# Patient Record
Sex: FEMALE | Race: WHITE | NOT HISPANIC OR LATINO | Employment: PART TIME | URBAN - METROPOLITAN AREA
[De-identification: names, ages, dates, MRNs, and addresses within clinical notes are randomized per-mention and may not be internally consistent; named-entity substitution may affect disease eponyms.]

---

## 2021-08-11 ENCOUNTER — TRANSFERRED RECORDS (OUTPATIENT)
Dept: HEALTH INFORMATION MANAGEMENT | Facility: CLINIC | Age: 66
End: 2021-08-11

## 2021-08-11 DIAGNOSIS — Z11.59 ENCOUNTER FOR SCREENING FOR OTHER VIRAL DISEASES: ICD-10-CM

## 2021-08-13 ENCOUNTER — LAB (OUTPATIENT)
Dept: LAB | Facility: CLINIC | Age: 66
End: 2021-08-13
Attending: INTERNAL MEDICINE
Payer: COMMERCIAL

## 2021-08-13 ENCOUNTER — TELEPHONE (OUTPATIENT)
Dept: SURGERY | Facility: CLINIC | Age: 66
End: 2021-08-13

## 2021-08-13 DIAGNOSIS — Z11.59 ENCOUNTER FOR SCREENING FOR OTHER VIRAL DISEASES: ICD-10-CM

## 2021-08-13 PROCEDURE — U0003 INFECTIOUS AGENT DETECTION BY NUCLEIC ACID (DNA OR RNA); SEVERE ACUTE RESPIRATORY SYNDROME CORONAVIRUS 2 (SARS-COV-2) (CORONAVIRUS DISEASE [COVID-19]), AMPLIFIED PROBE TECHNIQUE, MAKING USE OF HIGH THROUGHPUT TECHNOLOGIES AS DESCRIBED BY CMS-2020-01-R: HCPCS

## 2021-08-13 PROCEDURE — U0005 INFEC AGEN DETEC AMPLI PROBE: HCPCS

## 2021-08-13 NOTE — TELEPHONE ENCOUNTER
Per Dr. Wolf's faxed request, called Khloe to make an appointment for her to see Dr. Viera for surgical consult.  Khloe has been recently diagnosed with Her 2 + breast cancer.  She had her work up at [facility] in [place].  She did meet with Dr. Frank at [facility] for surgical consult but prefers to keep her care closer to home.  She is having her port placed by IR on Tuesday, 8-17-21.  Made her an appointment to come see Dr. Viera for surgical consult on Thursday, 8-19-21 at 0940.  Told her she may bring someone with to her consult with Dr. Viera, masks required.

## 2021-08-14 LAB — SARS-COV-2 RNA RESP QL NAA+PROBE: NEGATIVE

## 2021-08-16 NOTE — H&P
Interventional Radiology - History and Physical  8/16/2021    Procedure Requested: Port placement  Requesting Provider: Brandon Wolf MD    HPI: Khloe Becker is a 65 year old female with a recent diagnosis of LEFT sided ductal breast carcinoma.    Presents today for a port placement.    Imaging:   N/A    NPO Status: MN  Anticoagulation/Antiplatelets/Bleeding tendencies: MN  Antibiotics: Ancef 2 g IV x1 ordered for procedure    Review of Systems: A comprehensive 10-point review of systems was performed. All systems were reviewed and negative with exception to those reported in the HPI.    PMH:  Past Medical History:   Diagnosis Date     Breast cancer (H)     left     Hypertension      Obese        PSH:  Past Surgical History:   Procedure Laterality Date     CHOLECYSTECTOMY       HAND SURGERY Left      hysteectomy       TONSILLECTOMY         ALLERGIES:  Allergies   Allergen Reactions     Shellfish-Derived Products Hives     Latex Rash       MEDICATIONS:  Current Outpatient Medications   Medication     busPIRone (BUSPAR) 10 MG tablet     clonazePAM (KLONOPIN) 0.5 MG tablet     fluticasone (VERAMYST) 27.5 MCG/SPRAY nasal spray     furosemide (LASIX) 20 MG tablet     hydrochlorothiazide 10 mg/mL SUSP     multivitamin w/minerals (THERA-VIT-M) tablet     Current Facility-Administered Medications   Medication     lidocaine (LMX4) cream     lidocaine 1 % 0.1-1 mL     sodium chloride (PF) 0.9% PF flush 3 mL     sodium chloride (PF) 0.9% PF flush 3 mL     sodium chloride 0.9% 1000 mL TABLE SOLN     sodium chloride 0.9% infusion         LABS:  No results found for: INR   No results found for: HGB]  No results found for: PLT  No results found for: CR  No results found for: POTASSIUM      EXAM:  BP (!) 172/87 (BP Location: Right arm)   Pulse 74   Temp 98.2  F (36.8  C) (Oral)   Resp 18   SpO2 96%      General:  Stable.  In no acute distress.    Neuro:  A&O x 3. Answers questions appropriately  Resp:  Lungs clear to  auscultation bilaterally. RA.  Unlabored  Cardio:  S1S2 and reg, without murmur, clicks or rubs  Abdomen:  Soft, non-distended, non-tender, positive bowel sounds.  Skin:  Without excoriations, ecchymosis, erythema, lesions or open sores on bilateral chest and neck.    Pre-Sedation Assessment:  Mallampati Airway Classification:  II - Faucial pillars and soft palate may be seen, but uvula is masked by the base of the tongue  Previous reaction to anesthesia/sedation:  No  Sedation plan based on assessment: Moderate (conscious) sedation  ASA Classification: Class 3 - SEVERE SYSTEMIC DISEASE, DEFINITE FUNCTIONAL LIMITATIONS.   Code Status: Full Code intra procedure, per discussion with patient.     ASSESSMENT:  66 yo F presents today for a port placement with sedation 2/2 newly diagnosed LEFT sided breast cancer requiring chemotherapy    PLAN:    Proceed with port placement, with sedation    - No apparent contraindications for right sided port placement based upon chart review, physical exam, and discussion with patient today- prefer RIGHT sided placement given underlying dx  - Ancef per MAR    Procedure, risks/benefits, details, alternatives, and sedation reviewed with patient and she verbalized understanding. All questions answered. OK to proceed with above radiology procedure.       MIR IRVING NP  Interventional Radiology  [phone]

## 2021-08-17 ENCOUNTER — HOSPITAL ENCOUNTER (OUTPATIENT)
Dept: INTERVENTIONAL RADIOLOGY/VASCULAR | Facility: HOSPITAL | Age: 66
Discharge: HOME | End: 2021-08-17
Attending: INTERNAL MEDICINE | Admitting: RADIOLOGY
Payer: COMMERCIAL

## 2021-08-17 VITALS
DIASTOLIC BLOOD PRESSURE: 76 MMHG | SYSTOLIC BLOOD PRESSURE: 144 MMHG | HEART RATE: 75 BPM | OXYGEN SATURATION: 98 % | RESPIRATION RATE: 16 BRPM | TEMPERATURE: 98.3 F

## 2021-08-17 DIAGNOSIS — C50.919 BREAST CANCER, FEMALE (H): ICD-10-CM

## 2021-08-17 PROCEDURE — 99153 MOD SED SAME PHYS/QHP EA: CPT

## 2021-08-17 PROCEDURE — 272N000500 HC NEEDLE CR2

## 2021-08-17 PROCEDURE — 76937 US GUIDE VASCULAR ACCESS: CPT

## 2021-08-17 PROCEDURE — C1788 PORT, INDWELLING, IMP: HCPCS

## 2021-08-17 PROCEDURE — 99152 MOD SED SAME PHYS/QHP 5/>YRS: CPT

## 2021-08-17 PROCEDURE — 36561 INSERT TUNNELED CV CATH: CPT

## 2021-08-17 PROCEDURE — 250N000011 HC RX IP 250 OP 636: Performed by: NURSE PRACTITIONER

## 2021-08-17 PROCEDURE — C1769 GUIDE WIRE: HCPCS

## 2021-08-17 PROCEDURE — 250N000011 HC RX IP 250 OP 636: Performed by: RADIOLOGY

## 2021-08-17 PROCEDURE — 250N000009 HC RX 250: Performed by: NURSE PRACTITIONER

## 2021-08-17 RX ORDER — SODIUM CHLORIDE 9 MG/ML
INJECTION, SOLUTION INTRAVENOUS CONTINUOUS
Status: DISCONTINUED | OUTPATIENT
Start: 2021-08-17 | End: 2021-08-18 | Stop reason: HOSPADM

## 2021-08-17 RX ORDER — CEFAZOLIN SODIUM 2 G/100ML
2 INJECTION, SOLUTION INTRAVENOUS ONCE
Status: COMPLETED | OUTPATIENT
Start: 2021-08-17 | End: 2021-08-17

## 2021-08-17 RX ORDER — NALOXONE HYDROCHLORIDE 0.4 MG/ML
0.2 INJECTION, SOLUTION INTRAMUSCULAR; INTRAVENOUS; SUBCUTANEOUS
Status: DISCONTINUED | OUTPATIENT
Start: 2021-08-17 | End: 2021-08-18 | Stop reason: HOSPADM

## 2021-08-17 RX ORDER — FUROSEMIDE 20 MG
20 TABLET ORAL PRN
COMMUNITY
End: 2022-12-28

## 2021-08-17 RX ORDER — NALOXONE HYDROCHLORIDE 0.4 MG/ML
0.4 INJECTION, SOLUTION INTRAMUSCULAR; INTRAVENOUS; SUBCUTANEOUS
Status: DISCONTINUED | OUTPATIENT
Start: 2021-08-17 | End: 2021-08-18 | Stop reason: HOSPADM

## 2021-08-17 RX ORDER — MULTIPLE VITAMINS W/ MINERALS TAB 9MG-400MCG
1 TAB ORAL DAILY
COMMUNITY

## 2021-08-17 RX ORDER — FLUMAZENIL 0.1 MG/ML
0.2 INJECTION, SOLUTION INTRAVENOUS
Status: DISCONTINUED | OUTPATIENT
Start: 2021-08-17 | End: 2021-08-18 | Stop reason: HOSPADM

## 2021-08-17 RX ORDER — BUSPIRONE HYDROCHLORIDE 10 MG/1
10 TABLET ORAL 2 TIMES DAILY PRN
COMMUNITY
End: 2022-10-19

## 2021-08-17 RX ORDER — CLONAZEPAM 0.5 MG/1
0.5 TABLET ORAL 2 TIMES DAILY PRN
COMMUNITY
End: 2022-04-15

## 2021-08-17 RX ORDER — FENTANYL CITRATE 50 UG/ML
25-50 INJECTION, SOLUTION INTRAMUSCULAR; INTRAVENOUS EVERY 5 MIN PRN
Status: DISCONTINUED | OUTPATIENT
Start: 2021-08-17 | End: 2021-08-18 | Stop reason: HOSPADM

## 2021-08-17 RX ORDER — HEPARIN SODIUM (PORCINE) LOCK FLUSH IV SOLN 100 UNIT/ML 100 UNIT/ML
500 SOLUTION INTRAVENOUS ONCE
Status: COMPLETED | OUTPATIENT
Start: 2021-08-17 | End: 2021-08-17

## 2021-08-17 RX ORDER — LIDOCAINE 40 MG/G
CREAM TOPICAL
Status: DISCONTINUED | OUTPATIENT
Start: 2021-08-17 | End: 2021-08-18 | Stop reason: HOSPADM

## 2021-08-17 RX ADMIN — FENTANYL CITRATE 50 MCG: 50 INJECTION, SOLUTION INTRAMUSCULAR; INTRAVENOUS at 08:24

## 2021-08-17 RX ADMIN — FENTANYL CITRATE 50 MCG: 50 INJECTION, SOLUTION INTRAMUSCULAR; INTRAVENOUS at 08:39

## 2021-08-17 RX ADMIN — MIDAZOLAM HYDROCHLORIDE 1 MG: 1 INJECTION, SOLUTION INTRAMUSCULAR; INTRAVENOUS at 08:22

## 2021-08-17 RX ADMIN — MIDAZOLAM HYDROCHLORIDE 1 MG: 1 INJECTION, SOLUTION INTRAMUSCULAR; INTRAVENOUS at 08:30

## 2021-08-17 RX ADMIN — LIDOCAINE HYDROCHLORIDE 30 ML: 10 INJECTION, SOLUTION INFILTRATION; PERINEURAL at 08:43

## 2021-08-17 RX ADMIN — FENTANYL CITRATE 50 MCG: 50 INJECTION, SOLUTION INTRAMUSCULAR; INTRAVENOUS at 08:34

## 2021-08-17 RX ADMIN — MIDAZOLAM HYDROCHLORIDE 2 MG: 1 INJECTION, SOLUTION INTRAMUSCULAR; INTRAVENOUS at 08:16

## 2021-08-17 RX ADMIN — CEFAZOLIN SODIUM 2 G: 2 INJECTION, SOLUTION INTRAVENOUS at 08:15

## 2021-08-17 RX ADMIN — FENTANYL CITRATE 50 MCG: 50 INJECTION, SOLUTION INTRAMUSCULAR; INTRAVENOUS at 08:18

## 2021-08-17 RX ADMIN — HEPARIN 500 UNITS: 100 SYRINGE at 08:44

## 2021-08-17 NOTE — IP AVS SNAPSHOT
After Visit Summary Template Not Found    This Print Group is only intended to be used in the After Visit Summary and can only be used in a report that uses a released After Visit Summary Template.                       MRN:[id number]                      After Visit Summary   8/17/2021    Khloe Becker    MRN: [id number]           Visit Information        Provider Department      8/17/2021  8:00 AM [facility]OLOG[facility] NURSE; [facility] Interventional Radiology           Review of your medicines      UNREVIEWED medicines. Ask your doctor about these medicines       Dose / Directions   busPIRone 10 MG tablet  Commonly known as: BUSPAR      Dose: 10 mg  Take 10 mg by mouth 2 times daily as needed  Refills: 0     clonazePAM 0.5 MG tablet  Commonly known as: klonoPIN      Dose: 0.5 mg  Take 0.5 mg by mouth 2 times daily as needed for anxiety  Refills: 0     fluticasone 27.5 MCG/SPRAY nasal spray  Commonly known as: VERAMYST      Dose: 2 spray  Spray 2 sprays into both nostrils daily  Refills: 0     furosemide 20 MG tablet  Commonly known as: LASIX      Dose: 20 mg  Take 20 mg by mouth daily  Refills: 0     hydrochlorothiazide 10 mg/mL Susp      Dose: 25 mg  Take 25 mg by mouth daily  Refills: 0     multivitamin w/minerals tablet      Dose: 1 tablet  Take 1 tablet by mouth daily  Refills: 0              Protect others around you: Learn how to safely use, store and throw away your medicines at www.disposemymeds.org.       Follow-ups after your visit       Your next 10 appointments already scheduled    Aug 19, 2021  9:40 AM  (Arrive by 9:25 AM)  New Visit with Ana Viera MD  [facility] ([facility] ) [address] [place] [id number]  [phone]      Aug 25, 2021 10:00 AM  ECHO COMPLETE with [facility] ECHO STAFF, [facility] OP 1  [facility] ([facility] ) [address]  [facility] [address] [phone]         Care Instructions       Further instructions from your care team       Port Placement Procedure Discharge Instructions:  You had a port placed. A port is a small medical device that is placed under the skin and is connected to a vein with a catheter (thin, flexible tube). Ports can be used to administer IV medications, fluids or blood products (including chemotherapy) or for blood lab draws. Please follow the below instructions:  Care Instructions:  - If you received sedation for your procedure, do not drive or operate heavy machinery for the rest of the day.  - You may shower beginning post procedure day #1.  Do not scrub site until well healed; pat dry.  - Avoid submerging the port site under water (tub baths, Jacuzzis, hot tubs and pools) for 10 days or until glue falls off.  - You may take over the counter pain medication for discomfort. Follow the package directions.  - Avoid heavy lifting (greater than 10 pounds) and strenuous activities for 3 days.   - If you experience significant bleeding at site, apply pressure with hands above the clavicle bone, sit upright and seek immediate medical assistance.    Call [facility] ([phone]) if you experience the following:   - Uncontrolled bleeding from port site  - Fever (greater than 101 F (38.3C))  - Purulent (yellow/green/foul smelling) drainage from port insertion site.  - Increasing pain at port site  - Increasing redness at port site    Additional Information About Your Visit       [employer] Information    [facility] gives you secure access to your electronic health record. If you see a primary care provider, you can also send messages to your care team and make appointments. If you have questions, please call your primary care clinic.  If you do not have a primary care provider, please call [phone] and they will assist you.       Care EveryWhere ID    This is your Care EveryWhere ID. This could  be used by other organizations to access your [facility] medical records  [id number]       Your Vitals Were  Most recent update: 8/17/2021  9:03 AM    Blood Pressure   146/68    Pulse   67    Temperature   98.3  F (36.8  C)    Respirations   16    Pulse Oximetry   98%          Primary Care Provider Office Phone # Fax #    Daniela Haile PA-C [phone]      Equal Access to Services    KAYLAH AGUILERA : Hadii aad ku hadasho Soomaali, waaxda luqadaha, qaybta kaalmada adeegyada, waxay idiin hayaan adeeg kharash la'aan . So [facility] [phone].    ATENCIÓN: Si habla español, tiene a cummins disposición servicios gratuitos de asistencia lingüística. Llame al [phone].    We comply with applicable federal and state civil rights laws, including the [place] Human Rights Act. We do not discriminate on the basis of race, color, creed, Latter-day, national origin, marital status, age, disability, sex, sexual orientation, or gender identity.    If you would like an itemization of your charges they will now be available in [facility] 30 days after discharge. To access the itemized statements in [facility] go to billing/billing summary. From there select view account. There will be multiple tabs showing an overview of your account, detail, payments, and communications. From the communications tab you can see your monthly statements, your itemized statements, and any billing letters generated for your account. If you do not have a [facility] account and need help getting access please contact [facility] support at [phone].  If you would prefer to have your itemized statements mailed please contact our automated itemized bill request line at [phone] option  2.       Thank you!    Thank you for choosing [facility] for your care. Our goal is always to provide you with excellent care. Hearing back from our patients is one way we can continue to improve our services. Please take a few minutes to complete the written survey that  you may receive in the mail after you visit with us. Thank you!            Medication List      ASK your doctor about these medications          Morning Afternoon Evening Bedtime As Needed    busPIRone 10 MG tablet  Also known as: BUSPAR  INSTRUCTIONS: Take 10 mg by mouth 2 times daily as needed                     clonazePAM 0.5 MG tablet  Also known as: klonoPIN  INSTRUCTIONS: Take 0.5 mg by mouth 2 times daily as needed for anxiety                     fluticasone 27.5 MCG/SPRAY nasal spray  Also known as: VERAMYST  INSTRUCTIONS: Spray 2 sprays into both nostrils daily                     furosemide 20 MG tablet  Also known as: LASIX  INSTRUCTIONS: Take 20 mg by mouth daily                     hydrochlorothiazide 10 mg/mL Susp  INSTRUCTIONS: Take 25 mg by mouth daily                     multivitamin w/minerals tablet  INSTRUCTIONS: Take 1 tablet by mouth daily

## 2021-08-17 NOTE — PRE-PROCEDURE
GENERAL PRE-PROCEDURE:   Date/Time:  8/17/2021 8:00 AM    Risks and benefits: Risks, benefits and alternatives were discussed    Consent given by:  Patient  Patient states understanding of procedure being performed: Yes    Patient's understanding of procedure matches consent: Yes    Procedure consent matches procedure scheduled: Yes    Expected level of sedation:  Moderate  Appropriately NPO:  Yes  Mallampati  :  Grade 2- soft palate, base of uvula, tonsillar pillars, and portion of posterior pharyngeal wall visible  Lungs:  Lungs clear with good breath sounds bilaterally  Heart:  Normal heart sounds and rate  History & Physical reviewed:  History and physical reviewed and no updates needed  Statement of review:  I have reviewed the lab findings, diagnostic data, medications, and the plan for sedation

## 2021-08-17 NOTE — DISCHARGE INSTRUCTIONS
Port Placement Procedure Discharge Instructions:  You had a port placed. A port is a small medical device that is placed under the skin and is connected to a vein with a catheter (thin, flexible tube). Ports can be used to administer IV medications, fluids or blood products (including chemotherapy) or for blood lab draws. Please follow the below instructions:  Care Instructions:  - If you received sedation for your procedure, do not drive or operate heavy machinery for the rest of the day.  - You may shower beginning post procedure day #1.  Do not scrub site until well healed; pat dry.  - Avoid submerging the port site under water (tub baths, Jacuzzis, hot tubs and pools) for 10 days or until glue falls off.  - You may take over the counter pain medication for discomfort. Follow the package directions.  - Avoid heavy lifting (greater than 10 pounds) and strenuous activities for 3 days.   - If you experience significant bleeding at site, apply pressure with hands above the clavicle bone, sit upright and seek immediate medical assistance.    Call [facility] ([phone]) if you experience the following:   - Uncontrolled bleeding from port site  - Fever (greater than 101 F (38.3C))  - Purulent (yellow/green/foul smelling) drainage from port insertion site.  - Increasing pain at port site  - Increasing redness at port site

## 2021-08-17 NOTE — IP AVS SNAPSHOT
[facility] Interventional Radiology  [address] [place] [id number]  Phone: [phone]  Fax: [phone]                                    After Visit Summary   8/17/2021    Khloe Becker    MRN: [id number]           After Visit Summary Signature Page    I have received my discharge instructions, and my questions have been answered. I have discussed any challenges I see with this plan with the nurse or doctor.    ..........................................................................................................................................  Patient/Patient Representative Signature      ..........................................................................................................................................  Patient Representative Print Name and Relationship to Patient    ..................................................               ................................................  Date                                   Time    ..........................................................................................................................................  Reviewed by Signature/Title    ...................................................              ..............................................  Date                                               Time          22EPIC Rev 08/18

## 2021-08-17 NOTE — PROGRESS NOTES
Pt given discharge instructions without questions or concerns and pt ambulates from ER without difficulty with family driving.

## 2021-08-17 NOTE — PRE-PROCEDURE
GENERAL PRE-PROCEDURE:   Procedure:  Port placement  Date/Time:  8/17/2021 7:54 AM    Written consent obtained?: Yes    Risks and benefits: Risks, benefits and alternatives were discussed    Consent given by:  Patient  Patient states understanding of procedure being performed: Yes    Patient's understanding of procedure matches consent: Yes    Procedure consent matches procedure scheduled: Yes    Expected level of sedation:  Moderate  Appropriately NPO:  Yes  Mallampati  :  Grade 2- soft palate, base of uvula, tonsillar pillars, and portion of posterior pharyngeal wall visible  Lungs:  Lungs clear with good breath sounds bilaterally  Heart:  Normal heart sounds and rate  History & Physical reviewed:  History and physical reviewed and no updates needed  Statement of review:  I have reviewed the lab findings, diagnostic data, medications, and the plan for sedation

## 2021-08-17 NOTE — PROCEDURES
[facility]    Procedure: IR Procedure Note    Date/Time: 8/17/2021 8:55 AM  Performed by: Claudio Carrillo MD  Authorized by: Claudio Carrillo MD     UNIVERSAL PROTOCOL   Site Marked: NA  Prior Images Obtained and Reviewed:  Yes  Required items: Required blood products, implants, devices and special equipment available    Patient identity confirmed:  Verbally with patient, arm band, provided demographic data and hospital-assigned identification number  Patient was reevaluated immediately before administering moderate or deep sedation or anesthesia  Confirmation Checklist:  Patient's identity using two indicators, relevant allergies, procedure was appropriate and matched the consent or emergent situation and correct equipment/implants were available  Time out: Immediately prior to the procedure a time out was called    Universal Protocol: the Joint Commission Universal Protocol was followed    Preparation: Patient was prepped and draped in usual sterile fashion           ANESTHESIA    Anesthesia: Local infiltration  Local Anesthetic:  Lidocaine 1% without epinephrine      SEDATION    Patient Sedated: Yes    Vital signs: Vital signs monitored during sedation    See dictated procedure note for full details.  Findings: Port placement    Specimens: none    Complications: None    Condition: Stable    Plan: Interventional Radiology Post-Procedure Note    Procedure: Chest port placement.    Attending: Claudio Carrillo,    Findings: Placement of a chest port.  Please see the final report for specific details.    Plan: The chest port is ready for use.    PROCEDURE   Patient Tolerance:  Patient tolerated the procedure well with no immediate complications    Length of time physician/provider present for 1:1 monitoring during sedation: 30

## 2021-08-19 ENCOUNTER — OFFICE VISIT (OUTPATIENT)
Dept: SURGERY | Facility: CLINIC | Age: 66
End: 2021-08-19
Payer: COMMERCIAL

## 2021-08-19 VITALS — HEIGHT: 64 IN | RESPIRATION RATE: 16 BRPM | WEIGHT: 243 LBS | BODY MASS INDEX: 41.48 KG/M2

## 2021-08-19 DIAGNOSIS — E66.01 MORBID OBESITY (H): ICD-10-CM

## 2021-08-19 DIAGNOSIS — C50.812 MALIGNANT NEOPLASM OF OVERLAPPING SITES OF LEFT BREAST IN FEMALE, ESTROGEN RECEPTOR POSITIVE (H): Primary | ICD-10-CM

## 2021-08-19 DIAGNOSIS — Z17.0 MALIGNANT NEOPLASM OF OVERLAPPING SITES OF LEFT BREAST IN FEMALE, ESTROGEN RECEPTOR POSITIVE (H): Primary | ICD-10-CM

## 2021-08-19 PROCEDURE — 99204 OFFICE O/P NEW MOD 45 MIN: CPT | Mod: ZF | Performed by: SPECIALIST

## 2021-08-19 PROCEDURE — G0463 HOSPITAL OUTPT CLINIC VISIT: HCPCS

## 2021-08-19 RX ORDER — DEXAMETHASONE 4 MG/1
TABLET ORAL
COMMUNITY
Start: 2021-08-18 | End: 2022-01-14

## 2021-08-19 RX ORDER — OMEGA-3/DHA/EPA/FISH OIL 300-1000MG
1 CAPSULE ORAL
COMMUNITY

## 2021-08-19 RX ORDER — IRBESARTAN 75 MG/1
75 TABLET ORAL PRN
COMMUNITY
Start: 2020-12-29

## 2021-08-19 RX ORDER — PROCHLORPERAZINE MALEATE 10 MG
10 TABLET ORAL EVERY 6 HOURS PRN
COMMUNITY
Start: 2021-08-18 | End: 2023-05-17 | Stop reason: ALTCHOICE

## 2021-08-19 ASSESSMENT — MIFFLIN-ST. JEOR: SCORE: 1627.24

## 2021-08-19 NOTE — PROGRESS NOTES
"This is a 65 year old  female who I'm asked to see by Daniela Haile for evaluation of a left breast cancer.  This was picked up  on screening mammogram.   The patient cannot feel a mass.  A needle biopsy was done of 2 different areas which shows an invasive ductal carcinoma in one area and then DCIS and another area.  It is estrogen receptor positive , progesterone receptor positive  and HER-2 positive.     She has no family history of breast cancer.      PAST MEDICAL HISTORY:  Past Medical History:   Diagnosis Date     Breast cancer (H)     left     Hypertension      Obese      Past Surgical History:   Procedure Laterality Date     CHOLECYSTECTOMY       HAND SURGERY Left      hysteectomy       IR CHEST PORT PLACEMENT > 5 YRS OF AGE  8/17/2021     TONSILLECTOMY         Medications:    Current Outpatient Medications:      busPIRone (BUSPAR) 10 MG tablet, Take 10 mg by mouth 2 times daily as needed, Disp: , Rfl:      clonazePAM (KLONOPIN) 0.5 MG tablet, Take 0.5 mg by mouth 2 times daily as needed for anxiety, Disp: , Rfl:      fluticasone (VERAMYST) 27.5 MCG/SPRAY nasal spray, Spray 2 sprays into both nostrils daily, Disp: , Rfl:      furosemide (LASIX) 20 MG tablet, Take 20 mg by mouth daily, Disp: , Rfl:      hydrochlorothiazide 10 mg/mL SUSP, Take 25 mg by mouth daily, Disp: , Rfl:      multivitamin w/minerals (THERA-VIT-M) tablet, Take 1 tablet by mouth daily, Disp: , Rfl:     Allergies:  Allergies   Allergen Reactions     Shellfish-Derived Products Hives     Latex Rash        Social History:  Social History     Tobacco Use     Smoking status: Never Smoker     Smokeless tobacco: Never Used   Vaping Use     Vaping Use: Never used   Substance Use Topics     Alcohol use: Not on file     Drug use: Never        ROS:  A comprehensive review of systems was negative.    Physical Exam  Resp 16   Ht 1.626 m (5' 4\")   Wt 110.2 kg (243 lb)   Breastfeeding No   BMI 41.71 kg/m    General:alert, appears stated age, " cooperative and morbidly obese  Lungs:clear to auscultation bilaterally  CV:regular rate and rhythm  Breasts: Very large pendulous breasts.  No palpable masses on the right.  Clearly palpable mass in the upper outer quadrant of the left breast.  No other palpable masses.  No skin changes.  Lymph Nodes:enlarged lymph nodes in the left axilla  Neuro:Grossly normal  Musculoskeletal: Normal range of motion of her upper extremities.  Skin: Normal skin turgor.    Reviewed her mammograms and ultrasound and pathology.     Impression: Left Breast Cancer. Clinically T2, N1.  Discussed the surgical options for treatment of breast cancer which generally are a lumpectomy (partial mastectomy) combined with radiation versus a mastectomy.  Explained that the survival benefit is the same for both.  The difference is in local recurrence risk.  The patient is a Poor candidate for a lumpectomy.  Because of the multifocal nature and in the very large area of calcifications on the left breast, she will need a mastectomy.  I am actually somewhat of a second opinion.  The patient has already visited with Dr. Frank at [facility] and was told exactly the same thing.  Because she is HER-2 positive it was recommended she do neoadjuvant chemotherapy which is exactly what I would have also recommended.  The patient already understands all of this.  And in fact is starting chemotherapy later this week.  We went over the different options for reconstruction after mastectomy.  She also had questions about doing the opposite side.  I explained that whether or not she does the right side is her choice.  It does not improve her survival.  From a symmetry standpoint, she may want to consider at least a breast reduction on the other side.  I suggested she start talking to a plastic surgeon so she can start getting educated about her options.    Plan: The patient will follow up with me in November when she still has 1 or 2 doses of chemotherapy left.  We will  then make further plans for her surgery.  Explained that most of her surgeries are done as an outpatient.  If she does end up having a tissue flap which she is interested in because she does not want an implant, then I told her that would need to be delayed because she will need radiation afterwards.

## 2021-08-19 NOTE — NURSING NOTE
"Khloe presents to [facility] today for a surgical consult with Dr. Viera  regarding her newly diagnosed left breast cancer.  She is accompanied by her friend for consult.  RN assessment and EMR update. Resp 16   Ht 1.626 m (5' 4\")   Wt 110.2 kg (243 lb)   Breastfeeding No   BMI 41.71 kg/m    Patient given a Breast Cancer Packet, contents reviewed.  She met with Dr. Viera  see dictation for details of visit. She will plan to continue with her NAC with Dr. Wolf and come see Dr. Viera when getting close to her last dose. Support provided, invited calls.  RN time 18 mins.  "

## 2021-08-25 ENCOUNTER — HOSPITAL ENCOUNTER (OUTPATIENT)
Dept: CARDIOLOGY | Facility: HOSPITAL | Age: 66
Discharge: HOME | End: 2021-08-25
Attending: INTERNAL MEDICINE | Admitting: INTERNAL MEDICINE
Payer: COMMERCIAL

## 2021-08-25 DIAGNOSIS — Z01.818 EXAMINATION PRIOR TO CHEMOTHERAPY: ICD-10-CM

## 2021-08-25 LAB — LVEF ECHO: NORMAL

## 2021-08-25 PROCEDURE — 93306 TTE W/DOPPLER COMPLETE: CPT | Mod: 26 | Performed by: INTERNAL MEDICINE

## 2021-08-25 PROCEDURE — 999N000208 ECHOCARDIOGRAM COMPLETE

## 2021-08-25 PROCEDURE — 255N000002 HC RX 255 OP 636: Performed by: INTERNAL MEDICINE

## 2021-08-25 RX ADMIN — PERFLUTREN 3 ML: 6.52 INJECTION, SUSPENSION INTRAVENOUS at 11:00

## 2021-08-29 ENCOUNTER — HEALTH MAINTENANCE LETTER (OUTPATIENT)
Age: 66
End: 2021-08-29

## 2021-09-02 ENCOUNTER — TRANSFERRED RECORDS (OUTPATIENT)
Dept: HEALTH INFORMATION MANAGEMENT | Facility: CLINIC | Age: 66
End: 2021-09-02

## 2021-10-24 ENCOUNTER — HEALTH MAINTENANCE LETTER (OUTPATIENT)
Age: 66
End: 2021-10-24

## 2021-11-04 ENCOUNTER — TRANSFERRED RECORDS (OUTPATIENT)
Dept: HEALTH INFORMATION MANAGEMENT | Facility: CLINIC | Age: 66
End: 2021-11-04
Payer: COMMERCIAL

## 2021-11-22 ENCOUNTER — TELEPHONE (OUTPATIENT)
Dept: SURGERY | Facility: CLINIC | Age: 66
End: 2021-11-22
Payer: COMMERCIAL

## 2021-11-22 NOTE — TELEPHONE ENCOUNTER
Patient called, needing help setting up a plastic surgeon consult and follow up NAC appointment with Dr. Viera.  She has two chemo appointments left.  She will see Dr. Alamo on 12-8-21 and then Dr. Viera on 12-10-21.  Support provided, invited calls.

## 2021-11-26 ENCOUNTER — TRANSFERRED RECORDS (OUTPATIENT)
Dept: HEALTH INFORMATION MANAGEMENT | Facility: CLINIC | Age: 66
End: 2021-11-26
Payer: COMMERCIAL

## 2021-12-08 ENCOUNTER — TELEPHONE (OUTPATIENT)
Dept: SURGERY | Facility: CLINIC | Age: 66
End: 2021-12-08
Payer: COMMERCIAL

## 2021-12-08 NOTE — TELEPHONE ENCOUNTER
Khloe called, she needed to reschedule her Dr. Alamo and Dr. Viera, scheduled for this week as she is having chemo side effects.  Appts cancelled, rescheduled her for next week for both appts.  Support provided.

## 2021-12-13 ENCOUNTER — HOSPITAL ENCOUNTER (OUTPATIENT)
Dept: CARDIOLOGY | Facility: HOSPITAL | Age: 66
Discharge: HOME | End: 2021-12-13
Attending: INTERNAL MEDICINE | Admitting: INTERNAL MEDICINE
Payer: COMMERCIAL

## 2021-12-13 DIAGNOSIS — Z01.818 EXAMINATION PRIOR TO CHEMOTHERAPY: ICD-10-CM

## 2021-12-13 PROCEDURE — 255N000002 HC RX 255 OP 636: Performed by: INTERNAL MEDICINE

## 2021-12-13 PROCEDURE — 999N000208 ECHOCARDIOGRAM COMPLETE

## 2021-12-13 PROCEDURE — 93306 TTE W/DOPPLER COMPLETE: CPT | Mod: 26 | Performed by: INTERNAL MEDICINE

## 2021-12-13 PROCEDURE — C8929 TTE W OR WO FOL WCON,DOPPLER: HCPCS

## 2021-12-13 RX ADMIN — PERFLUTREN 2 ML: 6.52 INJECTION, SUSPENSION INTRAVENOUS at 15:50

## 2021-12-15 ENCOUNTER — OFFICE VISIT (OUTPATIENT)
Dept: SURGERY | Facility: CLINIC | Age: 66
End: 2021-12-15
Attending: PHYSICIAN ASSISTANT
Payer: COMMERCIAL

## 2021-12-15 VITALS — HEIGHT: 64 IN | RESPIRATION RATE: 16 BRPM | WEIGHT: 225 LBS | BODY MASS INDEX: 38.41 KG/M2

## 2021-12-15 DIAGNOSIS — N64.81 PTOSIS OF BOTH BREASTS: ICD-10-CM

## 2021-12-15 DIAGNOSIS — N62 MACROMASTIA: ICD-10-CM

## 2021-12-15 DIAGNOSIS — Z17.0 MALIGNANT NEOPLASM OF UPPER-OUTER QUADRANT OF LEFT BREAST IN FEMALE, ESTROGEN RECEPTOR POSITIVE (H): Primary | ICD-10-CM

## 2021-12-15 DIAGNOSIS — C50.412 MALIGNANT NEOPLASM OF UPPER-OUTER QUADRANT OF LEFT BREAST IN FEMALE, ESTROGEN RECEPTOR POSITIVE (H): Primary | ICD-10-CM

## 2021-12-15 PROCEDURE — G0463 HOSPITAL OUTPT CLINIC VISIT: HCPCS

## 2021-12-15 PROCEDURE — 99204 OFFICE O/P NEW MOD 45 MIN: CPT | Performed by: PLASTIC SURGERY

## 2021-12-15 RX ORDER — LIDOCAINE/PRILOCAINE 2.5 %-2.5%
CREAM (GRAM) TOPICAL
COMMUNITY
Start: 2021-08-18 | End: 2023-02-28

## 2021-12-15 RX ORDER — OLANZAPINE 2.5 MG/1
TABLET, FILM COATED ORAL
COMMUNITY
Start: 2021-10-29 | End: 2022-01-14

## 2021-12-15 RX ORDER — CITALOPRAM HYDROBROMIDE 20 MG/1
TABLET ORAL
COMMUNITY
Start: 2021-11-19 | End: 2022-01-14

## 2021-12-15 RX ORDER — ONDANSETRON 8 MG/1
TABLET, ORALLY DISINTEGRATING ORAL
COMMUNITY
Start: 2021-10-19 | End: 2022-04-08

## 2021-12-15 RX ORDER — POTASSIUM CHLORIDE 1500 MG/1
TABLET, EXTENDED RELEASE ORAL
COMMUNITY
Start: 2021-12-02 | End: 2021-12-30

## 2021-12-15 ASSESSMENT — MIFFLIN-ST. JEOR: SCORE: 1540.59

## 2021-12-15 NOTE — PROGRESS NOTES
Chief complaint:  Left breast cancer    History of present illness:  This is a 66 year old lady who is a patient of Dr. Viera, with newly diagnosed left breast invasive ductal carcinoma.  Patient discovered the cancer by herself during self breast examination.  This was later confirmed with an ultrasound core needle biopsy that showed invasive ductal carcinoma, ductal carcinoma in situ, with ER positive receptors, RI positive receptors and HER-2 positive.  She was also found to have a left axillary metastatic lymph node.  Patient has been receiving chemotherapy.    Patient is referred to me to discuss breast reconstruction options.    Past medical history:  Past Medical History:   Diagnosis Date     Breast cancer (H)     left     Hypertension      Obese        Past surgical history:  Laparoscopic cholecystectomy, left hand ring finger and small finger metacarpal open reduction internal fixation, total abdominal hysterectomy, tonsillectomy, right chest Mediport placement.    Allergies:  Shrimp, latex, lisinopril.  Ibuprofen gave her peptic ulcer disease.    Medications:    Current Outpatient Medications:      busPIRone (BUSPAR) 10 MG tablet, Take 10 mg by mouth 2 times daily as needed, Disp: , Rfl:      cholecalciferol 25 MCG (1000 UT) TABS, Take 1,000 Units by mouth, Disp: , Rfl:      citalopram (CELEXA) 20 MG tablet, , Disp: , Rfl:      clonazePAM (KLONOPIN) 0.5 MG tablet, Take 0.5 mg by mouth 2 times daily as needed for anxiety, Disp: , Rfl:      dexamethasone (DECADRON) 4 MG tablet, , Disp: , Rfl:      fish oil-omega-3 fatty acids 1000 MG capsule, , Disp: , Rfl:      fluticasone (VERAMYST) 27.5 MCG/SPRAY nasal spray, Spray 2 sprays into both nostrils daily, Disp: , Rfl:      furosemide (LASIX) 20 MG tablet, Take 20 mg by mouth daily, Disp: , Rfl:      hydrochlorothiazide 10 mg/mL SUSP, Take 25 mg by mouth daily, Disp: , Rfl:      irbesartan (AVAPRO) 75 MG tablet, TAKE 1 TABLET BY MOUTH ONCE DAILY IN THE EVENING,  "Disp: , Rfl:      lidocaine-prilocaine (EMLA) 2.5-2.5 % external cream, , Disp: , Rfl:      multivitamin w/minerals (THERA-VIT-M) tablet, Take 1 tablet by mouth daily, Disp: , Rfl:      OLANZapine (ZYPREXA) 2.5 MG tablet, TAKE 1 TABLET BY MOUTH STARTING THE NIGHT BEFORE CHEMOTHERAPY AND CONTINUE FOR 6 DAYS AS NEEDED, Disp: , Rfl:      ondansetron (ZOFRAN-ODT) 8 MG ODT tab, DISSOLVE 1 TABLET ON THE TONGUE EVERY 8 HOURS AS NEEDED FOR NAUSEA OR VOMITING, Disp: , Rfl:      potassium chloride ER (K-TAB) 20 MEQ CR tablet, TAKE 2 TABLETS BY MOUTH DAILY FOR 1 WEEK, Disp: , Rfl:      prochlorperazine (COMPAZINE) 10 MG tablet, , Disp: , Rfl:     Family history:  1 brother with head and neck cancer.    GYN history:  G0, P0    Social History:  Denies tobacco denies alcohol    Review of systems:  General ROS: No complaints or constitutional symptoms  Skin: No complaints or symptoms   Hematologic/Lymphatic: No symptoms or complaints  Psychiatric: No symptoms or complaints  Endocrine: No excessive fatigue, no hypermetabolic symptoms reported  Respiratory ROS: No cough, shortness of breath, or wheezing  Cardiovascular ROS: No chest pain or dyspnea on exertion  Breast ROS: Denies mastalgia, denies nipple inversion, denies nipple discharge, denies peau d'orange  Gastrointestinal ROS: Patient is complaining of diarrhea secondary to her chemotherapy treatments  Musculoskeletal ROS: No recent injuries reported  Neurological ROS: No focal neurologic defects reported.      Physical exam:  Resp 16   Ht 1.626 m (5' 4\")   Wt 102.1 kg (225 lb)   BMI 38.62 kg/m    General: Alert, cooperative, appears stated age   Skin: Skin color, texture, turgor normal, no rashes or lesions   Lymphatic: No obvious adenopathy, no swelling   Eyes: No scleral icterus, pupils equal  HENT: No traumatic injury to the head or face, no gross abnormalities  Lungs: Normal respiratory effort, breath sounds equal bilaterally  Heart: Regular rate and rhythm  Breasts: " Patient presents with bilateral grade 3 ptosis and significant loss of volume on both breasts.  There are no nipple inversion or nipple discharge or peau d'orange.  On the right breast the sternal notch to nipple distance is 33 cm, nipple to inframammary fold is 13 cm, breast diameter 18 cm.  On the left breast the sternal notch to nipple 31 cm, nipple to IMF is 14 cm, diameter 17 cm.  There are no palpable breast masses, no axillary lymphadenopathies.  Abdomen: Soft, non-distended and non-tender to palpation  Neurologic: Grossly intact                      Pathology:  Left breast invasive ductal carcinoma, ductal carcinoma in situ, ER positive, SD positive, HER-2 positive.    Assessment:  This is a 66 years old lady with left breast invasive ductal carcinoma requiring neoadjuvant chemotherapy.    Patient is a candidate for left breast skin sparing mastectomy.  However I still need to receive confirmation from Dr. Viera as far of her definitive surgical treatment.    Patient with grade 3 ptosis bilaterally, and previous history of macromastia currently wearing a 40 double D bra.    PLAN:   I had a lengthy discussion with Ms. puga and we discussed about autologous versus implant-based reconstruction.  Patient is not interested in autologous breast reconstruction.    At this time she seems to be more inclined towards implant-based reconstruction.  However, she have not ruled out opting out from any reconstruction.  She mention that she have thought about being flat on that side and use an external prosthesis.  Patient told me that she is still thinking about different options.    We did discuss implant-based reconstruction, direct to implant in the prepectoral space while utilizing Wise pattern reduction mastectomy.  She seems to me interested on the left breast reconstruction and then wait until the summer for contralateral right breast reduction.  She told me that she is not very interested in having bilateral  mastectomies at this time.  She told me that she wants to recover from her left breast cancer and potentially from the left breast reconstruction before doing anything else with her right breast.    I discussed with her that we will place a permanent implant if she decides to have the breast reconstruction.  She wants to be a smaller and I offered her Allergan, full profile, cohesive implant, and sizers using 450 cc, 415 cc, and 385 cc.  Again we will utilize sizers as well of dermal flap from the inferior pedicle area of a Mathews pattern mastectomy if she decides to go with the reconstruction.  I also discussed with her that if the blood supply to the skin mastectomy flap is poor then we will have to place a tissue expander, fill it up in the office during multiple visits, followed by second stage breast reconstruction with a permanent implant around 3 months later.  I told her that she needs to be aware of this.    We also discussed risks of surgery and they include but are not limited to scarring, infection, loss of implant, asymmetry, permanent anesthesia or paresthesia on the left breast, loss of the nipple areolar complex, tattooing for the nipple areolar complex, need for further surgeries.    At this time patient told me that she to think about all of this and she is going to have a consultation again with Dr. Viera to go over her treatment.    I will wait for Dr. Viera's recommendations and for the patient's decision.  We will coordinate accordingly.    Time spent examining the patient, taking pictures and reviewing her chart 45 minutes.    Jorge Luis Alamo MD MD, FACS   Diplomate American Board of Plastic Surgery  Diplomate American Board of Surgery  [facility]  Division of Plastic & Reconstructive Surgery  Office: [phone]   12/15/2021 at 11:25 AM

## 2021-12-15 NOTE — NURSING NOTE
"Khloe presents to [facility] today for a reconstruction consult with Dr. Alamo.  She has been receiving NAC.  She follows with Dr. Wolf for medical oncology.  She has her last chemotherapy next week.  RN assessment and EMR update.  Resp 16   Ht 1.626 m (5' 4\")   Wt 102.1 kg (225 lb)   BMI 38.62 kg/m  .  Patient met with Dr. Alamo and his assistant, Mela.  See dictation for details of visit.  Patient rescheduled her follow up surgical consult with Dr. Viera to 12-21-21.  She is unsure of a surgery plan at this time and hopes to decide it after that appointment.  Support provided, invited calls. RN time 15 mins  "

## 2021-12-17 ENCOUNTER — TRANSFERRED RECORDS (OUTPATIENT)
Dept: HEALTH INFORMATION MANAGEMENT | Facility: CLINIC | Age: 66
End: 2021-12-17
Payer: COMMERCIAL

## 2021-12-30 ENCOUNTER — OFFICE VISIT (OUTPATIENT)
Dept: SURGERY | Facility: CLINIC | Age: 66
End: 2021-12-30
Attending: PHYSICIAN ASSISTANT
Payer: COMMERCIAL

## 2021-12-30 DIAGNOSIS — C50.412 MALIGNANT NEOPLASM OF UPPER-OUTER QUADRANT OF LEFT BREAST IN FEMALE, ESTROGEN RECEPTOR POSITIVE (H): Primary | ICD-10-CM

## 2021-12-30 DIAGNOSIS — Z17.0 MALIGNANT NEOPLASM OF UPPER-OUTER QUADRANT OF LEFT BREAST IN FEMALE, ESTROGEN RECEPTOR POSITIVE (H): Primary | ICD-10-CM

## 2021-12-30 PROCEDURE — G0463 HOSPITAL OUTPT CLINIC VISIT: HCPCS

## 2021-12-30 PROCEDURE — 99215 OFFICE O/P EST HI 40 MIN: CPT | Performed by: SPECIALIST

## 2021-12-30 NOTE — PROGRESS NOTES
History of Present Illness:  This is a 66 year old y/o female who comes in for follow-up of her left breast cancer for which she has been getting neoadjuvant chemotherapy.  She has had a tough time with chemo but is gone through it.  She feels like the mass has completely resolved.  She is already visited with the plastic surgeon and she would like to do a unilateral mastectomy for now with reconstruction and then wants to have a breast reduction on the other side down the line.  She had a friend with her today who had lots of questions about whether or not she should be doing bilateral related to her HER-2.  I tried to answer her questions as best as I could try to keep my focus on the patient.    PAST MEDICAL HISTORY:  Past Medical History:   Diagnosis Date     Breast cancer (H)     left     Hypertension      Obese        Past Surgical History:   Procedure Laterality Date     CHOLECYSTECTOMY       HAND SURGERY Left      hysteectomy       IR CHEST PORT PLACEMENT > 5 YRS OF AGE  8/17/2021     TONSILLECTOMY           Medications:    Current Outpatient Medications:      busPIRone (BUSPAR) 10 MG tablet, Take 10 mg by mouth 2 times daily as needed, Disp: , Rfl:      cholecalciferol 25 MCG (1000 UT) TABS, Take 1,000 Units by mouth, Disp: , Rfl:      citalopram (CELEXA) 20 MG tablet, , Disp: , Rfl:      clonazePAM (KLONOPIN) 0.5 MG tablet, Take 0.5 mg by mouth 2 times daily as needed for anxiety, Disp: , Rfl:      dexamethasone (DECADRON) 4 MG tablet, , Disp: , Rfl:      fish oil-omega-3 fatty acids 1000 MG capsule, , Disp: , Rfl:      fluticasone (VERAMYST) 27.5 MCG/SPRAY nasal spray, Spray 2 sprays into both nostrils daily, Disp: , Rfl:      furosemide (LASIX) 20 MG tablet, Take 20 mg by mouth daily, Disp: , Rfl:      hydrochlorothiazide 10 mg/mL SUSP, Take 25 mg by mouth daily, Disp: , Rfl:      irbesartan (AVAPRO) 75 MG tablet, TAKE 1 TABLET BY MOUTH ONCE DAILY IN THE EVENING, Disp: , Rfl:      lidocaine-prilocaine  (EMLA) 2.5-2.5 % external cream, , Disp: , Rfl:      multivitamin w/minerals (THERA-VIT-M) tablet, Take 1 tablet by mouth daily, Disp: , Rfl:      OLANZapine (ZYPREXA) 2.5 MG tablet, TAKE 1 TABLET BY MOUTH STARTING THE NIGHT BEFORE CHEMOTHERAPY AND CONTINUE FOR 6 DAYS AS NEEDED, Disp: , Rfl:      ondansetron (ZOFRAN-ODT) 8 MG ODT tab, DISSOLVE 1 TABLET ON THE TONGUE EVERY 8 HOURS AS NEEDED FOR NAUSEA OR VOMITING, Disp: , Rfl:      potassium chloride ER (K-TAB) 20 MEQ CR tablet, TAKE 2 TABLETS BY MOUTH DAILY FOR 1 WEEK, Disp: , Rfl:      prochlorperazine (COMPAZINE) 10 MG tablet, , Disp: , Rfl:       Allergies:     Allergies   Allergen Reactions     Ibuprofen Other (See Comments)     Hypertensive reaction     Shrimp Anaphylaxis     Shrimp allergy     Hydrochlorothiazide      Other reaction(s): Gastrointestinal  Diarrhea     Lisinopril Cough     Caused aspiration     Shellfish-Derived Products Hives     Latex Rash        Social History:  Social History     Tobacco Use     Smoking status: Never Smoker     Smokeless tobacco: Never Used   Vaping Use     Vaping Use: Never used   Substance Use Topics     Alcohol use: Not on file     Drug use: Never        ROS:  Pertinent items are noted in HPI.    PHYSICAL EXAM:  There were no vitals taken for this visit.  General: alert, appears stated age and cooperative, morbidly obese  Lungs:  clear to auscultation bilaterally  CV: Regular  Breast: No palpable masses in either breast.  Axilla: No palpable adenopathy.      Impression: Left breast cancer, HER-2 positive, status post neoadjuvant chemotherapy.  She has had an excellent clinical response.  We talked again about the options for surgery.  I made it very clear that she would not improve her survival by doing a mastectomy on the opposite breast and in fact she does not want to do that at this time.  I did talk about the fact that she needs radiation afterwards and she does not remember at all that I told her this when I first  saw her.  I have documented in my note that we did talk about it but with all the information you get right at the time, it is very possible she did not hear this.  She had node positive disease, so even with a complete pathologic response, we would tend to recommend postoperative radiation.  I told her that reconstruction is still an option it just can change it somewhat and sometimes the plastic surgeon will want to do just tissue expanders versus a permanent implant at the time.  She then wondered if she should even do it at all.  I told her that we can save her skin and it is a better reconstruction result if we try to save her skin and do an implant at the time.  She wants to take some time to think about this now before she makes a final decision.  I would still do a sentinel lymph node biopsy and if that node is positive then we need to do a full lymph node dissection but if the node is negative then we do not need to remove more nodes.  We really try to take at least 3 lymph nodes when doing a sentinel lymph node after neoadjuvant chemotherapy.  Also went over the fact that almost everything we do now as an outpatient surgery.  She understands that and asked appropriate questions.    Plan: She wants to talk this over with Dr. Wolf and I have also sent Dr. Wolf messages to make sure we are all on the same page as far as the need for postop radiation.  She will get back to me and let me know what she decided from a surgical standpoint.

## 2021-12-30 NOTE — NURSING NOTE
Khloe presents to [facility] today for a surgical consult with Dr. Viera  regarding her  breast cancer.  She has been undergoing NAC with Dr. Wolf.  She has completed her chemo.  She is accompanied by her friend for consult.  RN assessment and EMR update.  Patient given a Breast Cancer Packet, contents reviewed.  She met with Dr. Viera  see dictation for details of visit. She will plan to think over her surgery options now that she was reminded she will need radiation after her surgery.    Support provided, invited calls.  RN time 20 mins.

## 2022-01-04 ENCOUNTER — TELEPHONE (OUTPATIENT)
Dept: SURGERY | Facility: CLINIC | Age: 67
End: 2022-01-04
Payer: COMMERCIAL

## 2022-01-04 NOTE — TELEPHONE ENCOUNTER
Spoke with pt about follow up appointment. She scheduled for 1/7/22 to discuss change in breast recon options.

## 2022-01-07 ENCOUNTER — TELEPHONE (OUTPATIENT)
Dept: SURGERY | Facility: CLINIC | Age: 67
End: 2022-01-07

## 2022-01-07 ENCOUNTER — OFFICE VISIT (OUTPATIENT)
Dept: PLASTIC SURGERY | Facility: AMBULATORY SURGERY CENTER | Age: 67
End: 2022-01-07
Payer: COMMERCIAL

## 2022-01-07 DIAGNOSIS — C50.112 MALIGNANT NEOPLASM OF CENTRAL PORTION OF LEFT BREAST IN FEMALE, ESTROGEN RECEPTOR POSITIVE (H): Primary | ICD-10-CM

## 2022-01-07 DIAGNOSIS — Z17.0 MALIGNANT NEOPLASM OF CENTRAL PORTION OF LEFT BREAST IN FEMALE, ESTROGEN RECEPTOR POSITIVE (H): Primary | ICD-10-CM

## 2022-01-07 DIAGNOSIS — Z17.0 MALIGNANT NEOPLASM OF OVERLAPPING SITES OF LEFT BREAST IN FEMALE, ESTROGEN RECEPTOR POSITIVE (H): Primary | ICD-10-CM

## 2022-01-07 DIAGNOSIS — C50.812 MALIGNANT NEOPLASM OF OVERLAPPING SITES OF LEFT BREAST IN FEMALE, ESTROGEN RECEPTOR POSITIVE (H): Primary | ICD-10-CM

## 2022-01-07 PROCEDURE — 99213 OFFICE O/P EST LOW 20 MIN: CPT | Performed by: PLASTIC SURGERY

## 2022-01-07 NOTE — LETTER
1/7/2022         RE: Khloe Becker  [address] [place] [address]        Dear Colleague,    Thank you for referring your patient, Khloe Becker, to the [facility]. Please see a copy of my visit note below.    Mrs. Becker is a 66 years old lady with history of left breast invasive ductal carcinoma with ER positive receptors, MD positive receptors and HER2 positive.  Patient also with history of left axillary metastatic lymph node.  Patient has been receiving chemotherapy.    She returns today for reevaluation.  Mrs. becker is a patient of Dr. Viera and both have informed me that patient will also receive postoperative radiation.    I explained to Mrs. Becker that radiation increases the chances of complications in implant-based breast reconstruction.  I told her that I will no longer be able to offer immediate prepectoral direct to implant breast reconstruction.  What I will be able to offer her is immediate delay breast reconstruction with a tissue expander.  Then, she will need serial insufflation of the tissue expander with saline in the office until the tissue expander has achieved its nominal volume.    Then, she will receive radiation into the left breast, and approximately 3 months later the tissue expander will be removed and she will have the option of either transition to a permanent implant plus fat grafting or autologous breast reconstruction with a CHARLY flap.    Patient told me that she did not realize that she will be needing radiation and with this prospect she is no longer interested in pursuing left breast reconstruction.  She told me that she does not want to face potential complications with implant-based reconstruction or a more complex procedure with autologous reconstruction.    I told her that I completely understand her point of view.    Patient told me however, that once she is recovered from her mastectomy and she has finished radiation, she might  contact me in the summer for right breast reduction mammoplasty.    I told her that I will be happy to see her once she is feeling ready for this step.    Patient told me that she will contact me when she feels ready.    Time spent with the patient 20 minutes.    Jorge Luis Alamo MD , FACS   Diplomate American Board of Plastic Surgery  Diplomate American Board of Surgery  [facility]  Division of Plastic & Reconstructive Surgery   Office: [phone]   1/9/2022 at 5:34 PM         Again, thank you for allowing me to participate in the care of your patient.        Sincerely,        Jorge Luis Alamo MD

## 2022-01-07 NOTE — TELEPHONE ENCOUNTER
Khloe called, said she saw Dr. Alamo today and has decided she will be having a unilateral mastectomy without any reconstruction.  She may decided to have a breast reduction on her opposite breast next summer.  Dr. Viera notified, orders placed.  Told Khloe that Alba will call her to schedule. Gave her Alba's direct number as well.  Support provided, invited calls.

## 2022-01-09 NOTE — PROGRESS NOTES
Mrs. Becker is a 66 years old lady with history of left breast invasive ductal carcinoma with ER positive receptors, AR positive receptors and HER2 positive.  Patient also with history of left axillary metastatic lymph node.  Patient has been receiving chemotherapy.    She returns today for reevaluation.  Mrs. becker is a patient of Dr. Viera and both have informed me that patient will also receive postoperative radiation.    I explained to Mrs. Becker that radiation increases the chances of complications in implant-based breast reconstruction.  I told her that I will no longer be able to offer immediate prepectoral direct to implant breast reconstruction.  What I will be able to offer her is immediate delay breast reconstruction with a tissue expander.  Then, she will need serial insufflation of the tissue expander with saline in the office until the tissue expander has achieved its nominal volume.    Then, she will receive radiation into the left breast, and approximately 3 months later the tissue expander will be removed and she will have the option of either transition to a permanent implant plus fat grafting or autologous breast reconstruction with a CHARLY flap.    Patient told me that she did not realize that she will be needing radiation and with this prospect she is no longer interested in pursuing left breast reconstruction.  She told me that she does not want to face potential complications with implant-based reconstruction or a more complex procedure with autologous reconstruction.    I told her that I completely understand her point of view.    Patient told me however, that once she is recovered from her mastectomy and she has finished radiation, she might contact me in the summer for right breast reduction mammoplasty.    I told her that I will be happy to see her once she is feeling ready for this step.    Patient told me that she will contact me when she feels ready.    Time spent with the patient 20  minutes.    Jorge Luis Alamo MD , FACS   Diplomate American Board of Plastic Surgery  Diplomate American Board of Surgery  [facility]  Division of Plastic & Reconstructive Surgery   Office: [phone]   1/9/2022 at 5:34 PM

## 2022-01-10 ENCOUNTER — TELEPHONE (OUTPATIENT)
Dept: SURGERY | Facility: CLINIC | Age: 67
End: 2022-01-10
Payer: COMMERCIAL

## 2022-01-10 PROBLEM — Z17.0 MALIGNANT NEOPLASM OF OVERLAPPING SITES OF LEFT BREAST IN FEMALE, ESTROGEN RECEPTOR POSITIVE (H): Status: ACTIVE | Noted: 2022-01-10

## 2022-01-10 PROBLEM — C50.812 MALIGNANT NEOPLASM OF OVERLAPPING SITES OF LEFT BREAST IN FEMALE, ESTROGEN RECEPTOR POSITIVE (H): Status: ACTIVE | Noted: 2022-01-10

## 2022-01-10 NOTE — TELEPHONE ENCOUNTER
Spoke with Khloe over the phone today regarding surgery scheduling with Dr. Middleton MSC on  date: 1/19/2022.    Covid Test: Date: 1/15/2022 at 10:50 am, Location: [facility]  Post Op: Date: 2/3/2022 at 8:40 am, Location: [facility]    Letter sent via [employer]

## 2022-01-10 NOTE — LETTER
We've received instruction to get you scheduled for surgery with Dr Viera. We have that arranged as follows:     Surgery Date: 1/19/2022  Location: [address], [address] (3rd floor) [facility]  Arrival Time: 8:15 am (Unless instructed differently by the pre-op call nurse)     Post op Appointment: 2/3/2022 at 8:40 with Dr. Viera     Prep Instructions:     1. Please schedule a pre-op physical with your primary care doctor. This may be virtual or face-to-face depending on your doctors preference. Call them right away to schedule this.    2. PCR-Rated COVID19 testing is required within 4 days of surgery. We have this scheduled for you at [facility], [address] [place] [address], 1st Floor on 1/15/2022 at 10:50 am. Follow the specific instructions you receive by Franca. If your test is positive, your surgery will be canceled.     3. Nothing to eat or drink for 8 hours before surgery unless instructed differently by the pre-op nurse.    4. If the community sees a new COVID19 surge, your procedure may need to be postponed. We will contact you if this happens.     5. You will need an adult to drive you home and stay with you 24 hours after surgery.     6. You may have one family member wait in the lobby at the surgery center during your surgery. Visitor restrictions are subject to change, please verify with the pre-op nurse when they call.    7. When you arrive to the surgery center, you will be screened for COVID19 symptoms. If you screen positive, your surgery will need to be postponed.    8. We always encourage you to notify your insurance any time you have medical tests or procedures scheduled including surgery. The number is usually right on the back of your insurance card. Please call [facility] Cost of Care at [phone] for the surgeon fees, and [facility] Cost of Care [phone] for facility fees if you need pricing information.     9. You will receive a  call from a pre-op call nurse 1-3 days prior to surgery. She will go over more details with you.     Call our office if you have any questions! Thank you!

## 2022-01-11 DIAGNOSIS — Z11.59 ENCOUNTER FOR SCREENING FOR OTHER VIRAL DISEASES: Primary | ICD-10-CM

## 2022-01-14 RX ORDER — NITROFURANTOIN 25; 75 MG/1; MG/1
CAPSULE ORAL
COMMUNITY
End: 2022-01-14

## 2022-01-14 RX ORDER — POTASSIUM CHLORIDE 1500 MG/1
TABLET, EXTENDED RELEASE ORAL
COMMUNITY
End: 2022-01-14

## 2022-01-14 RX ORDER — DIPHENOXYLATE HCL/ATROPINE 2.5-.025MG
TABLET ORAL
COMMUNITY
End: 2022-04-08

## 2022-01-14 RX ORDER — HYDROCHLOROTHIAZIDE 25 MG/1
25 TABLET ORAL DAILY
COMMUNITY
End: 2022-09-28

## 2022-01-14 ASSESSMENT — MIFFLIN-ST. JEOR: SCORE: 1513.38

## 2022-01-15 ENCOUNTER — LAB (OUTPATIENT)
Dept: FAMILY MEDICINE | Facility: CLINIC | Age: 67
End: 2022-01-15
Payer: COMMERCIAL

## 2022-01-15 DIAGNOSIS — Z11.59 ENCOUNTER FOR SCREENING FOR OTHER VIRAL DISEASES: ICD-10-CM

## 2022-01-15 PROCEDURE — U0003 INFECTIOUS AGENT DETECTION BY NUCLEIC ACID (DNA OR RNA); SEVERE ACUTE RESPIRATORY SYNDROME CORONAVIRUS 2 (SARS-COV-2) (CORONAVIRUS DISEASE [COVID-19]), AMPLIFIED PROBE TECHNIQUE, MAKING USE OF HIGH THROUGHPUT TECHNOLOGIES AS DESCRIBED BY CMS-2020-01-R: HCPCS

## 2022-01-15 PROCEDURE — U0005 INFEC AGEN DETEC AMPLI PROBE: HCPCS

## 2022-01-16 LAB — SARS-COV-2 RNA RESP QL NAA+PROBE: NEGATIVE

## 2022-01-18 ENCOUNTER — ANESTHESIA EVENT (OUTPATIENT)
Dept: SURGERY | Facility: AMBULATORY SURGERY CENTER | Age: 67
End: 2022-01-18
Payer: COMMERCIAL

## 2022-01-19 ENCOUNTER — HOSPITAL ENCOUNTER (OUTPATIENT)
Facility: AMBULATORY SURGERY CENTER | Age: 67
End: 2022-01-19
Attending: SPECIALIST
Payer: COMMERCIAL

## 2022-01-19 ENCOUNTER — ANCILLARY PROCEDURE (OUTPATIENT)
Dept: MAMMOGRAPHY | Facility: CLINIC | Age: 67
End: 2022-01-19
Attending: SPECIALIST
Payer: COMMERCIAL

## 2022-01-19 ENCOUNTER — ANESTHESIA (OUTPATIENT)
Dept: SURGERY | Facility: AMBULATORY SURGERY CENTER | Age: 67
End: 2022-01-19
Payer: COMMERCIAL

## 2022-01-19 ENCOUNTER — HOSPITAL ENCOUNTER (OUTPATIENT)
Dept: NUCLEAR MEDICINE | Facility: HOSPITAL | Age: 67
End: 2022-01-19
Attending: SPECIALIST
Payer: COMMERCIAL

## 2022-01-19 VITALS
HEIGHT: 64 IN | RESPIRATION RATE: 16 BRPM | HEART RATE: 74 BPM | TEMPERATURE: 97 F | WEIGHT: 219 LBS | BODY MASS INDEX: 37.39 KG/M2 | DIASTOLIC BLOOD PRESSURE: 74 MMHG | OXYGEN SATURATION: 97 % | SYSTOLIC BLOOD PRESSURE: 159 MMHG

## 2022-01-19 DIAGNOSIS — Z17.0 MALIGNANT NEOPLASM OF OVERLAPPING SITES OF LEFT BREAST IN FEMALE, ESTROGEN RECEPTOR POSITIVE (H): ICD-10-CM

## 2022-01-19 DIAGNOSIS — C50.812 MALIGNANT NEOPLASM OF OVERLAPPING SITES OF LEFT BREAST IN FEMALE, ESTROGEN RECEPTOR POSITIVE (H): ICD-10-CM

## 2022-01-19 PROCEDURE — 250N000009 HC RX 250: Performed by: SPECIALIST

## 2022-01-19 PROCEDURE — 999N000065 MA POST PROCEDURE LEFT

## 2022-01-19 PROCEDURE — 343N000001 HC RX 343: Performed by: SPECIALIST

## 2022-01-19 PROCEDURE — A9520 TC99 TILMANOCEPT DIAG 0.5MCI: HCPCS | Performed by: SPECIALIST

## 2022-01-19 PROCEDURE — 272N000431 IMAGE GUIDED BREAST LOCALIZATION W SENT NODE INJ LEFT

## 2022-01-19 PROCEDURE — 19307 MAST MOD RAD: CPT | Mod: LT | Performed by: SPECIALIST

## 2022-01-19 RX ORDER — ONDANSETRON 2 MG/ML
INJECTION INTRAMUSCULAR; INTRAVENOUS PRN
Status: DISCONTINUED | OUTPATIENT
Start: 2022-01-19 | End: 2022-01-19

## 2022-01-19 RX ORDER — NEOSTIGMINE METHYLSULFATE 1 MG/ML
VIAL (ML) INJECTION PRN
Status: DISCONTINUED | OUTPATIENT
Start: 2022-01-19 | End: 2022-01-19

## 2022-01-19 RX ORDER — CEFAZOLIN SODIUM 2 G/100ML
2 INJECTION, SOLUTION INTRAVENOUS
Status: DISCONTINUED | OUTPATIENT
Start: 2022-01-19 | End: 2022-01-19

## 2022-01-19 RX ORDER — FENTANYL CITRATE 50 UG/ML
INJECTION, SOLUTION INTRAMUSCULAR; INTRAVENOUS PRN
Status: DISCONTINUED | OUTPATIENT
Start: 2022-01-19 | End: 2022-01-19

## 2022-01-19 RX ORDER — GLYCOPYRROLATE 0.2 MG/ML
INJECTION, SOLUTION INTRAMUSCULAR; INTRAVENOUS PRN
Status: DISCONTINUED | OUTPATIENT
Start: 2022-01-19 | End: 2022-01-19

## 2022-01-19 RX ORDER — LIDOCAINE 40 MG/G
CREAM TOPICAL
Status: DISCONTINUED | OUTPATIENT
Start: 2022-01-19 | End: 2022-01-20 | Stop reason: HOSPADM

## 2022-01-19 RX ORDER — HYDROCODONE BITARTRATE AND ACETAMINOPHEN 5; 325 MG/1; MG/1
1-2 TABLET ORAL
Status: COMPLETED | OUTPATIENT
Start: 2022-01-19 | End: 2022-01-19

## 2022-01-19 RX ORDER — MEPERIDINE HYDROCHLORIDE 25 MG/ML
12.5 INJECTION INTRAMUSCULAR; INTRAVENOUS; SUBCUTANEOUS
Status: DISCONTINUED | OUTPATIENT
Start: 2022-01-19 | End: 2022-01-20 | Stop reason: HOSPADM

## 2022-01-19 RX ORDER — MAGNESIUM SULFATE HEPTAHYDRATE 40 MG/ML
4 INJECTION, SOLUTION INTRAVENOUS ONCE
Status: COMPLETED | OUTPATIENT
Start: 2022-01-19 | End: 2022-01-19

## 2022-01-19 RX ORDER — PROPOFOL 10 MG/ML
INJECTION, EMULSION INTRAVENOUS CONTINUOUS PRN
Status: DISCONTINUED | OUTPATIENT
Start: 2022-01-19 | End: 2022-01-19

## 2022-01-19 RX ORDER — FENTANYL CITRATE 50 UG/ML
25 INJECTION, SOLUTION INTRAMUSCULAR; INTRAVENOUS EVERY 5 MIN PRN
Status: DISCONTINUED | OUTPATIENT
Start: 2022-01-19 | End: 2022-01-20 | Stop reason: HOSPADM

## 2022-01-19 RX ORDER — VANCOMYCIN HYDROCHLORIDE 1 G/200ML
1000 INJECTION, SOLUTION INTRAVENOUS
Status: DISCONTINUED | OUTPATIENT
Start: 2022-01-19 | End: 2022-01-19

## 2022-01-19 RX ORDER — ONDANSETRON 4 MG/1
4 TABLET, ORALLY DISINTEGRATING ORAL EVERY 30 MIN PRN
Status: DISCONTINUED | OUTPATIENT
Start: 2022-01-19 | End: 2022-01-20 | Stop reason: HOSPADM

## 2022-01-19 RX ORDER — LIDOCAINE HYDROCHLORIDE 20 MG/ML
INJECTION, SOLUTION INFILTRATION; PERINEURAL PRN
Status: DISCONTINUED | OUTPATIENT
Start: 2022-01-19 | End: 2022-01-19

## 2022-01-19 RX ORDER — DEXAMETHASONE SODIUM PHOSPHATE 10 MG/ML
INJECTION, SOLUTION INTRAMUSCULAR; INTRAVENOUS PRN
Status: DISCONTINUED | OUTPATIENT
Start: 2022-01-19 | End: 2022-01-19

## 2022-01-19 RX ORDER — SODIUM CHLORIDE, SODIUM LACTATE, POTASSIUM CHLORIDE, CALCIUM CHLORIDE 600; 310; 30; 20 MG/100ML; MG/100ML; MG/100ML; MG/100ML
INJECTION, SOLUTION INTRAVENOUS CONTINUOUS
Status: DISCONTINUED | OUTPATIENT
Start: 2022-01-19 | End: 2022-01-20 | Stop reason: HOSPADM

## 2022-01-19 RX ORDER — PROPOFOL 10 MG/ML
INJECTION, EMULSION INTRAVENOUS PRN
Status: DISCONTINUED | OUTPATIENT
Start: 2022-01-19 | End: 2022-01-19

## 2022-01-19 RX ORDER — CEFAZOLIN SODIUM 2 G/100ML
2 INJECTION, SOLUTION INTRAVENOUS SEE ADMIN INSTRUCTIONS
Status: DISCONTINUED | OUTPATIENT
Start: 2022-01-19 | End: 2022-01-19

## 2022-01-19 RX ORDER — HYDROCODONE BITARTRATE AND ACETAMINOPHEN 5; 325 MG/1; MG/1
1-2 TABLET ORAL EVERY 6 HOURS PRN
Qty: 20 TABLET | Refills: 0 | Status: SHIPPED | OUTPATIENT
Start: 2022-01-19 | End: 2022-02-03

## 2022-01-19 RX ORDER — ONDANSETRON 2 MG/ML
4 INJECTION INTRAMUSCULAR; INTRAVENOUS EVERY 30 MIN PRN
Status: DISCONTINUED | OUTPATIENT
Start: 2022-01-19 | End: 2022-01-20 | Stop reason: HOSPADM

## 2022-01-19 RX ORDER — FENTANYL CITRATE 50 UG/ML
25 INJECTION, SOLUTION INTRAMUSCULAR; INTRAVENOUS
Status: DISCONTINUED | OUTPATIENT
Start: 2022-01-19 | End: 2022-01-20 | Stop reason: HOSPADM

## 2022-01-19 RX ORDER — CLINDAMYCIN PHOSPHATE 900 MG/50ML
900 INJECTION, SOLUTION INTRAVENOUS ONCE
Status: COMPLETED | OUTPATIENT
Start: 2022-01-19 | End: 2022-01-19

## 2022-01-19 RX ORDER — ACETAMINOPHEN 325 MG/1
975 TABLET ORAL ONCE
Status: COMPLETED | OUTPATIENT
Start: 2022-01-19 | End: 2022-01-19

## 2022-01-19 RX ORDER — OXYCODONE HYDROCHLORIDE 5 MG/1
5 TABLET ORAL EVERY 4 HOURS PRN
Status: DISCONTINUED | OUTPATIENT
Start: 2022-01-19 | End: 2022-01-20 | Stop reason: HOSPADM

## 2022-01-19 RX ORDER — HYDROMORPHONE HCL IN WATER/PF 6 MG/30 ML
0.2 PATIENT CONTROLLED ANALGESIA SYRINGE INTRAVENOUS EVERY 5 MIN PRN
Status: DISCONTINUED | OUTPATIENT
Start: 2022-01-19 | End: 2022-01-20 | Stop reason: HOSPADM

## 2022-01-19 RX ORDER — KETAMINE HYDROCHLORIDE 50 MG/ML
INJECTION, SOLUTION INTRAMUSCULAR; INTRAVENOUS PRN
Status: DISCONTINUED | OUTPATIENT
Start: 2022-01-19 | End: 2022-01-19

## 2022-01-19 RX ORDER — BUPIVACAINE HYDROCHLORIDE 2.5 MG/ML
INJECTION, SOLUTION INFILTRATION; PERINEURAL PRN
Status: DISCONTINUED | OUTPATIENT
Start: 2022-01-19 | End: 2022-01-19 | Stop reason: HOSPADM

## 2022-01-19 RX ADMIN — ONDANSETRON 4 MG: 2 INJECTION INTRAMUSCULAR; INTRAVENOUS at 10:36

## 2022-01-19 RX ADMIN — CLINDAMYCIN PHOSPHATE 900 MG: 900 INJECTION, SOLUTION INTRAVENOUS at 10:31

## 2022-01-19 RX ADMIN — TILMANOCEPT 0.49 MILLICURIE: KIT at 08:32

## 2022-01-19 RX ADMIN — LIDOCAINE HYDROCHLORIDE 10 ML: 10 INJECTION, SOLUTION EPIDURAL; INFILTRATION; INTRACAUDAL; PERINEURAL at 09:16

## 2022-01-19 RX ADMIN — DEXAMETHASONE SODIUM PHOSPHATE 10 MG: 10 INJECTION, SOLUTION INTRAMUSCULAR; INTRAVENOUS at 10:36

## 2022-01-19 RX ADMIN — SODIUM CHLORIDE, SODIUM LACTATE, POTASSIUM CHLORIDE, CALCIUM CHLORIDE: 600; 310; 30; 20 INJECTION, SOLUTION INTRAVENOUS at 11:57

## 2022-01-19 RX ADMIN — FENTANYL CITRATE 50 MCG: 50 INJECTION, SOLUTION INTRAMUSCULAR; INTRAVENOUS at 10:45

## 2022-01-19 RX ADMIN — PROPOFOL 180 MCG/KG/MIN: 10 INJECTION, EMULSION INTRAVENOUS at 10:31

## 2022-01-19 RX ADMIN — GLYCOPYRROLATE 0.8 MG: 0.2 INJECTION, SOLUTION INTRAMUSCULAR; INTRAVENOUS at 11:24

## 2022-01-19 RX ADMIN — Medication 5 MG: at 11:24

## 2022-01-19 RX ADMIN — FENTANYL CITRATE 50 MCG: 50 INJECTION, SOLUTION INTRAMUSCULAR; INTRAVENOUS at 10:31

## 2022-01-19 RX ADMIN — FENTANYL CITRATE 25 MCG: 50 INJECTION, SOLUTION INTRAMUSCULAR; INTRAVENOUS at 12:57

## 2022-01-19 RX ADMIN — Medication 30 MG: at 10:31

## 2022-01-19 RX ADMIN — KETAMINE HYDROCHLORIDE 30 MG: 50 INJECTION, SOLUTION INTRAMUSCULAR; INTRAVENOUS at 11:02

## 2022-01-19 RX ADMIN — LIDOCAINE HYDROCHLORIDE 60 MG: 20 INJECTION, SOLUTION INFILTRATION; PERINEURAL at 10:31

## 2022-01-19 RX ADMIN — ACETAMINOPHEN 975 MG: 325 TABLET ORAL at 10:12

## 2022-01-19 RX ADMIN — PROPOFOL 180 MG: 10 INJECTION, EMULSION INTRAVENOUS at 10:31

## 2022-01-19 RX ADMIN — SODIUM CHLORIDE, SODIUM LACTATE, POTASSIUM CHLORIDE, CALCIUM CHLORIDE: 600; 310; 30; 20 INJECTION, SOLUTION INTRAVENOUS at 10:25

## 2022-01-19 RX ADMIN — MAGNESIUM SULFATE HEPTAHYDRATE 4 G: 40 INJECTION, SOLUTION INTRAVENOUS at 10:15

## 2022-01-19 RX ADMIN — HYDROCODONE BITARTRATE AND ACETAMINOPHEN 1 TABLET: 5; 325 TABLET ORAL at 14:10

## 2022-01-19 ASSESSMENT — MIFFLIN-ST. JEOR: SCORE: 1513.38

## 2022-01-19 NOTE — DISCHARGE INSTRUCTIONS
You have received 975 mg of Acetaminophen (Tylenol) at 10:12 AM. Please do not take an additional dose of Tylenol until after 4:12 PM     Do not exceed 4,000 mg of acetaminophen during a 24 hour period and keep in mind that acetaminophen can also be found in many over-the-counter cold medications as well as narcotics that may be given for pain.     If you have any questions or concerns regarding your procedure, please contact Dr. Viera, her office number is [phone].

## 2022-01-19 NOTE — ANESTHESIA CARE TRANSFER NOTE
Patient: Khloe Becker    Procedure: Procedure(s):  Left Mastectomy, [place] Lymph Node Biopsy, COMPLETION LYMPH NODE DISSECTION       Diagnosis: Malignant neoplasm of overlapping sites of left breast in female, estrogen receptor positive (H) [C50.812, Z17.0]  Diagnosis Additional Information: No value filed.    Anesthesia Type:   General     Note:    Oropharynx: oropharynx clear of all foreign objects  Level of Consciousness: drowsy  Oxygen Supplementation: face mask  Level of Supplemental Oxygen (L/min / FiO2): 10  Independent Airway: airway patency satisfactory and stable  Dentition: dentition unchanged  Vital Signs Stable: post-procedure vital signs reviewed and stable  Report to RN Given: handoff report given  Patient transferred to: PACU    Handoff Report: Identifed the Patient, Identified the Reponsible Provider, Reviewed the pertinent medical history, Discussed the surgical course, Reviewed Intra-OP anesthesia mangement and issues during anesthesia, Set expectations for post-procedure period and Allowed opportunity for questions and acknowledgement of understanding      Vitals:  Vitals Value Taken Time   /64 01/19/22 1200   Temp 97.1  F (36.2  C) 01/19/22 1200   Pulse 78 01/19/22 1200   Resp 16 01/19/22 1200   SpO2 99 % 01/19/22 1200       Electronically Signed By: ZOË Maria CRNA  January 19, 2022  12:02 PM

## 2022-01-19 NOTE — ANESTHESIA PREPROCEDURE EVALUATION
Anesthesia Pre-Procedure Evaluation    Patient: Khloe Becker   MRN: [id number] : 1955        Preoperative Diagnosis: Malignant neoplasm of overlapping sites of left breast in female, estrogen receptor positive (H) [C50.812, Z17.0]    Procedure : Procedure(s):  Left Mastectomy, [place] Lymph Node Biopsy          Past Medical History:   Diagnosis Date     Anemia      Breast cancer (H)     left     Gastroesophageal reflux disease      Hypertension      Motion sickness      Obese      PONV (postoperative nausea and vomiting)       Past Surgical History:   Procedure Laterality Date     CHOLECYSTECTOMY       HAND SURGERY Left      hysteectomy       IR CHEST PORT PLACEMENT > 5 YRS OF AGE  2021     TONSILLECTOMY        Allergies   Allergen Reactions     Ibuprofen Other (See Comments)     Hypertensive reaction     Shrimp Anaphylaxis     Shrimp allergy     Keflex [Cephalexin] Other (See Comments)     Blood in Urine, heavy     Hydrochlorothiazide      Other reaction(s): Gastrointestinal  Diarrhea     Lisinopril Cough     Caused aspiration     Shellfish-Derived Products Hives     Latex Rash      Social History     Tobacco Use     Smoking status: Never Smoker     Smokeless tobacco: Never Used   Substance Use Topics     Alcohol use: Not Currently      Wt Readings from Last 1 Encounters:   22 99.3 kg (219 lb)        Anesthesia Evaluation   Pt has had prior anesthetic.     History of anesthetic complications  - PONV.      ROS/MED HX  ENT/Pulmonary:  - neg pulmonary ROS     Neurologic:  - neg neurologic ROS     Cardiovascular:     (+) hypertension-----    METS/Exercise Tolerance:     Hematologic:  - neg hematologic  ROS     Musculoskeletal:  - neg musculoskeletal ROS     GI/Hepatic:     (+) GERD,     Renal/Genitourinary:  - neg Renal ROS     Endo:     (+) Obesity,     Psychiatric/Substance Use:  - neg psychiatric ROS     Infectious Disease:       Malignancy:   (+) Malignancy, History of Breast.    Other:             Physical Exam    Airway  airway exam normal      Mallampati: I   TM distance: > 3 FB   Neck ROM: full     Respiratory Devices and Support         Dental  no notable dental history         Cardiovascular   cardiovascular exam normal          Pulmonary   pulmonary exam normal                OUTSIDE LABS:  CBC: No results found for: WBC, HGB, HCT, PLT  BMP: No results found for: NA, POTASSIUM, CHLORIDE, CO2, BUN, CR, GLC  COAGS: No results found for: PTT, INR, FIBR  POC: No results found for: BGM, HCG, HCGS  HEPATIC: No results found for: ALBUMIN, PROTTOTAL, ALT, AST, GGT, ALKPHOS, BILITOTAL, BILIDIRECT, ABRAHAN  OTHER: No results found for: PH, LACT, A1C, VIJAY, PHOS, MAG, LIPASE, AMYLASE, TSH, T4, T3, CRP, SED    Anesthesia Plan    ASA Status:  3   NPO Status:  NPO Appropriate    Anesthesia Type: General.     - Airway: LMA   Induction: Intravenous.   Maintenance: TIVA.        Consents    Anesthesia Plan(s) and associated risks, benefits, and realistic alternatives discussed. Questions answered and patient/representative(s) expressed understanding.    - Discussed:     - Discussed with:  Patient         Postoperative Care    Pain management: Multi-modal analgesia.   PONV prophylaxis: Ondansetron (or other 5HT-3), Dexamethasone or Solumedrol     Comments:    Other Comments: GA - LMA with TIVA   Fentanyl prn. Patient does NOT want versed.  Decadron, zofran.             Trish Faith MD

## 2022-01-19 NOTE — ANESTHESIA POSTPROCEDURE EVALUATION
Patient: Khloe Becker    Procedure: Procedure(s):  Left Mastectomy, [place] Lymph Node Biopsy, COMPLETION LYMPH NODE DISSECTION       Diagnosis:Malignant neoplasm of overlapping sites of left breast in female, estrogen receptor positive (H) [C50.812, Z17.0]  Diagnosis Additional Information: No value filed.    Anesthesia Type:  General    Note:  Disposition: Outpatient   Postop Pain Control: Uneventful            Sign Out: Well controlled pain   PONV: No   Neuro/Psych: Uneventful            Sign Out: Acceptable/Baseline neuro status   Airway/Respiratory: Uneventful            Sign Out: Acceptable/Baseline resp. status   CV/Hemodynamics: Uneventful            Sign Out: Acceptable CV status   Other NRE: NONE   DID A NON-ROUTINE EVENT OCCUR? No           Last vitals:  Vitals Value Taken Time   /77 01/19/22 1316   Temp 97.1  F (36.2  C) 01/19/22 1200   Pulse 76 01/19/22 1319   Resp 16 01/19/22 1315   SpO2 94 % 01/19/22 1319   Vitals shown include unvalidated device data.    Electronically Signed By: Trish Faith MD  January 19, 2022  2:03 PM

## 2022-01-19 NOTE — ANESTHESIA PROCEDURE NOTES
Airway       Patient location during procedure: OR  Staff -        Performed By: CRNA  Consent for Airway        Urgency: elective  Indications and Patient Condition       Indications for airway management: kirsten-procedural         Mask difficulty assessment: 2 - vent by mask + OA or adjuvant +/- NMBA    Final Airway Details       Final airway type: supraglottic airway    Supraglottic Airway Details        Type: LMA       LMA size: 4    Post intubation assessment        Placement verified by: capnometry, equal breath sounds and chest rise        Number of attempts at approach: 1       Secured with: silk tape       Ease of procedure: easy       Dentition: Intact and Unchanged

## 2022-01-19 NOTE — OP NOTE
Operative Note    Name:  Khloe Becker  PCP:  Daniela Haile  Procedure Date:  1/19/2022       Procedure:  Procedure(s):  Left Mastectomy, [place] Lymph Node Biopsy, COMPLETION LYMPH NODE DISSECTION     Pre-Procedure Diagnosis:  Malignant neoplasm of overlapping sites of left breast in female, estrogen receptor positive (H) [C50.812, Z17.0]     Post-Procedure Diagnosis:    Same     Surgeon(s):  Ana Viera MD     Assistant: None        Anesthesia Type:  General       Findings:  Positive SLN.    Operative Report:    Patient was properly identified and brought to the operating suite where she is placed in the supine position.  She was prepped and draped in a sterile fashion.  An elliptical shaped incision encompassing the Left nipple was made and skin flaps raised superiorly to the pectoralis medially to the sternum, inferiorly to the rectus sheath and lateral to the lateral chest wall.  I then dissected into the axilla and followed the wire placed by radiology down to what was a clump of lymph nodes.  These were removed and then using the gamma probe I confirmed that at least 1 of these nodes was the true sentinel node.  There is no further activity in the axilla.  These were sent for frozen section.  The breast tissue was swept away from the chest wall using electrocautery. The wound was inspected for hemostasis.  A 7 mm flat Mathew-Lerner drain was brought out through a separate stab incision.  Pathology then called to report that the sentinel node was unfortunately positive for disease.  I then used a combination of sharp dissection and electrocautery to remove all of the level 1 and level 2 lymph nodes.  The axillary tissue was swept down from the axillary vein.  Branches of the axillary vein and artery were clipped with hemoclips.  The long thoracic nerve and the thoracodorsal bundle were left intact.  The wound was then closed with 2-0 Vicryl to the subcutaneous tissue and Insorb staples to the skin.  Sterile dressings were placed.  The patient told procedure well.      Estimated Blood Loss:   50cc        Specimens:    ID Type Source Tests Collected by Time Destination   1 :  Tissue Lymph Node(s) [place], Breast, Left SURGICAL PATHOLOGY EXAM Ana Viera MD 1/19/2022 10:58 AM    2 :  Mastectomy, Simple Breast, Left SURGICAL PATHOLOGY EXAM Ana Viera MD 1/19/2022 11:13 AM    3 : Additional Axillary Lymph Nodes Biopsy Lymph Node(s), Axillary, Left SURGICAL PATHOLOGY EXAM Ana Viera MD 1/19/2022 11:45 AM            Drains:        Complications:    None    Ana Viera MD     Date: 1/19/2022  Time: 11:53 AM

## 2022-01-21 ENCOUNTER — TELEPHONE (OUTPATIENT)
Dept: SURGERY | Facility: CLINIC | Age: 67
End: 2022-01-21
Payer: COMMERCIAL

## 2022-01-21 NOTE — TELEPHONE ENCOUNTER
Patient called, wanted to review some routine post op cares at home.  Reviewed with her, support and encouragement and reassurance provided.  Invited calls.

## 2022-01-26 DIAGNOSIS — C50.412 MALIGNANT NEOPLASM OF UPPER-OUTER QUADRANT OF LEFT BREAST IN FEMALE, ESTROGEN RECEPTOR POSITIVE (H): Primary | ICD-10-CM

## 2022-01-26 DIAGNOSIS — Z17.0 MALIGNANT NEOPLASM OF UPPER-OUTER QUADRANT OF LEFT BREAST IN FEMALE, ESTROGEN RECEPTOR POSITIVE (H): Primary | ICD-10-CM

## 2022-01-26 DIAGNOSIS — Z90.12 STATUS POST LEFT MASTECTOMY: ICD-10-CM

## 2022-02-03 ENCOUNTER — OFFICE VISIT (OUTPATIENT)
Dept: SURGERY | Facility: CLINIC | Age: 67
End: 2022-02-03
Attending: PHYSICIAN ASSISTANT
Payer: COMMERCIAL

## 2022-02-03 DIAGNOSIS — Z90.12 STATUS POST LEFT MASTECTOMY: ICD-10-CM

## 2022-02-03 DIAGNOSIS — Z48.89 POSTOPERATIVE VISIT: Primary | ICD-10-CM

## 2022-02-03 PROCEDURE — 99024 POSTOP FOLLOW-UP VISIT: CPT | Performed by: SPECIALIST

## 2022-02-03 PROCEDURE — G0463 HOSPITAL OUTPT CLINIC VISIT: HCPCS

## 2022-02-03 NOTE — NURSING NOTE
Khloe presents to [facility] for a post op appointment with Dr. Viera .  She isaccompanied by herself for appointment.  She states she is doing well, minimal pain.  She has good ROM, reviewed ROM exercises with her.  Drains are still putting out 40 ml per 24 hours.  Patient met with Dr. Viera .  See dictation for details of visit. Patient will call when her drain levels reach 20 ml or less in 24 hours.   Invited calls sooner if she has any questions.  RN time 12 mins.

## 2022-02-03 NOTE — PROGRESS NOTES
In for follow-up of her left mastectomy  with sentinel lymph node biopsy.  She is feeling well.  She is having very minimal pain.      Physical exam:  Appears well.  Does not appear in any discomfort.  Breasts: Incision is healing nicely with no signs of infection.  No swelling.  APURVA drain is putting out just a little too much to be pulled today.    Pathology:  There was a tiny area of residual carcinoma in the tumor bed.  The margins are negative.  Her sentinel lymph node was positive for just a tiny focus of tumor.  The remaining nodes were all negative.    Impression: Postop visit. Doing well.  Reviewed her pathology.  She had a nearly 100% pathologic response so a very good response.  I still think she needs radiation and I reviewed that with her.  She has already been set up to be fitted for a sleeve for her left arm.  We will also put in a prescription for mastectomy bra.    Plan: Follow-up with us when her drain is putting out less than 20 cc/day.  I told her that should happen within the next week or so.

## 2022-02-09 ENCOUNTER — TRANSFERRED RECORDS (OUTPATIENT)
Dept: HEALTH INFORMATION MANAGEMENT | Facility: CLINIC | Age: 67
End: 2022-02-09
Payer: COMMERCIAL

## 2022-02-15 ENCOUNTER — ALLIED HEALTH/NURSE VISIT (OUTPATIENT)
Dept: SURGERY | Facility: CLINIC | Age: 67
End: 2022-02-15
Payer: COMMERCIAL

## 2022-02-15 DIAGNOSIS — Z90.12 STATUS POST LEFT MASTECTOMY: Primary | ICD-10-CM

## 2022-02-15 DIAGNOSIS — T81.49XA CELLULITIS, WOUND, POST-OPERATIVE: Primary | ICD-10-CM

## 2022-02-15 PROCEDURE — G0463 HOSPITAL OUTPT CLINIC VISIT: HCPCS

## 2022-02-15 RX ORDER — CLINDAMYCIN HCL 300 MG
300 CAPSULE ORAL 3 TIMES DAILY
Qty: 30 CAPSULE | Refills: 0 | Status: SHIPPED | OUTPATIENT
Start: 2022-02-15 | End: 2022-02-25

## 2022-02-15 NOTE — NURSING NOTE
Khloe presents to [facility] [place] today for RN drain removal.  Patient states she has had 20 mlor less in a 24 hour period for at least 2 consecutive days. Patient has area of warmth and redness under her incision.  Patient states she was running a low grade temp yesterday.  Temp 97.7 today.  Dr. Viera consulted.  RX for abx sent to patient's pharmacy.  RN assessment and EMR update.  Drain removed without difficulty.  Patient tolerated well.  Applied 4x4 with bacitracin. Told her to change as needed if it becomeswet, otherwise leave it on for 24 hours then shower as usual and cover as needed.  Reviewed ROM exercises.  Reviewed follow up plan and signs and symptoms of infection and seroma. Made her a follow up appointment with Dr. Viera next Tuesday, 2-22-21.  Told her to call with any increase or change in symptoms.   Support provided, invited calls.  RN time22 mins

## 2022-02-24 ENCOUNTER — OFFICE VISIT (OUTPATIENT)
Dept: SURGERY | Facility: CLINIC | Age: 67
End: 2022-02-24
Attending: PHYSICIAN ASSISTANT
Payer: COMMERCIAL

## 2022-02-24 DIAGNOSIS — Z90.12 STATUS POST LEFT MASTECTOMY: Primary | ICD-10-CM

## 2022-02-24 PROCEDURE — G0463 HOSPITAL OUTPT CLINIC VISIT: HCPCS

## 2022-02-24 PROCEDURE — 99024 POSTOP FOLLOW-UP VISIT: CPT | Performed by: SPECIALIST

## 2022-02-24 NOTE — NURSING NOTE
Khloe presents to [facility] for a post op appointment with Dr. Viera .  She isaccompanied by herself for appointment.  She states she is doing well, minimal pain.  She has good ROM, reviewed ROM exercises with her.  States she is still having occasional drainage from her drain insertion site.  No more temp or areas of redness.  Still has two days left of [place].  Patient met with Dr. Viera .  See dictation for details of visit.  She will plan tofollow up with Dr. Viera  in 6 mos after her right mammogram.  RN time 12 mins.

## 2022-03-10 ENCOUNTER — HOSPITAL ENCOUNTER (OUTPATIENT)
Dept: CARDIOLOGY | Facility: HOSPITAL | Age: 67
Discharge: HOME | End: 2022-03-10
Attending: INTERNAL MEDICINE | Admitting: INTERNAL MEDICINE
Payer: COMMERCIAL

## 2022-03-10 DIAGNOSIS — C79.9: ICD-10-CM

## 2022-03-10 DIAGNOSIS — C50.919: ICD-10-CM

## 2022-03-10 LAB — BI-PLANE LVEF ECHO: NORMAL

## 2022-03-10 PROCEDURE — 93306 TTE W/DOPPLER COMPLETE: CPT

## 2022-03-10 PROCEDURE — 93306 TTE W/DOPPLER COMPLETE: CPT | Mod: 26 | Performed by: INTERNAL MEDICINE

## 2022-03-14 ENCOUNTER — PATIENT OUTREACH (OUTPATIENT)
Dept: ONCOLOGY | Facility: CLINIC | Age: 67
End: 2022-03-14
Payer: COMMERCIAL

## 2022-03-14 ENCOUNTER — TELEPHONE (OUTPATIENT)
Dept: SURGERY | Facility: CLINIC | Age: 67
End: 2022-03-14
Payer: COMMERCIAL

## 2022-03-14 DIAGNOSIS — Z17.0 MALIGNANT NEOPLASM OF UPPER-OUTER QUADRANT OF LEFT BREAST IN FEMALE, ESTROGEN RECEPTOR POSITIVE (H): Primary | ICD-10-CM

## 2022-03-14 DIAGNOSIS — C50.412 MALIGNANT NEOPLASM OF UPPER-OUTER QUADRANT OF LEFT BREAST IN FEMALE, ESTROGEN RECEPTOR POSITIVE (H): Primary | ICD-10-CM

## 2022-03-14 NOTE — PROGRESS NOTES
New Patient Oncology Nurse Navigator Note     Referring provider: Dr. Ana Viera     Referring Clinic/Organization: [facility]     Referred to (specialty:) Medical Oncology      Date Referral Received: March 14, 2022     Evaluation for:  Breast cancer     Clinical History (per Nurse review of records provided):      In summer of 2021 Khloe noticed a left upper outer breast lump present for one week and a bilateral diagnostic mammogram and bilateral ultrasound were performed.  On mammography an irregular, dense mass upper outer left breast with spiculated margins was noted with some associated calcification underlying the BB marking site of lump.  There were also suspicious pleomorphic microcalcs in the most of the left lower, inner quadrant extending to the centra/retroaerolar breast at middle depth.  These extended over an approximately 10 cm area.  In the righ breast a microlobular mass upper outer breast was deemed mostly likely an intramammary lymph node.     Targeted ultrasound of the left breast revealed an irregular, hypoechoic, solid mass with antiparallel orientation at the 2:00, 11 cm from the nipple measuring 2.1 X 2.1 X 2.0 cm.  Ultrasound of the left blateral, anterior asymmetry only demonstrated an incidental 8 mm cyst within dense tissue and no suspsicious findings.  Left axillary ultrasound revealed a suspicious rounded lymph node without visible fatty hilum measuring 1.1 X 1.1 X 1.0 cm.  Targeting ultrasound of the right breast mass revealed a normal-appearing intramammary lymph node with oval shape and fatty hilum measuring 9 x 8 x 5 mm.      7/13/21 -  A.  Breast, left, 2:00; 11 cm FN, needle core biopsy:    Invasive ductal carcinoma, Ellis grade 3     ER/CA+, HER2 POSITIVE   B.  Lymph node, left axilla, needle core biopsy:    Metastatic ductal carcinoma    7/13/21 -   A.  Breast, left, calcifications; 8:00 mid depth, needle core biopsy:    Ductal carcinoma in  situ, high nuclear grade, micropapillary and clinging types with necrosis    Results of estrogen receptor to follow in an addendum     B.  Breast, left, tissue; 8:00 mid depth, needle core biopsy:    Benign adipose tissue     7/16/21 - A bilateral breast MRI was performed.  Please see original report.    She met with Dr. Ana Viera and Dr. Brandon Wolf and proceeded with neoadjuvant TCHP.      7/27/21 - PET completed and findings suspicious for multifocal left breast invasive ductal carcinoma with left level I axillary lymph node metastasis.    9/2/21-12/17/21 - TCHP X 6 cycles with carboplatin held C4 and 6 due to poor tolerance.     Dr. Viera performed left mastectomy with sentinel lymph node biopsy, and complete lymph node dissection on 1/19/22.    A) SENTINEL LYMPH NODES, LEFT, BIOPSY:        - ONE LYMPH NODE POSITIVE FOR MICROMETASTATIC CARCINOMA (1/3)     B) BREAST, LEFT, MASTECTOMY:   1) RESIDUAL MICROSCOPIC FOCI OF INVASIVE DUCTAL CARCINOMA IN A 27 x 19 x   18 MM FIBROTIC TREATMENT BED             a) Grade: Ellis grade II (of III)             b) Size:  1.5 x 0.5 mm             c) Margins: Uninvolved by carcinoma        2) NEGATIVE FOR DUCTAL CARCINOMA IN SITU        3) ADDITIONAL FINDINGS:             Sclerosing adenosis, calcifications and unremarkable skin and nipple        4) Previous biopsy site present x2   5) ER/AL/HER2 IMMUNOHISTOCHEMICAL STAIN RESULTS, B4:   a) ER: Positive, strong average nuclear staining in greater than 95% of invasive carcinoma   b) AL: Positive, strong average nuclear staining in 30% of invasive carcinoma   c) HER2 by IHC: Equivocal (score 2+), see comment     C) LYMPH NODES, LEFT AXILLARY, ADDITIONAL, EXCISION:        - TEN LYMPH NODES NEGATIVE FOR METASTATIC CARCINOMA (0/10)     PATHOLOGIC STAGE: [id number]     In post-op follow up on 2/24/22 Dr. Viera suggests she is okay to proceed with radiation therapy. (WHERE IS REFERRAL FOR RAD ONC? Did she see someone already?  Pt lives  in [facility]      She did meet with Dr. Alamo for plastic surgery consult.  Since she needs radiation therapy and with this prospect she is no longer interested in pursuing left breast reconstruction.  She did not want to face potential complications with implant-based reconstruction or a more complex procedure with autologous reconstruction. Patient told Dr. Alamo that once she is recovered from her mastectomy and she has finished radiation, she might consider right breast reduction mammoplasty.    On post-op return for follow up with Dr. Wolf recommended adjuvant TDM-1 (Ado-trastuzumab, also called T-DM1) with low threshold to de-escalate to HP is there is poor tolerance. Plan was to start on 2/21/22 for 14 cycles.      Records Location: Care Everywhere, Media and See Bookmarked material

## 2022-03-14 NOTE — TELEPHONE ENCOUNTER
Per Dr. Viera's request, called patient to ask about her desire to change medical oncologists.  Patient states there has not been good communication in her current office and this has prompted her to seek out a second opinion.  It has been recommended she have radiation, she has not started this yet. States she is still deciding if she wants to have radiation.    Order placed, told her to expect a call to schedule.  Support provided, invited calls.

## 2022-03-17 NOTE — PROGRESS NOTES
RECORDS STATUS - BREAST    RECORDS REQUESTED FROM:EPIC   DATE REQUESTED: 3/25/2022   NOTES DETAILS STATUS   OFFICE NOTE from referring provider Complete Epic   Ana Viera MD   OFFICE NOTE from surgeon Complete See Breast Biopsy in EPIC   OFFICE NOTE from radiation oncologist     DISCHARGE SUMMARY from hospital Complete 7/14/2021   Ductal carcinoma in situ (DCIS) of left breast ([facility])    Invasive ductal carcinoma of breast, female, left ([facility])     DISCHARGE REPORT from the ER     OPERATIVE REPORT Complete Epic 1/19/2022 Breast Biopsy    MEDICATION LIST Complete [facility]   CLINICAL TRIAL TREATMENTS TO DATE     LABS     REQUEST BLOCKS FOR ALL BREAST CANCER PTS     PATHOLOGY REPORTS  (Tissue diagnosis, Stage, ER/ND percentage positive and intensity of staining, HER2 IHC, FISH, and all biopsies from breast and any distant metastasis)                 Complete 1/19/2022  A) SENTINEL LYMPH NODES, LEFT, BIOPSY:        - ONE LYMPH NODE POSITIVE FOR MICROMETASTATIC CARCINOMA    GENONOMIC TESTING     TYPE:   (Next Generation Sequencing, including Foundation One testing, and Oncotype score)     IMAGING (NEED IMAGES & REPORT)     CT SCANS     MRI Complete MRI Breast 7/16/2021 ([facility])    MAMMO Complete 1/19/2022, [facility] images are in PACS   ULTRASOUND     PET Complete-[facility]     BONE SCAN     BRAIN MRI       Action    Action Taken 3/17/2022 5:10pm ANNAMARIA     I called pt Khloe

## 2022-03-25 ENCOUNTER — ONCOLOGY VISIT (OUTPATIENT)
Dept: ONCOLOGY | Facility: HOSPITAL | Age: 67
End: 2022-03-25
Attending: SPECIALIST
Payer: COMMERCIAL

## 2022-03-25 ENCOUNTER — PRE VISIT (OUTPATIENT)
Dept: ONCOLOGY | Facility: HOSPITAL | Age: 67
End: 2022-03-25

## 2022-03-25 ENCOUNTER — PATIENT OUTREACH (OUTPATIENT)
Dept: ONCOLOGY | Facility: HOSPITAL | Age: 67
End: 2022-03-25
Payer: COMMERCIAL

## 2022-03-25 VITALS
SYSTOLIC BLOOD PRESSURE: 159 MMHG | HEART RATE: 77 BPM | TEMPERATURE: 97.9 F | BODY MASS INDEX: 36.7 KG/M2 | HEIGHT: 64 IN | RESPIRATION RATE: 20 BRPM | WEIGHT: 215 LBS | DIASTOLIC BLOOD PRESSURE: 71 MMHG | OXYGEN SATURATION: 100 %

## 2022-03-25 DIAGNOSIS — C50.412 MALIGNANT NEOPLASM OF UPPER-OUTER QUADRANT OF LEFT BREAST IN FEMALE, ESTROGEN RECEPTOR POSITIVE (H): ICD-10-CM

## 2022-03-25 DIAGNOSIS — Z17.0 MALIGNANT NEOPLASM OF UPPER-OUTER QUADRANT OF LEFT BREAST IN FEMALE, ESTROGEN RECEPTOR POSITIVE (H): ICD-10-CM

## 2022-03-25 PROCEDURE — 99205 OFFICE O/P NEW HI 60 MIN: CPT | Performed by: INTERNAL MEDICINE

## 2022-03-25 PROCEDURE — G0463 HOSPITAL OUTPT CLINIC VISIT: HCPCS

## 2022-03-25 RX ORDER — HYDROCORTISONE 2.5 %
CREAM (GRAM) TOPICAL
COMMUNITY
Start: 2022-03-09

## 2022-03-25 RX ORDER — FLUCONAZOLE 150 MG/1
150 TABLET ORAL WEEKLY
COMMUNITY
Start: 2022-03-09 | End: 2022-05-20

## 2022-03-25 RX ORDER — KETOCONAZOLE 20 MG/G
CREAM TOPICAL
COMMUNITY
Start: 2022-03-09 | End: 2022-09-07

## 2022-03-25 RX ORDER — ACETAMINOPHEN 500 MG
1000 TABLET ORAL PRN
COMMUNITY

## 2022-03-25 ASSESSMENT — PAIN SCALES - GENERAL: PAINLEVEL: NO PAIN (0)

## 2022-03-25 NOTE — PROGRESS NOTES
[facility] Hematology and Oncology Consult Note    Patient: Khloe Becker  MRN: [id number]  Date of Service: 03/25/2022      Reason for Visit    Chief Complaint   Patient presents with     Oncology Clinic Visit     New Patient - Malignant neoplasm of upper-outer quadrant of left breast in female, estrogen receptor positive          Assessment/Plan    Problem List Items Addressed This Visit     None      Visit Diagnoses     Malignant neoplasm of upper-outer quadrant of left breast in female, estrogen receptor positive (H)            Early-stage triple positive left-sided breast cancer status post 6 cycles of neoadjuvant TCHP chemotherapy and left mastectomy  Post neoadjuvant therapy surgical pathology shows residual tumor both in the breast and in the lymph nodes.  Pathological staging is [id number].  One of the 3 sentinel lymph nodes were positive.  She had some issues with chemotherapy and carboplatin dose was held for cycle 4 and 6 due to cytopenias and poor tolerance.    He is here to discuss adjuvant therapy.  I reviewed the post neoadjuvant therapy pathology report with her in detail today.  With residual disease following neoadjuvant chemotherapy containing anti-HER-2 therapy, the category 1 recommendation (NCCN guidelines) is to do adjuvant TDM 1 (Ado-trastuzumab emtansine) every 3 weeks for 14 cycles based on PACO trial which showed significant improvement in the disease-free survival for patients receiving TDM 1 when compared to trastuzumab (with prior either anthracycline or not anthracycline containing neoadjuvant chemotherapy) across all subgroups.  If there is any significant contraindications or intolerance to use TDM 1 then trastuzumab with Pertuzumab combination is an alternative although there are no studies comparing this combination with TDM 1.  She is particularly worried about peripheral neuropathy side effect from TDM 1 as she already has grade 1-2 neuropathy from neoadjuvant  therapy.  I explained to her that the incidence of peripheral neuropathy with TDM 1 is about 20 to 30% and mostly it is grade 1-2.  Grade 3 or 4 events were seen only in a small number of patients (less than 5%).  Since she already has some residual neuropathy I would expect some worsening of this if she were to do TDM 1, but unfortunately I do not have any tool to quantify or to predict the severity of it.  With Herceptin and Perjeta she was worried about worsening cardiac functions.  I explained to her that studies have shown no increase in cardiotoxicity when Perjeta is combined with Herceptin.  And most cardiotoxicity from Herceptin is reversible once we stop the drug.  She also has a similar risk of cardiotoxicity with the TDM 1 although it is slightly less compared with the Herceptin.     As far as the adjuvant radiation is concerned, she is already met with radiation oncologist at an outside clinic and it looks like based on their discussion, she is leaning towards not doing radiation.  I again reviewed the NCCN guidelines with her.  With her being clinically node positive disease to begin with and the fact that she still has node positive disease post neoadjuvant therapy, the recommendation is to do postmastectomy radiation based on retrospective studies which have shown decrease in the risk of local recurrence in patients undergoing PMRT.  She is worried about side effects from radiation especially long-term cardiotoxicity, which is real.  I offered her a second opinion with our radiation oncologist but she is happy with her decision as of now.  She understands that if she proceeds with adjuvant systemic therapy without radiation, there is an increased risk of local recurrence.    She is also hormone receptor positive and will need adjuvant endocrine therapy which can be done concurrently with the anti-HER-2 therapy.    She is leaning towards doing TDM 1 but will think about it a bit more and will contact  me with her decision in the next few days.    ECOG Performance    0 - Independent    Problem List    Patient Active Problem List   Diagnosis     Morbid obesity (H)     Malignant neoplasm of overlapping sites of left breast in female, estrogen receptor positive (H)     ______________________________________________________________________________    Staging History    Cancer Staging  No matching staging information was found for the patient.      History of presenting illness:  Khloe Becker is a 66-year-old female who has a recent history of stage IIb cT2 cN1 cM0, triple positive high-grade invasive ductal carcinoma of the left breast, status post 6 cycles of TCHP and left mastectomy with sentinel lymph node biopsy and ALND who is here to discuss adjuvant therapy in the setting of residual HER-2 positive disease.    In brief, she presented in July 21 to her PCP with left-sided breast mass.  Mammogram showed a 2.1 x 2.1 x 2 cm hypoechoic solid mass at 2 o'clock position about 11 cm of the nipple.  Ultrasound-guided biopsy of this mass came back positive for invasive ductal carcinoma grade 3, ER and OK positive ( 90%) and HER-2 positive by FISH.  Left axilla lymph node biopsy showed metastatic disease.  She also had some DCIS in the microcalcification sites.  Along this she had an MRI of bilateral breasts which showed 3 x 2.7 x 2.5 cm mass in the left breast and a 1.5 x 1.9 x 1.1 cm mass in the left axilla.  PET scan showed no evidence of metastatic disease.  She underwent neoadjuvant chemotherapy with 6 cycles of TCHP starting 9/2/21 to 12/17/21.  Carboplatin was held for cycle 4 and cycle 6 due to poor tolerance.  He underwent left breast mastectomy with sentinel node biopsy followed by axillary lymph node dissection on 1/19/2022.  Surgical path showed residual microscopic invasive ductal carcinoma and one of the 3 sentinel lymph nodes were positive for micrometastatic carcinoma.  It was hormone receptor positive  and HER-2 2+ by IHC.  I do not think a repeat FISH was sent again.  She was seen by an oncologist at [facility] last month who recommended adjuvant therapy with TDM 1 due to residual disease and she also met with radiation oncologist to discuss adjuvant postmastectomy radiation to the breast and axilla.     She is here today to seek a second opinion regarding adjuvant systemic therapy.  She has grade 1-2 peripheral neuropathy in the form of numbness and tingling.  Start affecting her daily activities of living.  She does say that she drops things sometimes and has some fine motor skills deficits.  She also had an echocardiogram recently which showed an EF of 56%.  There is a slight drop in the EF compared to pretreatment echo.    She is concerned about her quality of life going forward and is torn between choosing TDM 1 versus Herceptin and Perjeta for adjuvant therapy.  She is also not keen on doing adjuvant radiation due to similar concerns.  She has fully recovered from chemotherapy.  Apart from neuropathy no other residual side effects.  Her counts have recovered.    She is a never smoker.  Does not drink alcohol.    Past History    Past Medical History:   Diagnosis Date     Anemia      Breast cancer (H)     left     Gastroesophageal reflux disease      Hypertension      Motion sickness      Obese      PONV (postoperative nausea and vomiting)     No family history on file.   Past Surgical History:   Procedure Laterality Date     CHOLECYSTECTOMY       HAND SURGERY Left      hysteectomy       IR CHEST PORT PLACEMENT > 5 YRS OF AGE  8/17/2021     MASTECTOMY SIMPLE Left 1/19/2022    Procedure: Left Mastectomy, [place] Lymph Node Biopsy, COMPLETION LYMPH NODE DISSECTION;  Surgeon: Ana Viera MD;  Location: [facility] OR     TONSILLECTOMY      Social History     Socioeconomic History     Marital status:      Spouse name: Not on file     Number of children: Not on file     Years of education:  Not on file     Highest education level: Not on file   Occupational History     Not on file   Tobacco Use     Smoking status: Never Smoker     Smokeless tobacco: Never Used   Vaping Use     Vaping Use: Never used   Substance and Sexual Activity     Alcohol use: Not Currently     Drug use: Never     Sexual activity: Not on file   Other Topics Concern     Parent/sibling w/ CABG, MI or angioplasty before 65F 55M? Not Asked   Social History Narrative     Not on file     Social Determinants of Health     Financial Resource Strain: Not on file   Food Insecurity: Not on file   Transportation Needs: Not on file   Physical Activity: Not on file   Stress: Not on file   Social Connections: Not on file   Intimate Partner Violence: Not on file   Housing Stability: Not on file        Allergies    Allergies   Allergen Reactions     Ibuprofen Other (See Comments)     Hypertensive reaction     Seafood Anaphylaxis     Shrimp allergy     Shrimp Anaphylaxis     Shrimp allergy     Keflex [Cephalexin] Other (See Comments)     Blood in Urine, heavy     Hydrochlorothiazide      Other reaction(s): Gastrointestinal  Diarrhea     Lisinopril Cough     Caused aspiration     Shellfish-Derived Products Hives     Latex Rash       Review of Systems    Pertinent items are noted in HPI.      Physical Exam    Oncology Vitals 3/25/2022   Height 162 cm   Weight 97.523 kg   BSA (m2) 2.09 m2   /71   Pulse 77   Temp 97.9   Temp src 1   SpO2 100   Pain Score 0   Some recent data might be hidden       General: Alert, cooperative, in no distress  HEENT: No scleral icterus, EOMs normal  Lungs: Clear to auscultation bilaterally  CVS: S1-S2 normal, regular rhythm  Abdomen: Soft, nontender  Lymphatic system: No peripheral adenopathy  Skin: No rashes  Breast: Status post left mastectomy  Neuro: Alert, oriented x3    Lab Results    No results found for this or any previous visit (from the past 168 hour(s)).    Imaging Results    Echocardiogram  Complete    Result Date: 3/10/2022  [id number]^MAGALIS^[place] [address]  Name: JOHN VINSON MRN: [id number] : 1955 Study Date: 03/10/2022 12:41 PM Age: 66 yrs Gender: Female Patient Location: [facility] Reason For Study: Micrometastasis of breast with at least one neoplasm greater sendy Ordering Physician: BRYSON BLANCAS Referring Physician: BRYSON BLANCAS Performed By: MB  BSA: 2.0 m2 Height: 64 in Weight: 214 lb HR: 91 ______________________________________________________________________________ Procedure Complete Echo Adult. ______________________________________________________________________________ Interpretation Summary  Biplane LVEF is 56%. No regional wall motion abnormalities noted. The right ventricle is normal in size and function. There is trace to mild mitral regurgitation. There is trace tricuspid regurgitation. Compared to the prior study dated 2021, there are changes as noted. There is slight decline in LV systolic function, although still within normal limits. ______________________________________________________________________________ Left Ventricle The left ventricle is normal in size. There is normal left ventricular wall thickness. Biplane LVEF is 56%. No regional wall motion abnormalities noted.  Right Ventricle The right ventricle is normal in size and function.  Atria Normal left atrial size. Right atrial size is normal. There is no color Doppler evidence of an atrial shunt.  Mitral Valve Mitral valve leaflets appear normal. There is trace to mild mitral regurgitation.  Tricuspid Valve Tricuspid valve leaflets appear normal. There is trace tricuspid regurgitation.  Aortic Valve The aortic valve is trileaflet with aortic valve sclerosis. No aortic regurgitation is present.  Pulmonic Valve The pulmonic valve is not well visualized. There is no pulmonic valvular regurgitation.  Vessels The aorta root is normal. Normal size  ascending aorta. IVC diameter and respiratory changes fall into an intermediate range suggesting an RA pressure of 8 mmHg.  Pericardium There is no pericardial effusion.  ______________________________________________________________________________ MMode/2D Measurements & Calculations RVDd: 2.8 cm IVSd: 1.0 cm LVIDd: 4.5 cm LVIDs: 3.8 cm LVPWd: 0.79 cm FS: 15.2 %  LV mass(C)d: 134.8 grams LV mass(C)dI: 67.0 grams/m2 asc Aorta Diam: 3.5 cm LVOT diam: 2.1 cm LVOT area: 3.6 cm2 LA Volume Indexed (AL/bp): 22.5 ml/m2 RWT: 0.35  Time Measurements MM HR: 78.0 BPM  Doppler Measurements & Calculations MV E max spenser: 78.0 cm/sec MV A max spenser: 111.6 cm/sec MV E/A: 0.70 MV dec time: 0.15 sec Ao V2 max: 154.1 cm/sec Ao max P.0 mmHg Ao V2 mean: 109.3 cm/sec Ao mean P.3 mmHg Ao V2 VTI: 33.8 cm BOBBY(I,D): 2.3 cm2 BOBBY(V,D): 2.2 cm2 LV V1 max PG: 3.4 mmHg LV V1 max: 92.8 cm/sec LV V1 VTI: 21.4 cm SV(LVOT): 77.1 ml SI(LVOT): 38.3 ml/m2 PA acc time: 0.13 sec AV Spenser Ratio (DI): 0.60 BOBBY Index (cm2/m2): 1.1 E/E': 11.6 E/E' avg: 10.9 Lateral E/e': 10.1 Medial E/e': 11.8 Peak E' Spenser: 6.7 cm/sec  ______________________________________________________________________________ Report approved by: Gloria Mendez 03/10/2022 02:56 PM         A total of 70 minutes was spent today on this visit including face to face conversation with the patient, EMR review (labs, imaging studies, pathology reports and outside records), counseling and care co-ordination and documentation.    Signed by: Xavier Rubio MD

## 2022-03-25 NOTE — PROGRESS NOTES
"Oncology Rooming Note    March 25, 2022 10:40 AM   Khloe Becker is a 66 year old female who presents for:    Chief Complaint   Patient presents with     Oncology Clinic Visit     New Patient - Malignant neoplasm of upper-outer quadrant of left breast in female, estrogen receptor positive      Initial Vitals: BP (!) 159/71 (BP Location: Right arm, Patient Position: Sitting, Cuff Size: Adult Regular)   Pulse 77   Temp 97.9  F (36.6  C) (Oral)   Resp 20   Ht 1.619 m (5' 3.75\")   Wt 97.5 kg (215 lb)   SpO2 100%   BMI 37.19 kg/m   Estimated body mass index is 37.19 kg/m  as calculated from the following:    Height as of this encounter: 1.619 m (5' 3.75\").    Weight as of this encounter: 97.5 kg (215 lb). Body surface area is 2.09 meters squared.  No Pain (0) Comment: Data Unavailable   No LMP recorded. Patient is postmenopausal.  Allergies reviewed: Yes  Medications reviewed: Yes    Medications: Medication refills not needed today.  Pharmacy name entered into Ensyn: [id number] - [place] [id number]  [facility] [address]    Clinical concerns: New Patient - Malignant neoplasm of upper-outer quadrant of left breast in female, estrogen receptor positive.       Erica Eric CMA              "

## 2022-03-25 NOTE — PROGRESS NOTES
[facility]: Cancer Care Short Note                                    Discussion with Patient:                                                      Written info on Kadcyla given to patient. She was seen in clinic today for consultation.      Patient to follow up as scheduled at next appt.    Signature:    Sofi Sandoval RN Care Coordinator  [facility]  Cancer [facility]

## 2022-03-30 PROBLEM — Z17.0 MALIGNANT NEOPLASM OF UPPER-OUTER QUADRANT OF LEFT BREAST IN FEMALE, ESTROGEN RECEPTOR POSITIVE (H): Status: ACTIVE | Noted: 2022-03-30

## 2022-03-30 PROBLEM — C50.412 MALIGNANT NEOPLASM OF UPPER-OUTER QUADRANT OF LEFT BREAST IN FEMALE, ESTROGEN RECEPTOR POSITIVE (H): Status: ACTIVE | Noted: 2022-03-30

## 2022-04-04 ENCOUNTER — PATIENT OUTREACH (OUTPATIENT)
Dept: ONCOLOGY | Facility: HOSPITAL | Age: 67
End: 2022-04-04
Payer: COMMERCIAL

## 2022-04-04 NOTE — TELEPHONE ENCOUNTER
I call Khloe to check in on how she is doing and to see if she would like to proceed with chemotherapy.     She was seen in clinic on Fri, 3/25. Information was given to her about Kadcyla.     I was unable to get a hold of Khloe. I left her a detailed message indicating to intention of my call. I asked that she call me back and I would assist in coordinating her treatment.     Sofi Sandoval  RN Care Coordinator  [facility]

## 2022-04-06 DIAGNOSIS — C50.412 MALIGNANT NEOPLASM OF UPPER-OUTER QUADRANT OF LEFT BREAST IN FEMALE, ESTROGEN RECEPTOR POSITIVE (H): Primary | ICD-10-CM

## 2022-04-06 DIAGNOSIS — Z17.0 MALIGNANT NEOPLASM OF UPPER-OUTER QUADRANT OF LEFT BREAST IN FEMALE, ESTROGEN RECEPTOR POSITIVE (H): Primary | ICD-10-CM

## 2022-04-06 RX ORDER — DIPHENHYDRAMINE HYDROCHLORIDE 50 MG/ML
50 INJECTION INTRAMUSCULAR; INTRAVENOUS
Status: CANCELLED
Start: 2022-04-06

## 2022-04-06 RX ORDER — EPINEPHRINE 1 MG/ML
0.3 INJECTION, SOLUTION INTRAMUSCULAR; SUBCUTANEOUS EVERY 5 MIN PRN
Status: CANCELLED | OUTPATIENT
Start: 2022-04-06

## 2022-04-06 RX ORDER — HEPARIN SODIUM,PORCINE 10 UNIT/ML
5 VIAL (ML) INTRAVENOUS
Status: CANCELLED | OUTPATIENT
Start: 2022-04-06

## 2022-04-06 RX ORDER — NALOXONE HYDROCHLORIDE 0.4 MG/ML
0.2 INJECTION, SOLUTION INTRAMUSCULAR; INTRAVENOUS; SUBCUTANEOUS
Status: CANCELLED | OUTPATIENT
Start: 2022-04-06

## 2022-04-06 RX ORDER — HEPARIN SODIUM (PORCINE) LOCK FLUSH IV SOLN 100 UNIT/ML 100 UNIT/ML
5 SOLUTION INTRAVENOUS
Status: CANCELLED | OUTPATIENT
Start: 2022-04-06

## 2022-04-06 RX ORDER — MEPERIDINE HYDROCHLORIDE 25 MG/ML
25 INJECTION INTRAMUSCULAR; INTRAVENOUS; SUBCUTANEOUS EVERY 30 MIN PRN
Status: CANCELLED | OUTPATIENT
Start: 2022-04-06

## 2022-04-06 RX ORDER — ALBUTEROL SULFATE 0.83 MG/ML
2.5 SOLUTION RESPIRATORY (INHALATION)
Status: CANCELLED | OUTPATIENT
Start: 2022-04-06

## 2022-04-06 RX ORDER — LORAZEPAM 2 MG/ML
0.5 INJECTION INTRAMUSCULAR EVERY 4 HOURS PRN
Status: CANCELLED | OUTPATIENT
Start: 2022-04-06

## 2022-04-06 RX ORDER — ALBUTEROL SULFATE 90 UG/1
1-2 AEROSOL, METERED RESPIRATORY (INHALATION)
Status: CANCELLED
Start: 2022-04-06

## 2022-04-06 RX ORDER — METHYLPREDNISOLONE SODIUM SUCCINATE 125 MG/2ML
125 INJECTION, POWDER, LYOPHILIZED, FOR SOLUTION INTRAMUSCULAR; INTRAVENOUS
Status: CANCELLED
Start: 2022-04-06

## 2022-04-08 ENCOUNTER — INFUSION THERAPY VISIT (OUTPATIENT)
Dept: INFUSION THERAPY | Facility: HOSPITAL | Age: 67
End: 2022-04-08
Attending: INTERNAL MEDICINE
Payer: COMMERCIAL

## 2022-04-08 ENCOUNTER — ONCOLOGY VISIT (OUTPATIENT)
Dept: ONCOLOGY | Facility: HOSPITAL | Age: 67
End: 2022-04-08
Attending: INTERNAL MEDICINE
Payer: COMMERCIAL

## 2022-04-08 VITALS
OXYGEN SATURATION: 97 % | BODY MASS INDEX: 35 KG/M2 | RESPIRATION RATE: 20 BRPM | HEART RATE: 92 BPM | DIASTOLIC BLOOD PRESSURE: 77 MMHG | WEIGHT: 205 LBS | SYSTOLIC BLOOD PRESSURE: 143 MMHG | TEMPERATURE: 98.2 F | HEIGHT: 64 IN

## 2022-04-08 VITALS — HEART RATE: 77 BPM | DIASTOLIC BLOOD PRESSURE: 71 MMHG | SYSTOLIC BLOOD PRESSURE: 135 MMHG | RESPIRATION RATE: 16 BRPM

## 2022-04-08 DIAGNOSIS — C50.412 MALIGNANT NEOPLASM OF UPPER-OUTER QUADRANT OF LEFT BREAST IN FEMALE, ESTROGEN RECEPTOR POSITIVE (H): Primary | ICD-10-CM

## 2022-04-08 DIAGNOSIS — Z17.0 MALIGNANT NEOPLASM OF UPPER-OUTER QUADRANT OF LEFT BREAST IN FEMALE, ESTROGEN RECEPTOR POSITIVE (H): Primary | ICD-10-CM

## 2022-04-08 LAB
ALBUMIN SERPL-MCNC: 3.7 G/DL (ref 3.5–5)
ALP SERPL-CCNC: 98 U/L (ref 45–120)
ALT SERPL W P-5'-P-CCNC: 13 U/L (ref 0–45)
ANION GAP SERPL CALCULATED.3IONS-SCNC: 10 MMOL/L (ref 5–18)
AST SERPL W P-5'-P-CCNC: 14 U/L (ref 0–40)
BASOPHILS # BLD AUTO: 0.1 10E3/UL (ref 0–0.2)
BASOPHILS NFR BLD AUTO: 1 %
BILIRUB SERPL-MCNC: 0.4 MG/DL (ref 0–1)
BUN SERPL-MCNC: 23 MG/DL (ref 8–22)
CALCIUM SERPL-MCNC: 9.5 MG/DL (ref 8.5–10.5)
CHLORIDE BLD-SCNC: 102 MMOL/L (ref 98–107)
CO2 SERPL-SCNC: 29 MMOL/L (ref 22–31)
CREAT SERPL-MCNC: 0.78 MG/DL (ref 0.6–1.1)
EOSINOPHIL # BLD AUTO: 0.2 10E3/UL (ref 0–0.7)
EOSINOPHIL NFR BLD AUTO: 2 %
ERYTHROCYTE [DISTWIDTH] IN BLOOD BY AUTOMATED COUNT: 14.4 % (ref 10–15)
GFR SERPL CREATININE-BSD FRML MDRD: 83 ML/MIN/1.73M2
GLUCOSE BLD-MCNC: 87 MG/DL (ref 70–125)
HCT VFR BLD AUTO: 37.4 % (ref 35–47)
HGB BLD-MCNC: 11.9 G/DL (ref 11.7–15.7)
IMM GRANULOCYTES # BLD: 0 10E3/UL
IMM GRANULOCYTES NFR BLD: 0 %
LYMPHOCYTES # BLD AUTO: 1.9 10E3/UL (ref 0.8–5.3)
LYMPHOCYTES NFR BLD AUTO: 28 %
MCH RBC QN AUTO: 27.7 PG (ref 26.5–33)
MCHC RBC AUTO-ENTMCNC: 31.8 G/DL (ref 31.5–36.5)
MCV RBC AUTO: 87 FL (ref 78–100)
MONOCYTES # BLD AUTO: 0.4 10E3/UL (ref 0–1.3)
MONOCYTES NFR BLD AUTO: 6 %
NEUTROPHILS # BLD AUTO: 4.5 10E3/UL (ref 1.6–8.3)
NEUTROPHILS NFR BLD AUTO: 63 %
NRBC # BLD AUTO: 0 10E3/UL
NRBC BLD AUTO-RTO: 0 /100
PLATELET # BLD AUTO: 232 10E3/UL (ref 150–450)
POTASSIUM BLD-SCNC: 3.6 MMOL/L (ref 3.5–5)
PROT SERPL-MCNC: 7 G/DL (ref 6–8)
RBC # BLD AUTO: 4.29 10E6/UL (ref 3.8–5.2)
SODIUM SERPL-SCNC: 141 MMOL/L (ref 136–145)
WBC # BLD AUTO: 7 10E3/UL (ref 4–11)

## 2022-04-08 PROCEDURE — 258N000003 HC RX IP 258 OP 636: Performed by: INTERNAL MEDICINE

## 2022-04-08 PROCEDURE — 85014 HEMATOCRIT: CPT | Performed by: INTERNAL MEDICINE

## 2022-04-08 PROCEDURE — G0463 HOSPITAL OUTPT CLINIC VISIT: HCPCS | Mod: 25

## 2022-04-08 PROCEDURE — 80053 COMPREHEN METABOLIC PANEL: CPT | Performed by: INTERNAL MEDICINE

## 2022-04-08 PROCEDURE — 96413 CHEMO IV INFUSION 1 HR: CPT

## 2022-04-08 PROCEDURE — 250N000011 HC RX IP 250 OP 636: Performed by: INTERNAL MEDICINE

## 2022-04-08 PROCEDURE — 99214 OFFICE O/P EST MOD 30 MIN: CPT | Performed by: INTERNAL MEDICINE

## 2022-04-08 RX ORDER — METHYLPREDNISOLONE SODIUM SUCCINATE 125 MG/2ML
125 INJECTION, POWDER, LYOPHILIZED, FOR SOLUTION INTRAMUSCULAR; INTRAVENOUS
Status: DISCONTINUED | OUTPATIENT
Start: 2022-04-08 | End: 2022-04-08 | Stop reason: HOSPADM

## 2022-04-08 RX ORDER — DIPHENOXYLATE HCL/ATROPINE 2.5-.025MG
TABLET ORAL
Status: CANCELLED | OUTPATIENT
Start: 2022-04-08

## 2022-04-08 RX ORDER — ONDANSETRON 8 MG/1
TABLET, ORALLY DISINTEGRATING ORAL
Status: CANCELLED | OUTPATIENT
Start: 2022-04-08

## 2022-04-08 RX ORDER — ALBUTEROL SULFATE 90 UG/1
1-2 AEROSOL, METERED RESPIRATORY (INHALATION)
Status: DISCONTINUED | OUTPATIENT
Start: 2022-04-08 | End: 2022-04-08 | Stop reason: HOSPADM

## 2022-04-08 RX ORDER — EPINEPHRINE 1 MG/ML
0.3 INJECTION, SOLUTION INTRAMUSCULAR; SUBCUTANEOUS EVERY 5 MIN PRN
Status: DISCONTINUED | OUTPATIENT
Start: 2022-04-08 | End: 2022-04-08 | Stop reason: HOSPADM

## 2022-04-08 RX ORDER — DIPHENOXYLATE HCL/ATROPINE 2.5-.025MG
1 TABLET ORAL 4 TIMES DAILY PRN
Qty: 30 TABLET | Refills: 0 | Status: SHIPPED | OUTPATIENT
Start: 2022-04-08 | End: 2023-05-17

## 2022-04-08 RX ORDER — MEPERIDINE HYDROCHLORIDE 25 MG/ML
25 INJECTION INTRAMUSCULAR; INTRAVENOUS; SUBCUTANEOUS EVERY 30 MIN PRN
Status: DISCONTINUED | OUTPATIENT
Start: 2022-04-08 | End: 2022-04-08 | Stop reason: HOSPADM

## 2022-04-08 RX ORDER — DIPHENHYDRAMINE HYDROCHLORIDE 50 MG/ML
50 INJECTION INTRAMUSCULAR; INTRAVENOUS
Status: DISCONTINUED | OUTPATIENT
Start: 2022-04-08 | End: 2022-04-08 | Stop reason: HOSPADM

## 2022-04-08 RX ORDER — ONDANSETRON 8 MG/1
8 TABLET, ORALLY DISINTEGRATING ORAL EVERY 8 HOURS PRN
Qty: 30 TABLET | Refills: 1 | Status: SHIPPED | OUTPATIENT
Start: 2022-04-08

## 2022-04-08 RX ORDER — NALOXONE HYDROCHLORIDE 0.4 MG/ML
0.2 INJECTION, SOLUTION INTRAMUSCULAR; INTRAVENOUS; SUBCUTANEOUS
Status: DISCONTINUED | OUTPATIENT
Start: 2022-04-08 | End: 2022-04-08 | Stop reason: HOSPADM

## 2022-04-08 RX ORDER — ALBUTEROL SULFATE 0.83 MG/ML
2.5 SOLUTION RESPIRATORY (INHALATION)
Status: DISCONTINUED | OUTPATIENT
Start: 2022-04-08 | End: 2022-04-08 | Stop reason: HOSPADM

## 2022-04-08 RX ORDER — HEPARIN SODIUM (PORCINE) LOCK FLUSH IV SOLN 100 UNIT/ML 100 UNIT/ML
5 SOLUTION INTRAVENOUS
Status: DISCONTINUED | OUTPATIENT
Start: 2022-04-08 | End: 2022-04-08 | Stop reason: HOSPADM

## 2022-04-08 RX ADMIN — HEPARIN 5 ML: 100 SYRINGE at 14:45

## 2022-04-08 RX ADMIN — SODIUM CHLORIDE 250 ML: 9 INJECTION, SOLUTION INTRAVENOUS at 10:21

## 2022-04-08 RX ADMIN — ADO-TRASTUZUMAB EMTANSINE 352 MG: 20 INJECTION, POWDER, LYOPHILIZED, FOR SOLUTION INTRAVENOUS at 11:42

## 2022-04-08 ASSESSMENT — PAIN SCALES - GENERAL: PAINLEVEL: MODERATE PAIN (5)

## 2022-04-08 NOTE — LETTER
[address] [address]  [phone]  Dept: [phone]      4/8/2022    Re: Khloe Becker      TO WHOM IT MAY CONCERN:    Khloe Becker is under my care for management of breast cancer.  She will be starting treatment with Adotrastuzumab-emtasine on 4/8/2022.  The treatment is given every 3 weeks for total of 14 cycles.  She may experience significant side effects during this treatment and may need to be out of work during the period of treatment.  Please do not hesitate to call me if there are any questions.    Cordkylie Rubio MD  [facility]   ambulated to bathroom

## 2022-04-08 NOTE — PROGRESS NOTES
Infusion Nursing Note:  Khloe Becker presents today for cycle 1 day 1 treatment with Kadcyla.    Patient seen by provider today: Yes: Dr Rubio   present during visit today: Not Applicable.    Note: She was educated on her plan of care for today.  Kadcyla infused over 90 minutes and pt was observed for 90 following infusion.  There was no evidence of reaction.      Intravenous Access:  Implanted Port.    Treatment Conditions:  Results reviewed, labs MET treatment parameters, ok to proceed with treatment.      Post Infusion Assessment:  Patient tolerated infusion without incident.       Discharge Plan:   Patient discharged in stable condition accompanied by: self.      Crista Mi RN

## 2022-04-08 NOTE — PROGRESS NOTES
[facility] Hematology and Oncology Progress Note    Patient: Khloe Becker  MRN: [id number]  Date of Service: 04/08/2022        Reason for Visit    Chief Complaint   Patient presents with     Oncology Clinic Visit     New start Kaadcyla - Malignant neoplasm of upper-outer quadrant of left breast in female, estrogen receptor and HER-2 positive          Problem List Items Addressed This Visit        Other    Malignant neoplasm of upper-outer quadrant of left breast in female, estrogen receptor positive (H) - Primary    Relevant Medications    diphenoxylate-atropine (LOMOTIL) 2.5-0.025 MG tablet    ondansetron (ZOFRAN-ODT) 8 MG ODT tab    Other Relevant Orders    CBC with platelets (Completed)    Creatinine (Completed)    ALT (Completed)    AST (Completed)    Bilirubin,  total (Completed)            Assessment and Plan    Early-stage triple positive left-sided breast cancer status post 6 cycles of neoadjuvant TCHP chemotherapy and left mastectomy  Post neoadjuvant therapy surgical pathology shows residual tumor both in the breast and in the lymph nodes.  Pathological staging is [id number].  One of the 3 sentinel lymph nodes were positive.    Here to start Kadcyla.  She finally made a decision to proceed with R.  She was contemplating using either Herceptin or Herceptin with Perjeta.  We had extensive discussion last time.  Labs today reviewed.  Creatinine is 0.78.  Electrolytes within normal limits.  Liver function tests are normal.  CBC shows hemoglobin 11.9.  Platelet count is normal.  No contraindication to proceed with catheter today.  Dose will be 3.5 mg/kg.  We will see her back in 1 week for toxicity check.  Echocardiogram from 2/24/2022 showed an EF of 56%.  We will monitor her echo every 12 weeks or so.  I again reviewed the potential side effects and complications with I including worsening peripheral neuropathy and heart failure.  She already has grade 1 through 2 peripheral neuropathy.  I asked her  to keep an eye on it.  The chances of grade 3 or 4 neuropathy with calculator is low but with preexistent neuropathy she is at risk for worsening issues.    We again discussed about adjuvant radiation to her chest wall considering that she had node positive disease to begin with.  She has voluntarily made a decision not to pursue radiation therapy.  I explained to her that there is increased risk for local recurrence and radiation would help mitigate the problem.  She understands this.  She wants to hold off on it.    She is also ER/AR positive.  I will start with Kadcyla first and abdominal Arimidex after 1 or 2 cycles.  She wants to see how she does with Kadcyla first before starting endocrine therapy also.    Cycle 1 Kadcyla today.  We will see her back in 1 week for toxicity check with labs again.    Cancer Staging  No matching staging information was found for the patient.    ECOG Performance    0 - Independent         Problem List    Patient Active Problem List   Diagnosis     Morbid obesity (H)     Malignant neoplasm of overlapping sites of left breast in female, estrogen receptor positive (H)     Malignant neoplasm of upper-outer quadrant of left breast in female, estrogen receptor positive (H)        ______________________________________________________________________________    Interval History   Khloe Becker is a 66 year old with triple positive breast cancer status post neoadjuvant chemotherapy with TCHP followed by mastectomy who is here to start cycle 1 of adjuvant Kadcyla.    Previously I saw her for early-stage triple positive breast cancer.  She had residual disease after neoadjuvant chemotherapy with TCHP.  I recommended adjuvant Kadcyla.  She has no prevention in proceeding with it initially.  But finally she made decision not here to start treatment.  Denies any new issues since last visit.  Doing well.  Denies any residual chemotherapy side effects.  She is slowly recovering well.  Denies  any excessive fatigue.  She is physically active.  Appetite is good.  Weight has been stable.    Labs reviewed today    Review of Systems  A comprehensive review of systems was negative except for what is noted in the interval history    Current Outpatient Medications   Medication     acetaminophen (TYLENOL) 500 MG tablet     cholecalciferol 25 MCG (1000 UT) TABS     diphenoxylate-atropine (LOMOTIL) 2.5-0.025 MG tablet     fish oil-omega-3 fatty acids 1000 MG capsule     fluconazole (DIFLUCAN) 150 MG tablet     fluticasone (VERAMYST) 27.5 MCG/SPRAY nasal spray     hydrochlorothiazide (HYDRODIURIL) 25 MG tablet     hydrocortisone 2.5 % cream     ketoconazole (NIZORAL) 2 % external cream     lidocaine-prilocaine (EMLA) 2.5-2.5 % external cream     multivitamin w/minerals (THERA-VIT-M) tablet     ondansetron (ZOFRAN-ODT) 8 MG ODT tab     prochlorperazine (COMPAZINE) 10 MG tablet     busPIRone (BUSPAR) 10 MG tablet     clonazePAM (KLONOPIN) 0.5 MG tablet     furosemide (LASIX) 20 MG tablet     irbesartan (AVAPRO) 75 MG tablet     medical cannabis (Patient's own supply)     No current facility-administered medications for this visit.        Physical Exam    No flowsheet data found.    General: alert and cooperative  HEENT: Head: Normal, normocephalic, atraumatic.  Eye: Normal external eye, conjunctiva, lids cornea, BRIGETTE.  Chest: Clear to auscultation bilaterally  Cardiac: S1, S2 normal, regular rate and rhythm  Abdomen: abdomen is soft without significant tenderness, masses, organomegaly or guarding  Extremities: atraumatic, no peripheral edema  CNS: Alert and oriented x3, neurologic exam grossly normal.  Lymphatics: No bilateral cervical, axillary, supraclavicular adenopathy noted    Lab Results    No results found for this or any previous visit (from the past 168 hour(s)).    Imaging    No results found.  A total of 30 min were spent today on this visit which included face to face conversation with the patient, EMR  review, counseling and co-ordination of care and medical documentation.      Signed by: Xavier Rubio MD

## 2022-04-08 NOTE — LETTER
4/8/2022         RE: Khloe Becker  [address] [place] [address]        Dear Colleague,    Thank you for referring your patient, Khloe Becker, to the [facility]ER CENTER [place]. Please see a copy of my visit note below.    [facility] Hematology and Oncology Progress Note    Patient: Khloe Becker  MRN: [id number]  Date of Service: 04/08/2022        Reason for Visit    Chief Complaint   Patient presents with     Oncology Clinic Visit     New start Kaadcyla - Malignant neoplasm of upper-outer quadrant of left breast in female, estrogen receptor and HER-2 positive          Problem List Items Addressed This Visit        Other    Malignant neoplasm of upper-outer quadrant of left breast in female, estrogen receptor positive (H) - Primary    Relevant Medications    diphenoxylate-atropine (LOMOTIL) 2.5-0.025 MG tablet    ondansetron (ZOFRAN-ODT) 8 MG ODT tab    Other Relevant Orders    CBC with platelets (Completed)    Creatinine (Completed)    ALT (Completed)    AST (Completed)    Bilirubin,  total (Completed)            Assessment and Plan    Early-stage triple positive left-sided breast cancer status post 6 cycles of neoadjuvant TCHP chemotherapy and left mastectomy  Post neoadjuvant therapy surgical pathology shows residual tumor both in the breast and in the lymph nodes.  Pathological staging is [id number].  One of the 3 sentinel lymph nodes were positive.    Here to start Kadcyla.  She finally made a decision to proceed with R.  She was contemplating using either Herceptin or Herceptin with Perjeta.  We had extensive discussion last time.  Labs today reviewed.  Creatinine is 0.78.  Electrolytes within normal limits.  Liver function tests are normal.  CBC shows hemoglobin 11.9.  Platelet count is normal.  No contraindication to proceed with catheter today.  Dose will be 3.5 mg/kg.  We will see her back in 1 week for toxicity check.  Echocardiogram from 2/24/2022 showed an EF  of 56%.  We will monitor her echo every 12 weeks or so.  I again reviewed the potential side effects and complications with I including worsening peripheral neuropathy and heart failure.  She already has grade 1 through 2 peripheral neuropathy.  I asked her to keep an eye on it.  The chances of grade 3 or 4 neuropathy with calculator is low but with preexistent neuropathy she is at risk for worsening issues.    We again discussed about adjuvant radiation to her chest wall considering that she had node positive disease to begin with.  She has voluntarily made a decision not to pursue radiation therapy.  I explained to her that there is increased risk for local recurrence and radiation would help mitigate the problem.  She understands this.  She wants to hold off on it.    She is also ER/OK positive.  I will start with Kadcyla first and abdominal Arimidex after 1 or 2 cycles.  She wants to see how she does with Kadcyla first before starting endocrine therapy also.    Cycle 1 Kadcyla today.  We will see her back in 1 week for toxicity check with labs again.    Cancer Staging  No matching staging information was found for the patient.    ECOG Performance    0 - Independent         Problem List    Patient Active Problem List   Diagnosis     Morbid obesity (H)     Malignant neoplasm of overlapping sites of left breast in female, estrogen receptor positive (H)     Malignant neoplasm of upper-outer quadrant of left breast in female, estrogen receptor positive (H)        ______________________________________________________________________________    Interval History   Khloe Becker is a 66 year old with triple positive breast cancer status post neoadjuvant chemotherapy with TCHP followed by mastectomy who is here to start cycle 1 of adjuvant Kadcyla.    Previously I saw her for early-stage triple positive breast cancer.  She had residual disease after neoadjuvant chemotherapy with TCHP.  I recommended adjuvant Kadcyla.   She has no prevention in proceeding with it initially.  But finally she made decision not here to start treatment.  Denies any new issues since last visit.  Doing well.  Denies any residual chemotherapy side effects.  She is slowly recovering well.  Denies any excessive fatigue.  She is physically active.  Appetite is good.  Weight has been stable.    Labs reviewed today    Review of Systems  A comprehensive review of systems was negative except for what is noted in the interval history    Current Outpatient Medications   Medication     acetaminophen (TYLENOL) 500 MG tablet     cholecalciferol 25 MCG (1000 UT) TABS     diphenoxylate-atropine (LOMOTIL) 2.5-0.025 MG tablet     fish oil-omega-3 fatty acids 1000 MG capsule     fluconazole (DIFLUCAN) 150 MG tablet     fluticasone (VERAMYST) 27.5 MCG/SPRAY nasal spray     hydrochlorothiazide (HYDRODIURIL) 25 MG tablet     hydrocortisone 2.5 % cream     ketoconazole (NIZORAL) 2 % external cream     lidocaine-prilocaine (EMLA) 2.5-2.5 % external cream     multivitamin w/minerals (THERA-VIT-M) tablet     ondansetron (ZOFRAN-ODT) 8 MG ODT tab     prochlorperazine (COMPAZINE) 10 MG tablet     busPIRone (BUSPAR) 10 MG tablet     clonazePAM (KLONOPIN) 0.5 MG tablet     furosemide (LASIX) 20 MG tablet     irbesartan (AVAPRO) 75 MG tablet     medical cannabis (Patient's own supply)     No current facility-administered medications for this visit.        Physical Exam    No flowsheet data found.    General: alert and cooperative  HEENT: Head: Normal, normocephalic, atraumatic.  Eye: Normal external eye, conjunctiva, lids cornea, BRIGETTE.  Chest: Clear to auscultation bilaterally  Cardiac: S1, S2 normal, regular rate and rhythm  Abdomen: abdomen is soft without significant tenderness, masses, organomegaly or guarding  Extremities: atraumatic, no peripheral edema  CNS: Alert and oriented x3, neurologic exam grossly normal.  Lymphatics: No bilateral cervical, axillary, supraclavicular  "adenopathy noted    Lab Results    No results found for this or any previous visit (from the past 168 hour(s)).    Imaging    No results found.  A total of 30 min were spent today on this visit which included face to face conversation with the patient, EMR review, counseling and co-ordination of care and medical documentation.      Signed by: Xavier Rubio MD      Oncology Rooming Note    April 8, 2022 9:32 AM   Khloe Becker is a 66 year old female who presents for:    Chief Complaint   Patient presents with     Oncology Clinic Visit     New start Kiana - Malignant neoplasm of upper-outer quadrant of left breast in female, estrogen receptor and HER-2 positive      Initial Vitals: BP (!) 143/77 (BP Location: Right arm, Patient Position: Sitting, Cuff Size: Adult Large)   Pulse 92   Temp 98.2  F (36.8  C) (Oral)   Resp 20   Ht 1.626 m (5' 4\")   Wt 93 kg (205 lb)   SpO2 97%   BMI 35.19 kg/m   Estimated body mass index is 35.19 kg/m  as calculated from the following:    Height as of this encounter: 1.626 m (5' 4\").    Weight as of this encounter: 93 kg (205 lb). Body surface area is 2.05 meters squared.  Moderate Pain (5) Comment: Data Unavailable   No LMP recorded. Patient is postmenopausal.  Allergies reviewed: Yes  Medications reviewed: Yes    Medications: MEDICATION REFILLS NEEDED TODAY. Provider was notified.  Pharmacy name entered into [facility]: [facility] #[id number] - [address]  [facility]    Clinical concerns: New start Kiana - Malignant neoplasm of upper-outer quadrant of left breast in female, estrogen receptor and HER-2 positive.     Erica Eric [facility]                  Again, thank you for allowing me to participate in the care of your patient.        Sincerely,        Xavier Rubio MD    "

## 2022-04-08 NOTE — PROGRESS NOTES
"Oncology Rooming Note    April 8, 2022 9:32 AM   Khloe Becker is a 66 year old female who presents for:    Chief Complaint   Patient presents with     Oncology Clinic Visit     New start Kiana - Malignant neoplasm of upper-outer quadrant of left breast in female, estrogen receptor and HER-2 positive      Initial Vitals: BP (!) 143/77 (BP Location: Right arm, Patient Position: Sitting, Cuff Size: Adult Large)   Pulse 92   Temp 98.2  F (36.8  C) (Oral)   Resp 20   Ht 1.626 m (5' 4\")   Wt 93 kg (205 lb)   SpO2 97%   BMI 35.19 kg/m   Estimated body mass index is 35.19 kg/m  as calculated from the following:    Height as of this encounter: 1.626 m (5' 4\").    Weight as of this encounter: 93 kg (205 lb). Body surface area is 2.05 meters squared.  Moderate Pain (5) Comment: Data Unavailable   No LMP recorded. Patient is postmenopausal.  Allergies reviewed: Yes  Medications reviewed: Yes    Medications: MEDICATION REFILLS NEEDED TODAY. Provider was notified.  Pharmacy name entered into [facility]: [id number] - [place] [id number] [facility] AT [facility]    Clinical concerns: New start Sreecyla - Malignant neoplasm of upper-outer quadrant of left breast in female, estrogen receptor and HER-2 positive.     Erica Eric CMA              "

## 2022-04-11 ENCOUNTER — PATIENT OUTREACH (OUTPATIENT)
Dept: CARE COORDINATION | Facility: CLINIC | Age: 67
End: 2022-04-11

## 2022-04-11 NOTE — PROGRESS NOTES
"Oncology Distress Screening Follow-up  Clinical Social Work  [facility]    Identified Concern and Score From Distress Screenin. How concerned are you about feeling anxious or very scared?  6 Abnormal      Date of Distress Screenin22    Data: Khloe is a 66 year old woman diagnosed with breast cancer. Khloe is followed by Dr. Rubio at the [facility] in [place]. Khloe had a provider visit on  where she expressed concern re: feeling scared/anxious.     Intervention: SW reached out to Khloe today to check in and address her concerns further. Khloe shares that she is doing \"pretty good\" She reports feeling fatigued over the weekend after her treatment but overall is experiencing no other side effects. Khloe states that she is having some \"chemo brain'\" today and reports leaving her work laptop at home. She states \" I made it through all the other treatment without experiencing this (Chemo brain) and now here it is\".     Khloe reports that she was feeling anxious about starting a new treatment. She reports being very anxious at baseline but this increased those feelings. She does share that she was very grateful for the support from her nurses during her treatment on  as they slowly administered the chemo and checked in with her half way through to make sure she was doing/feeling ok. She states that she felt \"very safe\" and \"happy\" about being at [facility].   SW validate her feelings about being increasingly anxious starting a new treatment and  discussed various types of resources SW can provide if needed. At this time, Khloe denies any resource/support needs but will let SW know if any needs arise. SW will MyChart SW contact info.     Education Provided: Oncology Clinic SW role/contact info    Follow-up Required: SW will remain available for ongoing resource/support needs.       This encounter was discussed with Lynne Santos, LP, LICSW    Joyce Nelson, MSW, LGSW  [facility] " [place]  Oncology Social Worker   Phone: [phone]  Pager: [phone]

## 2022-04-13 ENCOUNTER — PATIENT OUTREACH (OUTPATIENT)
Dept: ONCOLOGY | Facility: HOSPITAL | Age: 67
End: 2022-04-13
Payer: COMMERCIAL

## 2022-04-13 NOTE — PROGRESS NOTES
[facility]: Cancer Care Short Note                                  [facility]: Cancer Care Initial Note                                    Discussion with Patient:                                                      Patient returned call.   Post treatment assessment:   Discussed how she is doing since treatment last Friday. She is very tired and fatigued. 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 Nothing helps the fatigue so far.     She has some leg pain - is not sure if this is from the chemo or from the weather changes.    Her question about appt on Friday is if her labs will be drawn via her port - in which case she can numb it up before she arrives. Writer encouraged her to do this.      Medication understanding/side effect: She reports that she researches any medications she takes and she does not take them unless she needs them.       Assessment:                                                      Initial  Current living arrangement:: I live alone, I live in a private home  Informal Support system:: Children, Neighbors, Friends  Mobility Status: Independent  Transportation means:: Regular car  Medication adherence problem (GOAL):: No  Knowledgeable about how to use meds:: Yes (Very medically knowledgeable.)  Medication side effects suspected:: No  Referrals Placed: None  Advanced Care Plans/Directives on file:: In process  Patient Reported Pain?: Yes, but is new or different pain (Not managed. Tylenol doesn't help much, and can't take advil.)  Pain Score: Moderate Pain (5)  Pain Loc: Lower Leg  (DVPRS) Pain Rating Score : Interrupts some activities  Pain Management Interventions: essential oils, guided imagery    RNCC Initial:   Initial  Current living arrangement:: I live alone, I live in a private home  Informal Support system:: Children, Neighbors, Friends  Mobility Status: Independent  Transportation means:: Regular car  Medication  adherence problem (GOAL):: No  Knowledgeable about how to use meds:: Yes (Very medically knowledgeable.  Her  )  Medication side effects suspected:: No  Referrals Placed: None  Advanced Care Plans/Directives on file:: In process  Patient Reported Pain?: Yes, but is new or different pain (Not managed. Tylenol doesn't help much, and can't take advil.)  Pain Score: Moderate Pain (5)  Pain Loc: Lower Leg  (DVPRS) Pain Rating Score : Interrupts some activities  Pain Management Interventions: essential oils, guided imagery          Intervention/Education provided during outreach:                                                       Inquired about non-pharmacological approaches to symptom management she uses. She uses guided meditation by Kenn Cain. Writer educated pt on essential oils - using massage oils via vendor such a [employer], such as Ache Ease. We discussed considering using Peppermint for fatigue.     Educated her on the Care Coordination program, and that I would be her care coordinator. Explained triage nursing, on call MD, going to ED for emergencies.       Patient to follow up as scheduled at next appt.    Signature:  .Isis Cobb RN   Cancer Care Nurse Coordinator  [facility]  [phone]      Discussion with Patient:                                                      Called patient to check in post treatment. No answer, left message. She will be in clinic on Friday, and let her know she can return my call or talk to us on Friday.        Assessment:                                                         Intervention/Education provided during outreach:                                                           Signature:  Isis Cobb RN   Cancer Care Nurse Coordinator  [facility]  [phone]

## 2022-04-15 ENCOUNTER — ONCOLOGY VISIT (OUTPATIENT)
Dept: ONCOLOGY | Facility: HOSPITAL | Age: 67
End: 2022-04-15
Attending: INTERNAL MEDICINE
Payer: COMMERCIAL

## 2022-04-15 ENCOUNTER — LAB (OUTPATIENT)
Dept: INFUSION THERAPY | Facility: HOSPITAL | Age: 67
End: 2022-04-15
Attending: INTERNAL MEDICINE
Payer: COMMERCIAL

## 2022-04-15 VITALS
WEIGHT: 201.2 LBS | DIASTOLIC BLOOD PRESSURE: 83 MMHG | HEIGHT: 64 IN | HEART RATE: 75 BPM | OXYGEN SATURATION: 97 % | RESPIRATION RATE: 14 BRPM | SYSTOLIC BLOOD PRESSURE: 135 MMHG | TEMPERATURE: 98.8 F | BODY MASS INDEX: 34.35 KG/M2

## 2022-04-15 DIAGNOSIS — C50.412 MALIGNANT NEOPLASM OF UPPER-OUTER QUADRANT OF LEFT BREAST IN FEMALE, ESTROGEN RECEPTOR POSITIVE (H): Primary | ICD-10-CM

## 2022-04-15 DIAGNOSIS — Z17.0 MALIGNANT NEOPLASM OF UPPER-OUTER QUADRANT OF LEFT BREAST IN FEMALE, ESTROGEN RECEPTOR POSITIVE (H): Primary | ICD-10-CM

## 2022-04-15 LAB
ALT SERPL W P-5'-P-CCNC: 15 U/L (ref 0–45)
AST SERPL W P-5'-P-CCNC: 25 U/L (ref 0–40)
BILIRUB SERPL-MCNC: 0.4 MG/DL (ref 0–1)
CREAT SERPL-MCNC: 0.75 MG/DL (ref 0.6–1.1)
ERYTHROCYTE [DISTWIDTH] IN BLOOD BY AUTOMATED COUNT: 14 % (ref 10–15)
GFR SERPL CREATININE-BSD FRML MDRD: 87 ML/MIN/1.73M2
HCT VFR BLD AUTO: 37.6 % (ref 35–47)
HGB BLD-MCNC: 12 G/DL (ref 11.7–15.7)
MCH RBC QN AUTO: 27.5 PG (ref 26.5–33)
MCHC RBC AUTO-ENTMCNC: 31.9 G/DL (ref 31.5–36.5)
MCV RBC AUTO: 86 FL (ref 78–100)
PLATELET # BLD AUTO: 131 10E3/UL (ref 150–450)
RBC # BLD AUTO: 4.36 10E6/UL (ref 3.8–5.2)
WBC # BLD AUTO: 6.5 10E3/UL (ref 4–11)

## 2022-04-15 PROCEDURE — 250N000011 HC RX IP 250 OP 636: Performed by: INTERNAL MEDICINE

## 2022-04-15 PROCEDURE — 82565 ASSAY OF CREATININE: CPT

## 2022-04-15 PROCEDURE — 84450 TRANSFERASE (AST) (SGOT): CPT

## 2022-04-15 PROCEDURE — G0463 HOSPITAL OUTPT CLINIC VISIT: HCPCS | Mod: 25

## 2022-04-15 PROCEDURE — 84460 ALANINE AMINO (ALT) (SGPT): CPT

## 2022-04-15 PROCEDURE — 85027 COMPLETE CBC AUTOMATED: CPT

## 2022-04-15 PROCEDURE — 36591 DRAW BLOOD OFF VENOUS DEVICE: CPT

## 2022-04-15 PROCEDURE — 99214 OFFICE O/P EST MOD 30 MIN: CPT | Performed by: INTERNAL MEDICINE

## 2022-04-15 PROCEDURE — G0463 HOSPITAL OUTPT CLINIC VISIT: HCPCS

## 2022-04-15 PROCEDURE — 82247 BILIRUBIN TOTAL: CPT

## 2022-04-15 RX ORDER — HEPARIN SODIUM (PORCINE) LOCK FLUSH IV SOLN 100 UNIT/ML 100 UNIT/ML
5 SOLUTION INTRAVENOUS
Status: DISCONTINUED | OUTPATIENT
Start: 2022-04-15 | End: 2022-04-15 | Stop reason: HOSPADM

## 2022-04-15 RX ORDER — HEPARIN SODIUM (PORCINE) LOCK FLUSH IV SOLN 100 UNIT/ML 100 UNIT/ML
5 SOLUTION INTRAVENOUS
Status: CANCELLED | OUTPATIENT
Start: 2022-04-15

## 2022-04-15 RX ORDER — CLONAZEPAM 0.5 MG/1
0.5 TABLET ORAL 2 TIMES DAILY PRN
Qty: 30 TABLET | Refills: 0 | Status: SHIPPED | OUTPATIENT
Start: 2022-04-15 | End: 2022-10-19

## 2022-04-15 RX ADMIN — Medication 5 ML: at 14:24

## 2022-04-15 ASSESSMENT — PAIN SCALES - GENERAL: PAINLEVEL: MILD PAIN (3)

## 2022-04-15 NOTE — PROGRESS NOTES
"Oncology Rooming Note    April 15, 2022 1:56 PM   Khloe Becker is a 66 year old female who presents for:    Chief Complaint   Patient presents with     Oncology Clinic Visit     Return visit for 1 week follow up with labs related to Malignant neoplasm of upper-outer quadrant of left breast in female, estrogen receptor positive (H)     Initial Vitals: /83 (BP Location: Right arm, Patient Position: Sitting, Cuff Size: Adult Regular)   Pulse 75   Temp 98.8  F (37.1  C) (Oral)   Resp 14   Ht 1.626 m (5' 4.02\")   Wt 91.3 kg (201 lb 3.2 oz)   SpO2 97%   BMI 34.52 kg/m   Estimated body mass index is 34.52 kg/m  as calculated from the following:    Height as of this encounter: 1.626 m (5' 4.02\").    Weight as of this encounter: 91.3 kg (201 lb 3.2 oz). Body surface area is 2.03 meters squared.  Mild Pain (3) Comment: Data Unavailable   No LMP recorded. Patient is postmenopausal.  Allergies reviewed: Yes  Medications reviewed: Yes    Medications: MEDICATION REFILLS NEEDED TODAY. Provider was notified.  Pharmacy name entered into [employer]: [facility] #[id number] - [address]  AT [facility]    Clinical concerns: Return visit for 1 week follow up with labs related to Malignant neoplasm of upper-outer quadrant of left breast in female, estrogen receptor positive (H)      GEORGIA PORTILLO MA            "

## 2022-04-15 NOTE — LETTER
"    4/15/2022         RE: Khloe Becker  [address]  [place] [facility] [address]        Dear Colleague,    Thank you for referring your patient, Khloe Becker, to the [facility]ER [facility] [place]. Please see a copy of my visit note below.    Oncology Rooming Note    April 15, 2022 1:56 PM   Khloe Becker is a 66 year old female who presents for:    Chief Complaint   Patient presents with     Oncology Clinic Visit     Return visit for 1 week follow up with labs related to Malignant neoplasm of upper-outer quadrant of left breast in female, estrogen receptor positive (H)     Initial Vitals: /83 (BP Location: Right arm, Patient Position: Sitting, Cuff Size: Adult Regular)   Pulse 75   Temp 98.8  F (37.1  C) (Oral)   Resp 14   Ht 1.626 m (5' 4.02\")   Wt 91.3 kg (201 lb 3.2 oz)   SpO2 97%   BMI 34.52 kg/m   Estimated body mass index is 34.52 kg/m  as calculated from the following:    Height as of this encounter: 1.626 m (5' 4.02\").    Weight as of this encounter: 91.3 kg (201 lb 3.2 oz). Body surface area is 2.03 meters squared.  Mild Pain (3) Comment: Data Unavailable   No LMP recorded. Patient is postmenopausal.  Allergies reviewed: Yes  Medications reviewed: Yes    Medications: MEDICATION REFILLS NEEDED TODAY. Provider was notified.  Pharmacy name entered into [facility]: [facility] #[id number] - [facility] [address] [facility]  AT [facility]    Clinical concerns: Return visit for 1 week follow up with labs related to Malignant neoplasm of upper-outer quadrant of left breast in female, estrogen receptor positive (H)      GEORGIA PORTILLO MA              [facility] Hematology and Oncology Progress Note    Patient: Khloe Becker  MRN: [id number]  Date of Service: 04/08/2022        Reason for Visit    Chief Complaint   Patient presents with     Oncology Clinic Visit     Return visit for 1 week follow up with labs related to Malignant neoplasm of upper-outer " quadrant of left breast in female, estrogen receptor positive (H)         Problem List Items Addressed This Visit        Other    Malignant neoplasm of upper-outer quadrant of left breast in female, estrogen receptor positive (H) - Primary    Relevant Medications    clonazePAM (KLONOPIN) 0.5 MG tablet    Other Relevant Orders    Infusion Appointment Request            Assessment and Plan    Early-stage triple positive left-sided breast cancer status post 6 cycles of neoadjuvant TCHP chemotherapy and left mastectomy  Post neoadjuvant therapy surgical pathology shows residual tumor both in the breast and in the lymph nodes.  Pathological staging is [id number].  One of the 3 sentinel lymph nodes were positive.    On adjuvant R.  Status post 1 cycle.  Did extremely well with infusion without any major issues.  She has an expected side effect including fatigue.  But no worsening of neuropathy.  No evidence of cardiomyopathy.  There is also a bit too early to see any of these side effects anyway.  Labs today show mildly low platelet count at 131.  This is an expected side effect.  Neutrophil count is normal.  Hemoglobin is 12.  Creatinine is 0.75.  LFTs are within normal limits.    Explained to her that fatigue is expected complication with these treatments and I would expect her fatigue to worsen a little bit in the next week or so before it gets better.  We also expect her blood counts go down little bit further in the next week.  Watch for any signs or symptoms of infection or increased bruising or bleeding.  Avoid any sick contacts.  She can go outside and be physically active but use gentle precautions like frequent handwashing and masking.    I will see her back in 2 weeks with repeat labs.  Cycle 2 calculi to follow the same day.    She is agreeable with the plan.    Cancer Staging  No matching staging information was found for the patient.    ECOG Performance    0 - Independent         Problem List    Patient  Active Problem List   Diagnosis     Morbid obesity (H)     Malignant neoplasm of overlapping sites of left breast in female, estrogen receptor positive (H)     Malignant neoplasm of upper-outer quadrant of left breast in female, estrogen receptor positive (H)        ______________________________________________________________________________    Interval History   Khloe Becker is a 66 year old with triple positive breast cancer status post neoadjuvant chemotherapy with TCHP followed by mastectomy who is here for toxicity check after cycle 1 of adjuvant Kadcyla.    Did well with infusion without any major issues.  No infusion reactions.  Her neuropathy is stable.  Continues to have some fatigue but denies any fevers chills or night sweats.  No worsening shortness of breath or pedal edema.  Appetite is okay.  Weight has been stable.  Has been physically very active.    Labs reviewed today    Review of Systems  A comprehensive review of systems was negative except for what is noted in the interval history    Current Outpatient Medications   Medication     acetaminophen (TYLENOL) 500 MG tablet     cholecalciferol 25 MCG (1000 UT) TABS     clonazePAM (KLONOPIN) 0.5 MG tablet     diphenoxylate-atropine (LOMOTIL) 2.5-0.025 MG tablet     fish oil-omega-3 fatty acids 1000 MG capsule     fluconazole (DIFLUCAN) 150 MG tablet     fluticasone (VERAMYST) 27.5 MCG/SPRAY nasal spray     hydrochlorothiazide (HYDRODIURIL) 25 MG tablet     hydrocortisone 2.5 % cream     ketoconazole (NIZORAL) 2 % external cream     lidocaine-prilocaine (EMLA) 2.5-2.5 % external cream     medical cannabis (Patient's own supply)     multivitamin w/minerals (THERA-VIT-M) tablet     ondansetron (ZOFRAN-ODT) 8 MG ODT tab     prochlorperazine (COMPAZINE) 10 MG tablet     busPIRone (BUSPAR) 10 MG tablet     furosemide (LASIX) 20 MG tablet     irbesartan (AVAPRO) 75 MG tablet     No current facility-administered medications for this visit.        Physical  Exam    No flowsheet data found.    General: alert and cooperative  HEENT: Head: Normal, normocephalic, atraumatic.  Eye: Normal external eye, conjunctiva, lids cornea, BRIGETTE.  Chest: Clear to auscultation bilaterally  Cardiac: S1, S2 normal, regular rate and rhythm  Abdomen: abdomen is soft without significant tenderness, masses, organomegaly or guarding  Extremities: atraumatic, no peripheral edema  CNS: Alert and oriented x3, neurologic exam grossly normal.  Lymphatics: No bilateral cervical, axillary, supraclavicular adenopathy noted    Lab Results    No results found for this or any previous visit (from the past 168 hour(s)).    Imaging    No results found.     A total of 25 min were spent today on this visit which included face to face conversation with the patient, EMR review, counseling and co-ordination of care and medical documentation.      Signed by: Xavier Rubio MD      Again, thank you for allowing me to participate in the care of your patient.        Sincerely,        Xavier Rubio MD

## 2022-04-27 ENCOUNTER — DOCUMENTATION ONLY (OUTPATIENT)
Dept: OTHER | Facility: CLINIC | Age: 67
End: 2022-04-27
Payer: COMMERCIAL

## 2022-04-29 ENCOUNTER — LAB (OUTPATIENT)
Dept: INFUSION THERAPY | Facility: HOSPITAL | Age: 67
End: 2022-04-29
Attending: INTERNAL MEDICINE
Payer: COMMERCIAL

## 2022-04-29 ENCOUNTER — ONCOLOGY VISIT (OUTPATIENT)
Dept: ONCOLOGY | Facility: HOSPITAL | Age: 67
End: 2022-04-29
Attending: INTERNAL MEDICINE
Payer: COMMERCIAL

## 2022-04-29 VITALS
TEMPERATURE: 98.5 F | HEART RATE: 66 BPM | BODY MASS INDEX: 35.17 KG/M2 | SYSTOLIC BLOOD PRESSURE: 134 MMHG | WEIGHT: 206 LBS | OXYGEN SATURATION: 97 % | DIASTOLIC BLOOD PRESSURE: 63 MMHG | RESPIRATION RATE: 16 BRPM | HEIGHT: 64 IN

## 2022-04-29 DIAGNOSIS — Z13.820 SCREENING FOR OSTEOPOROSIS: Primary | ICD-10-CM

## 2022-04-29 DIAGNOSIS — Z17.0 MALIGNANT NEOPLASM OF UPPER-OUTER QUADRANT OF LEFT BREAST IN FEMALE, ESTROGEN RECEPTOR POSITIVE (H): Primary | ICD-10-CM

## 2022-04-29 DIAGNOSIS — Z78.0 ASYMPTOMATIC MENOPAUSAL STATE: ICD-10-CM

## 2022-04-29 DIAGNOSIS — G47.00 INSOMNIA, UNSPECIFIED TYPE: ICD-10-CM

## 2022-04-29 DIAGNOSIS — C50.412 MALIGNANT NEOPLASM OF UPPER-OUTER QUADRANT OF LEFT BREAST IN FEMALE, ESTROGEN RECEPTOR POSITIVE (H): Primary | ICD-10-CM

## 2022-04-29 LAB
ALBUMIN SERPL-MCNC: 3.5 G/DL (ref 3.5–5)
ALP SERPL-CCNC: 92 U/L (ref 45–120)
ALT SERPL W P-5'-P-CCNC: 20 U/L (ref 0–45)
ANION GAP SERPL CALCULATED.3IONS-SCNC: 8 MMOL/L (ref 5–18)
AST SERPL W P-5'-P-CCNC: 19 U/L (ref 0–40)
BASOPHILS # BLD AUTO: 0 10E3/UL (ref 0–0.2)
BASOPHILS NFR BLD AUTO: 1 %
BILIRUB SERPL-MCNC: 0.4 MG/DL (ref 0–1)
BUN SERPL-MCNC: 20 MG/DL (ref 8–22)
CALCIUM SERPL-MCNC: 9.5 MG/DL (ref 8.5–10.5)
CHLORIDE BLD-SCNC: 103 MMOL/L (ref 98–107)
CO2 SERPL-SCNC: 31 MMOL/L (ref 22–31)
CREAT SERPL-MCNC: 0.71 MG/DL (ref 0.6–1.1)
EOSINOPHIL # BLD AUTO: 0.1 10E3/UL (ref 0–0.7)
EOSINOPHIL NFR BLD AUTO: 2 %
ERYTHROCYTE [DISTWIDTH] IN BLOOD BY AUTOMATED COUNT: 14.7 % (ref 10–15)
GFR SERPL CREATININE-BSD FRML MDRD: >90 ML/MIN/1.73M2
GLUCOSE BLD-MCNC: 96 MG/DL (ref 70–125)
HCT VFR BLD AUTO: 35.7 % (ref 35–47)
HGB BLD-MCNC: 11.4 G/DL (ref 11.7–15.7)
IMM GRANULOCYTES # BLD: 0 10E3/UL
IMM GRANULOCYTES NFR BLD: 0 %
LYMPHOCYTES # BLD AUTO: 1.8 10E3/UL (ref 0.8–5.3)
LYMPHOCYTES NFR BLD AUTO: 28 %
MCH RBC QN AUTO: 27.7 PG (ref 26.5–33)
MCHC RBC AUTO-ENTMCNC: 31.9 G/DL (ref 31.5–36.5)
MCV RBC AUTO: 87 FL (ref 78–100)
MONOCYTES # BLD AUTO: 0.4 10E3/UL (ref 0–1.3)
MONOCYTES NFR BLD AUTO: 7 %
NEUTROPHILS # BLD AUTO: 4 10E3/UL (ref 1.6–8.3)
NEUTROPHILS NFR BLD AUTO: 62 %
NRBC # BLD AUTO: 0 10E3/UL
NRBC BLD AUTO-RTO: 0 /100
PLATELET # BLD AUTO: 226 10E3/UL (ref 150–450)
POTASSIUM BLD-SCNC: 3.6 MMOL/L (ref 3.5–5)
PROT SERPL-MCNC: 6.9 G/DL (ref 6–8)
RBC # BLD AUTO: 4.12 10E6/UL (ref 3.8–5.2)
SODIUM SERPL-SCNC: 142 MMOL/L (ref 136–145)
WBC # BLD AUTO: 6.3 10E3/UL (ref 4–11)

## 2022-04-29 PROCEDURE — 80053 COMPREHEN METABOLIC PANEL: CPT | Performed by: INTERNAL MEDICINE

## 2022-04-29 PROCEDURE — G0463 HOSPITAL OUTPT CLINIC VISIT: HCPCS | Mod: 25

## 2022-04-29 PROCEDURE — 85025 COMPLETE CBC W/AUTO DIFF WBC: CPT | Performed by: INTERNAL MEDICINE

## 2022-04-29 PROCEDURE — 99214 OFFICE O/P EST MOD 30 MIN: CPT | Performed by: INTERNAL MEDICINE

## 2022-04-29 PROCEDURE — 258N000003 HC RX IP 258 OP 636: Performed by: INTERNAL MEDICINE

## 2022-04-29 PROCEDURE — 250N000011 HC RX IP 250 OP 636: Performed by: INTERNAL MEDICINE

## 2022-04-29 PROCEDURE — 96413 CHEMO IV INFUSION 1 HR: CPT

## 2022-04-29 RX ORDER — ALBUTEROL SULFATE 0.83 MG/ML
2.5 SOLUTION RESPIRATORY (INHALATION)
Status: DISCONTINUED | OUTPATIENT
Start: 2022-04-29 | End: 2022-04-29 | Stop reason: HOSPADM

## 2022-04-29 RX ORDER — NALOXONE HYDROCHLORIDE 0.4 MG/ML
0.2 INJECTION, SOLUTION INTRAMUSCULAR; INTRAVENOUS; SUBCUTANEOUS
Status: DISCONTINUED | OUTPATIENT
Start: 2022-04-29 | End: 2022-04-29 | Stop reason: HOSPADM

## 2022-04-29 RX ORDER — NALOXONE HYDROCHLORIDE 0.4 MG/ML
0.2 INJECTION, SOLUTION INTRAMUSCULAR; INTRAVENOUS; SUBCUTANEOUS
Status: CANCELLED | OUTPATIENT
Start: 2022-04-29

## 2022-04-29 RX ORDER — DIPHENHYDRAMINE HYDROCHLORIDE 50 MG/ML
50 INJECTION INTRAMUSCULAR; INTRAVENOUS
Status: DISCONTINUED | OUTPATIENT
Start: 2022-04-29 | End: 2022-04-29 | Stop reason: HOSPADM

## 2022-04-29 RX ORDER — HEPARIN SODIUM (PORCINE) LOCK FLUSH IV SOLN 100 UNIT/ML 100 UNIT/ML
5 SOLUTION INTRAVENOUS
Status: CANCELLED | OUTPATIENT
Start: 2022-04-29

## 2022-04-29 RX ORDER — ALBUTEROL SULFATE 90 UG/1
1-2 AEROSOL, METERED RESPIRATORY (INHALATION)
Status: DISCONTINUED | OUTPATIENT
Start: 2022-04-29 | End: 2022-04-29 | Stop reason: HOSPADM

## 2022-04-29 RX ORDER — METHYLPREDNISOLONE SODIUM SUCCINATE 125 MG/2ML
125 INJECTION, POWDER, LYOPHILIZED, FOR SOLUTION INTRAMUSCULAR; INTRAVENOUS
Status: DISCONTINUED | OUTPATIENT
Start: 2022-04-29 | End: 2022-04-29 | Stop reason: HOSPADM

## 2022-04-29 RX ORDER — EPINEPHRINE 1 MG/ML
0.3 INJECTION, SOLUTION INTRAMUSCULAR; SUBCUTANEOUS EVERY 5 MIN PRN
Status: CANCELLED | OUTPATIENT
Start: 2022-04-29

## 2022-04-29 RX ORDER — MEPERIDINE HYDROCHLORIDE 25 MG/ML
25 INJECTION INTRAMUSCULAR; INTRAVENOUS; SUBCUTANEOUS EVERY 30 MIN PRN
Status: CANCELLED | OUTPATIENT
Start: 2022-04-29

## 2022-04-29 RX ORDER — HEPARIN SODIUM,PORCINE 10 UNIT/ML
5 VIAL (ML) INTRAVENOUS
Status: CANCELLED | OUTPATIENT
Start: 2022-04-29

## 2022-04-29 RX ORDER — ALBUTEROL SULFATE 0.83 MG/ML
2.5 SOLUTION RESPIRATORY (INHALATION)
Status: CANCELLED | OUTPATIENT
Start: 2022-04-29

## 2022-04-29 RX ORDER — MEPERIDINE HYDROCHLORIDE 25 MG/ML
25 INJECTION INTRAMUSCULAR; INTRAVENOUS; SUBCUTANEOUS EVERY 30 MIN PRN
Status: DISCONTINUED | OUTPATIENT
Start: 2022-04-29 | End: 2022-04-29 | Stop reason: HOSPADM

## 2022-04-29 RX ORDER — EPINEPHRINE 1 MG/ML
0.3 INJECTION, SOLUTION INTRAMUSCULAR; SUBCUTANEOUS EVERY 5 MIN PRN
Status: DISCONTINUED | OUTPATIENT
Start: 2022-04-29 | End: 2022-04-29 | Stop reason: HOSPADM

## 2022-04-29 RX ORDER — HEPARIN SODIUM (PORCINE) LOCK FLUSH IV SOLN 100 UNIT/ML 100 UNIT/ML
5 SOLUTION INTRAVENOUS
Status: DISCONTINUED | OUTPATIENT
Start: 2022-04-29 | End: 2022-04-29 | Stop reason: HOSPADM

## 2022-04-29 RX ORDER — METHYLPREDNISOLONE SODIUM SUCCINATE 125 MG/2ML
125 INJECTION, POWDER, LYOPHILIZED, FOR SOLUTION INTRAMUSCULAR; INTRAVENOUS
Status: CANCELLED
Start: 2022-04-29

## 2022-04-29 RX ORDER — ALBUTEROL SULFATE 90 UG/1
1-2 AEROSOL, METERED RESPIRATORY (INHALATION)
Status: CANCELLED
Start: 2022-04-29

## 2022-04-29 RX ORDER — DIPHENHYDRAMINE HYDROCHLORIDE 50 MG/ML
50 INJECTION INTRAMUSCULAR; INTRAVENOUS
Status: CANCELLED
Start: 2022-04-29

## 2022-04-29 RX ORDER — LORAZEPAM 2 MG/ML
0.5 INJECTION INTRAMUSCULAR EVERY 4 HOURS PRN
Status: CANCELLED | OUTPATIENT
Start: 2022-04-29

## 2022-04-29 RX ORDER — TRAZODONE HYDROCHLORIDE 50 MG/1
50 TABLET, FILM COATED ORAL AT BEDTIME
Qty: 30 TABLET | Refills: 0 | Status: SHIPPED | OUTPATIENT
Start: 2022-04-29 | End: 2023-05-17 | Stop reason: SINTOL

## 2022-04-29 RX ADMIN — ADO-TRASTUZUMAB EMTANSINE 352 MG: 20 INJECTION, POWDER, LYOPHILIZED, FOR SOLUTION INTRAVENOUS at 11:22

## 2022-04-29 RX ADMIN — HEPARIN 5 ML: 100 SYRINGE at 12:31

## 2022-04-29 RX ADMIN — SODIUM CHLORIDE 250 ML: 9 INJECTION, SOLUTION INTRAVENOUS at 11:21

## 2022-04-29 ASSESSMENT — PAIN SCALES - GENERAL: PAINLEVEL: SEVERE PAIN (6)

## 2022-04-29 NOTE — PROGRESS NOTES
"Oncology Rooming Note    April 29, 2022 9:59 AM   Khloe Becker is a 66 year old female who presents for:    Chief Complaint   Patient presents with     Oncology Clinic Visit     Return visit for 2 week follow up with labs and infusion related to Malignant neoplasm of upper-outer quadrant of left breast in female, estrogen receptor positive (H)     Initial Vitals: /63 (BP Location: Right arm, Patient Position: Sitting, Cuff Size: Adult Regular)   Pulse 66   Temp 98.5  F (36.9  C) (Oral)   Resp 16   Ht 1.626 m (5' 4.02\")   Wt 93.4 kg (206 lb)   SpO2 97%   BMI 35.34 kg/m   Estimated body mass index is 35.34 kg/m  as calculated from the following:    Height as of this encounter: 1.626 m (5' 4.02\").    Weight as of this encounter: 93.4 kg (206 lb). Body surface area is 2.05 meters squared.  Severe Pain (6) Comment: Data Unavailable   No LMP recorded. Patient is postmenopausal.  Allergies reviewed: Yes  Medications reviewed: Yes    Medications: Medication refills not needed today.  Pharmacy name entered into [facility]: [id number] [address]  AT [facility]    Clinical concerns: Return visit for 2 week follow up with labs and infusion related to Malignant neoplasm of upper-outer quadrant of left breast in female, estrogen receptor positive (H)        GEORGIA PORTILLO MA            "

## 2022-04-29 NOTE — PROGRESS NOTES
Infusion Nursing Note:  Khloe Becker presents today for Day 1, Cycle 2 Kadcyla.    Patient seen by provider today: Yes: Dr. Rubio.   present during visit today: Not Applicable.    Note: Khloe arrived ambulatory by herself for treatment following her office visit with Dr. Rubio. Plan of care reviewed and Khloe has no additional questions. Kadcyla infused over 30 minutes and patient was observed for 30 minutes following completion of infusion. Khloe tolerated treatment well and showed no signs of reaction.    Intravenous Access:  Implanted Port.    Treatment Conditions:  Lab Results   Component Value Date    HGB 11.4 (L) 04/29/2022    WBC 6.3 04/29/2022    ANEUTAUTO 4.0 04/29/2022     04/29/2022      Results reviewed, labs MET treatment parameters, ok to proceed with treatment.    Post Infusion Assessment:  Patient tolerated infusion without incident.  Patient observed for 30 minutes post Cycle 2 of Kadcyla per protocol.  Blood return noted pre and post infusion.  Site patent and intact, free from redness, edema or discomfort.  Access discontinued per protocol.     Discharge Plan:   Patient will return May 20th for next appointment.   Patient discharged in stable condition accompanied by: self.  Departure Mode: Ambulatory.      Keyana García RN

## 2022-04-29 NOTE — LETTER
"    4/29/2022         RE: Khloe Becker  [address]  [place] [address]        Dear Colleague,    Thank you for referring your patient, Khloe Becekr, to the [facility] [place]. Please see a copy of my visit note below.    Oncology Rooming Note    April 29, 2022 9:59 AM   Khloe Becker is a 66 year old female who presents for:    Chief Complaint   Patient presents with     Oncology Clinic Visit     Return visit for 2 week follow up with labs and infusion related to Malignant neoplasm of upper-outer quadrant of left breast in female, estrogen receptor positive (H)     Initial Vitals: /63 (BP Location: Right arm, Patient Position: Sitting, Cuff Size: Adult Regular)   Pulse 66   Temp 98.5  F (36.9  C) (Oral)   Resp 16   Ht 1.626 m (5' 4.02\")   Wt 93.4 kg (206 lb)   SpO2 97%   BMI 35.34 kg/m   Estimated body mass index is 35.34 kg/m  as calculated from the following:    Height as of this encounter: 1.626 m (5' 4.02\").    Weight as of this encounter: 93.4 kg (206 lb). Body surface area is 2.05 meters squared.  Severe Pain (6) Comment: Data Unavailable   No LMP recorded. Patient is postmenopausal.  Allergies reviewed: Yes  Medications reviewed: Yes    Medications: Medication refills not needed today.  Pharmacy name entered into [facility]: [facility] #[id number] - [place], [place] - [address] [facility]  AT [facility]    Clinical concerns: Return visit for 2 week follow up with labs and infusion related to Malignant neoplasm of upper-outer quadrant of left breast in female, estrogen receptor positive (H)        GEORGIA PORTILLO MA              [facility] Hematology and Oncology Progress Note    Patient: Khloe Becker  MRN: [id number]  Date of Service: 04/08/2022        Reason for Visit    Chief Complaint   Patient presents with     Oncology Clinic Visit     Return visit for 2 week follow up with labs and infusion related to Malignant neoplasm of " upper-outer quadrant of left breast in female, estrogen receptor positive (H)         Problem List Items Addressed This Visit    None     Visit Diagnoses     Screening for osteoporosis    -  Primary    Relevant Orders    DX Hip/Pelvis/Spine    Asymptomatic menopausal state         Relevant Orders    DX Hip/Pelvis/Spine            Assessment and Plan    Early-stage triple positive left-sided breast cancer status post 6 cycles of neoadjuvant TCHP chemotherapy and left mastectomy  Post neoadjuvant therapy surgical pathology shows residual tumor both in the breast and in the lymph nodes.  Pathological staging is [id number].  One of the 3 sentinel lymph nodes were positive.    On adjuvant Kadcyla.  Status post 1 cycle.  Doing well since last visit.  Last 10 days have been really good for her.  Fatigue has gotten better.  No major side effects so far other than slight worsening of peripheral neuropathy in the feet.  However it seems to be intermittent and she mainly noticed it after the first infusion.  Now she is feeling better.  Labs today reviewed.  Hemoglobin is slightly down at 11.4.  She is asymptomatic.  Blood count is normal.  Neutrophil count is 4.  All other labs including creatinine, LFTs electrolytes are within normal limits.  No contraindications to proceed with treatment today.  She will get cycle 2 today.    Endocrine therapy for ER positive breast cancer  In the past I discussed about starting an AI for her ER positive breast cancer.  I reviewed the potential side effects and complications associated with the AI's and alternative options like tamoxifen.  I will start with a DEXA scan.  She is interested in trying AI's first.  I will plan to start her on therapy after next cycle of Kadcyla.  She is agreeable with the plan.        Cancer Staging  No matching staging information was found for the patient.    ECOG Performance    0 - Independent         Problem List    Patient Active Problem List   Diagnosis      Morbid obesity (H)     Malignant neoplasm of overlapping sites of left breast in female, estrogen receptor positive (H)     Malignant neoplasm of upper-outer quadrant of left breast in female, estrogen receptor positive (H)        ______________________________________________________________________________    Interval History   Khloe Becker is a 66 year old with triple positive breast cancer status post neoadjuvant chemotherapy with TCHP followed by mastectomy who is here for cycle 2 of Kadcyla.    Did well with infusion without any major issues.  No infusion reactions.  Her neuropathy is stable.  Continues to have some fatigue but denies any fevers chills or night sweats.  No worsening shortness of breath or pedal edema.  Appetite is okay.  Weight has been stable.  Has been physically very active.    Labs reviewed today    Review of Systems  A comprehensive review of systems was negative except for what is noted in the interval history    Current Outpatient Medications   Medication     acetaminophen (TYLENOL) 500 MG tablet     cholecalciferol 25 MCG (1000 UT) TABS     clonazePAM (KLONOPIN) 0.5 MG tablet     diphenoxylate-atropine (LOMOTIL) 2.5-0.025 MG tablet     fluticasone (VERAMYST) 27.5 MCG/SPRAY nasal spray     hydrochlorothiazide (HYDRODIURIL) 25 MG tablet     hydrocortisone 2.5 % cream     ketoconazole (NIZORAL) 2 % external cream     lidocaine-prilocaine (EMLA) 2.5-2.5 % external cream     multivitamin w/minerals (THERA-VIT-M) tablet     ondansetron (ZOFRAN-ODT) 8 MG ODT tab     prochlorperazine (COMPAZINE) 10 MG tablet     busPIRone (BUSPAR) 10 MG tablet     fish oil-omega-3 fatty acids 1000 MG capsule     fluconazole (DIFLUCAN) 150 MG tablet     furosemide (LASIX) 20 MG tablet     irbesartan (AVAPRO) 75 MG tablet     medical cannabis (Patient's own supply)     No current facility-administered medications for this visit.        Physical Exam    No flowsheet data found.    General: alert and  cooperative  HEENT: Head: Normal, normocephalic, atraumatic.  Eye: Normal external eye, conjunctiva, lids cornea, BRIGETTE.  Chest: Clear to auscultation bilaterally  Cardiac: S1, S2 normal, regular rate and rhythm  Abdomen: abdomen is soft without significant tenderness, masses, organomegaly or guarding  Extremities: atraumatic, no peripheral edema  CNS: Alert and oriented x3, neurologic exam grossly normal.  Lymphatics: No bilateral cervical, axillary, supraclavicular adenopathy noted    Lab Results    Recent Results (from the past 168 hour(s))   Comprehensive metabolic panel   Result Value Ref Range    Sodium 142 136 - 145 mmol/L    Potassium 3.6 3.5 - 5.0 mmol/L    Chloride 103 98 - 107 mmol/L    Carbon Dioxide (CO2) 31 22 - 31 mmol/L    Anion Gap 8 5 - 18 mmol/L    Urea Nitrogen 20 8 - 22 mg/dL    Creatinine 0.71 0.60 - 1.10 mg/dL    Calcium 9.5 8.5 - 10.5 mg/dL    Glucose 96 70 - 125 mg/dL    Alkaline Phosphatase 92 45 - 120 U/L    AST 19 0 - 40 U/L    ALT 20 0 - 45 U/L    Protein Total 6.9 6.0 - 8.0 g/dL    Albumin 3.5 3.5 - 5.0 g/dL    Bilirubin Total 0.4 0.0 - 1.0 mg/dL    GFR Estimate >90 >60 mL/min/1.73m2   CBC with platelets and differential   Result Value Ref Range    WBC Count 6.3 4.0 - 11.0 10e3/uL    RBC Count 4.12 3.80 - 5.20 10e6/uL    Hemoglobin 11.4 (L) 11.7 - 15.7 g/dL    Hematocrit 35.7 35.0 - 47.0 %    MCV 87 78 - 100 fL    MCH 27.7 26.5 - 33.0 pg    MCHC 31.9 31.5 - 36.5 g/dL    RDW 14.7 10.0 - 15.0 %    Platelet Count 226 150 - 450 10e3/uL    % Neutrophils 62 %    % Lymphocytes 28 %    % Monocytes 7 %    % Eosinophils 2 %    % Basophils 1 %    % Immature Granulocytes 0 %    NRBCs per 100 WBC 0 <1 /100    Absolute Neutrophils 4.0 1.6 - 8.3 10e3/uL    Absolute Lymphocytes 1.8 0.8 - 5.3 10e3/uL    Absolute Monocytes 0.4 0.0 - 1.3 10e3/uL    Absolute Eosinophils 0.1 0.0 - 0.7 10e3/uL    Absolute Basophils 0.0 0.0 - 0.2 10e3/uL    Absolute Immature Granulocytes 0.0 <=0.4 10e3/uL    Absolute NRBCs 0.0  10e3/uL       Imaging    No results found.     A total of 30 min were spent today on this visit which included face to face conversation with the patient, EMR review, counseling and co-ordination of care and medical documentation.      Signed by: Xavier Rubio MD      Again, thank you for allowing me to participate in the care of your patient.        Sincerely,        Xavier Rubio MD

## 2022-04-29 NOTE — PROGRESS NOTES
[facility] Hematology and Oncology Progress Note    Patient: Khloe Becker  MRN: [id number]  Date of Service: 04/08/2022        Reason for Visit    Chief Complaint   Patient presents with     Oncology Clinic Visit     Return visit for 1 week follow up with labs related to Malignant neoplasm of upper-outer quadrant of left breast in female, estrogen receptor positive (H)         Problem List Items Addressed This Visit        Other    Malignant neoplasm of upper-outer quadrant of left breast in female, estrogen receptor positive (H) - Primary    Relevant Medications    clonazePAM (KLONOPIN) 0.5 MG tablet    Other Relevant Orders    Infusion Appointment Request            Assessment and Plan    Early-stage triple positive left-sided breast cancer status post 6 cycles of neoadjuvant TCHP chemotherapy and left mastectomy  Post neoadjuvant therapy surgical pathology shows residual tumor both in the breast and in the lymph nodes.  Pathological staging is [id number].  One of the 3 sentinel lymph nodes were positive.    On adjuvant R.  Status post 1 cycle.  Did extremely well with infusion without any major issues.  She has an expected side effect including fatigue.  But no worsening of neuropathy.  No evidence of cardiomyopathy.  There is also a bit too early to see any of these side effects anyway.  Labs today show mildly low platelet count at 131.  This is an expected side effect.  Neutrophil count is normal.  Hemoglobin is 12.  Creatinine is 0.75.  LFTs are within normal limits.    Explained to her that fatigue is expected complication with these treatments and I would expect her fatigue to worsen a little bit in the next week or so before it gets better.  We also expect her blood counts go down little bit further in the next week.  Watch for any signs or symptoms of infection or increased bruising or bleeding.  Avoid any sick contacts.  She can go outside and be physically active but use gentle precautions like  frequent handwashing and masking.    I will see her back in 2 weeks with repeat labs.  Cycle 2 calculi to follow the same day.    She is agreeable with the plan.    Cancer Staging  No matching staging information was found for the patient.    ECOG Performance    0 - Independent         Problem List    Patient Active Problem List   Diagnosis     Morbid obesity (H)     Malignant neoplasm of overlapping sites of left breast in female, estrogen receptor positive (H)     Malignant neoplasm of upper-outer quadrant of left breast in female, estrogen receptor positive (H)        ______________________________________________________________________________    Interval History   Khloe Becker is a 66 year old with triple positive breast cancer status post neoadjuvant chemotherapy with TCHP followed by mastectomy who is here for toxicity check after cycle 1 of adjuvant Kadcyla.    Did well with infusion without any major issues.  No infusion reactions.  Her neuropathy is stable.  Continues to have some fatigue but denies any fevers chills or night sweats.  No worsening shortness of breath or pedal edema.  Appetite is okay.  Weight has been stable.  Has been physically very active.    Labs reviewed today    Review of Systems  A comprehensive review of systems was negative except for what is noted in the interval history    Current Outpatient Medications   Medication     acetaminophen (TYLENOL) 500 MG tablet     cholecalciferol 25 MCG (1000 UT) TABS     clonazePAM (KLONOPIN) 0.5 MG tablet     diphenoxylate-atropine (LOMOTIL) 2.5-0.025 MG tablet     fish oil-omega-3 fatty acids 1000 MG capsule     fluconazole (DIFLUCAN) 150 MG tablet     fluticasone (VERAMYST) 27.5 MCG/SPRAY nasal spray     hydrochlorothiazide (HYDRODIURIL) 25 MG tablet     hydrocortisone 2.5 % cream     ketoconazole (NIZORAL) 2 % external cream     lidocaine-prilocaine (EMLA) 2.5-2.5 % external cream     medical cannabis (Patient's own supply)     multivitamin  w/minerals (THERA-VIT-M) tablet     ondansetron (ZOFRAN-ODT) 8 MG ODT tab     prochlorperazine (COMPAZINE) 10 MG tablet     busPIRone (BUSPAR) 10 MG tablet     furosemide (LASIX) 20 MG tablet     irbesartan (AVAPRO) 75 MG tablet     No current facility-administered medications for this visit.        Physical Exam    No flowsheet data found.    General: alert and cooperative  HEENT: Head: Normal, normocephalic, atraumatic.  Eye: Normal external eye, conjunctiva, lids cornea, BRIGETTE.  Chest: Clear to auscultation bilaterally  Cardiac: S1, S2 normal, regular rate and rhythm  Abdomen: abdomen is soft without significant tenderness, masses, organomegaly or guarding  Extremities: atraumatic, no peripheral edema  CNS: Alert and oriented x3, neurologic exam grossly normal.  Lymphatics: No bilateral cervical, axillary, supraclavicular adenopathy noted    Lab Results    No results found for this or any previous visit (from the past 168 hour(s)).    Imaging    No results found.     A total of 25 min were spent today on this visit which included face to face conversation with the patient, EMR review, counseling and co-ordination of care and medical documentation.      Signed by: Xavier Rubio MD

## 2022-04-29 NOTE — PROGRESS NOTES
[facility] Hematology and Oncology Progress Note    Patient: Khloe Becker  MRN: [id number]  Date of Service: 04/08/2022        Reason for Visit    Chief Complaint   Patient presents with     Oncology Clinic Visit     Return visit for 2 week follow up with labs and infusion related to Malignant neoplasm of upper-outer quadrant of left breast in female, estrogen receptor positive (H)         Problem List Items Addressed This Visit    None     Visit Diagnoses     Screening for osteoporosis    -  Primary    Relevant Orders    DX Hip/Pelvis/Spine    Asymptomatic menopausal state         Relevant Orders    DX Hip/Pelvis/Spine            Assessment and Plan    Early-stage triple positive left-sided breast cancer status post 6 cycles of neoadjuvant TCHP chemotherapy and left mastectomy  Post neoadjuvant therapy surgical pathology shows residual tumor both in the breast and in the lymph nodes.  Pathological staging is [id number].  One of the 3 sentinel lymph nodes were positive.    On adjuvant Kadcyla.  Status post 1 cycle.  Doing well since last visit.  Last 10 days have been really good for her.  Fatigue has gotten better.  No major side effects so far other than slight worsening of peripheral neuropathy in the feet.  However it seems to be intermittent and she mainly noticed it after the first infusion.  Now she is feeling better.  Labs today reviewed.  Hemoglobin is slightly down at 11.4.  She is asymptomatic.  Blood count is normal.  Neutrophil count is 4.  All other labs including creatinine, LFTs electrolytes are within normal limits.  No contraindications to proceed with treatment today.  She will get cycle 2 today.    Endocrine therapy for ER positive breast cancer  In the past I discussed about starting an AI for her ER positive breast cancer.  I reviewed the potential side effects and complications associated with the AI's and alternative options like tamoxifen.  I will start with a DEXA scan.  She is  interested in trying AI's first.  I will plan to start her on therapy after next cycle of Kadcyla.  She is agreeable with the plan.        Cancer Staging  No matching staging information was found for the patient.    ECOG Performance    0 - Independent         Problem List    Patient Active Problem List   Diagnosis     Morbid obesity (H)     Malignant neoplasm of overlapping sites of left breast in female, estrogen receptor positive (H)     Malignant neoplasm of upper-outer quadrant of left breast in female, estrogen receptor positive (H)        ______________________________________________________________________________    Interval History   Khloe Becker is a 66 year old with triple positive breast cancer status post neoadjuvant chemotherapy with TCHP followed by mastectomy who is here for cycle 2 of Kadcyla.    Did well with infusion without any major issues.  No infusion reactions.  Her neuropathy is stable.  Continues to have some fatigue but denies any fevers chills or night sweats.  No worsening shortness of breath or pedal edema.  Appetite is okay.  Weight has been stable.  Has been physically very active.    Labs reviewed today    Review of Systems  A comprehensive review of systems was negative except for what is noted in the interval history    Current Outpatient Medications   Medication     acetaminophen (TYLENOL) 500 MG tablet     cholecalciferol 25 MCG (1000 UT) TABS     clonazePAM (KLONOPIN) 0.5 MG tablet     diphenoxylate-atropine (LOMOTIL) 2.5-0.025 MG tablet     fluticasone (VERAMYST) 27.5 MCG/SPRAY nasal spray     hydrochlorothiazide (HYDRODIURIL) 25 MG tablet     hydrocortisone 2.5 % cream     ketoconazole (NIZORAL) 2 % external cream     lidocaine-prilocaine (EMLA) 2.5-2.5 % external cream     multivitamin w/minerals (THERA-VIT-M) tablet     ondansetron (ZOFRAN-ODT) 8 MG ODT tab     prochlorperazine (COMPAZINE) 10 MG tablet     busPIRone (BUSPAR) 10 MG tablet     fish oil-omega-3 fatty acids  1000 MG capsule     fluconazole (DIFLUCAN) 150 MG tablet     furosemide (LASIX) 20 MG tablet     irbesartan (AVAPRO) 75 MG tablet     medical cannabis (Patient's own supply)     No current facility-administered medications for this visit.        Physical Exam    No flowsheet data found.    General: alert and cooperative  HEENT: Head: Normal, normocephalic, atraumatic.  Eye: Normal external eye, conjunctiva, lids cornea, BRIGETTE.  Chest: Clear to auscultation bilaterally  Cardiac: S1, S2 normal, regular rate and rhythm  Abdomen: abdomen is soft without significant tenderness, masses, organomegaly or guarding  Extremities: atraumatic, no peripheral edema  CNS: Alert and oriented x3, neurologic exam grossly normal.  Lymphatics: No bilateral cervical, axillary, supraclavicular adenopathy noted    Lab Results    Recent Results (from the past 168 hour(s))   Comprehensive metabolic panel   Result Value Ref Range    Sodium 142 136 - 145 mmol/L    Potassium 3.6 3.5 - 5.0 mmol/L    Chloride 103 98 - 107 mmol/L    Carbon Dioxide (CO2) 31 22 - 31 mmol/L    Anion Gap 8 5 - 18 mmol/L    Urea Nitrogen 20 8 - 22 mg/dL    Creatinine 0.71 0.60 - 1.10 mg/dL    Calcium 9.5 8.5 - 10.5 mg/dL    Glucose 96 70 - 125 mg/dL    Alkaline Phosphatase 92 45 - 120 U/L    AST 19 0 - 40 U/L    ALT 20 0 - 45 U/L    Protein Total 6.9 6.0 - 8.0 g/dL    Albumin 3.5 3.5 - 5.0 g/dL    Bilirubin Total 0.4 0.0 - 1.0 mg/dL    GFR Estimate >90 >60 mL/min/1.73m2   CBC with platelets and differential   Result Value Ref Range    WBC Count 6.3 4.0 - 11.0 10e3/uL    RBC Count 4.12 3.80 - 5.20 10e6/uL    Hemoglobin 11.4 (L) 11.7 - 15.7 g/dL    Hematocrit 35.7 35.0 - 47.0 %    MCV 87 78 - 100 fL    MCH 27.7 26.5 - 33.0 pg    MCHC 31.9 31.5 - 36.5 g/dL    RDW 14.7 10.0 - 15.0 %    Platelet Count 226 150 - 450 10e3/uL    % Neutrophils 62 %    % Lymphocytes 28 %    % Monocytes 7 %    % Eosinophils 2 %    % Basophils 1 %    % Immature Granulocytes 0 %    NRBCs per 100 WBC  0 <1 /100    Absolute Neutrophils 4.0 1.6 - 8.3 10e3/uL    Absolute Lymphocytes 1.8 0.8 - 5.3 10e3/uL    Absolute Monocytes 0.4 0.0 - 1.3 10e3/uL    Absolute Eosinophils 0.1 0.0 - 0.7 10e3/uL    Absolute Basophils 0.0 0.0 - 0.2 10e3/uL    Absolute Immature Granulocytes 0.0 <=0.4 10e3/uL    Absolute NRBCs 0.0 10e3/uL       Imaging    No results found.     A total of 30 min were spent today on this visit which included face to face conversation with the patient, EMR review, counseling and co-ordination of care and medical documentation.      Signed by: Xavier Rubio MD

## 2022-05-20 ENCOUNTER — ONCOLOGY VISIT (OUTPATIENT)
Dept: ONCOLOGY | Facility: HOSPITAL | Age: 67
End: 2022-05-20
Attending: INTERNAL MEDICINE
Payer: COMMERCIAL

## 2022-05-20 ENCOUNTER — INFUSION THERAPY VISIT (OUTPATIENT)
Dept: INFUSION THERAPY | Facility: HOSPITAL | Age: 67
End: 2022-05-20
Attending: INTERNAL MEDICINE
Payer: COMMERCIAL

## 2022-05-20 VITALS
WEIGHT: 203.5 LBS | TEMPERATURE: 98.1 F | OXYGEN SATURATION: 97 % | HEIGHT: 64 IN | SYSTOLIC BLOOD PRESSURE: 148 MMHG | RESPIRATION RATE: 20 BRPM | HEART RATE: 77 BPM | BODY MASS INDEX: 34.74 KG/M2 | DIASTOLIC BLOOD PRESSURE: 86 MMHG

## 2022-05-20 DIAGNOSIS — T45.1X5A CHEMOTHERAPY-INDUCED PERIPHERAL NEUROPATHY (H): ICD-10-CM

## 2022-05-20 DIAGNOSIS — C50.412 MALIGNANT NEOPLASM OF UPPER-OUTER QUADRANT OF LEFT BREAST IN FEMALE, ESTROGEN RECEPTOR POSITIVE (H): Primary | ICD-10-CM

## 2022-05-20 DIAGNOSIS — Z17.0 MALIGNANT NEOPLASM OF UPPER-OUTER QUADRANT OF LEFT BREAST IN FEMALE, ESTROGEN RECEPTOR POSITIVE (H): Primary | ICD-10-CM

## 2022-05-20 DIAGNOSIS — G62.0 CHEMOTHERAPY-INDUCED PERIPHERAL NEUROPATHY (H): ICD-10-CM

## 2022-05-20 LAB
ALBUMIN SERPL-MCNC: 3.6 G/DL (ref 3.5–5)
ALP SERPL-CCNC: 92 U/L (ref 45–120)
ALT SERPL W P-5'-P-CCNC: 23 U/L (ref 0–45)
ANION GAP SERPL CALCULATED.3IONS-SCNC: 10 MMOL/L (ref 5–18)
AST SERPL W P-5'-P-CCNC: 20 U/L (ref 0–40)
BASOPHILS # BLD AUTO: 0.1 10E3/UL (ref 0–0.2)
BASOPHILS NFR BLD AUTO: 1 %
BILIRUB SERPL-MCNC: 0.5 MG/DL (ref 0–1)
BUN SERPL-MCNC: 16 MG/DL (ref 8–22)
CALCIUM SERPL-MCNC: 10 MG/DL (ref 8.5–10.5)
CHLORIDE BLD-SCNC: 102 MMOL/L (ref 98–107)
CO2 SERPL-SCNC: 29 MMOL/L (ref 22–31)
CREAT SERPL-MCNC: 0.76 MG/DL (ref 0.6–1.1)
EOSINOPHIL # BLD AUTO: 0.2 10E3/UL (ref 0–0.7)
EOSINOPHIL NFR BLD AUTO: 2 %
ERYTHROCYTE [DISTWIDTH] IN BLOOD BY AUTOMATED COUNT: 15.4 % (ref 10–15)
GFR SERPL CREATININE-BSD FRML MDRD: 85 ML/MIN/1.73M2
GLUCOSE BLD-MCNC: 98 MG/DL (ref 70–125)
HCT VFR BLD AUTO: 38.1 % (ref 35–47)
HGB BLD-MCNC: 12.2 G/DL (ref 11.7–15.7)
IMM GRANULOCYTES # BLD: 0 10E3/UL
IMM GRANULOCYTES NFR BLD: 0 %
LYMPHOCYTES # BLD AUTO: 1.6 10E3/UL (ref 0.8–5.3)
LYMPHOCYTES NFR BLD AUTO: 25 %
MCH RBC QN AUTO: 27.4 PG (ref 26.5–33)
MCHC RBC AUTO-ENTMCNC: 32 G/DL (ref 31.5–36.5)
MCV RBC AUTO: 86 FL (ref 78–100)
MONOCYTES # BLD AUTO: 0.5 10E3/UL (ref 0–1.3)
MONOCYTES NFR BLD AUTO: 8 %
NEUTROPHILS # BLD AUTO: 4.1 10E3/UL (ref 1.6–8.3)
NEUTROPHILS NFR BLD AUTO: 64 %
NRBC # BLD AUTO: 0 10E3/UL
NRBC BLD AUTO-RTO: 0 /100
PLATELET # BLD AUTO: 244 10E3/UL (ref 150–450)
POTASSIUM BLD-SCNC: 3.6 MMOL/L (ref 3.5–5)
PROT SERPL-MCNC: 7.6 G/DL (ref 6–8)
RBC # BLD AUTO: 4.45 10E6/UL (ref 3.8–5.2)
SODIUM SERPL-SCNC: 141 MMOL/L (ref 136–145)
WBC # BLD AUTO: 6.4 10E3/UL (ref 4–11)

## 2022-05-20 PROCEDURE — 96413 CHEMO IV INFUSION 1 HR: CPT

## 2022-05-20 PROCEDURE — 85025 COMPLETE CBC W/AUTO DIFF WBC: CPT | Performed by: INTERNAL MEDICINE

## 2022-05-20 PROCEDURE — 99214 OFFICE O/P EST MOD 30 MIN: CPT | Performed by: INTERNAL MEDICINE

## 2022-05-20 PROCEDURE — G0463 HOSPITAL OUTPT CLINIC VISIT: HCPCS | Mod: 25

## 2022-05-20 PROCEDURE — 258N000003 HC RX IP 258 OP 636: Performed by: INTERNAL MEDICINE

## 2022-05-20 PROCEDURE — 250N000011 HC RX IP 250 OP 636: Performed by: INTERNAL MEDICINE

## 2022-05-20 PROCEDURE — 80051 ELECTROLYTE PANEL: CPT | Performed by: INTERNAL MEDICINE

## 2022-05-20 RX ORDER — DIPHENHYDRAMINE HYDROCHLORIDE 50 MG/ML
50 INJECTION INTRAMUSCULAR; INTRAVENOUS
Status: CANCELLED
Start: 2022-05-20

## 2022-05-20 RX ORDER — HEPARIN SODIUM (PORCINE) LOCK FLUSH IV SOLN 100 UNIT/ML 100 UNIT/ML
5 SOLUTION INTRAVENOUS
Status: CANCELLED | OUTPATIENT
Start: 2022-05-20

## 2022-05-20 RX ORDER — METHYLPREDNISOLONE SODIUM SUCCINATE 125 MG/2ML
125 INJECTION, POWDER, LYOPHILIZED, FOR SOLUTION INTRAMUSCULAR; INTRAVENOUS
Status: CANCELLED
Start: 2022-05-20

## 2022-05-20 RX ORDER — DULOXETIN HYDROCHLORIDE 30 MG/1
30 CAPSULE, DELAYED RELEASE ORAL DAILY
Qty: 30 CAPSULE | Refills: 1 | Status: SHIPPED | OUTPATIENT
Start: 2022-05-20 | End: 2022-07-06

## 2022-05-20 RX ORDER — NALOXONE HYDROCHLORIDE 0.4 MG/ML
0.2 INJECTION, SOLUTION INTRAMUSCULAR; INTRAVENOUS; SUBCUTANEOUS
Status: CANCELLED | OUTPATIENT
Start: 2022-05-20

## 2022-05-20 RX ORDER — HEPARIN SODIUM (PORCINE) LOCK FLUSH IV SOLN 100 UNIT/ML 100 UNIT/ML
5 SOLUTION INTRAVENOUS
Status: DISCONTINUED | OUTPATIENT
Start: 2022-05-20 | End: 2022-05-20 | Stop reason: HOSPADM

## 2022-05-20 RX ORDER — MEPERIDINE HYDROCHLORIDE 25 MG/ML
25 INJECTION INTRAMUSCULAR; INTRAVENOUS; SUBCUTANEOUS EVERY 30 MIN PRN
Status: CANCELLED | OUTPATIENT
Start: 2022-05-20

## 2022-05-20 RX ORDER — ALBUTEROL SULFATE 90 UG/1
1-2 AEROSOL, METERED RESPIRATORY (INHALATION)
Status: CANCELLED
Start: 2022-05-20

## 2022-05-20 RX ORDER — EPINEPHRINE 1 MG/ML
0.3 INJECTION, SOLUTION INTRAMUSCULAR; SUBCUTANEOUS EVERY 5 MIN PRN
Status: CANCELLED | OUTPATIENT
Start: 2022-05-20

## 2022-05-20 RX ORDER — HEPARIN SODIUM,PORCINE 10 UNIT/ML
5 VIAL (ML) INTRAVENOUS
Status: CANCELLED | OUTPATIENT
Start: 2022-05-20

## 2022-05-20 RX ORDER — LORAZEPAM 2 MG/ML
0.5 INJECTION INTRAMUSCULAR EVERY 4 HOURS PRN
Status: CANCELLED | OUTPATIENT
Start: 2022-05-20

## 2022-05-20 RX ORDER — ANASTROZOLE 1 MG/1
1 TABLET ORAL DAILY
Qty: 60 TABLET | Refills: 4 | Status: SHIPPED | OUTPATIENT
Start: 2022-05-20 | End: 2023-05-17

## 2022-05-20 RX ORDER — ALBUTEROL SULFATE 0.83 MG/ML
2.5 SOLUTION RESPIRATORY (INHALATION)
Status: CANCELLED | OUTPATIENT
Start: 2022-05-20

## 2022-05-20 RX ADMIN — HEPARIN 5 ML: 100 SYRINGE at 11:45

## 2022-05-20 RX ADMIN — ADO-TRASTUZUMAB EMTANSINE 352 MG: 20 INJECTION, POWDER, LYOPHILIZED, FOR SOLUTION INTRAVENOUS at 10:55

## 2022-05-20 RX ADMIN — SODIUM CHLORIDE 250 ML: 9 INJECTION, SOLUTION INTRAVENOUS at 10:55

## 2022-05-20 ASSESSMENT — PAIN SCALES - GENERAL: PAINLEVEL: SEVERE PAIN (6)

## 2022-05-20 NOTE — LETTER
"    5/20/2022         RE: Khloe Becker  [address]  [facility] [address]        Dear Colleague,    Thank you for referring your patient, Khloe Becker, to the [facility] [place]. Please see a copy of my visit note below.    Oncology Rooming Note    May 20, 2022 9:49 AM   Khloe Becker is a 66 year old female who presents for:    Chief Complaint   Patient presents with     Oncology Clinic Visit     Return visit for 2 week follow up with labs and infusion related to Malignant neoplasm of upper-outer quadrant of left breast in female, estrogen receptor positive (H)     Initial Vitals: BP (!) 148/86 (BP Location: Right arm, Patient Position: Sitting, Cuff Size: Adult Large)   Pulse 77   Temp 98.1  F (36.7  C) (Oral)   Resp 20   Ht 1.626 m (5' 4\")   Wt 92.3 kg (203 lb 8 oz)   SpO2 97%   BMI 34.93 kg/m   Estimated body mass index is 34.93 kg/m  as calculated from the following:    Height as of this encounter: 1.626 m (5' 4\").    Weight as of this encounter: 92.3 kg (203 lb 8 oz). Body surface area is 2.04 meters squared.  Severe Pain (6) Comment: Data Unavailable   No LMP recorded. Patient is postmenopausal.  Allergies reviewed: Yes  Medications reviewed: Yes    Medications: Medication refills not needed today.  Pharmacy name entered into [facility]: [id number] - [facility] [address]  AT [facility]    Clinical concerns: Return visit for 2 week follow up with labs and infusion related to Malignant neoplasm of upper-outer quadrant of left breast in female, estrogen receptor positive.       Erica Eric [facility] Hematology and Oncology Progress Note    Patient: Khloe Becker  MRN: [id number]  Date of Service: 04/08/2022        Reason for Visit    Chief Complaint   Patient presents with     Oncology Clinic Visit     Return visit for 2 week follow up with labs and infusion related to Malignant neoplasm of " upper-outer quadrant of left breast in female, estrogen receptor positive (H)         Problem List Items Addressed This Visit        Other    Malignant neoplasm of upper-outer quadrant of left breast in female, estrogen receptor positive (H) - Primary    Relevant Medications    DULoxetine (CYMBALTA) 30 MG capsule    anastrozole (ARIMIDEX) 1 MG tablet    Other Relevant Orders    Infusion Appointment Request (Completed)      Other Visit Diagnoses     Chemotherapy-induced peripheral neuropathy (H)        Relevant Medications    DULoxetine (CYMBALTA) 30 MG capsule            Assessment and Plan    Early-stage triple positive left-sided breast cancer status post 6 cycles of neoadjuvant TCHP chemotherapy and left mastectomy  Here for cycle 3 of Kadcyla.  Labs reviewed today.  Globin is 12.2.  ANC is 1.1.  Platelet count is 244.  Electrolytes, creatinine and liver function tests are within normal limits.  No contraindication to proceed with treatment today.  She will get cycle 3 Kadcyla today.  No dose changes made.    Chemotherapy related side effects  She has significant fatigue since last cycle.  It lasted more this time.  She also has worsening of neuropathy in the feet.  It is still grade 2.  It is not affecting her daily activities.  But it is noticeable and affecting her quality of life.  Is mainly numbness in the feet with some pins and needle sensations.  Does keep her up at night.  Does not want to take any Neurontin.  Recommended starting Cymbalta 30 mg once daily.  This also will help with her depression.      She was very tearful today during interview.  We discussed about potential dose reductions in the future versus changing to Herceptin and Perjeta.  She wants to continue with Kadcyla for now.  She wants to finish it as soon as possible.  Explained to her that if her side effects become intolerable then we can start going down the dose or even change to Herceptin and Perjeta which is still a reasonable  alternative.    Endocrine therapy for ER positive breast cancer  Again discussed starting endocrine therapy.  I sent a prescription for Arimidex 1 mg to be taken daily.  I also ordered a DEXA scan.  I reviewed the potential side effects and complications with Arimidex including hot flashes, musculoskeletal pain etc.  She will start as soon as possible.    She has a little bit of pain in her port area and has noticed a small lump.  I think this is scar tissue around the tunneled catheter.  I asked her to continue to watch.    I will see her back in 3 weeks with repeat labs.  Cycle 4 Kadcyla to follow the same day.  Plan is to repeat her echocardiogram after 4 cycles.  Fortunately no signs of heart failure right now.        Cancer Staging  No matching staging information was found for the patient.    ECOG Performance    0 - Independent         Problem List    Patient Active Problem List   Diagnosis     Morbid obesity (H)     Malignant neoplasm of overlapping sites of left breast in female, estrogen receptor positive (H)     Malignant neoplasm of upper-outer quadrant of left breast in female, estrogen receptor positive (H)        ______________________________________________________________________________    Interval History   Khloe Becker is a 66 year old with triple positive breast cancer status post neoadjuvant chemotherapy with TCHP followed by mastectomy who is here for cycle 3 of Kadcyla and management of chemotherapy related toxicities.    Since last visit she has had some rough time.  After cycle 2 she had a few days of extreme fatigue.  She could not do anything on those days.  It happened about 5 to 6 days after the infusion.  It has taken her a longer time to recover from the fatigue this time.  She was tearful during the interview today.  She also has noticed slightly worsening of her peripheral neuropathy especially in the feet.  She states that her feet are mostly numb, mostly at times with some pins  and needle sensations.  It does keep her up at night sometimes.  No loss of balance.  She is still able to walk and drive.  Denies any significant nausea or vomiting.  Her appetite is okay.  She has not lost any weight.  Denies any shortness of breath or chest pain.  She does notice some pain in her port area.  No worsening pedal edema.  She has not started endocrine therapy yet.  Although she is experiencing some hot flashes.    Labs reviewed today    Review of Systems  A comprehensive review of systems was negative except for what is noted in the interval history    Current Outpatient Medications   Medication     acetaminophen (TYLENOL) 500 MG tablet     anastrozole (ARIMIDEX) 1 MG tablet     cholecalciferol 25 MCG (1000 UT) TABS     DULoxetine (CYMBALTA) 30 MG capsule     fluticasone (VERAMYST) 27.5 MCG/SPRAY nasal spray     hydrochlorothiazide (HYDRODIURIL) 25 MG tablet     hydrocortisone 2.5 % cream     irbesartan (AVAPRO) 75 MG tablet     ketoconazole (NIZORAL) 2 % external cream     lidocaine-prilocaine (EMLA) 2.5-2.5 % external cream     multivitamin w/minerals (THERA-VIT-M) tablet     busPIRone (BUSPAR) 10 MG tablet     clonazePAM (KLONOPIN) 0.5 MG tablet     diphenoxylate-atropine (LOMOTIL) 2.5-0.025 MG tablet     fish oil-omega-3 fatty acids 1000 MG capsule     furosemide (LASIX) 20 MG tablet     medical cannabis (Patient's own supply)     ondansetron (ZOFRAN-ODT) 8 MG ODT tab     prochlorperazine (COMPAZINE) 10 MG tablet     traZODone (DESYREL) 50 MG tablet     No current facility-administered medications for this visit.        Physical Exam    No flowsheet data found.    General: alert and cooperative  HEENT: Head: Normal, normocephalic, atraumatic.  Eye: Normal external eye, conjunctiva, lids cornea, BRIGETTE.  Chest: Clear to auscultation bilaterally  Cardiac: S1, S2 normal, regular rate and rhythm  Abdomen: abdomen is soft without significant tenderness, masses, organomegaly or guarding  Extremities:  atraumatic, no peripheral edema  CNS: Alert and oriented x3, neurologic exam grossly normal.  Lymphatics: No bilateral cervical, axillary, supraclavicular adenopathy noted    Lab Results    Recent Results (from the past 168 hour(s))   Comprehensive metabolic panel   Result Value Ref Range    Sodium 141 136 - 145 mmol/L    Potassium 3.6 3.5 - 5.0 mmol/L    Chloride 102 98 - 107 mmol/L    Carbon Dioxide (CO2) 29 22 - 31 mmol/L    Anion Gap 10 5 - 18 mmol/L    Urea Nitrogen 16 8 - 22 mg/dL    Creatinine 0.76 0.60 - 1.10 mg/dL    Calcium 10.0 8.5 - 10.5 mg/dL    Glucose 98 70 - 125 mg/dL    Alkaline Phosphatase 92 45 - 120 U/L    AST 20 0 - 40 U/L    ALT 23 0 - 45 U/L    Protein Total 7.6 6.0 - 8.0 g/dL    Albumin 3.6 3.5 - 5.0 g/dL    Bilirubin Total 0.5 0.0 - 1.0 mg/dL    GFR Estimate 85 >60 mL/min/1.73m2   CBC with platelets and differential   Result Value Ref Range    WBC Count 6.4 4.0 - 11.0 10e3/uL    RBC Count 4.45 3.80 - 5.20 10e6/uL    Hemoglobin 12.2 11.7 - 15.7 g/dL    Hematocrit 38.1 35.0 - 47.0 %    MCV 86 78 - 100 fL    MCH 27.4 26.5 - 33.0 pg    MCHC 32.0 31.5 - 36.5 g/dL    RDW 15.4 (H) 10.0 - 15.0 %    Platelet Count 244 150 - 450 10e3/uL    % Neutrophils 64 %    % Lymphocytes 25 %    % Monocytes 8 %    % Eosinophils 2 %    % Basophils 1 %    % Immature Granulocytes 0 %    NRBCs per 100 WBC 0 <1 /100    Absolute Neutrophils 4.1 1.6 - 8.3 10e3/uL    Absolute Lymphocytes 1.6 0.8 - 5.3 10e3/uL    Absolute Monocytes 0.5 0.0 - 1.3 10e3/uL    Absolute Eosinophils 0.2 0.0 - 0.7 10e3/uL    Absolute Basophils 0.1 0.0 - 0.2 10e3/uL    Absolute Immature Granulocytes 0.0 <=0.4 10e3/uL    Absolute NRBCs 0.0 10e3/uL       Imaging    No results found.     A total of 30 min were spent today on this visit which included face to face conversation with the patient, EMR review, counseling and co-ordination of care and medical documentation.      Signed by: Xavier Rubio MD      Again, thank you for allowing me to  participate in the care of your patient.        Sincerely,        Xavier Rubio MD

## 2022-05-20 NOTE — PROGRESS NOTES
Infusion Nursing Note:  Khloe Becker presents today for C3 D1 Kadcyla.    Patient seen by provider today: Yes:    present during visit today: Not Applicable.    Note: Pt arrives amb[facility] to [facility] [place] after visit with Provider. Pt reports no difficulties during previous infusions. Pt reports experiencing extreme fatigue after previous treatments; lasting 1-2 days.    Intravenous Access:  Implanted Port WNL, with good blood return.    Treatment Conditions:  Lab Results   Component Value Date    HGB 12.2 05/20/2022    WBC 6.4 05/20/2022    ANEUTAUTO 4.1 05/20/2022     05/20/2022      Lab Results   Component Value Date     05/20/2022    POTASSIUM 3.6 05/20/2022    CR 0.76 05/20/2022    VIJAY 10.0 05/20/2022    BILITOTAL 0.5 05/20/2022    ALBUMIN 3.6 05/20/2022    ALT 23 05/20/2022    AST 20 05/20/2022     Results reviewed, labs MET treatment parameters, ok to proceed with treatment.    Post Infusion Assessment:  Patient tolerated infusion without incident; no observation needed.    Site patent and intact, free from redness, edema or discomfort.  No evidence of extravasations.  Access discontinued per protocol.     Discharge Plan:   Patient discharged in stable condition accompanied by: self.  Departure Mode: Ambulatory.      Mary Gan RN

## 2022-05-20 NOTE — PROGRESS NOTES
[facility] Hematology and Oncology Progress Note    Patient: Khloe Becker  MRN: [id number]  Date of Service: 04/08/2022        Reason for Visit    Chief Complaint   Patient presents with     Oncology Clinic Visit     Return visit for 2 week follow up with labs and infusion related to Malignant neoplasm of upper-outer quadrant of left breast in female, estrogen receptor positive (H)         Problem List Items Addressed This Visit        Other    Malignant neoplasm of upper-outer quadrant of left breast in female, estrogen receptor positive (H) - Primary    Relevant Medications    DULoxetine (CYMBALTA) 30 MG capsule    anastrozole (ARIMIDEX) 1 MG tablet    Other Relevant Orders    Infusion Appointment Request (Completed)      Other Visit Diagnoses     Chemotherapy-induced peripheral neuropathy (H)        Relevant Medications    DULoxetine (CYMBALTA) 30 MG capsule            Assessment and Plan    Early-stage triple positive left-sided breast cancer status post 6 cycles of neoadjuvant TCHP chemotherapy and left mastectomy  Here for cycle 3 of Kadcyla.  Labs reviewed today.  Globin is 12.2.  ANC is 1.1.  Platelet count is 244.  Electrolytes, creatinine and liver function tests are within normal limits.  No contraindication to proceed with treatment today.  She will get cycle 3 Kadcyla today.  No dose changes made.    Chemotherapy related side effects  She has significant fatigue since last cycle.  It lasted more this time.  She also has worsening of neuropathy in the feet.  It is still grade 2.  It is not affecting her daily activities.  But it is noticeable and affecting her quality of life.  Is mainly numbness in the feet with some pins and needle sensations.  Does keep her up at night.  Does not want to take any Neurontin.  Recommended starting Cymbalta 30 mg once daily.  This also will help with her depression.      She was very tearful today during interview.  We discussed about potential dose reductions  in the future versus changing to Herceptin and Perjeta.  She wants to continue with Kadcyla for now.  She wants to finish it as soon as possible.  Explained to her that if her side effects become intolerable then we can start going down the dose or even change to Herceptin and Perjeta which is still a reasonable alternative.    Endocrine therapy for ER positive breast cancer  Again discussed starting endocrine therapy.  I sent a prescription for Arimidex 1 mg to be taken daily.  I also ordered a DEXA scan.  I reviewed the potential side effects and complications with Arimidex including hot flashes, musculoskeletal pain etc.  She will start as soon as possible.    She has a little bit of pain in her port area and has noticed a small lump.  I think this is scar tissue around the tunneled catheter.  I asked her to continue to watch.    I will see her back in 3 weeks with repeat labs.  Cycle 4 Kadcyla to follow the same day.  Plan is to repeat her echocardiogram after 4 cycles.  Fortunately no signs of heart failure right now.        Cancer Staging  No matching staging information was found for the patient.    ECOG Performance    0 - Independent         Problem List    Patient Active Problem List   Diagnosis     Morbid obesity (H)     Malignant neoplasm of overlapping sites of left breast in female, estrogen receptor positive (H)     Malignant neoplasm of upper-outer quadrant of left breast in female, estrogen receptor positive (H)        ______________________________________________________________________________    Interval History   Khloe Becker is a 66 year old with triple positive breast cancer status post neoadjuvant chemotherapy with TCHP followed by mastectomy who is here for cycle 3 of Kadcyla and management of chemotherapy related toxicities.    Since last visit she has had some rough time.  After cycle 2 she had a few days of extreme fatigue.  She could not do anything on those days.  It happened about 5  to 6 days after the infusion.  It has taken her a longer time to recover from the fatigue this time.  She was tearful during the interview today.  She also has noticed slightly worsening of her peripheral neuropathy especially in the feet.  She states that her feet are mostly numb, mostly at times with some pins and needle sensations.  It does keep her up at night sometimes.  No loss of balance.  She is still able to walk and drive.  Denies any significant nausea or vomiting.  Her appetite is okay.  She has not lost any weight.  Denies any shortness of breath or chest pain.  She does notice some pain in her port area.  No worsening pedal edema.  She has not started endocrine therapy yet.  Although she is experiencing some hot flashes.    Labs reviewed today    Review of Systems  A comprehensive review of systems was negative except for what is noted in the interval history    Current Outpatient Medications   Medication     acetaminophen (TYLENOL) 500 MG tablet     anastrozole (ARIMIDEX) 1 MG tablet     cholecalciferol 25 MCG (1000 UT) TABS     DULoxetine (CYMBALTA) 30 MG capsule     fluticasone (VERAMYST) 27.5 MCG/SPRAY nasal spray     hydrochlorothiazide (HYDRODIURIL) 25 MG tablet     hydrocortisone 2.5 % cream     irbesartan (AVAPRO) 75 MG tablet     ketoconazole (NIZORAL) 2 % external cream     lidocaine-prilocaine (EMLA) 2.5-2.5 % external cream     multivitamin w/minerals (THERA-VIT-M) tablet     busPIRone (BUSPAR) 10 MG tablet     clonazePAM (KLONOPIN) 0.5 MG tablet     diphenoxylate-atropine (LOMOTIL) 2.5-0.025 MG tablet     fish oil-omega-3 fatty acids 1000 MG capsule     furosemide (LASIX) 20 MG tablet     medical cannabis (Patient's own supply)     ondansetron (ZOFRAN-ODT) 8 MG ODT tab     prochlorperazine (COMPAZINE) 10 MG tablet     traZODone (DESYREL) 50 MG tablet     No current facility-administered medications for this visit.        Physical Exam    No flowsheet data found.    General: alert and  cooperative  HEENT: Head: Normal, normocephalic, atraumatic.  Eye: Normal external eye, conjunctiva, lids cornea, BRIGETTE.  Chest: Clear to auscultation bilaterally  Cardiac: S1, S2 normal, regular rate and rhythm  Abdomen: abdomen is soft without significant tenderness, masses, organomegaly or guarding  Extremities: atraumatic, no peripheral edema  CNS: Alert and oriented x3, neurologic exam grossly normal.  Lymphatics: No bilateral cervical, axillary, supraclavicular adenopathy noted    Lab Results    Recent Results (from the past 168 hour(s))   Comprehensive metabolic panel   Result Value Ref Range    Sodium 141 136 - 145 mmol/L    Potassium 3.6 3.5 - 5.0 mmol/L    Chloride 102 98 - 107 mmol/L    Carbon Dioxide (CO2) 29 22 - 31 mmol/L    Anion Gap 10 5 - 18 mmol/L    Urea Nitrogen 16 8 - 22 mg/dL    Creatinine 0.76 0.60 - 1.10 mg/dL    Calcium 10.0 8.5 - 10.5 mg/dL    Glucose 98 70 - 125 mg/dL    Alkaline Phosphatase 92 45 - 120 U/L    AST 20 0 - 40 U/L    ALT 23 0 - 45 U/L    Protein Total 7.6 6.0 - 8.0 g/dL    Albumin 3.6 3.5 - 5.0 g/dL    Bilirubin Total 0.5 0.0 - 1.0 mg/dL    GFR Estimate 85 >60 mL/min/1.73m2   CBC with platelets and differential   Result Value Ref Range    WBC Count 6.4 4.0 - 11.0 10e3/uL    RBC Count 4.45 3.80 - 5.20 10e6/uL    Hemoglobin 12.2 11.7 - 15.7 g/dL    Hematocrit 38.1 35.0 - 47.0 %    MCV 86 78 - 100 fL    MCH 27.4 26.5 - 33.0 pg    MCHC 32.0 31.5 - 36.5 g/dL    RDW 15.4 (H) 10.0 - 15.0 %    Platelet Count 244 150 - 450 10e3/uL    % Neutrophils 64 %    % Lymphocytes 25 %    % Monocytes 8 %    % Eosinophils 2 %    % Basophils 1 %    % Immature Granulocytes 0 %    NRBCs per 100 WBC 0 <1 /100    Absolute Neutrophils 4.1 1.6 - 8.3 10e3/uL    Absolute Lymphocytes 1.6 0.8 - 5.3 10e3/uL    Absolute Monocytes 0.5 0.0 - 1.3 10e3/uL    Absolute Eosinophils 0.2 0.0 - 0.7 10e3/uL    Absolute Basophils 0.1 0.0 - 0.2 10e3/uL    Absolute Immature Granulocytes 0.0 <=0.4 10e3/uL    Absolute NRBCs  0.0 10e3/uL       Imaging    No results found.     A total of 30 min were spent today on this visit which included face to face conversation with the patient, EMR review, counseling and co-ordination of care and medical documentation.      Signed by: Xavier Rubio MD

## 2022-05-20 NOTE — PROGRESS NOTES
"Oncology Rooming Note    May 20, 2022 9:49 AM   Khloe Becker is a 66 year old female who presents for:    Chief Complaint   Patient presents with     Oncology Clinic Visit     Return visit for 2 week follow up with labs and infusion related to Malignant neoplasm of upper-outer quadrant of left breast in female, estrogen receptor positive (H)     Initial Vitals: BP (!) 148/86 (BP Location: Right arm, Patient Position: Sitting, Cuff Size: Adult Large)   Pulse 77   Temp 98.1  F (36.7  C) (Oral)   Resp 20   Ht 1.626 m (5' 4\")   Wt 92.3 kg (203 lb 8 oz)   SpO2 97%   BMI 34.93 kg/m   Estimated body mass index is 34.93 kg/m  as calculated from the following:    Height as of this encounter: 1.626 m (5' 4\").    Weight as of this encounter: 92.3 kg (203 lb 8 oz). Body surface area is 2.04 meters squared.  Severe Pain (6) Comment: Data Unavailable   No LMP recorded. Patient is postmenopausal.  Allergies reviewed: Yes  Medications reviewed: Yes    Medications: Medication refills not needed today.  Ph[facility] into [id number] [address]  AT [facility]    Clinical concerns: Return visit for 2 week follow up with labs and infusion related to Malignant neoplasm of upper-outer quadrant of left breast in female, estrogen receptor positive.       Erica Eric, JEFFREY              "

## 2022-06-01 ENCOUNTER — HOSPITAL ENCOUNTER (OUTPATIENT)
Dept: BONE DENSITY | Facility: HOSPITAL | Age: 67
Discharge: HOME | End: 2022-06-01
Attending: INTERNAL MEDICINE | Admitting: INTERNAL MEDICINE
Payer: COMMERCIAL

## 2022-06-01 DIAGNOSIS — Z13.820 SCREENING FOR OSTEOPOROSIS: ICD-10-CM

## 2022-06-01 DIAGNOSIS — Z78.0 ASYMPTOMATIC MENOPAUSAL STATE: ICD-10-CM

## 2022-06-01 PROCEDURE — 77080 DXA BONE DENSITY AXIAL: CPT

## 2022-06-10 ENCOUNTER — LAB (OUTPATIENT)
Dept: INFUSION THERAPY | Facility: HOSPITAL | Age: 67
End: 2022-06-10
Attending: INTERNAL MEDICINE
Payer: COMMERCIAL

## 2022-06-10 ENCOUNTER — PATIENT OUTREACH (OUTPATIENT)
Dept: ONCOLOGY | Facility: HOSPITAL | Age: 67
End: 2022-06-10

## 2022-06-10 ENCOUNTER — ONCOLOGY VISIT (OUTPATIENT)
Dept: ONCOLOGY | Facility: HOSPITAL | Age: 67
End: 2022-06-10
Attending: INTERNAL MEDICINE
Payer: COMMERCIAL

## 2022-06-10 VITALS
HEART RATE: 76 BPM | TEMPERATURE: 98.6 F | BODY MASS INDEX: 34.81 KG/M2 | HEIGHT: 64 IN | DIASTOLIC BLOOD PRESSURE: 80 MMHG | OXYGEN SATURATION: 98 % | WEIGHT: 203.9 LBS | SYSTOLIC BLOOD PRESSURE: 144 MMHG | RESPIRATION RATE: 16 BRPM

## 2022-06-10 DIAGNOSIS — C50.412 MALIGNANT NEOPLASM OF UPPER-OUTER QUADRANT OF LEFT BREAST IN FEMALE, ESTROGEN RECEPTOR POSITIVE (H): Primary | ICD-10-CM

## 2022-06-10 DIAGNOSIS — Z17.0 MALIGNANT NEOPLASM OF UPPER-OUTER QUADRANT OF LEFT BREAST IN FEMALE, ESTROGEN RECEPTOR POSITIVE (H): Primary | ICD-10-CM

## 2022-06-10 DIAGNOSIS — M85.9 LOW BONE DENSITY: ICD-10-CM

## 2022-06-10 DIAGNOSIS — G62.0 CHEMOTHERAPY-INDUCED PERIPHERAL NEUROPATHY (H): ICD-10-CM

## 2022-06-10 DIAGNOSIS — M79.18 MUSCULOSKELETAL PAIN: ICD-10-CM

## 2022-06-10 DIAGNOSIS — T45.1X5A CHEMOTHERAPY-INDUCED PERIPHERAL NEUROPATHY (H): ICD-10-CM

## 2022-06-10 DIAGNOSIS — Z51.11 ENCOUNTER FOR ANTINEOPLASTIC CHEMOTHERAPY: ICD-10-CM

## 2022-06-10 LAB
ALBUMIN SERPL-MCNC: 3.6 G/DL (ref 3.5–5)
ALP SERPL-CCNC: 95 U/L (ref 45–120)
ALT SERPL W P-5'-P-CCNC: 17 U/L (ref 0–45)
ANION GAP SERPL CALCULATED.3IONS-SCNC: 8 MMOL/L (ref 5–18)
AST SERPL W P-5'-P-CCNC: 19 U/L (ref 0–40)
BASOPHILS # BLD AUTO: 0 10E3/UL (ref 0–0.2)
BASOPHILS NFR BLD AUTO: 1 %
BILIRUB SERPL-MCNC: 0.5 MG/DL (ref 0–1)
BUN SERPL-MCNC: 21 MG/DL (ref 8–22)
CALCIUM SERPL-MCNC: 9.8 MG/DL (ref 8.5–10.5)
CHLORIDE BLD-SCNC: 101 MMOL/L (ref 98–107)
CO2 SERPL-SCNC: 30 MMOL/L (ref 22–31)
CREAT SERPL-MCNC: 0.73 MG/DL (ref 0.6–1.1)
EOSINOPHIL # BLD AUTO: 0.1 10E3/UL (ref 0–0.7)
EOSINOPHIL NFR BLD AUTO: 2 %
ERYTHROCYTE [DISTWIDTH] IN BLOOD BY AUTOMATED COUNT: 15.2 % (ref 10–15)
GFR SERPL CREATININE-BSD FRML MDRD: 90 ML/MIN/1.73M2
GLUCOSE BLD-MCNC: 100 MG/DL (ref 70–125)
HCT VFR BLD AUTO: 36.3 % (ref 35–47)
HGB BLD-MCNC: 11.7 G/DL (ref 11.7–15.7)
IMM GRANULOCYTES # BLD: 0 10E3/UL
IMM GRANULOCYTES NFR BLD: 0 %
LYMPHOCYTES # BLD AUTO: 1.9 10E3/UL (ref 0.8–5.3)
LYMPHOCYTES NFR BLD AUTO: 28 %
MCH RBC QN AUTO: 27.9 PG (ref 26.5–33)
MCHC RBC AUTO-ENTMCNC: 32.2 G/DL (ref 31.5–36.5)
MCV RBC AUTO: 86 FL (ref 78–100)
MONOCYTES # BLD AUTO: 0.4 10E3/UL (ref 0–1.3)
MONOCYTES NFR BLD AUTO: 6 %
NEUTROPHILS # BLD AUTO: 4.5 10E3/UL (ref 1.6–8.3)
NEUTROPHILS NFR BLD AUTO: 63 %
NRBC # BLD AUTO: 0 10E3/UL
NRBC BLD AUTO-RTO: 0 /100
PLATELET # BLD AUTO: 210 10E3/UL (ref 150–450)
POTASSIUM BLD-SCNC: 3.5 MMOL/L (ref 3.5–5)
PROT SERPL-MCNC: 7.4 G/DL (ref 6–8)
RBC # BLD AUTO: 4.2 10E6/UL (ref 3.8–5.2)
SODIUM SERPL-SCNC: 139 MMOL/L (ref 136–145)
WBC # BLD AUTO: 7 10E3/UL (ref 4–11)

## 2022-06-10 PROCEDURE — 85025 COMPLETE CBC W/AUTO DIFF WBC: CPT | Performed by: INTERNAL MEDICINE

## 2022-06-10 PROCEDURE — 96413 CHEMO IV INFUSION 1 HR: CPT

## 2022-06-10 PROCEDURE — 99214 OFFICE O/P EST MOD 30 MIN: CPT | Performed by: INTERNAL MEDICINE

## 2022-06-10 PROCEDURE — G0463 HOSPITAL OUTPT CLINIC VISIT: HCPCS | Mod: 25

## 2022-06-10 PROCEDURE — 250N000011 HC RX IP 250 OP 636: Performed by: INTERNAL MEDICINE

## 2022-06-10 PROCEDURE — 258N000003 HC RX IP 258 OP 636: Performed by: INTERNAL MEDICINE

## 2022-06-10 PROCEDURE — 80053 COMPREHEN METABOLIC PANEL: CPT | Performed by: INTERNAL MEDICINE

## 2022-06-10 RX ORDER — NALOXONE HYDROCHLORIDE 0.4 MG/ML
0.2 INJECTION, SOLUTION INTRAMUSCULAR; INTRAVENOUS; SUBCUTANEOUS
Status: CANCELLED | OUTPATIENT
Start: 2022-06-10

## 2022-06-10 RX ORDER — MEPERIDINE HYDROCHLORIDE 25 MG/ML
25 INJECTION INTRAMUSCULAR; INTRAVENOUS; SUBCUTANEOUS EVERY 30 MIN PRN
Status: CANCELLED | OUTPATIENT
Start: 2022-06-10

## 2022-06-10 RX ORDER — METHYLPREDNISOLONE SODIUM SUCCINATE 125 MG/2ML
125 INJECTION, POWDER, LYOPHILIZED, FOR SOLUTION INTRAMUSCULAR; INTRAVENOUS
Status: CANCELLED
Start: 2022-06-10

## 2022-06-10 RX ORDER — DIPHENHYDRAMINE HYDROCHLORIDE 50 MG/ML
50 INJECTION INTRAMUSCULAR; INTRAVENOUS
Status: CANCELLED
Start: 2022-06-10

## 2022-06-10 RX ORDER — HEPARIN SODIUM (PORCINE) LOCK FLUSH IV SOLN 100 UNIT/ML 100 UNIT/ML
5 SOLUTION INTRAVENOUS
Status: CANCELLED | OUTPATIENT
Start: 2022-06-10

## 2022-06-10 RX ORDER — EPINEPHRINE 1 MG/ML
0.3 INJECTION, SOLUTION INTRAMUSCULAR; SUBCUTANEOUS EVERY 5 MIN PRN
Status: CANCELLED | OUTPATIENT
Start: 2022-06-10

## 2022-06-10 RX ORDER — LORAZEPAM 2 MG/ML
0.5 INJECTION INTRAMUSCULAR EVERY 4 HOURS PRN
Status: CANCELLED | OUTPATIENT
Start: 2022-06-10

## 2022-06-10 RX ORDER — ALBUTEROL SULFATE 90 UG/1
1-2 AEROSOL, METERED RESPIRATORY (INHALATION)
Status: CANCELLED
Start: 2022-06-10

## 2022-06-10 RX ORDER — HEPARIN SODIUM (PORCINE) LOCK FLUSH IV SOLN 100 UNIT/ML 100 UNIT/ML
5 SOLUTION INTRAVENOUS
Status: DISCONTINUED | OUTPATIENT
Start: 2022-06-10 | End: 2022-06-10 | Stop reason: HOSPADM

## 2022-06-10 RX ORDER — HEPARIN SODIUM,PORCINE 10 UNIT/ML
5 VIAL (ML) INTRAVENOUS
Status: CANCELLED | OUTPATIENT
Start: 2022-06-10

## 2022-06-10 RX ORDER — ALBUTEROL SULFATE 0.83 MG/ML
2.5 SOLUTION RESPIRATORY (INHALATION)
Status: CANCELLED | OUTPATIENT
Start: 2022-06-10

## 2022-06-10 RX ADMIN — ADO-TRASTUZUMAB EMTANSINE 352 MG: 20 INJECTION, POWDER, LYOPHILIZED, FOR SOLUTION INTRAVENOUS at 11:13

## 2022-06-10 RX ADMIN — HEPARIN 5 ML: 100 SYRINGE at 12:21

## 2022-06-10 ASSESSMENT — PAIN SCALES - GENERAL: PAINLEVEL: MODERATE PAIN (5)

## 2022-06-10 NOTE — PROGRESS NOTES
[facility] Hematology and Oncology Progress Note    Patient: Khloe Becker  MRN: [id number]  Date of Service: 04/08/2022        Reason for Visit    Chief Complaint   Patient presents with     Oncology Clinic Visit     Malignant neoplasm of overlapping sites of left breast in female, estrogen receptor positive         Problem List Items Addressed This Visit        Nervous and Auditory    Chemotherapy-induced peripheral neuropathy (H)    Musculoskeletal pain       Other    Malignant neoplasm of upper-outer quadrant of left breast in female, estrogen receptor positive (H) - Primary    Relevant Orders    Echocardiogram Complete    Infusion Appointment Request (Completed)      Other Visit Diagnoses     Encounter for antineoplastic chemotherapy         Relevant Orders    Echocardiogram Complete    Low bone density                Assessment and Plan    Early-stage triple positive left-sided breast cancer status post 6 cycles of neoadjuvant TCHP chemotherapy and left mastectomy  Here for cycle 4 of Kadcyla.  Tolerating it pretty well.  She had significant issues after cycle 2 but after cycle 3, she felt better.  Less fatigue.  Neuropathy is pretty much stable.  It waxes and wanes.  Musculoskeletal pain is also the same.  Labs reviewed today.  LFTs are within normal limits.  Creatinine is normal.  Electrolytes are normal.  CBC shows hemoglobin of 11.7.  Platelet count is 210.  White count is normal.  No contraindications to proceed with treatment today.  She will get cycle 4 calculi today she will come back in 3 weeks with labs prior for cycle 5.  I will also repeat an echocardiogram.  No signs of heart failure on physical exam today.  She has always had peripheral edema which is no worse.    Chemotherapy related side effects (neuropathy and musculoskeletal pain)  Cycle 3 was better tolerated.  She continues to have significant musculoskeletal pain and neuropathy issues.  Neuropathy is grade 2.  This has not  worsened.  However she does report that she was riding her lawnmower and had significant pain in her arms.  Has numbness and tingling in the fingertips.  Denies any loss of balance.  It is not affecting her daily activities.  She tried Cymbalta for a week but it did not work out for her and hence stopped it.  She takes about 2 g of Tylenol a day on some days when discussed currently pain is really bad.  I asked her to try some ibuprofen in between.  Plenty of water with it and take it after food.  Does not want to try any other pain meds at this point in time.  We will continue to monitor.  Expect a little bit of worsening of musculoskeletal pain with Arimidex.    Endocrine therapy for ER positive breast cancer  Started Arimidex couple days ago.  So far no major issues.  I asked her to watch out for worsening musculoskeletal pain and hot flashes.    Low bone density  DEXA scan done couple weeks ago showed low bone density.  Started calcium and vitamin D.  With Arimidex expect her to have continued bone loss.  At some point in time we might have to start her on bisphosphonates.  She wants to hold off on this for now.  Continue calcium vitamin D for now.  Plan is to repeat DEXA scan within a year.            Cancer Staging  Malignant neoplasm of upper-outer quadrant of left breast in female, estrogen receptor positive (H)  Staging form: Breast, AJCC 8th Edition  - Clinical: Stage IB (cT2, cN1, cM0, G3, ER+, DC+, HER2+) - Signed by Xavier Rubio MD on 6/10/2022  - Pathologic: Stage IA (pT1a, pN1mi, cM0, G3, ER+, DC+, HER2+) - Signed by Xavier Rubio MD on 6/10/2022      ECOG Performance    0 - Independent         Problem List    Patient Active Problem List   Diagnosis     Morbid obesity (H)     Malignant neoplasm of overlapping sites of left breast in female, estrogen receptor positive (H)     Malignant neoplasm of upper-outer quadrant of left breast in female, estrogen receptor positive (H)      Chemotherapy-induced peripheral neuropathy (H)     Musculoskeletal pain      Oncologic history  July 2021: Left breast mass palpated by patient.  Mammogram showing 2.1 x 2.1 x 2 cm hypoechoic mass at 2 o'clock position about 11 cm from the nipple.  Ultrasound-guided biopsy of the mass showed invasive ductal carcinoma, grade 3, ER, GA and HER2 positive.  Left axillary lymph node biopsy positive for metastatic disease.    Neoadjuvant chemotherapy with TCHP starting 9/2/2021.  Finished 6 cycles of chemo.  Last dose was 12/17/2020.  Carboplatin was held for cycle 4 and 6 due to poor tolerance.    Left mastectomy with sentinel lymph node biopsy and axillary lymph node dissection done on 1/19/2022.    Surgical path showing residual disease.  ypT1a, pN1(mi)    Interval History   Khloe Becker is a 66 year old with triple positive breast cancer status post neoadjuvant chemotherapy with TCHP followed by mastectomy who is here for cycle 4 of Kadcyla and management of chemotherapy related toxicities.    Doing well since last visit.  After her last cycle she had significantly less side effects.  Neuropathy comes and goes.  She has tingling and numbness in her hands and feet all the time.  It still about grade 2.  She also has significant musculoskeletal pain and takes Tylenol up to 2 g in a day on some days.  States it is tolerable for the most part.  Fatigue was less compared to cycle 3.  She started Arimidex 2 days ago.  Denies any worsening of musculoskeletal pain after that.  No significant hot flashes.  Did try Cymbalta for a week for neuropathy but did not feel like it was working for her and it made her tired and hence stopped it.  Appetite is good.  Weight has been stable.    Labs reviewed today    Review of Systems  A comprehensive review of systems was negative except for what is noted in the interval history    Current Outpatient Medications   Medication     acetaminophen (TYLENOL) 500 MG tablet     anastrozole  (ARIMIDEX) 1 MG tablet     cholecalciferol 25 MCG (1000 UT) TABS     fish oil-omega-3 fatty acids 1000 MG capsule     fluticasone (VERAMYST) 27.5 MCG/SPRAY nasal spray     furosemide (LASIX) 20 MG tablet     hydrochlorothiazide (HYDRODIURIL) 25 MG tablet     irbesartan (AVAPRO) 75 MG tablet     lidocaine-prilocaine (EMLA) 2.5-2.5 % external cream     multivitamin w/minerals (THERA-VIT-M) tablet     busPIRone (BUSPAR) 10 MG tablet     clonazePAM (KLONOPIN) 0.5 MG tablet     diphenoxylate-atropine (LOMOTIL) 2.5-0.025 MG tablet     DULoxetine (CYMBALTA) 30 MG capsule     hydrocortisone 2.5 % cream     ketoconazole (NIZORAL) 2 % external cream     medical cannabis (Patient's own supply)     ondansetron (ZOFRAN-ODT) 8 MG ODT tab     prochlorperazine (COMPAZINE) 10 MG tablet     traZODone (DESYREL) 50 MG tablet     No current facility-administered medications for this visit.     Facility-Administered Medications Ordered in Other Visits   Medication     ADO-trastuzumab emtansine (KADCYLA) 352 mg in sodium chloride 0.9 % 267.6 mL infusion     heparin 100 UNIT/ML injection 5 mL        Physical Exam    No flowsheet data found.    General: Alert, cooperative, no distress  HEENT: Normocephalic and atraumatic  Eye: Normal external eye, conjunctiva, lids cornea, BRIGETTE.  Chest: Clear to auscultation bilaterally  Cardiac: S1, S2 normal, regular rate and rhythm pedal edema present  Abdomen: Soft and nontender  CNS: Alert and oriented x3, neurologic exam grossly normal.  Lymphatics: No bilateral cervical, axillary, supraclavicular adenopathy noted    Lab Results    Recent Results (from the past 168 hour(s))   Comprehensive metabolic panel   Result Value Ref Range    Sodium 139 136 - 145 mmol/L    Potassium 3.5 3.5 - 5.0 mmol/L    Chloride 101 98 - 107 mmol/L    Carbon Dioxide (CO2) 30 22 - 31 mmol/L    Anion Gap 8 5 - 18 mmol/L    Urea Nitrogen 21 8 - 22 mg/dL    Creatinine 0.73 0.60 - 1.10 mg/dL    Calcium 9.8 8.5 - 10.5 mg/dL     Glucose 100 70 - 125 mg/dL    Alkaline Phosphatase 95 45 - 120 U/L    AST 19 0 - 40 U/L    ALT 17 0 - 45 U/L    Protein Total 7.4 6.0 - 8.0 g/dL    Albumin 3.6 3.5 - 5.0 g/dL    Bilirubin Total 0.5 0.0 - 1.0 mg/dL    GFR Estimate 90 >60 mL/min/1.73m2   CBC with platelets and differential   Result Value Ref Range    WBC Count 7.0 4.0 - 11.0 10e3/uL    RBC Count 4.20 3.80 - 5.20 10e6/uL    Hemoglobin 11.7 11.7 - 15.7 g/dL    Hematocrit 36.3 35.0 - 47.0 %    MCV 86 78 - 100 fL    MCH 27.9 26.5 - 33.0 pg    MCHC 32.2 31.5 - 36.5 g/dL    RDW 15.2 (H) 10.0 - 15.0 %    Platelet Count 210 150 - 450 10e3/uL    % Neutrophils 63 %    % Lymphocytes 28 %    % Monocytes 6 %    % Eosinophils 2 %    % Basophils 1 %    % Immature Granulocytes 0 %    NRBCs per 100 WBC 0 <1 /100    Absolute Neutrophils 4.5 1.6 - 8.3 10e3/uL    Absolute Lymphocytes 1.9 0.8 - 5.3 10e3/uL    Absolute Monocytes 0.4 0.0 - 1.3 10e3/uL    Absolute Eosinophils 0.1 0.0 - 0.7 10e3/uL    Absolute Basophils 0.0 0.0 - 0.2 10e3/uL    Absolute Immature Granulocytes 0.0 <=0.4 10e3/uL    Absolute NRBCs 0.0 10e3/uL       Imaging    DX Hip/Pelvis/Spine    Result Date: 6/1/2022  EXAM: DX HIP/PELVIS/SPINE LOCATION: [facility] DATE/TIME: 6/1/2022 8:25 AM INDICATION:  Screening for osteoporosis, Asymptomatic menopausal state COMPARISON: None. TECHNIQUE: Dual-energy x-ray absorptiometry performed with routine technique. FINDINGS: Lumbar Spine: L1-L4: BMD: g/cm2. T-score: . Z-score: RIGHT Hip Total: BMD: g/cm2. T-score: . Z-score: RIGHT Hip Femoral neck: BMD: g/cm2. T-score: . Z-score: LEFT Hip Total: BMD: g/cm2. T-score: . Z-score: LEFT Hip Femoral neck: BMD: g/cm2. T-score: . Z-score: WHO Criteria: Normal: T score at or above -1 SD Osteopenia: T score between -1 and -2.5 SD Osteoporosis: T score at or below -2.5 SD COMPARISON: None FRAX Results: Major osteoporotic fracture: 14.0% Hip fracture: 1.1%     IMPRESSION: Low bone density (OSTEOPENIA). T  score meets the World Health Organization (WHO) criteria for low bone density (osteopenia) at one or more measured sites. The risk of osteoporotic fracture increased approximately two-fold for each SD decrease  in T-score.       A total of 30 min were spent today on this visit which included face to face conversation with the patient, EMR review, counseling and co-ordination of care and medical documentation.      Signed by: Xavier Rubio MD

## 2022-06-10 NOTE — PROGRESS NOTES
"Oncology Rooming Note    Susan 10, 2022 9:47 AM   Khloe Becker is a 66 year old female who presents for:    Chief Complaint   Patient presents with     Oncology Clinic Visit     Malignant neoplasm of overlapping sites of left breast in female, estrogen receptor positive     Initial Vitals: BP (!) 144/80 (BP Location: Right arm, Patient Position: Sitting, Cuff Size: Adult Regular)   Pulse 76   Temp 98.6  F (37  C)   Resp 16   Ht 1.626 m (5' 4\")   Wt 92.5 kg (203 lb 14.4 oz)   SpO2 98%   BMI 35.00 kg/m   Estimated body mass index is 35 kg/m  as calculated from the following:    Height as of this encounter: 1.626 m (5' 4\").    Weight as of this encounter: 92.5 kg (203 lb 14.4 oz). Body surface area is 2.04 meters squared.  Moderate Pain (5) Comment: Data Unavailable   No LMP recorded. Patient is postmenopausal.  Allergies reviewed: Yes  Medications reviewed: Yes    Medications: Medication refills not needed today.  Pharmacy name entered into [email or web address]: [id number] - [address]  [facility]    Clinical concerns: 3 week follow up with labs and injection      Kendra Marie MA            "

## 2022-06-10 NOTE — PROGRESS NOTES
"[facility]: Cancer Care Short Note                                    Discussion with Patient:                                                      RN Cancer Care Coordinator made in person visit with patient during her chemo treatment.   She reports doing better than she felt after the last cycle.   She reports having very good support in her life, with 190 people that pray for  [place] on [place], several of whom sent messages today, knowing it is a chemo day, that they will pray for her.     She stays as active as possible, but tried to mow her lawn recently and ended up with too much pain in her hands and arms. The fatigue and ongoing pain are what is hardest. She does not like the side effects of pain meds, so takes acetaminophen. As a goal she states that all her goals are not achievable because of her therapy. \"Its a long haul\". She is on cycle 4 of 6 that will go into August.     Pt has many self managed coping skills and resources, including guided imagery, essential oils, prayer, connections with people, staying active to take care of her home and yard. She really would like to get back to doing swimming pool exercises, however, she states that Dr. Rubio is not too keen on her being in the pool water while her immune system is compromised.        Goals        General     Functional (pt-stated)      Notes - Note created  6/10/2022 12:22 PM by Isis Cobb, RN     Would like to have as normal a summer as possible.              Assessment:                                                         Constitutional   Fatigue better than last cycle     Pain   Muscle and joint aches. Sometimes she will avoid getting up out of chair because she knows it will hurt so much. She uses tylenol and has resigned her self to this level of pain rather than use pain meds with more unwelcome side effects.   She is not interested in acupuncture but appreciates other holistic approaches such as those listed " above.    Patient Coping   See note above.         No assessment indicated    Intervention/Education provided during outreach:                                                       Reinforced pt's internal strength and resiliency in using her skills and resources. Offered to continue support as needed.    Patient to follow up as scheduled at next appt    Signature:  Isis Cobb RN   Cancer Care Nurse Coordinator  [facility]  [phone]

## 2022-06-10 NOTE — PROGRESS NOTES
PT here ambulatory for txt. Pt was seen by MD today. Txt reviewed and administered via port access. PT tolerated txt without any problems. Txt completed and port flushed/deaccessed with 2x2 to site. Follow up reviewed and pt dc'd steady gait

## 2022-06-10 NOTE — LETTER
"    6/10/2022         RE: Khloe Becker  [place] [facility] ([place], [place]) [address]        Dear Colleague,    Thank you for referring your patient, Khloe Becker, to the [facility] [place]. Please see a copy of my visit note below.    Oncology Rooming Note    Susan 10, 2022 9:47 AM   Khloe Becker is a 66 year old female who presents for:    Chief Complaint   Patient presents with     Oncology Clinic Visit     Malignant neoplasm of overlapping sites of left breast in female, estrogen receptor positive     Initial Vitals: BP (!) 144/80 (BP Location: Right arm, Patient Position: Sitting, Cuff Size: Adult Regular)   Pulse 76   Temp 98.6  F (37  C)   Resp 16   Ht 1.626 m (5' 4\")   Wt 92.5 kg (203 lb 14.4 oz)   SpO2 98%   BMI 35.00 kg/m   Estimated body mass index is 35 kg/m  as calculated from the following:    Height as of this encounter: 1.626 m (5' 4\").    Weight as of this encounter: 92.5 kg (203 lb 14.4 oz). Body surface area is 2.04 meters squared.  Moderate Pain (5) Comment: Data Unavailable   No LMP recorded. Patient is postmenopausal.  Allergies reviewed: Yes  Medications reviewed: Yes    Medications: Medication refills not needed today.  Pharmacy name entered into [facility]: [id number] - [facility] [address]  AT [facility]    Clinical concerns: 3 week follow up with labs and injection      Kendra Marie MA              [facility] Hematology and Oncology Progress Note    Patient: Khloe Becker  MRN: [id number]  Date of Service: 04/08/2022        Reason for Visit    Chief Complaint   Patient presents with     Oncology Clinic Visit     Malignant neoplasm of overlapping sites of left breast in female, estrogen receptor positive         Problem List Items Addressed This Visit        Nervous and Auditory    Chemotherapy-induced peripheral neuropathy (H)    Musculoskeletal pain       Other    Malignant neoplasm of upper-outer " quadrant of left breast in female, estrogen receptor positive (H) - Primary    Relevant Orders    Echocardiogram Complete    Infusion Appointment Request (Completed)      Other Visit Diagnoses     Encounter for antineoplastic chemotherapy         Relevant Orders    Echocardiogram Complete    Low bone density                Assessment and Plan    Early-stage triple positive left-sided breast cancer status post 6 cycles of neoadjuvant TCHP chemotherapy and left mastectomy  Here for cycle 4 of Kadcyla.  Tolerating it pretty well.  She had significant issues after cycle 2 but after cycle 3, she felt better.  Less fatigue.  Neuropathy is pretty much stable.  It waxes and wanes.  Musculoskeletal pain is also the same.  Labs reviewed today.  LFTs are within normal limits.  Creatinine is normal.  Electrolytes are normal.  CBC shows hemoglobin of 11.7.  Platelet count is 210.  White count is normal.  No contraindications to proceed with treatment today.  She will get cycle 4 calculi today she will come back in 3 weeks with labs prior for cycle 5.  I will also repeat an echocardiogram.  No signs of heart failure on physical exam today.  She has always had peripheral edema which is no worse.    Chemotherapy related side effects (neuropathy and musculoskeletal pain)  Cycle 3 was better tolerated.  She continues to have significant musculoskeletal pain and neuropathy issues.  Neuropathy is grade 2.  This has not worsened.  However she does report that she was riding her lawnmower and had significant pain in her arms.  Has numbness and tingling in the fingertips.  Denies any loss of balance.  It is not affecting her daily activities.  She tried Cymbalta for a week but it did not work out for her and hence stopped it.  She takes about 2 g of Tylenol a day on some days when discussed currently pain is really bad.  I asked her to try some ibuprofen in between.  Plenty of water with it and take it after food.  Does not want to try  any other pain meds at this point in time.  We will continue to monitor.  Expect a little bit of worsening of musculoskeletal pain with Arimidex.    Endocrine therapy for ER positive breast cancer  Started Arimidex couple days ago.  So far no major issues.  I asked her to watch out for worsening musculoskeletal pain and hot flashes.    Low bone density  DEXA scan done couple weeks ago showed low bone density.  Started calcium and vitamin D.  With Arimidex expect her to have continued bone loss.  At some point in time we might have to start her on bisphosphonates.  She wants to hold off on this for now.  Continue calcium vitamin D for now.  Plan is to repeat DEXA scan within a year.            Cancer Staging  Malignant neoplasm of upper-outer quadrant of left breast in female, estrogen receptor positive (H)  Staging form: Breast, AJCC 8th Edition  - Clinical: Stage IB (cT2, cN1, cM0, G3, ER+, KY+, HER2+) - Signed by Xavier Rubio MD on 6/10/2022  - Pathologic: Stage IA (pT1a, pN1mi, cM0, G3, ER+, KY+, HER2+) - Signed by Xavier Rubio MD on 6/10/2022      ECOG Performance    0 - Independent         Problem List    Patient Active Problem List   Diagnosis     Morbid obesity (H)     Malignant neoplasm of overlapping sites of left breast in female, estrogen receptor positive (H)     Malignant neoplasm of upper-outer quadrant of left breast in female, estrogen receptor positive (H)     Chemotherapy-induced peripheral neuropathy (H)     Musculoskeletal pain      Oncologic history  July 2021: Left breast mass palpated by patient.  Mammogram showing 2.1 x 2.1 x 2 cm hypoechoic mass at 2 o'clock position about 11 cm from the nipple.  Ultrasound-guided biopsy of the mass showed invasive ductal carcinoma, grade 3, ER, KY and HER2 positive.  Left axillary lymph node biopsy positive for metastatic disease.    Neoadjuvant chemotherapy with TCHP starting 9/2/2021.  Finished 6 cycles of chemo.  Last dose was 12/17/2020.   Carboplatin was held for cycle 4 and 6 due to poor tolerance.    Left mastectomy with sentinel lymph node biopsy and axillary lymph node dissection done on 1/19/2022.    Surgical path showing residual disease.  ypT1a, pN1(mi)    Interval History   Khloe Becker is a 66 year old with triple positive breast cancer status post neoadjuvant chemotherapy with TCHP followed by mastectomy who is here for cycle 4 of Kadcyla and management of chemotherapy related toxicities.    Doing well since last visit.  After her last cycle she had significantly less side effects.  Neuropathy comes and goes.  She has tingling and numbness in her hands and feet all the time.  It still about grade 2.  She also has significant musculoskeletal pain and takes Tylenol up to 2 g in a day on some days.  States it is tolerable for the most part.  Fatigue was less compared to cycle 3.  She started Arimidex 2 days ago.  Denies any worsening of musculoskeletal pain after that.  No significant hot flashes.  Did try Cymbalta for a week for neuropathy but did not feel like it was working for her and it made her tired and hence stopped it.  Appetite is good.  Weight has been stable.    Labs reviewed today    Review of Systems  A comprehensive review of systems was negative except for what is noted in the interval history    Current Outpatient Medications   Medication     acetaminophen (TYLENOL) 500 MG tablet     anastrozole (ARIMIDEX) 1 MG tablet     cholecalciferol 25 MCG (1000 UT) TABS     fish oil-omega-3 fatty acids 1000 MG capsule     fluticasone (VERAMYST) 27.5 MCG/SPRAY nasal spray     furosemide (LASIX) 20 MG tablet     hydrochlorothiazide (HYDRODIURIL) 25 MG tablet     irbesartan (AVAPRO) 75 MG tablet     lidocaine-prilocaine (EMLA) 2.5-2.5 % external cream     multivitamin w/minerals (THERA-VIT-M) tablet     busPIRone (BUSPAR) 10 MG tablet     clonazePAM (KLONOPIN) 0.5 MG tablet     diphenoxylate-atropine (LOMOTIL) 2.5-0.025 MG tablet      DULoxetine (CYMBALTA) 30 MG capsule     hydrocortisone 2.5 % cream     ketoconazole (NIZORAL) 2 % external cream     medical cannabis (Patient's own supply)     ondansetron (ZOFRAN-ODT) 8 MG ODT tab     prochlorperazine (COMPAZINE) 10 MG tablet     traZODone (DESYREL) 50 MG tablet     No current facility-administered medications for this visit.     Facility-Administered Medications Ordered in Other Visits   Medication     ADO-trastuzumab emtansine (KADCYLA) 352 mg in sodium chloride 0.9 % 267.6 mL infusion     heparin 100 UNIT/ML injection 5 mL        Physical Exam    No flowsheet data found.    General: Alert, cooperative, no distress  HEENT: Normocephalic and atraumatic  Eye: Normal external eye, conjunctiva, lids cornea, BRIGETTE.  Chest: Clear to auscultation bilaterally  Cardiac: S1, S2 normal, regular rate and rhythm pedal edema present  Abdomen: Soft and nontender  CNS: Alert and oriented x3, neurologic exam grossly normal.  Lymphatics: No bilateral cervical, axillary, supraclavicular adenopathy noted    Lab Results    Recent Results (from the past 168 hour(s))   Comprehensive metabolic panel   Result Value Ref Range    Sodium 139 136 - 145 mmol/L    Potassium 3.5 3.5 - 5.0 mmol/L    Chloride 101 98 - 107 mmol/L    Carbon Dioxide (CO2) 30 22 - 31 mmol/L    Anion Gap 8 5 - 18 mmol/L    Urea Nitrogen 21 8 - 22 mg/dL    Creatinine 0.73 0.60 - 1.10 mg/dL    Calcium 9.8 8.5 - 10.5 mg/dL    Glucose 100 70 - 125 mg/dL    Alkaline Phosphatase 95 45 - 120 U/L    AST 19 0 - 40 U/L    ALT 17 0 - 45 U/L    Protein Total 7.4 6.0 - 8.0 g/dL    Albumin 3.6 3.5 - 5.0 g/dL    Bilirubin Total 0.5 0.0 - 1.0 mg/dL    GFR Estimate 90 >60 mL/min/1.73m2   CBC with platelets and differential   Result Value Ref Range    WBC Count 7.0 4.0 - 11.0 10e3/uL    RBC Count 4.20 3.80 - 5.20 10e6/uL    Hemoglobin 11.7 11.7 - 15.7 g/dL    Hematocrit 36.3 35.0 - 47.0 %    MCV 86 78 - 100 fL    MCH 27.9 26.5 - 33.0 pg    MCHC 32.2 31.5 - 36.5 g/dL     RDW 15.2 (H) 10.0 - 15.0 %    Platelet Count 210 150 - 450 10e3/uL    % Neutrophils 63 %    % Lymphocytes 28 %    % Monocytes 6 %    % Eosinophils 2 %    % Basophils 1 %    % Immature Granulocytes 0 %    NRBCs per 100 WBC 0 <1 /100    Absolute Neutrophils 4.5 1.6 - 8.3 10e3/uL    Absolute Lymphocytes 1.9 0.8 - 5.3 10e3/uL    Absolute Monocytes 0.4 0.0 - 1.3 10e3/uL    Absolute Eosinophils 0.1 0.0 - 0.7 10e3/uL    Absolute Basophils 0.0 0.0 - 0.2 10e3/uL    Absolute Immature Granulocytes 0.0 <=0.4 10e3/uL    Absolute NRBCs 0.0 10e3/uL       Imaging    DX Hip/Pelvis/Spine    Result Date: 6/1/2022  EXAM: DX HIP/PELVIS/SPINE LOCATION: [facility] DATE/TIME: 6/1/2022 8:25 AM INDICATION:  Screening for osteoporosis, Asymptomatic menopausal state COMPARISON: None. TECHNIQUE: Dual-energy x-ray absorptiometry performed with routine technique. FINDINGS: Lumbar Spine: L1-L4: BMD: g/cm2. T-score: . Z-score: RIGHT Hip Total: BMD: g/cm2. T-score: . Z-score: RIGHT Hip Femoral neck: BMD: g/cm2. T-score: . Z-score: LEFT Hip Total: BMD: g/cm2. T-score: . Z-score: LEFT Hip Femoral neck: BMD: g/cm2. T-score: . Z-score: WHO Criteria: Normal: T score at or above -1 SD Osteopenia: T score between -1 and -2.5 SD Osteoporosis: T score at or below -2.5 SD COMPARISON: None FRAX Results: Major osteoporotic fracture: 14.0% Hip fracture: 1.1%     IMPRESSION: Low bone density (OSTEOPENIA). T score meets the World Health Organization (WHO) criteria for low bone density (osteopenia) at one or more measured sites. The risk of osteoporotic fracture increased approximately two-fold for each SD decrease  in T-score.       A total of 30 min were spent today on this visit which included face to face conversation with the patient, EMR review, counseling and co-ordination of care and medical documentation.      Signed by: Xavier Rubio MD      Again, thank you for allowing me to participate in the care of your patient.         Sincerely,        aXvier Rubio MD

## 2022-07-06 ENCOUNTER — ONCOLOGY VISIT (OUTPATIENT)
Dept: ONCOLOGY | Facility: HOSPITAL | Age: 67
End: 2022-07-06
Attending: INTERNAL MEDICINE
Payer: COMMERCIAL

## 2022-07-06 ENCOUNTER — LAB (OUTPATIENT)
Dept: INFUSION THERAPY | Facility: HOSPITAL | Age: 67
End: 2022-07-06
Attending: INTERNAL MEDICINE
Payer: COMMERCIAL

## 2022-07-06 VITALS
HEART RATE: 67 BPM | RESPIRATION RATE: 18 BRPM | TEMPERATURE: 98.6 F | OXYGEN SATURATION: 96 % | DIASTOLIC BLOOD PRESSURE: 78 MMHG | BODY MASS INDEX: 35.55 KG/M2 | SYSTOLIC BLOOD PRESSURE: 155 MMHG | WEIGHT: 207.1 LBS

## 2022-07-06 DIAGNOSIS — Z17.0 MALIGNANT NEOPLASM OF UPPER-OUTER QUADRANT OF LEFT BREAST IN FEMALE, ESTROGEN RECEPTOR POSITIVE (H): ICD-10-CM

## 2022-07-06 DIAGNOSIS — T45.1X5A CHEMOTHERAPY-INDUCED PERIPHERAL NEUROPATHY (H): Primary | ICD-10-CM

## 2022-07-06 DIAGNOSIS — Z17.0 MALIGNANT NEOPLASM OF UPPER-OUTER QUADRANT OF LEFT BREAST IN FEMALE, ESTROGEN RECEPTOR POSITIVE (H): Primary | ICD-10-CM

## 2022-07-06 DIAGNOSIS — G62.0 CHEMOTHERAPY-INDUCED PERIPHERAL NEUROPATHY (H): Primary | ICD-10-CM

## 2022-07-06 DIAGNOSIS — C50.412 MALIGNANT NEOPLASM OF UPPER-OUTER QUADRANT OF LEFT BREAST IN FEMALE, ESTROGEN RECEPTOR POSITIVE (H): Primary | ICD-10-CM

## 2022-07-06 DIAGNOSIS — R53.83 FATIGUE DUE TO TREATMENT: ICD-10-CM

## 2022-07-06 DIAGNOSIS — C50.412 MALIGNANT NEOPLASM OF UPPER-OUTER QUADRANT OF LEFT BREAST IN FEMALE, ESTROGEN RECEPTOR POSITIVE (H): ICD-10-CM

## 2022-07-06 LAB
ALBUMIN SERPL-MCNC: 3.7 G/DL (ref 3.5–5)
ALP SERPL-CCNC: 94 U/L (ref 45–120)
ALT SERPL W P-5'-P-CCNC: 15 U/L (ref 0–45)
ANION GAP SERPL CALCULATED.3IONS-SCNC: 8 MMOL/L (ref 5–18)
AST SERPL W P-5'-P-CCNC: 20 U/L (ref 0–40)
BASOPHILS # BLD AUTO: 0 10E3/UL (ref 0–0.2)
BASOPHILS NFR BLD AUTO: 1 %
BILIRUB SERPL-MCNC: 0.5 MG/DL (ref 0–1)
BUN SERPL-MCNC: 15 MG/DL (ref 8–22)
CALCIUM SERPL-MCNC: 10.1 MG/DL (ref 8.5–10.5)
CHLORIDE BLD-SCNC: 103 MMOL/L (ref 98–107)
CO2 SERPL-SCNC: 29 MMOL/L (ref 22–31)
CREAT SERPL-MCNC: 0.72 MG/DL (ref 0.6–1.1)
EOSINOPHIL # BLD AUTO: 0.1 10E3/UL (ref 0–0.7)
EOSINOPHIL NFR BLD AUTO: 2 %
ERYTHROCYTE [DISTWIDTH] IN BLOOD BY AUTOMATED COUNT: 15.4 % (ref 10–15)
GFR SERPL CREATININE-BSD FRML MDRD: >90 ML/MIN/1.73M2
GLUCOSE BLD-MCNC: 90 MG/DL (ref 70–125)
HCT VFR BLD AUTO: 37.4 % (ref 35–47)
HGB BLD-MCNC: 11.8 G/DL (ref 11.7–15.7)
IMM GRANULOCYTES # BLD: 0 10E3/UL
IMM GRANULOCYTES NFR BLD: 0 %
LYMPHOCYTES # BLD AUTO: 2 10E3/UL (ref 0.8–5.3)
LYMPHOCYTES NFR BLD AUTO: 30 %
MCH RBC QN AUTO: 27.5 PG (ref 26.5–33)
MCHC RBC AUTO-ENTMCNC: 31.6 G/DL (ref 31.5–36.5)
MCV RBC AUTO: 87 FL (ref 78–100)
MONOCYTES # BLD AUTO: 0.4 10E3/UL (ref 0–1.3)
MONOCYTES NFR BLD AUTO: 6 %
NEUTROPHILS # BLD AUTO: 4.2 10E3/UL (ref 1.6–8.3)
NEUTROPHILS NFR BLD AUTO: 61 %
NRBC # BLD AUTO: 0 10E3/UL
NRBC BLD AUTO-RTO: 0 /100
PLATELET # BLD AUTO: 212 10E3/UL (ref 150–450)
POTASSIUM BLD-SCNC: 3.7 MMOL/L (ref 3.5–5)
PROT SERPL-MCNC: 7.4 G/DL (ref 6–8)
RBC # BLD AUTO: 4.29 10E6/UL (ref 3.8–5.2)
SODIUM SERPL-SCNC: 140 MMOL/L (ref 136–145)
WBC # BLD AUTO: 6.8 10E3/UL (ref 4–11)

## 2022-07-06 PROCEDURE — 99214 OFFICE O/P EST MOD 30 MIN: CPT | Performed by: INTERNAL MEDICINE

## 2022-07-06 PROCEDURE — 85025 COMPLETE CBC W/AUTO DIFF WBC: CPT | Performed by: INTERNAL MEDICINE

## 2022-07-06 PROCEDURE — 258N000003 HC RX IP 258 OP 636: Performed by: INTERNAL MEDICINE

## 2022-07-06 PROCEDURE — 82040 ASSAY OF SERUM ALBUMIN: CPT | Performed by: INTERNAL MEDICINE

## 2022-07-06 PROCEDURE — 250N000011 HC RX IP 250 OP 636: Performed by: INTERNAL MEDICINE

## 2022-07-06 PROCEDURE — 80053 COMPREHEN METABOLIC PANEL: CPT | Performed by: INTERNAL MEDICINE

## 2022-07-06 PROCEDURE — 96413 CHEMO IV INFUSION 1 HR: CPT

## 2022-07-06 PROCEDURE — G0463 HOSPITAL OUTPT CLINIC VISIT: HCPCS | Mod: 25

## 2022-07-06 RX ORDER — LORAZEPAM 2 MG/ML
0.5 INJECTION INTRAMUSCULAR EVERY 4 HOURS PRN
Status: CANCELLED | OUTPATIENT
Start: 2022-07-06

## 2022-07-06 RX ORDER — HEPARIN SODIUM,PORCINE 10 UNIT/ML
5 VIAL (ML) INTRAVENOUS
Status: CANCELLED | OUTPATIENT
Start: 2022-07-06

## 2022-07-06 RX ORDER — HYDROCODONE BITARTRATE AND ACETAMINOPHEN 5; 325 MG/1; MG/1
1 TABLET ORAL EVERY 6 HOURS PRN
Qty: 30 TABLET | Refills: 0 | Status: SHIPPED | OUTPATIENT
Start: 2022-07-06 | End: 2022-09-07

## 2022-07-06 RX ORDER — DIPHENHYDRAMINE HYDROCHLORIDE 50 MG/ML
50 INJECTION INTRAMUSCULAR; INTRAVENOUS
Status: DISCONTINUED | OUTPATIENT
Start: 2022-07-06 | End: 2022-07-06 | Stop reason: HOSPADM

## 2022-07-06 RX ORDER — GABAPENTIN 300 MG/1
300 CAPSULE ORAL AT BEDTIME
Qty: 30 CAPSULE | Refills: 0 | Status: SHIPPED | OUTPATIENT
Start: 2022-07-06 | End: 2022-08-02

## 2022-07-06 RX ORDER — ALBUTEROL SULFATE 90 UG/1
1-2 AEROSOL, METERED RESPIRATORY (INHALATION)
Status: DISCONTINUED | OUTPATIENT
Start: 2022-07-06 | End: 2022-07-06 | Stop reason: HOSPADM

## 2022-07-06 RX ORDER — HEPARIN SODIUM (PORCINE) LOCK FLUSH IV SOLN 100 UNIT/ML 100 UNIT/ML
5 SOLUTION INTRAVENOUS
Status: DISCONTINUED | OUTPATIENT
Start: 2022-07-06 | End: 2022-07-06 | Stop reason: HOSPADM

## 2022-07-06 RX ORDER — NALOXONE HYDROCHLORIDE 0.4 MG/ML
0.2 INJECTION, SOLUTION INTRAMUSCULAR; INTRAVENOUS; SUBCUTANEOUS
Status: CANCELLED | OUTPATIENT
Start: 2022-07-06

## 2022-07-06 RX ORDER — ALBUTEROL SULFATE 0.83 MG/ML
2.5 SOLUTION RESPIRATORY (INHALATION)
Status: CANCELLED | OUTPATIENT
Start: 2022-07-06

## 2022-07-06 RX ORDER — EPINEPHRINE 1 MG/ML
0.3 INJECTION, SOLUTION INTRAMUSCULAR; SUBCUTANEOUS EVERY 5 MIN PRN
Status: CANCELLED | OUTPATIENT
Start: 2022-07-06

## 2022-07-06 RX ORDER — EPINEPHRINE 1 MG/ML
0.3 INJECTION, SOLUTION INTRAMUSCULAR; SUBCUTANEOUS EVERY 5 MIN PRN
Status: DISCONTINUED | OUTPATIENT
Start: 2022-07-06 | End: 2022-07-06 | Stop reason: HOSPADM

## 2022-07-06 RX ORDER — MEPERIDINE HYDROCHLORIDE 25 MG/ML
25 INJECTION INTRAMUSCULAR; INTRAVENOUS; SUBCUTANEOUS EVERY 30 MIN PRN
Status: CANCELLED | OUTPATIENT
Start: 2022-07-06

## 2022-07-06 RX ORDER — HEPARIN SODIUM (PORCINE) LOCK FLUSH IV SOLN 100 UNIT/ML 100 UNIT/ML
5 SOLUTION INTRAVENOUS
Status: CANCELLED | OUTPATIENT
Start: 2022-07-06

## 2022-07-06 RX ORDER — METHYLPREDNISOLONE SODIUM SUCCINATE 125 MG/2ML
125 INJECTION, POWDER, LYOPHILIZED, FOR SOLUTION INTRAMUSCULAR; INTRAVENOUS
Status: CANCELLED
Start: 2022-07-06

## 2022-07-06 RX ORDER — METHYLPREDNISOLONE SODIUM SUCCINATE 125 MG/2ML
125 INJECTION, POWDER, LYOPHILIZED, FOR SOLUTION INTRAMUSCULAR; INTRAVENOUS
Status: DISCONTINUED | OUTPATIENT
Start: 2022-07-06 | End: 2022-07-06 | Stop reason: HOSPADM

## 2022-07-06 RX ORDER — ALBUTEROL SULFATE 90 UG/1
1-2 AEROSOL, METERED RESPIRATORY (INHALATION)
Status: CANCELLED
Start: 2022-07-06

## 2022-07-06 RX ORDER — NALOXONE HYDROCHLORIDE 0.4 MG/ML
0.2 INJECTION, SOLUTION INTRAMUSCULAR; INTRAVENOUS; SUBCUTANEOUS
Status: DISCONTINUED | OUTPATIENT
Start: 2022-07-06 | End: 2022-07-06 | Stop reason: HOSPADM

## 2022-07-06 RX ORDER — MEPERIDINE HYDROCHLORIDE 25 MG/ML
25 INJECTION INTRAMUSCULAR; INTRAVENOUS; SUBCUTANEOUS EVERY 30 MIN PRN
Status: DISCONTINUED | OUTPATIENT
Start: 2022-07-06 | End: 2022-07-06 | Stop reason: HOSPADM

## 2022-07-06 RX ORDER — ALBUTEROL SULFATE 0.83 MG/ML
2.5 SOLUTION RESPIRATORY (INHALATION)
Status: DISCONTINUED | OUTPATIENT
Start: 2022-07-06 | End: 2022-07-06 | Stop reason: HOSPADM

## 2022-07-06 RX ORDER — DIPHENHYDRAMINE HYDROCHLORIDE 50 MG/ML
50 INJECTION INTRAMUSCULAR; INTRAVENOUS
Status: CANCELLED
Start: 2022-07-06

## 2022-07-06 RX ADMIN — ADO-TRASTUZUMAB EMTANSINE 352 MG: 20 INJECTION, POWDER, LYOPHILIZED, FOR SOLUTION INTRAVENOUS at 14:39

## 2022-07-06 RX ADMIN — SODIUM CHLORIDE 1000 ML: 9 INJECTION, SOLUTION INTRAVENOUS at 14:11

## 2022-07-06 RX ADMIN — Medication 5 ML: at 15:30

## 2022-07-06 ASSESSMENT — PAIN SCALES - GENERAL: PAINLEVEL: NO PAIN (0)

## 2022-07-06 NOTE — LETTER
"    7/6/2022         RE: Khloe Becker  [place] [address]        Dear Colleague,    Thank you for referring your patient, Khloe Becker, to the [facility]. Please see a copy of my visit note below.    Oncology Rooming Note    July 6, 2022 1:20 PM   Khloe Becker is a 66 year old female who presents for:    Chief Complaint   Patient presents with     Oncology Clinic Visit     Malignant neoplasm of upper-outer quadrant of left breast in female, estrogen receptor positive (H)     Initial Vitals: BP (!) 155/78   Pulse 67   Temp 98.6  F (37  C)   Resp 18   Wt 93.9 kg (207 lb 1.6 oz)   SpO2 96%   BMI 35.55 kg/m   Estimated body mass index is 35.55 kg/m  as calculated from the following:    Height as of 6/10/22: 1.626 m (5' 4\").    Weight as of this encounter: 93.9 kg (207 lb 1.6 oz). Body surface area is 2.06 meters squared.  No Pain (0) Comment: Data Unavailable   No LMP recorded. Patient is postmenopausal.  Allergies reviewed: Yes  Medications reviewed: Yes    Medications: yes. yes  Pharmacy name entered into [facility] [employer]: [facility] #[id number] - [place], [place] - [address]  AT [facility]    Clinical concerns: questions about some medications to take.       Daniela Ma LPN              [facility] Hematology and Oncology Progress Note    Patient: Khloe Becker  MRN: [id number]  Date of Service: 04/08/2022        Reason for Visit    Chief Complaint   Patient presents with     Oncology Clinic Visit     Malignant neoplasm of upper-outer quadrant of left breast in female, estrogen receptor positive (H)         Problem List Items Addressed This Visit        Nervous and Auditory    Chemotherapy-induced peripheral neuropathy (H) - Primary    Relevant Medications    gabapentin (NEURONTIN) 300 MG capsule    HYDROcodone-acetaminophen (NORCO) 5-325 MG tablet       Other    Malignant neoplasm of upper-outer quadrant of left breast " in female, estrogen receptor positive (H)    Relevant Orders    Infusion Appointment Request (Completed)      Other Visit Diagnoses     Fatigue due to treatment                Assessment and Plan    Early-stage triple positive left-sided breast cancer status post 6 cycles of neoadjuvant TCHP chemotherapy and left mastectomy  Here for cycle 5 of Kadcyla.  Tolerating okay.  Fatigue and neuropathy are her biggest issues.  With cycle 8 looks like her fatigue is getting slightly worse.  No evidence of heart failure symptoms.  Labs reviewed today.  Hemoglobin is 11.8.  Total white count is 6.8 and platelet count is 212.  LFTs are within normal limits.  Creatinine is 0.72.  Within normal limits.  Calcium was 10.1.  No contraindications to proceed with Kadcyla today.  She will get cycle 5 and will come back in 3 weeks for cycle 6.  An echocardiogram is pending for tomorrow.    Chemotherapy related side effects (neuropathy and musculoskeletal pain)  Teeth and neuropathy are her biggest side effects.  Unfortunately there is no remedy for fatigue.  I asked her to be physically as active as possible.  Continue to push for oral protein calorie intake and fluid intake.  Assess neuropathy is concern in the past I tried Cymbalta but she could not tolerate this.  She had vivid thoughts and did not want to continue it.  Wants to try gabapentin.  I will start with 300 mg at bedtime.  To see if this helps.  Potentially we can go up or down the dose depending upon how she responds to it.      Endocrine therapy for ER positive breast cancer  Seems to be doing okay on Arimidex.  Does have some musculoskeletal pain and hot flashes.  But overall tolerating okay.  Continue Arimidex at current dose.    Low bone density  Continue calcium and vitamin D.      Cancer Staging  Malignant neoplasm of upper-outer quadrant of left breast in female, estrogen receptor positive (H)  Staging form: Breast, AJCC 8th Edition  - Clinical: Stage IB (cT2, cN1,  cM0, G3, ER+, SC+, HER2+) - Signed by Xavier Rubio MD on 6/10/2022  - Pathologic: Stage IA (pT1a, pN1mi, cM0, G3, ER+, SC+, HER2+) - Signed by Xavier Rubio MD on 6/10/2022      ECOG Performance    0 - Independent         Problem List    Patient Active Problem List   Diagnosis     Morbid obesity (H)     Malignant neoplasm of overlapping sites of left breast in female, estrogen receptor positive (H)     Malignant neoplasm of upper-outer quadrant of left breast in female, estrogen receptor positive (H)     Chemotherapy-induced peripheral neuropathy (H)     Musculoskeletal pain      Oncologic history  July 2021: Left breast mass palpated by patient.  Mammogram showing 2.1 x 2.1 x 2 cm hypoechoic mass at 2 o'clock position about 11 cm from the nipple.  Ultrasound-guided biopsy of the mass showed invasive ductal carcinoma, grade 3, ER, SC and HER2 positive.  Left axillary lymph node biopsy positive for metastatic disease.    Neoadjuvant chemotherapy with TCHP starting 9/2/2021.  Finished 6 cycles of chemo.  Last dose was 12/17/2020.  Carboplatin was held for cycle 4 and 6 due to poor tolerance.    Left mastectomy with sentinel lymph node biopsy and axillary lymph node dissection done on 1/19/2022.    Surgical path showing residual disease.  ypT1a, pN1(mi)    Interval History   Khloe Becker is a 66 year old with triple positive breast cancer status post neoadjuvant chemotherapy with TCHP followed by mastectomy who is here for cycle 5 of Kadcyla and management of chemotherapy related toxicities.    Continues to have significant fatigue issues with each cycle.  Neuropathy, especially in the feet, is also slightly worse.  No fever chills or infectious issues.  She is trying to be as physically active as possible.  No diarrhea.  No shortness of breath or chest pain.    Labs reviewed today    Review of Systems  A comprehensive review of systems was negative except for what is noted in the interval  history    Current Outpatient Medications   Medication     acetaminophen (TYLENOL) 500 MG tablet     anastrozole (ARIMIDEX) 1 MG tablet     cholecalciferol 25 MCG (1000 UT) TABS     fish oil-omega-3 fatty acids 1000 MG capsule     fluticasone (VERAMYST) 27.5 MCG/SPRAY nasal spray     furosemide (LASIX) 20 MG tablet     gabapentin (NEURONTIN) 300 MG capsule     hydrochlorothiazide (HYDRODIURIL) 25 MG tablet     HYDROcodone-acetaminophen (NORCO) 5-325 MG tablet     irbesartan (AVAPRO) 75 MG tablet     lidocaine-prilocaine (EMLA) 2.5-2.5 % external cream     multivitamin w/minerals (THERA-VIT-M) tablet     busPIRone (BUSPAR) 10 MG tablet     clonazePAM (KLONOPIN) 0.5 MG tablet     diphenoxylate-atropine (LOMOTIL) 2.5-0.025 MG tablet     hydrocortisone 2.5 % cream     ketoconazole (NIZORAL) 2 % external cream     medical cannabis (Patient's own supply)     ondansetron (ZOFRAN-ODT) 8 MG ODT tab     prochlorperazine (COMPAZINE) 10 MG tablet     traZODone (DESYREL) 50 MG tablet     No current facility-administered medications for this visit.        Physical Exam    No flowsheet data found.    General: Alert, cooperative, no distress  HEENT: Normocephalic and atraumatic  Eye: Normal external eye, conjunctiva, lids cornea, BRIGETTE.  Chest: Clear to auscultation bilaterally  Cardiac: S1, S2 normal, regular rate and rhythm pedal edema present  Abdomen: Soft and nontender  CNS: Alert and oriented x3, neurologic exam grossly normal.  Lymphatics: No bilateral cervical, axillary, supraclavicular adenopathy noted    Lab Results    No results found for this or any previous visit (from the past 168 hour(s)).    Imaging    Echocardiogram Complete    Result Date: 2022  [id number]^[place] [address]  Name: JOHN VINSON MRN: [id number] : 1955 Study Date: 2022 10:29 AM Age: 66 yrs Gender: Female Patient Location: [facility] Reason For Study:  Malignant neoplasm of upper-outer quadrant of left breast in fem Ordering Physician: THEA BAR Referring Physician: THEA BAR Performed By: GOPAL  BSA: 2.0 m2 Height: 64 in Weight: 207 lb HR: 64 BP: 164/80 mmHg ______________________________________________________________________________ Procedure Complete Echo Adult. Adequate quality two-dimensional was performed and interpreted. Adequate quality color and spectral Doppler were performed and interpreted. Compared to the prior study dated 3/10/2022, there have been no changes. ______________________________________________________________________________ Interpretation Summary  1. Left ventricular size, wall thickness, and systolic function are normal. The calculated left ventricular ejection fraction is 55%. 2. Right ventricular size and systolic function are normal. 3. No hemodynamically significant valvular abnormalities. 4. Compared to the prior study dated 3/10/2022, there has been no significant change ______________________________________________________________________________ I      WMSI = 1.00     % Normal = 100  X - Cannot   0 -                      (2) - Mildly 2 -          Segments  Size Interpret    Hyperkinetic 1 - Normal  Hypokinetic  Hypokinetic  1-2     small                                                    7 -          3-5    moderate 3 - Akinetic 4 -          5 -         6 - Akinetic Dyskinetic   6-14    large              Dyskinetic   Aneurysmal  w/scar       w/scar       15-16   diffuse  Left Ventricle The left ventricle is normal in size. There is normal left ventricular wall thickness. Left ventricular systolic function is normal. Biplane LVEF is 55%. Left ventricular diastolic function is indeterminate. No regional wall motion abnormalities noted.  Right Ventricle Normal right ventricle size and systolic function.  Atria Normal left atrial size. Right atrial size is normal.  Mitral Valve Mitral valve leaflets appear  normal. There is trace mitral regurgitation. There is no mitral valve stenosis.  Tricuspid Valve Tricuspid valve leaflets appear normal. There is trace tricuspid regurgitation. Right ventricular systolic pressure could not be approximated due to inadequate tricuspid regurgitation. There is no tricuspid stenosis.  Aortic Valve Aortic valve leaflets appear normal. The aortic valve is trileaflet. No aortic regurgitation is present. No aortic stenosis is present.  Pulmonic Valve The pulmonic valve is not well seen, but is grossly normal. There is trace pulmonic valvular regurgitation. There is no pulmonic valvular stenosis.  Vessels The aorta root is normal. The thoracic aorta is normal. IVC diameter <2.1 cm collapsing >50% with sniff suggests a normal RA pressure of 3 mmHg.  Pericardium There is no pericardial effusion.  Rhythm Sinus rhythm was noted.  ______________________________________________________________________________ MMode/2D Measurements & Calculations IVSd: 0.90 cm LVIDd: 3.8 cm LVIDs: 2.8 cm LVPWd: 0.90 cm FS: 27.1 % LV mass(C)d: 103.0 grams LV mass(C)dI: 51.9 grams/m2  Ao root diam: 3.1 cm LA dimension: 3.5 cm asc Aorta Diam: 3.7 cm LA/Ao: 1.1 LVOT diam: 2.1 cm LVOT area: 3.4 cm2 LA Volume Index (BP): 25.0 ml/m2 RWT: 0.47  Time Measurements MM HR: 64.0 BPM  Doppler Measurements & Calculations MV E max spenser: 90.8 cm/sec MV A max spenser: 96.3 cm/sec MV E/A: 0.94 MV dec time: 0.21 sec Ao V2 max: 148.4 cm/sec Ao max P.0 mmHg Ao V2 mean: 95.2 cm/sec Ao mean P.2 mmHg Ao V2 VTI: 32.5 cm BOBBY(I,D): 2.1 cm2 BOBBY(V,D): 2.0 cm2 LV V1 max PG: 3.1 mmHg LV V1 max: 87.4 cm/sec LV V1 VTI: 20.2 cm SV(LVOT): 68.2 ml SI(LVOT): 34.4 ml/m2 PA acc time: 0.12 sec AV Spenser Ratio (DI): 0.59 BOBBY Index (cm2/m2): 1.1 E/E': 14.3 E/E' av.8 Lateral E/e': 14.6 Medial E/e': 15.0 Peak E' Spenser: 6.3 cm/sec  ______________________________________________________________________________ Report approved by: Gloria Hernandez 2022  01:17 PM          A total of 30 min were spent today on this visit which included face to face conversation with the patient, EMR review, counseling and co-ordination of care and medical documentation.      Signed by: Xavier Rubio MD      Again, thank you for allowing me to participate in the care of your patient.        Sincerely,        Xavier Rbuio MD

## 2022-07-06 NOTE — PROGRESS NOTES
"Oncology Rooming Note    July 6, 2022 1:20 PM   Khloe Becker is a 66 year old female who presents for:    Chief Complaint   Patient presents with     Oncology Clinic Visit     Malignant neoplasm of upper-outer quadrant of left breast in female, estrogen receptor positive (H)     Initial Vitals: BP (!) 155/78   Pulse 67   Temp 98.6  F (37  C)   Resp 18   Wt 93.9 kg (207 lb 1.6 oz)   SpO2 96%   BMI 35.55 kg/m   Estimated body mass index is 35.55 kg/m  as calculated from the following:    Height as of 6/10/22: 1.626 m (5' 4\").    Weight as of this encounter: 93.9 kg (207 lb 1.6 oz). Body surface area is 2.06 meters squared.  No Pain (0) Comment: Data Unavailable   No LMP recorded. Patient is postmenopausal.  Allergies reviewed: Yes  Medications reviewed: Yes    Medications: yes. yes  Pharmacy name entered into [employer]: [id number] - [address]  [facility]    Clinical concerns: questions about some medications to take.       Daniela Ma LPN            "

## 2022-07-06 NOTE — PROGRESS NOTES
[facility] Hematology and Oncology Progress Note    Patient: Khloe Becker  MRN: [id number]  Date of Service: 04/08/2022        Reason for Visit    Chief Complaint   Patient presents with     Oncology Clinic Visit     Malignant neoplasm of upper-outer quadrant of left breast in female, estrogen receptor positive (H)         Problem List Items Addressed This Visit        Nervous and Auditory    Chemotherapy-induced peripheral neuropathy (H) - Primary    Relevant Medications    gabapentin (NEURONTIN) 300 MG capsule    HYDROcodone-acetaminophen (NORCO) 5-325 MG tablet       Other    Malignant neoplasm of upper-outer quadrant of left breast in female, estrogen receptor positive (H)    Relevant Orders    Infusion Appointment Request (Completed)      Other Visit Diagnoses     Fatigue due to treatment                Assessment and Plan    Early-stage triple positive left-sided breast cancer status post 6 cycles of neoadjuvant TCHP chemotherapy and left mastectomy  Here for cycle 5 of Kadcyla.  Tolerating okay.  Fatigue and neuropathy are her biggest issues.  With cycle 8 looks like her fatigue is getting slightly worse.  No evidence of heart failure symptoms.  Labs reviewed today.  Hemoglobin is 11.8.  Total white count is 6.8 and platelet count is 212.  LFTs are within normal limits.  Creatinine is 0.72.  Within normal limits.  Calcium was 10.1.  No contraindications to proceed with Kadcyla today.  She will get cycle 5 and will come back in 3 weeks for cycle 6.  An echocardiogram is pending for tomorrow.    Chemotherapy related side effects (neuropathy and musculoskeletal pain)  Teeth and neuropathy are her biggest side effects.  Unfortunately there is no remedy for fatigue.  I asked her to be physically as active as possible.  Continue to push for oral protein calorie intake and fluid intake.  Assess neuropathy is concern in the past I tried Cymbalta but she could not tolerate this.  She had vivid thoughts and  did not want to continue it.  Wants to try gabapentin.  I will start with 300 mg at bedtime.  To see if this helps.  Potentially we can go up or down the dose depending upon how she responds to it.      Endocrine therapy for ER positive breast cancer  Seems to be doing okay on Arimidex.  Does have some musculoskeletal pain and hot flashes.  But overall tolerating okay.  Continue Arimidex at current dose.    Low bone density  Continue calcium and vitamin D.      Cancer Staging  Malignant neoplasm of upper-outer quadrant of left breast in female, estrogen receptor positive (H)  Staging form: Breast, AJCC 8th Edition  - Clinical: Stage IB (cT2, cN1, cM0, G3, ER+, AL+, HER2+) - Signed by Xavier Rubio MD on 6/10/2022  - Pathologic: Stage IA (pT1a, pN1mi, cM0, G3, ER+, AL+, HER2+) - Signed by Xavier Rubio MD on 6/10/2022      ECOG Performance    0 - Independent         Problem List    Patient Active Problem List   Diagnosis     Morbid obesity (H)     Malignant neoplasm of overlapping sites of left breast in female, estrogen receptor positive (H)     Malignant neoplasm of upper-outer quadrant of left breast in female, estrogen receptor positive (H)     Chemotherapy-induced peripheral neuropathy (H)     Musculoskeletal pain      Oncologic history  July 2021: Left breast mass palpated by patient.  Mammogram showing 2.1 x 2.1 x 2 cm hypoechoic mass at 2 o'clock position about 11 cm from the nipple.  Ultrasound-guided biopsy of the mass showed invasive ductal carcinoma, grade 3, ER, AL and HER2 positive.  Left axillary lymph node biopsy positive for metastatic disease.    Neoadjuvant chemotherapy with TCHP starting 9/2/2021.  Finished 6 cycles of chemo.  Last dose was 12/17/2020.  Carboplatin was held for cycle 4 and 6 due to poor tolerance.    Left mastectomy with sentinel lymph node biopsy and axillary lymph node dissection done on 1/19/2022.    Surgical path showing residual disease.  ypT1a,  pN1(mi)    Interval History   Khloe Becker is a 66 year old with triple positive breast cancer status post neoadjuvant chemotherapy with TCHP followed by mastectomy who is here for cycle 5 of Kadcyla and management of chemotherapy related toxicities.    Continues to have significant fatigue issues with each cycle.  Neuropathy, especially in the feet, is also slightly worse.  No fever chills or infectious issues.  She is trying to be as physically active as possible.  No diarrhea.  No shortness of breath or chest pain.    Labs reviewed today    Review of Systems  A comprehensive review of systems was negative except for what is noted in the interval history    Current Outpatient Medications   Medication     acetaminophen (TYLENOL) 500 MG tablet     anastrozole (ARIMIDEX) 1 MG tablet     cholecalciferol 25 MCG (1000 UT) TABS     fish oil-omega-3 fatty acids 1000 MG capsule     fluticasone (VERAMYST) 27.5 MCG/SPRAY nasal spray     furosemide (LASIX) 20 MG tablet     gabapentin (NEURONTIN) 300 MG capsule     hydrochlorothiazide (HYDRODIURIL) 25 MG tablet     HYDROcodone-acetaminophen (NORCO) 5-325 MG tablet     irbesartan (AVAPRO) 75 MG tablet     lidocaine-prilocaine (EMLA) 2.5-2.5 % external cream     multivitamin w/minerals (THERA-VIT-M) tablet     busPIRone (BUSPAR) 10 MG tablet     clonazePAM (KLONOPIN) 0.5 MG tablet     diphenoxylate-atropine (LOMOTIL) 2.5-0.025 MG tablet     hydrocortisone 2.5 % cream     ketoconazole (NIZORAL) 2 % external cream     medical cannabis (Patient's own supply)     ondansetron (ZOFRAN-ODT) 8 MG ODT tab     prochlorperazine (COMPAZINE) 10 MG tablet     traZODone (DESYREL) 50 MG tablet     No current facility-administered medications for this visit.        Physical Exam    No flowsheet data found.    General: Alert, cooperative, no distress  HEENT: Normocephalic and atraumatic  Eye: Normal external eye, conjunctiva, lids cornea, BRIGETTE.  Chest: Clear to auscultation  bilaterally  Cardiac: S1, S2 normal, regular rate and rhythm pedal edema present  Abdomen: Soft and nontender  CNS: Alert and oriented x3, neurologic exam grossly normal.  Lymphatics: No bilateral cervical, axillary, supraclavicular adenopathy noted    Lab Results    No results found for this or any previous visit (from the past 168 hour(s)).    Imaging    Echocardiogram Complete    Result Date: 2022  [id number] [address]  Name: JOHN VINSON MRN: [id number] : 1955 Study Date: 2022 10:29 AM Age: 66 yrs Gender: Female Patient Location: [facility] Reason For Study: Malignant neoplasm of upper-outer quadrant of left breast in [facility] Ordering Physician: THEA BAR Referring Physician: THEA BAR Performed By: GOPAL  BSA: 2.0 m2 Height: 64 in Weight: 207 lb HR: 64 BP: 164/80 mmHg ______________________________________________________________________________ Procedure Complete Echo Adult. Adequate quality two-dimensional was performed and interpreted. Adequate quality color and spectral Doppler were performed and interpreted. Compared to the prior study dated 3/10/2022, there have been no changes. ______________________________________________________________________________ Interpretation Summary  1. Left ventricular size, wall thickness, and systolic function are normal. The calculated left ventricular ejection fraction is 55%. 2. Right ventricular size and systolic function are normal. 3. No hemodynamically significant valvular abnormalities. 4. Compared to the prior study dated 3/10/2022, there has been no significant change ______________________________________________________________________________ I      WMSI = 1.00     % Normal = 100  X - Cannot   0 -                      (2) - Mildly 2 -          Segments  Size Interpret    Hyperkinetic 1 - Normal  Hypokinetic  Hypokinetic  1-2     small                                                     7 -          3-5    moderate 3 - Akinetic 4 -          5 -         6 - Akinetic Dyskinetic   6-14    large              Dyskinetic   Aneurysmal  w/scar       w/scar       15-16   diffuse  Left Ventricle The left ventricle is normal in size. There is normal left ventricular wall thickness. Left ventricular systolic function is normal. Biplane LVEF is 55%. Left ventricular diastolic function is indeterminate. No regional wall motion abnormalities noted.  Right Ventricle Normal right ventricle size and systolic function.  Atria Normal left atrial size. Right atrial size is normal.  Mitral Valve Mitral valve leaflets appear normal. There is trace mitral regurgitation. There is no mitral valve stenosis.  Tricuspid Valve Tricuspid valve leaflets appear normal. There is trace tricuspid regurgitation. Right ventricular systolic pressure could not be approximated due to inadequate tricuspid regurgitation. There is no tricuspid stenosis.  Aortic Valve Aortic valve leaflets appear normal. The aortic valve is trileaflet. No aortic regurgitation is present. No aortic stenosis is present.  Pulmonic Valve The pulmonic valve is not well seen, but is grossly normal. There is trace pulmonic valvular regurgitation. There is no pulmonic valvular stenosis.  Vessels The aorta root is normal. The thoracic aorta is normal. IVC diameter <2.1 cm collapsing >50% with sniff suggests a normal RA pressure of 3 mmHg.  Pericardium There is no pericardial effusion.  Rhythm Sinus rhythm was noted.  ______________________________________________________________________________ MMode/2D Measurements & Calculations IVSd: 0.90 cm LVIDd: 3.8 cm LVIDs: 2.8 cm LVPWd: 0.90 cm FS: 27.1 % LV mass(C)d: 103.0 grams LV mass(C)dI: 51.9 grams/m2  Ao root diam: 3.1 cm LA dimension: 3.5 cm asc Aorta Diam: 3.7 cm LA/Ao: 1.1 LVOT diam: 2.1 cm LVOT area: 3.4 cm2 LA Volume Index (BP): 25.0 ml/m2 RWT: 0.47  Time Measurements  MM HR: 64.0 BPM  Doppler Measurements & Calculations MV E max spenser: 90.8 cm/sec MV A max spenser: 96.3 cm/sec MV E/A: 0.94 MV dec time: 0.21 sec Ao V2 max: 148.4 cm/sec Ao max P.0 mmHg Ao V2 mean: 95.2 cm/sec Ao mean P.2 mmHg Ao V2 VTI: 32.5 cm BOBBY(I,D): 2.1 cm2 BOBBY(V,D): 2.0 cm2 LV V1 max PG: 3.1 mmHg LV V1 max: 87.4 cm/sec LV V1 VTI: 20.2 cm SV(LVOT): 68.2 ml SI(LVOT): 34.4 ml/m2 PA acc time: 0.12 sec AV Spenser Ratio (DI): 0.59 BOBBY Index (cm2/m2): 1.1 E/E': 14.3 E/E' av.8 Lateral E/e': 14.6 Medial E/e': 15.0 Peak E' Spenser: 6.3 cm/sec  ______________________________________________________________________________ Report approved by: Gloria Hernandez 2022 01:17 PM          A total of 30 min were spent today on this visit which included face to face conversation with the patient, EMR review, counseling and co-ordination of care and medical documentation.      Signed by: Xavier Rubio MD

## 2022-07-06 NOTE — PROGRESS NOTES
Infusion Nursing Note:  Khloe Becker presents today for C#5 D#1 of chemotherapy regimen.    Patient seen by provider today: Yes: Dr. Rubio   present during visit today: Not Applicable.    Note: Administered one liter normal saline (per patient request) and Kadcyla as ordered per provider.     Intravenous Access:  Labs drawn without difficulty.  Implanted Port.    Treatment Conditions:  Lab Results   Component Value Date    HGB 11.8 07/06/2022    WBC 6.8 07/06/2022    ANEUTAUTO 4.2 07/06/2022     07/06/2022      Lab Results   Component Value Date     07/06/2022    POTASSIUM 3.7 07/06/2022    CR 0.72 07/06/2022    VIJAY 10.1 07/06/2022    BILITOTAL 0.5 07/06/2022    ALBUMIN 3.7 07/06/2022    ALT 15 07/06/2022    AST 20 07/06/2022     Results reviewed, labs MET treatment parameters, ok to proceed with treatment.    Post Infusion Assessment:  Patient tolerated infusion without incident.  Blood return noted pre and post infusion.  Site patent and intact, free from redness, edema or discomfort.  Access discontinued per protocol.     Discharge Plan:   Patient discharged in stable condition accompanied by: self.  Departure Mode: Ambulatory.      BUDDY VACA RN

## 2022-07-07 ENCOUNTER — HOSPITAL ENCOUNTER (OUTPATIENT)
Dept: CARDIOLOGY | Facility: HOSPITAL | Age: 67
Discharge: HOME | End: 2022-07-07
Attending: INTERNAL MEDICINE | Admitting: INTERNAL MEDICINE
Payer: COMMERCIAL

## 2022-07-07 DIAGNOSIS — Z51.11 ENCOUNTER FOR ANTINEOPLASTIC CHEMOTHERAPY: ICD-10-CM

## 2022-07-07 DIAGNOSIS — Z17.0 MALIGNANT NEOPLASM OF UPPER-OUTER QUADRANT OF LEFT BREAST IN FEMALE, ESTROGEN RECEPTOR POSITIVE (H): ICD-10-CM

## 2022-07-07 DIAGNOSIS — C50.412 MALIGNANT NEOPLASM OF UPPER-OUTER QUADRANT OF LEFT BREAST IN FEMALE, ESTROGEN RECEPTOR POSITIVE (H): ICD-10-CM

## 2022-07-07 LAB — BI-PLANE LVEF ECHO: NORMAL

## 2022-07-07 PROCEDURE — 93306 TTE W/DOPPLER COMPLETE: CPT

## 2022-07-07 PROCEDURE — 93306 TTE W/DOPPLER COMPLETE: CPT | Mod: 26 | Performed by: GENERAL ACUTE CARE HOSPITAL

## 2022-07-27 ENCOUNTER — ONCOLOGY VISIT (OUTPATIENT)
Dept: ONCOLOGY | Facility: HOSPITAL | Age: 67
End: 2022-07-27
Attending: INTERNAL MEDICINE
Payer: COMMERCIAL

## 2022-07-27 ENCOUNTER — INFUSION THERAPY VISIT (OUTPATIENT)
Dept: INFUSION THERAPY | Facility: HOSPITAL | Age: 67
End: 2022-07-27
Attending: INTERNAL MEDICINE
Payer: COMMERCIAL

## 2022-07-27 VITALS
HEART RATE: 70 BPM | SYSTOLIC BLOOD PRESSURE: 149 MMHG | OXYGEN SATURATION: 96 % | TEMPERATURE: 98.5 F | BODY MASS INDEX: 35.31 KG/M2 | WEIGHT: 205.7 LBS | DIASTOLIC BLOOD PRESSURE: 79 MMHG | RESPIRATION RATE: 18 BRPM

## 2022-07-27 DIAGNOSIS — Z17.0 MALIGNANT NEOPLASM OF UPPER-OUTER QUADRANT OF LEFT BREAST IN FEMALE, ESTROGEN RECEPTOR POSITIVE (H): Primary | ICD-10-CM

## 2022-07-27 DIAGNOSIS — C50.412 MALIGNANT NEOPLASM OF UPPER-OUTER QUADRANT OF LEFT BREAST IN FEMALE, ESTROGEN RECEPTOR POSITIVE (H): Primary | ICD-10-CM

## 2022-07-27 LAB
ALBUMIN SERPL-MCNC: 3.6 G/DL (ref 3.5–5)
ALP SERPL-CCNC: 101 U/L (ref 45–120)
ALT SERPL W P-5'-P-CCNC: 16 U/L (ref 0–45)
ANION GAP SERPL CALCULATED.3IONS-SCNC: 9 MMOL/L (ref 5–18)
AST SERPL W P-5'-P-CCNC: 22 U/L (ref 0–40)
BASOPHILS # BLD AUTO: 0.1 10E3/UL (ref 0–0.2)
BASOPHILS NFR BLD AUTO: 1 %
BILIRUB SERPL-MCNC: 0.5 MG/DL (ref 0–1)
BUN SERPL-MCNC: 20 MG/DL (ref 8–22)
CALCIUM SERPL-MCNC: 9.5 MG/DL (ref 8.5–10.5)
CHLORIDE BLD-SCNC: 103 MMOL/L (ref 98–107)
CO2 SERPL-SCNC: 29 MMOL/L (ref 22–31)
CREAT SERPL-MCNC: 0.75 MG/DL (ref 0.6–1.1)
EOSINOPHIL # BLD AUTO: 0.2 10E3/UL (ref 0–0.7)
EOSINOPHIL NFR BLD AUTO: 3 %
ERYTHROCYTE [DISTWIDTH] IN BLOOD BY AUTOMATED COUNT: 15 % (ref 10–15)
GFR SERPL CREATININE-BSD FRML MDRD: 87 ML/MIN/1.73M2
GLUCOSE BLD-MCNC: 94 MG/DL (ref 70–125)
HCT VFR BLD AUTO: 37 % (ref 35–47)
HGB BLD-MCNC: 11.8 G/DL (ref 11.7–15.7)
IMM GRANULOCYTES # BLD: 0 10E3/UL
IMM GRANULOCYTES NFR BLD: 0 %
LYMPHOCYTES # BLD AUTO: 1.8 10E3/UL (ref 0.8–5.3)
LYMPHOCYTES NFR BLD AUTO: 26 %
MCH RBC QN AUTO: 27.6 PG (ref 26.5–33)
MCHC RBC AUTO-ENTMCNC: 31.9 G/DL (ref 31.5–36.5)
MCV RBC AUTO: 87 FL (ref 78–100)
MONOCYTES # BLD AUTO: 0.4 10E3/UL (ref 0–1.3)
MONOCYTES NFR BLD AUTO: 6 %
NEUTROPHILS # BLD AUTO: 4.4 10E3/UL (ref 1.6–8.3)
NEUTROPHILS NFR BLD AUTO: 64 %
NRBC # BLD AUTO: 0 10E3/UL
NRBC BLD AUTO-RTO: 0 /100
PLATELET # BLD AUTO: 212 10E3/UL (ref 150–450)
POTASSIUM BLD-SCNC: 3.6 MMOL/L (ref 3.5–5)
PROT SERPL-MCNC: 7.5 G/DL (ref 6–8)
RBC # BLD AUTO: 4.27 10E6/UL (ref 3.8–5.2)
SODIUM SERPL-SCNC: 141 MMOL/L (ref 136–145)
WBC # BLD AUTO: 6.9 10E3/UL (ref 4–11)

## 2022-07-27 PROCEDURE — 96413 CHEMO IV INFUSION 1 HR: CPT

## 2022-07-27 PROCEDURE — 85025 COMPLETE CBC W/AUTO DIFF WBC: CPT | Performed by: INTERNAL MEDICINE

## 2022-07-27 PROCEDURE — G0463 HOSPITAL OUTPT CLINIC VISIT: HCPCS | Mod: 25

## 2022-07-27 PROCEDURE — 250N000011 HC RX IP 250 OP 636: Performed by: INTERNAL MEDICINE

## 2022-07-27 PROCEDURE — 99214 OFFICE O/P EST MOD 30 MIN: CPT | Performed by: INTERNAL MEDICINE

## 2022-07-27 PROCEDURE — 258N000003 HC RX IP 258 OP 636: Performed by: INTERNAL MEDICINE

## 2022-07-27 PROCEDURE — 80053 COMPREHEN METABOLIC PANEL: CPT | Performed by: INTERNAL MEDICINE

## 2022-07-27 RX ORDER — DIPHENHYDRAMINE HYDROCHLORIDE 50 MG/ML
50 INJECTION INTRAMUSCULAR; INTRAVENOUS
Status: CANCELLED
Start: 2022-07-27

## 2022-07-27 RX ORDER — MEPERIDINE HYDROCHLORIDE 25 MG/ML
25 INJECTION INTRAMUSCULAR; INTRAVENOUS; SUBCUTANEOUS EVERY 30 MIN PRN
Status: CANCELLED | OUTPATIENT
Start: 2022-07-27

## 2022-07-27 RX ORDER — ALBUTEROL SULFATE 90 UG/1
1-2 AEROSOL, METERED RESPIRATORY (INHALATION)
Status: CANCELLED
Start: 2022-07-27

## 2022-07-27 RX ORDER — EPINEPHRINE 1 MG/ML
0.3 INJECTION, SOLUTION INTRAMUSCULAR; SUBCUTANEOUS EVERY 5 MIN PRN
Status: DISCONTINUED | OUTPATIENT
Start: 2022-07-27 | End: 2022-07-27 | Stop reason: HOSPADM

## 2022-07-27 RX ORDER — EPINEPHRINE 1 MG/ML
0.3 INJECTION, SOLUTION INTRAMUSCULAR; SUBCUTANEOUS EVERY 5 MIN PRN
Status: CANCELLED | OUTPATIENT
Start: 2022-07-27

## 2022-07-27 RX ORDER — NALOXONE HYDROCHLORIDE 0.4 MG/ML
0.2 INJECTION, SOLUTION INTRAMUSCULAR; INTRAVENOUS; SUBCUTANEOUS
Status: DISCONTINUED | OUTPATIENT
Start: 2022-07-27 | End: 2022-07-27 | Stop reason: HOSPADM

## 2022-07-27 RX ORDER — ALBUTEROL SULFATE 0.83 MG/ML
2.5 SOLUTION RESPIRATORY (INHALATION)
Status: CANCELLED | OUTPATIENT
Start: 2022-07-27

## 2022-07-27 RX ORDER — LORAZEPAM 2 MG/ML
0.5 INJECTION INTRAMUSCULAR EVERY 4 HOURS PRN
Status: CANCELLED | OUTPATIENT
Start: 2022-07-27

## 2022-07-27 RX ORDER — HEPARIN SODIUM (PORCINE) LOCK FLUSH IV SOLN 100 UNIT/ML 100 UNIT/ML
5 SOLUTION INTRAVENOUS
Status: CANCELLED | OUTPATIENT
Start: 2022-07-27

## 2022-07-27 RX ORDER — MEPERIDINE HYDROCHLORIDE 25 MG/ML
25 INJECTION INTRAMUSCULAR; INTRAVENOUS; SUBCUTANEOUS EVERY 30 MIN PRN
Status: DISCONTINUED | OUTPATIENT
Start: 2022-07-27 | End: 2022-07-27 | Stop reason: HOSPADM

## 2022-07-27 RX ORDER — ALBUTEROL SULFATE 0.83 MG/ML
2.5 SOLUTION RESPIRATORY (INHALATION)
Status: DISCONTINUED | OUTPATIENT
Start: 2022-07-27 | End: 2022-07-27 | Stop reason: HOSPADM

## 2022-07-27 RX ORDER — METHYLPREDNISOLONE SODIUM SUCCINATE 125 MG/2ML
125 INJECTION, POWDER, LYOPHILIZED, FOR SOLUTION INTRAMUSCULAR; INTRAVENOUS
Status: DISCONTINUED | OUTPATIENT
Start: 2022-07-27 | End: 2022-07-27 | Stop reason: HOSPADM

## 2022-07-27 RX ORDER — ALBUTEROL SULFATE 90 UG/1
1-2 AEROSOL, METERED RESPIRATORY (INHALATION)
Status: DISCONTINUED | OUTPATIENT
Start: 2022-07-27 | End: 2022-07-27 | Stop reason: HOSPADM

## 2022-07-27 RX ORDER — METHYLPREDNISOLONE SODIUM SUCCINATE 125 MG/2ML
125 INJECTION, POWDER, LYOPHILIZED, FOR SOLUTION INTRAMUSCULAR; INTRAVENOUS
Status: CANCELLED
Start: 2022-07-27

## 2022-07-27 RX ORDER — DIPHENHYDRAMINE HYDROCHLORIDE 50 MG/ML
50 INJECTION INTRAMUSCULAR; INTRAVENOUS
Status: DISCONTINUED | OUTPATIENT
Start: 2022-07-27 | End: 2022-07-27 | Stop reason: HOSPADM

## 2022-07-27 RX ORDER — NALOXONE HYDROCHLORIDE 0.4 MG/ML
0.2 INJECTION, SOLUTION INTRAMUSCULAR; INTRAVENOUS; SUBCUTANEOUS
Status: CANCELLED | OUTPATIENT
Start: 2022-07-27

## 2022-07-27 RX ORDER — HEPARIN SODIUM,PORCINE 10 UNIT/ML
5 VIAL (ML) INTRAVENOUS
Status: CANCELLED | OUTPATIENT
Start: 2022-07-27

## 2022-07-27 RX ORDER — HEPARIN SODIUM (PORCINE) LOCK FLUSH IV SOLN 100 UNIT/ML 100 UNIT/ML
5 SOLUTION INTRAVENOUS
Status: DISCONTINUED | OUTPATIENT
Start: 2022-07-27 | End: 2022-07-27 | Stop reason: HOSPADM

## 2022-07-27 RX ADMIN — SODIUM CHLORIDE 1000 ML: 9 INJECTION, SOLUTION INTRAVENOUS at 09:28

## 2022-07-27 RX ADMIN — Medication 5 ML: at 11:00

## 2022-07-27 RX ADMIN — ADO-TRASTUZUMAB EMTANSINE 352 MG: 20 INJECTION, POWDER, LYOPHILIZED, FOR SOLUTION INTRAVENOUS at 10:02

## 2022-07-27 ASSESSMENT — PAIN SCALES - GENERAL: PAINLEVEL: NO PAIN (0)

## 2022-07-27 NOTE — LETTER
"    7/27/2022         RE: Khloe Becker  [place] [address]        Dear Colleague,    Thank you for referring your patient, Khloe Becker, to the [facility]. Please see a copy of my visit note below.    Oncology Rooming Note    July 27, 2022 8:46 AM   Khloe Becker is a 66 year old female who presents for:    Chief Complaint   Patient presents with     Oncology Clinic Visit     Malignant neoplasm of upper-outer quadrant of left breast in female, estrogen receptor positive (H)     Initial Vitals: BP (!) 149/79   Pulse 70   Temp 98.5  F (36.9  C)   Resp 18   Wt 93.3 kg (205 lb 11.2 oz)   SpO2 96%   BMI 35.31 kg/m   Estimated body mass index is 35.31 kg/m  as calculated from the following:    Height as of 6/10/22: 1.626 m (5' 4\").    Weight as of this encounter: 93.3 kg (205 lb 11.2 oz). Body surface area is 2.05 meters squared.  No Pain (0) Comment: Data Unavailable   No LMP recorded. Patient is postmenopausal.  Allergies reviewed: Yes  Medications reviewed: Yes    Medications: Medication refills not needed today.  Pharmacy name entered into [employer]: [id number] - [place], [place] - [address]  AT [facility]    Clinical concerns: None      Daniela Ma LPN              [facility] Hematology and Oncology Progress Note    Patient: Khloe Becker  MRN: [id number]  Date of Service: 04/08/2022        Reason for Visit    Chief Complaint   Patient presents with     Oncology Clinic Visit     Malignant neoplasm of upper-outer quadrant of left breast in female, estrogen receptor positive (H)         Problem List Items Addressed This Visit        Other    Malignant neoplasm of upper-outer quadrant of left breast in female, estrogen receptor positive (H) - Primary    Relevant Orders    Infusion Appointment Request            Assessment and Plan    Early-stage triple positive left-sided breast cancer status post 6 " cycles of neoadjuvant TCHP chemotherapy and left mastectomy  Here for cycle 6 of Kadcyla.  Labs reviewed today.  Platelet count is 212.  Hemoglobin is 11.8 and total white count is 6.9.  Serum chemistry still pending.  No contraindications to proceed with treatment today.  No dose changes made.  She will get cycle 6 today and she will come back in 3 weeks for cycle 7.  Her echocardiogram was good last time.  No evidence of any heart failure.      Chemotherapy related side effects (neuropathy and musculoskeletal pain)  Fatigue and neuropathy are her biggest issues.  Fatigue seems to have gotten worse as she has gotten more cycles.  Unfortunately we do not have any treatment for this.  I asked her to be as physically active as possible when she is.  Neuropathy symptoms also have gotten worse since starting treatment.  She had some pre-existing neuropathy from taxane therapy in the past.  Probably grade 1-2.  Does not affect her activities of daily living.  Does affect her quality of life especially sleep but she wants to continue treatment without any dose reductions for now.  We had tried Cymbalta and most recently gabapentin but she did not tolerate this.  She wants to hold off any further medication management for neuropathy for now.  I explained to her that if neuropathy gets worse that its affecting her daily activities then we have to decrease the dose or hold the drug depending upon the severity of neuropathy.  She is aware of this.      Endocrine therapy for ER positive breast cancer  Continue Arimidex.  He does have some hot flashes.  Musculoskeletal pain has been stable.    Low bone density  Continue calcium and vitamin D.      Cancer Staging  Malignant neoplasm of upper-outer quadrant of left breast in female, estrogen receptor positive (H)  Staging form: Breast, AJCC 8th Edition  - Clinical: Stage IB (cT2, cN1, cM0, G3, ER+, HI+, HER2+) - Signed by Xavier Rubio MD on 6/10/2022  - Pathologic: Stage  IA (pT1a, pN1mi, cM0, G3, ER+, HI+, HER2+) - Signed by Xavier Rubio MD on 6/10/2022      ECOG Performance    0 - Independent         Problem List    Patient Active Problem List   Diagnosis     Morbid obesity (H)     Malignant neoplasm of overlapping sites of left breast in female, estrogen receptor positive (H)     Malignant neoplasm of upper-outer quadrant of left breast in female, estrogen receptor positive (H)     Chemotherapy-induced peripheral neuropathy (H)     Musculoskeletal pain      Oncologic history  July 2021: Left breast mass palpated by patient.  Mammogram showing 2.1 x 2.1 x 2 cm hypoechoic mass at 2 o'clock position about 11 cm from the nipple.  Ultrasound-guided biopsy of the mass showed invasive ductal carcinoma, grade 3, ER, HI and HER2 positive.  Left axillary lymph node biopsy positive for metastatic disease.    Neoadjuvant chemotherapy with TCHP starting 9/2/2021.  Finished 6 cycles of chemo.  Last dose was 12/17/2020.  Carboplatin was held for cycle 4 and 6 due to poor tolerance.    Left mastectomy with sentinel lymph node biopsy and axillary lymph node dissection done on 1/19/2022.    Surgical path showing residual disease.  ypT1a, pN1(mi)    Interval History   Khloe Becker is a 66 year old with triple positive breast cancer status post neoadjuvant chemotherapy with TCHP followed by mastectomy who is here for cycle 6 of Kadcyla and management of chemotherapy related toxicities.    Fatigue continues to be a big issue for her.  It looks like it is getting more and more as she progresses through treatments.  Last time I had prescribed gabapentin 300 mg to be taken at night for her neuropathy issues but she had severe fatigue after she did this so she stopped after a week.  Neuropathy seems to have gotten worse since she started treatment but compared to last time its been stable.  Does not affect her daily activities.  Does affect her sleep to some extent.  No loss of balance or  ambulatory issues.  No shortness of breath or chest pain.  Peripheral edema is pretty stable.    Labs reviewed today    Review of Systems  A comprehensive review of systems was negative except for what is noted in the interval history    Current Outpatient Medications   Medication     acetaminophen (TYLENOL) 500 MG tablet     anastrozole (ARIMIDEX) 1 MG tablet     cholecalciferol 25 MCG (1000 UT) TABS     fish oil-omega-3 fatty acids 1000 MG capsule     fluticasone (VERAMYST) 27.5 MCG/SPRAY nasal spray     furosemide (LASIX) 20 MG tablet     gabapentin (NEURONTIN) 300 MG capsule     hydrochlorothiazide (HYDRODIURIL) 25 MG tablet     HYDROcodone-acetaminophen (NORCO) 5-325 MG tablet     irbesartan (AVAPRO) 75 MG tablet     lidocaine-prilocaine (EMLA) 2.5-2.5 % external cream     multivitamin w/minerals (THERA-VIT-M) tablet     busPIRone (BUSPAR) 10 MG tablet     clonazePAM (KLONOPIN) 0.5 MG tablet     diphenoxylate-atropine (LOMOTIL) 2.5-0.025 MG tablet     hydrocortisone 2.5 % cream     ketoconazole (NIZORAL) 2 % external cream     medical cannabis (Patient's own supply)     ondansetron (ZOFRAN-ODT) 8 MG ODT tab     prochlorperazine (COMPAZINE) 10 MG tablet     traZODone (DESYREL) 50 MG tablet     No current facility-administered medications for this visit.        Physical Exam    No flowsheet data found.    General: Alert, cooperative, no distress  HEENT: Normocephalic and atraumatic  Eye: Normal external eye, conjunctiva, lids cornea, BRIGETTE.  Chest: Clear to auscultation bilaterally  Cardiac: S1, S2 normal, regular rate and rhythm pedal edema present  CNS: Alert and oriented x3, neurologic exam grossly normal.  Lymphatics: No bilateral cervical, axillary, supraclavicular adenopathy noted    Lab Results    Recent Results (from the past 168 hour(s))   CBC with platelets and differential   Result Value Ref Range    WBC Count 6.9 4.0 - 11.0 10e3/uL    RBC Count 4.27 3.80 - 5.20 10e6/uL    Hemoglobin 11.8 11.7 - 15.7  g/dL    Hematocrit 37.0 35.0 - 47.0 %    MCV 87 78 - 100 fL    MCH 27.6 26.5 - 33.0 pg    MCHC 31.9 31.5 - 36.5 g/dL    RDW 15.0 10.0 - 15.0 %    Platelet Count 212 150 - 450 10e3/uL    % Neutrophils 64 %    % Lymphocytes 26 %    % Monocytes 6 %    % Eosinophils 3 %    % Basophils 1 %    % Immature Granulocytes 0 %    NRBCs per 100 WBC 0 <1 /100    Absolute Neutrophils 4.4 1.6 - 8.3 10e3/uL    Absolute Lymphocytes 1.8 0.8 - 5.3 10e3/uL    Absolute Monocytes 0.4 0.0 - 1.3 10e3/uL    Absolute Eosinophils 0.2 0.0 - 0.7 10e3/uL    Absolute Basophils 0.1 0.0 - 0.2 10e3/uL    Absolute Immature Granulocytes 0.0 <=0.4 10e3/uL    Absolute NRBCs 0.0 10e3/uL       Imaging    Echocardiogram Complete    Result Date: 2022  [id number]^[email or web address] [address]  Name: JOHN VINSON MRN: [id number] : 1955 Study Date: 2022 10:29 AM Age: 66 yrs Gender: Female Patient Location: [facility] Reason For Study: Malignant neoplasm of upper-outer quadrant of left breast in [facility] Ordering Physician: THEA BAR Referring Physician: THEA BAR Performed By: GOPAL  BSA: 2.0 m2 Height: 64 in Weight: 207 lb HR: 64 BP: 164/80 mmHg ______________________________________________________________________________ Procedure Complete Echo Adult. Adequate quality two-dimensional was performed and interpreted. Adequate quality color and spectral Doppler were performed and interpreted. Compared to the prior study dated 3/10/2022, there have been no changes. ______________________________________________________________________________ Interpretation Summary  1. Left ventricular size, wall thickness, and systolic function are normal. The calculated left ventricular ejection fraction is 55%. 2. Right ventricular size and systolic function are normal. 3. No hemodynamically significant valvular abnormalities. 4. Compared to the prior study dated 3/10/2022,  there has been no significant change ______________________________________________________________________________ I      WMSI = 1.00     % Normal = 100  X - Cannot   0 -                      (2) - Mildly 2 -          Segments  Size Interpret    Hyperkinetic 1 - Normal  Hypokinetic  Hypokinetic  1-2     small                                                    7 -          3-5    moderate 3 - Akinetic 4 -          5 -         6 - Akinetic Dyskinetic   6-14    large              Dyskinetic   Aneurysmal  w/scar       w/scar       15-16   diffuse  Left Ventricle The left ventricle is normal in size. There is normal left ventricular wall thickness. Left ventricular systolic function is normal. Biplane LVEF is 55%. Left ventricular diastolic function is indeterminate. No regional wall motion abnormalities noted.  Right Ventricle Normal right ventricle size and systolic function.  Atria Normal left atrial size. Right atrial size is normal.  Mitral Valve Mitral valve leaflets appear normal. There is trace mitral regurgitation. There is no mitral valve stenosis.  Tricuspid Valve Tricuspid valve leaflets appear normal. There is trace tricuspid regurgitation. Right ventricular systolic pressure could not be approximated due to inadequate tricuspid regurgitation. There is no tricuspid stenosis.  Aortic Valve Aortic valve leaflets appear normal. The aortic valve is trileaflet. No aortic regurgitation is present. No aortic stenosis is present.  Pulmonic Valve The pulmonic valve is not well seen, but is grossly normal. There is trace pulmonic valvular regurgitation. There is no pulmonic valvular stenosis.  Vessels The aorta root is normal. The thoracic aorta is normal. IVC diameter <2.1 cm collapsing >50% with sniff suggests a normal RA pressure of 3 mmHg.  Pericardium There is no pericardial effusion.  Rhythm Sinus rhythm was noted.  ______________________________________________________________________________ MMode/2D  Measurements & Calculations IVSd: 0.90 cm LVIDd: 3.8 cm LVIDs: 2.8 cm LVPWd: 0.90 cm FS: 27.1 % LV mass(C)d: 103.0 grams LV mass(C)dI: 51.9 grams/m2  Ao root diam: 3.1 cm LA dimension: 3.5 cm asc Aorta Diam: 3.7 cm LA/Ao: 1.1 LVOT diam: 2.1 cm LVOT area: 3.4 cm2 LA Volume Index (BP): 25.0 ml/m2 RWT: 0.47  Time Measurements MM HR: 64.0 BPM  Doppler Measurements & Calculations MV E max spenser: 90.8 cm/sec MV A max spenser: 96.3 cm/sec MV E/A: 0.94 MV dec time: 0.21 sec Ao V2 max: 148.4 cm/sec Ao max P.0 mmHg Ao V2 mean: 95.2 cm/sec Ao mean P.2 mmHg Ao V2 VTI: 32.5 cm BOBBY(I,D): 2.1 cm2 BOBBY(V,D): 2.0 cm2 LV V1 max PG: 3.1 mmHg LV V1 max: 87.4 cm/sec LV V1 VTI: 20.2 cm SV(LVOT): 68.2 ml SI(LVOT): 34.4 ml/m2 PA acc time: 0.12 sec AV Spenser Ratio (DI): 0.59 BOBBY Index (cm2/m2): 1.1 E/E': 14.3 E/E' av.8 Lateral E/e': 14.6 Medial E/e': 15.0 Peak E' Spenser: 6.3 cm/sec  ______________________________________________________________________________ Report approved by: Gloria Hernandez 2022 01:17 PM          A total of 30 min were spent today on this visit which included face to face conversation with the patient, EMR review, counseling and co-ordination of care and medical documentation.      Signed by: Xavier Rubio MD      Again, thank you for allowing me to participate in the care of your patient.        Sincerely,        Xavier Rubio MD

## 2022-07-27 NOTE — PROGRESS NOTES
"Oncology Rooming Note    July 27, 2022 8:46 AM   Khloe Becker is a 66 year old female who presents for:    Chief Complaint   Patient presents with     Oncology Clinic Visit     Malignant neoplasm of upper-outer quadrant of left breast in female, estrogen receptor positive (H)     Initial Vitals: BP (!) 149/79   Pulse 70   Temp 98.5  F (36.9  C)   Resp 18   Wt 93.3 kg (205 lb 11.2 oz)   SpO2 96%   BMI 35.31 kg/m   Estimated body mass index is 35.31 kg/m  as calculated from the following:    Height as of 6/10/22: 1.626 m (5' 4\").    Weight as of this encounter: 93.3 kg (205 lb 11.2 oz). Body surface area is 2.05 meters squared.  No Pain (0) Comment: Data Unavailable   No LMP recorded. Patient is postmenopausal.  Allergies reviewed: Yes  Medications reviewed: Yes    Medications: Medication refills not needed today.  Pharmacy name entered into [facility]: [id number] - [place] [id number] [facility]  AT [facility]    Clinical concerns: None      Daniela Ma LPN            "

## 2022-07-27 NOTE — PROGRESS NOTES
[facility] Hematology and Oncology Progress Note    Patient: Khloe Becker  MRN: [id number]  Date of Service: 04/08/2022        Reason for Visit    Chief Complaint   Patient presents with     Oncology Clinic Visit     Malignant neoplasm of upper-outer quadrant of left breast in female, estrogen receptor positive (H)         Problem List Items Addressed This Visit        Other    Malignant neoplasm of upper-outer quadrant of left breast in female, estrogen receptor positive (H) - Primary    Relevant Orders    Infusion Appointment Request            Assessment and Plan    Early-stage triple positive left-sided breast cancer status post 6 cycles of neoadjuvant TCHP chemotherapy and left mastectomy  Here for cycle 6 of Kadcyla.  Labs reviewed today.  Platelet count is 212.  Hemoglobin is 11.8 and total white count is 6.9.  Serum chemistry still pending.  No contraindications to proceed with treatment today.  No dose changes made.  She will get cycle 6 today and she will come back in 3 weeks for cycle 7.  Her echocardiogram was good last time.  No evidence of any heart failure.      Chemotherapy related side effects (neuropathy and musculoskeletal pain)  Fatigue and neuropathy are her biggest issues.  Fatigue seems to have gotten worse as she has gotten more cycles.  Unfortunately we do not have any treatment for this.  I asked her to be as physically active as possible when she is.  Neuropathy symptoms also have gotten worse since starting treatment.  She had some pre-existing neuropathy from taxane therapy in the past.  Probably grade 1-2.  Does not affect her activities of daily living.  Does affect her quality of life especially sleep but she wants to continue treatment without any dose reductions for now.  We had tried Cymbalta and most recently gabapentin but she did not tolerate this.  She wants to hold off any further medication management for neuropathy for now.  I explained to her that if neuropathy  gets worse that its affecting her daily activities then we have to decrease the dose or hold the drug depending upon the severity of neuropathy.  She is aware of this.      Endocrine therapy for ER positive breast cancer  Continue Arimidex.  He does have some hot flashes.  Musculoskeletal pain has been stable.    Low bone density  Continue calcium and vitamin D.      Cancer Staging  Malignant neoplasm of upper-outer quadrant of left breast in female, estrogen receptor positive (H)  Staging form: Breast, AJCC 8th Edition  - Clinical: Stage IB (cT2, cN1, cM0, G3, ER+, CA+, HER2+) - Signed by Xavier Rubio MD on 6/10/2022  - Pathologic: Stage IA (pT1a, pN1mi, cM0, G3, ER+, CA+, HER2+) - Signed by Xavier Rubio MD on 6/10/2022      ECOG Performance    0 - Independent         Problem List    Patient Active Problem List   Diagnosis     Morbid obesity (H)     Malignant neoplasm of overlapping sites of left breast in female, estrogen receptor positive (H)     Malignant neoplasm of upper-outer quadrant of left breast in female, estrogen receptor positive (H)     Chemotherapy-induced peripheral neuropathy (H)     Musculoskeletal pain      Oncologic history  July 2021: Left breast mass palpated by patient.  Mammogram showing 2.1 x 2.1 x 2 cm hypoechoic mass at 2 o'clock position about 11 cm from the nipple.  Ultrasound-guided biopsy of the mass showed invasive ductal carcinoma, grade 3, ER, CA and HER2 positive.  Left axillary lymph node biopsy positive for metastatic disease.    Neoadjuvant chemotherapy with TCHP starting 9/2/2021.  Finished 6 cycles of chemo.  Last dose was 12/17/2020.  Carboplatin was held for cycle 4 and 6 due to poor tolerance.    Left mastectomy with sentinel lymph node biopsy and axillary lymph node dissection done on 1/19/2022.    Surgical path showing residual disease.  ypT1a, pN1(mi)    Interval History   Khloe Becker is a 66 year old with triple positive breast cancer status post  neoadjuvant chemotherapy with TCHP followed by mastectomy who is here for cycle 6 of Kadcyla and management of chemotherapy related toxicities.    Fatigue continues to be a big issue for her.  It looks like it is getting more and more as she progresses through treatments.  Last time I had prescribed gabapentin 300 mg to be taken at night for her neuropathy issues but she had severe fatigue after she did this so she stopped after a week.  Neuropathy seems to have gotten worse since she started treatment but compared to last time its been stable.  Does not affect her daily activities.  Does affect her sleep to some extent.  No loss of balance or ambulatory issues.  No shortness of breath or chest pain.  Peripheral edema is pretty stable.    Labs reviewed today    Review of Systems  A comprehensive review of systems was negative except for what is noted in the interval history    Current Outpatient Medications   Medication     acetaminophen (TYLENOL) 500 MG tablet     anastrozole (ARIMIDEX) 1 MG tablet     cholecalciferol 25 MCG (1000 UT) TABS     fish oil-omega-3 fatty acids 1000 MG capsule     fluticasone (VERAMYST) 27.5 MCG/SPRAY nasal spray     furosemide (LASIX) 20 MG tablet     gabapentin (NEURONTIN) 300 MG capsule     hydrochlorothiazide (HYDRODIURIL) 25 MG tablet     HYDROcodone-acetaminophen (NORCO) 5-325 MG tablet     irbesartan (AVAPRO) 75 MG tablet     lidocaine-prilocaine (EMLA) 2.5-2.5 % external cream     multivitamin w/minerals (THERA-VIT-M) tablet     busPIRone (BUSPAR) 10 MG tablet     clonazePAM (KLONOPIN) 0.5 MG tablet     diphenoxylate-atropine (LOMOTIL) 2.5-0.025 MG tablet     hydrocortisone 2.5 % cream     ketoconazole (NIZORAL) 2 % external cream     medical cannabis (Patient's own supply)     ondansetron (ZOFRAN-ODT) 8 MG ODT tab     prochlorperazine (COMPAZINE) 10 MG tablet     traZODone (DESYREL) 50 MG tablet     No current facility-administered medications for this visit.        Physical  Exam    No flowsheet data found.    General: Alert, cooperative, no distress  HEENT: Normocephalic and atraumatic  Eye: Normal external eye, conjunctiva, lids cornea, BRIGETTE.  Chest: Clear to auscultation bilaterally  Cardiac: S1, S2 normal, regular rate and rhythm pedal edema present  CNS: Alert and oriented x3, neurologic exam grossly normal.  Lymphatics: No bilateral cervical, axillary, supraclavicular adenopathy noted    Lab Results    Recent Results (from the past 168 hour(s))   CBC with platelets and differential   Result Value Ref Range    WBC Count 6.9 4.0 - 11.0 10e3/uL    RBC Count 4.27 3.80 - 5.20 10e6/uL    Hemoglobin 11.8 11.7 - 15.7 g/dL    Hematocrit 37.0 35.0 - 47.0 %    MCV 87 78 - 100 fL    MCH 27.6 26.5 - 33.0 pg    MCHC 31.9 31.5 - 36.5 g/dL    RDW 15.0 10.0 - 15.0 %    Platelet Count 212 150 - 450 10e3/uL    % Neutrophils 64 %    % Lymphocytes 26 %    % Monocytes 6 %    % Eosinophils 3 %    % Basophils 1 %    % Immature Granulocytes 0 %    NRBCs per 100 WBC 0 <1 /100    Absolute Neutrophils 4.4 1.6 - 8.3 10e3/uL    Absolute Lymphocytes 1.8 0.8 - 5.3 10e3/uL    Absolute Monocytes 0.4 0.0 - 1.3 10e3/uL    Absolute Eosinophils 0.2 0.0 - 0.7 10e3/uL    Absolute Basophils 0.1 0.0 - 0.2 10e3/uL    Absolute Immature Granulocytes 0.0 <=0.4 10e3/uL    Absolute NRBCs 0.0 10e3/uL       Imaging    Echocardiogram Complete    Result Date: 2022  [id number]^[email or web address]^[place] [address]  Name: JOHN VINSON MRN: [id number] : 1955 Study Date: 2022 10:29 AM Age: 66 yrs Gender: Female [facility] Reason For Study: Malignant neoplasm of upper-outer quadrant of left breast in [facility] Ordering Physician: THEA BAR Referring Physician: THEA BAR Performed By: GOPAL  BSA: 2.0 m2 Height: 64 in Weight: 207 lb HR: 64 BP: 164/80 mmHg  ______________________________________________________________________________ Procedure Complete Echo Adult. Adequate quality two-dimensional was performed and interpreted. Adequate quality color and spectral Doppler were performed and interpreted. Compared to the prior study dated 3/10/2022, there have been no changes. ______________________________________________________________________________ Interpretation Summary  1. Left ventricular size, wall thickness, and systolic function are normal. The calculated left ventricular ejection fraction is 55%. 2. Right ventricular size and systolic function are normal. 3. No hemodynamically significant valvular abnormalities. 4. Compared to the prior study dated 3/10/2022, there has been no significant change ______________________________________________________________________________ I      WMSI = 1.00     % Normal = 100  X - Cannot   0 -                      (2) - Mildly 2 -          Segments  Size Interpret    Hyperkinetic 1 - Normal  Hypokinetic  Hypokinetic  1-2     small                                                    7 -          3-5    moderate 3 - Akinetic 4 -          5 -         6 - Akinetic Dyskinetic   6-14    large              Dyskinetic   Aneurysmal  w/scar       w/scar       15-16   diffuse  Left Ventricle The left ventricle is normal in size. There is normal left ventricular wall thickness. Left ventricular systolic function is normal. Biplane LVEF is 55%. Left ventricular diastolic function is indeterminate. No regional wall motion abnormalities noted.  Right Ventricle Normal right ventricle size and systolic function.  Atria Normal left atrial size. Right atrial size is normal.  Mitral Valve Mitral valve leaflets appear normal. There is trace mitral regurgitation. There is no mitral valve stenosis.  Tricuspid Valve Tricuspid valve leaflets appear normal. There is trace tricuspid regurgitation. Right ventricular systolic pressure could not be  approximated due to inadequate tricuspid regurgitation. There is no tricuspid stenosis.  Aortic Valve Aortic valve leaflets appear normal. The aortic valve is trileaflet. No aortic regurgitation is present. No aortic stenosis is present.  Pulmonic Valve The pulmonic valve is not well seen, but is grossly normal. There is trace pulmonic valvular regurgitation. There is no pulmonic valvular stenosis.  Vessels The aorta root is normal. The thoracic aorta is normal. IVC diameter <2.1 cm collapsing >50% with sniff suggests a normal RA pressure of 3 mmHg.  Pericardium There is no pericardial effusion.  Rhythm Sinus rhythm was noted.  ______________________________________________________________________________ MMode/2D Measurements & Calculations IVSd: 0.90 cm LVIDd: 3.8 cm LVIDs: 2.8 cm LVPWd: 0.90 cm FS: 27.1 % LV mass(C)d: 103.0 grams LV mass(C)dI: 51.9 grams/m2  Ao root diam: 3.1 cm LA dimension: 3.5 cm asc Aorta Diam: 3.7 cm LA/Ao: 1.1 LVOT diam: 2.1 cm LVOT area: 3.4 cm2 LA Volume Index (BP): 25.0 ml/m2 RWT: 0.47  Time Measurements MM HR: 64.0 BPM  Doppler Measurements & Calculations MV E max spenser: 90.8 cm/sec MV A max spenser: 96.3 cm/sec MV E/A: 0.94 MV dec time: 0.21 sec Ao V2 max: 148.4 cm/sec Ao max P.0 mmHg Ao V2 mean: 95.2 cm/sec Ao mean P.2 mmHg Ao V2 VTI: 32.5 cm BOBBY(I,D): 2.1 cm2 BOBBY(V,D): 2.0 cm2 LV V1 max PG: 3.1 mmHg LV V1 max: 87.4 cm/sec LV V1 VTI: 20.2 cm SV(LVOT): 68.2 ml SI(LVOT): 34.4 ml/m2 PA acc time: 0.12 sec AV Spenser Ratio (DI): 0.59 BOBBY Index (cm2/m2): 1.1 E/E': 14.3 E/E' av.8 Lateral E/e': 14.6 Medial E/e': 15.0 Peak E' Spenser: 6.3 cm/sec  ______________________________________________________________________________ Report approved by: Gloria Hernandez 2022 01:17 PM          A total of 30 min were spent today on this visit which included face to face conversation with the patient, EMR review, counseling and co-ordination of care and medical documentation.      Signed by:  Xavier Rubio MD

## 2022-07-27 NOTE — PROGRESS NOTES
Infusion Nursing Note:  Khloe Becker presents today for cycle 6 day 1 treatment with Kadcyla.    Patient seen by provider today:Yes Dr Rubio   present during visit today: Not Applicable.    Note: Khloe was educated on her plan of care and treatment reviewed prior to administration.  She requested IV hydration which is in her treatment plan.  Khloe received IV hydration and chemotherapy as ordered.    Intravenous Access:  Implanted Port.    Treatment Conditions:  Results reviewed, labs MET treatment parameters, ok to proceed with treatment.    Post Infusion Assessment:  Patient tolerated infusion without incident.  Blood return noted pre and post infusion.  Site patent and intact, free from redness, edema or discomfort.  No evidence of extravasations.  Access discontinued per protocol.     Discharge Plan:   Patient discharged in stable condition accompanied by: self.      Crista Mi RN

## 2022-08-02 DIAGNOSIS — G62.0 CHEMOTHERAPY-INDUCED PERIPHERAL NEUROPATHY (H): ICD-10-CM

## 2022-08-02 DIAGNOSIS — T45.1X5A CHEMOTHERAPY-INDUCED PERIPHERAL NEUROPATHY (H): ICD-10-CM

## 2022-08-03 RX ORDER — GABAPENTIN 300 MG/1
300 CAPSULE ORAL AT BEDTIME
Qty: 90 CAPSULE | Refills: 3 | Status: SHIPPED | OUTPATIENT
Start: 2022-08-03 | End: 2022-12-28

## 2022-08-17 ENCOUNTER — LAB (OUTPATIENT)
Dept: INFUSION THERAPY | Facility: HOSPITAL | Age: 67
End: 2022-08-17
Attending: INTERNAL MEDICINE
Payer: COMMERCIAL

## 2022-08-17 ENCOUNTER — ONCOLOGY VISIT (OUTPATIENT)
Dept: ONCOLOGY | Facility: HOSPITAL | Age: 67
End: 2022-08-17
Attending: INTERNAL MEDICINE
Payer: COMMERCIAL

## 2022-08-17 VITALS
SYSTOLIC BLOOD PRESSURE: 171 MMHG | BODY MASS INDEX: 35.32 KG/M2 | TEMPERATURE: 98.2 F | WEIGHT: 206.9 LBS | DIASTOLIC BLOOD PRESSURE: 72 MMHG | RESPIRATION RATE: 18 BRPM | OXYGEN SATURATION: 98 % | HEIGHT: 64 IN | HEART RATE: 62 BPM

## 2022-08-17 DIAGNOSIS — C50.412 MALIGNANT NEOPLASM OF UPPER-OUTER QUADRANT OF LEFT BREAST IN FEMALE, ESTROGEN RECEPTOR POSITIVE (H): Primary | ICD-10-CM

## 2022-08-17 DIAGNOSIS — Z17.0 MALIGNANT NEOPLASM OF UPPER-OUTER QUADRANT OF LEFT BREAST IN FEMALE, ESTROGEN RECEPTOR POSITIVE (H): Primary | ICD-10-CM

## 2022-08-17 DIAGNOSIS — T45.1X5A CHEMOTHERAPY-INDUCED PERIPHERAL NEUROPATHY (H): ICD-10-CM

## 2022-08-17 DIAGNOSIS — G62.0 CHEMOTHERAPY-INDUCED PERIPHERAL NEUROPATHY (H): ICD-10-CM

## 2022-08-17 DIAGNOSIS — M79.18 MUSCULOSKELETAL PAIN: ICD-10-CM

## 2022-08-17 LAB
ALBUMIN SERPL-MCNC: 3.7 G/DL (ref 3.5–5)
ALP SERPL-CCNC: 87 U/L (ref 45–120)
ALT SERPL W P-5'-P-CCNC: 27 U/L (ref 0–45)
ANION GAP SERPL CALCULATED.3IONS-SCNC: 7 MMOL/L (ref 5–18)
AST SERPL W P-5'-P-CCNC: 29 U/L (ref 0–40)
BASOPHILS # BLD AUTO: 0.1 10E3/UL (ref 0–0.2)
BASOPHILS NFR BLD AUTO: 1 %
BILIRUB SERPL-MCNC: 0.5 MG/DL (ref 0–1)
BUN SERPL-MCNC: 22 MG/DL (ref 8–22)
CALCIUM SERPL-MCNC: 9.7 MG/DL (ref 8.5–10.5)
CHLORIDE BLD-SCNC: 99 MMOL/L (ref 98–107)
CO2 SERPL-SCNC: 33 MMOL/L (ref 22–31)
CREAT SERPL-MCNC: 0.73 MG/DL (ref 0.6–1.1)
EOSINOPHIL # BLD AUTO: 0.2 10E3/UL (ref 0–0.7)
EOSINOPHIL NFR BLD AUTO: 2 %
ERYTHROCYTE [DISTWIDTH] IN BLOOD BY AUTOMATED COUNT: 15.4 % (ref 10–15)
GFR SERPL CREATININE-BSD FRML MDRD: 90 ML/MIN/1.73M2
GLUCOSE BLD-MCNC: 92 MG/DL (ref 70–125)
HCT VFR BLD AUTO: 35.5 % (ref 35–47)
HGB BLD-MCNC: 11.8 G/DL (ref 11.7–15.7)
IMM GRANULOCYTES # BLD: 0 10E3/UL
IMM GRANULOCYTES NFR BLD: 0 %
LYMPHOCYTES # BLD AUTO: 2.3 10E3/UL (ref 0.8–5.3)
LYMPHOCYTES NFR BLD AUTO: 30 %
MCH RBC QN AUTO: 28.7 PG (ref 26.5–33)
MCHC RBC AUTO-ENTMCNC: 33.2 G/DL (ref 31.5–36.5)
MCV RBC AUTO: 86 FL (ref 78–100)
MONOCYTES # BLD AUTO: 0.5 10E3/UL (ref 0–1.3)
MONOCYTES NFR BLD AUTO: 7 %
NEUTROPHILS # BLD AUTO: 4.6 10E3/UL (ref 1.6–8.3)
NEUTROPHILS NFR BLD AUTO: 60 %
NRBC # BLD AUTO: 0 10E3/UL
NRBC BLD AUTO-RTO: 0 /100
PLATELET # BLD AUTO: 193 10E3/UL (ref 150–450)
POTASSIUM BLD-SCNC: 3.8 MMOL/L (ref 3.5–5)
PROT SERPL-MCNC: 7.7 G/DL (ref 6–8)
RBC # BLD AUTO: 4.11 10E6/UL (ref 3.8–5.2)
SODIUM SERPL-SCNC: 139 MMOL/L (ref 136–145)
WBC # BLD AUTO: 7.6 10E3/UL (ref 4–11)

## 2022-08-17 PROCEDURE — 80053 COMPREHEN METABOLIC PANEL: CPT | Performed by: INTERNAL MEDICINE

## 2022-08-17 PROCEDURE — 82040 ASSAY OF SERUM ALBUMIN: CPT | Performed by: INTERNAL MEDICINE

## 2022-08-17 PROCEDURE — 96413 CHEMO IV INFUSION 1 HR: CPT

## 2022-08-17 PROCEDURE — 258N000003 HC RX IP 258 OP 636: Performed by: INTERNAL MEDICINE

## 2022-08-17 PROCEDURE — 250N000011 HC RX IP 250 OP 636: Performed by: INTERNAL MEDICINE

## 2022-08-17 PROCEDURE — 99213 OFFICE O/P EST LOW 20 MIN: CPT | Performed by: INTERNAL MEDICINE

## 2022-08-17 PROCEDURE — G0463 HOSPITAL OUTPT CLINIC VISIT: HCPCS | Mod: 25

## 2022-08-17 PROCEDURE — 85025 COMPLETE CBC W/AUTO DIFF WBC: CPT | Performed by: INTERNAL MEDICINE

## 2022-08-17 RX ORDER — ALBUTEROL SULFATE 0.83 MG/ML
2.5 SOLUTION RESPIRATORY (INHALATION)
Status: CANCELLED | OUTPATIENT
Start: 2022-08-17

## 2022-08-17 RX ORDER — NALOXONE HYDROCHLORIDE 0.4 MG/ML
0.2 INJECTION, SOLUTION INTRAMUSCULAR; INTRAVENOUS; SUBCUTANEOUS
Status: CANCELLED | OUTPATIENT
Start: 2022-08-17

## 2022-08-17 RX ORDER — DIPHENHYDRAMINE HYDROCHLORIDE 50 MG/ML
50 INJECTION INTRAMUSCULAR; INTRAVENOUS
Status: CANCELLED
Start: 2022-08-17

## 2022-08-17 RX ORDER — HEPARIN SODIUM (PORCINE) LOCK FLUSH IV SOLN 100 UNIT/ML 100 UNIT/ML
5 SOLUTION INTRAVENOUS
Status: DISCONTINUED | OUTPATIENT
Start: 2022-08-17 | End: 2022-08-17 | Stop reason: HOSPADM

## 2022-08-17 RX ORDER — MEPERIDINE HYDROCHLORIDE 25 MG/ML
25 INJECTION INTRAMUSCULAR; INTRAVENOUS; SUBCUTANEOUS EVERY 30 MIN PRN
Status: CANCELLED | OUTPATIENT
Start: 2022-08-17

## 2022-08-17 RX ORDER — LORAZEPAM 2 MG/ML
0.5 INJECTION INTRAMUSCULAR EVERY 4 HOURS PRN
Status: CANCELLED | OUTPATIENT
Start: 2022-08-17

## 2022-08-17 RX ORDER — HEPARIN SODIUM (PORCINE) LOCK FLUSH IV SOLN 100 UNIT/ML 100 UNIT/ML
5 SOLUTION INTRAVENOUS
Status: CANCELLED | OUTPATIENT
Start: 2022-08-17

## 2022-08-17 RX ORDER — HEPARIN SODIUM,PORCINE 10 UNIT/ML
5 VIAL (ML) INTRAVENOUS
Status: CANCELLED | OUTPATIENT
Start: 2022-08-17

## 2022-08-17 RX ORDER — ALBUTEROL SULFATE 90 UG/1
1-2 AEROSOL, METERED RESPIRATORY (INHALATION)
Status: CANCELLED
Start: 2022-08-17

## 2022-08-17 RX ORDER — METHYLPREDNISOLONE SODIUM SUCCINATE 125 MG/2ML
125 INJECTION, POWDER, LYOPHILIZED, FOR SOLUTION INTRAMUSCULAR; INTRAVENOUS
Status: CANCELLED
Start: 2022-08-17

## 2022-08-17 RX ORDER — EPINEPHRINE 1 MG/ML
0.3 INJECTION, SOLUTION INTRAMUSCULAR; SUBCUTANEOUS EVERY 5 MIN PRN
Status: CANCELLED | OUTPATIENT
Start: 2022-08-17

## 2022-08-17 RX ADMIN — SODIUM CHLORIDE 1000 ML: 9 INJECTION, SOLUTION INTRAVENOUS at 14:39

## 2022-08-17 RX ADMIN — Medication 5 ML: at 16:00

## 2022-08-17 RX ADMIN — ADO-TRASTUZUMAB EMTANSINE 352 MG: 20 INJECTION, POWDER, LYOPHILIZED, FOR SOLUTION INTRAVENOUS at 15:07

## 2022-08-17 ASSESSMENT — PAIN SCALES - GENERAL: PAINLEVEL: MODERATE PAIN (5)

## 2022-08-17 NOTE — PROGRESS NOTES
Infusion Nursing Note:  Khloe Becker presents today for cycle 6 using Kadcyla.  Patient seen by provider today: Yes: Dr Rubio   present during visit today: Not Applicable.    Note: She received IV hydration and chemotherapy as ordered.    Intravenous Access:  Implanted Port.    Treatment Conditions:  Results reviewed, labs MET treatment parameters, ok to proceed with treatment.    Post Infusion Assessment:  Patient tolerated infusion without incident.  Blood return noted pre and post infusion.  Site patent and intact, free from redness, edema or discomfort.  No evidence of extravasations.  Access discontinued per protocol.     Discharge Plan:   Patient discharged in stable condition accompanied by: self.      Crista Mi RN

## 2022-08-17 NOTE — PROGRESS NOTES
[facility] Hematology and Oncology Progress Note    Patient: Khloe Becker  MRN: [id number]  Date of Service: 04/08/2022        Reason for Visit    Chief Complaint   Patient presents with     Oncology Clinic Visit     Malignant neoplasm of upper-outer quadrant of left breast in female, estrogen receptor positive (H)         Problem List Items Addressed This Visit        Nervous and Auditory    Chemotherapy-induced peripheral neuropathy (H)    Musculoskeletal pain       Other    Malignant neoplasm of upper-outer quadrant of left breast in female, estrogen receptor positive (H) - Primary    Relevant Orders    Infusion Appointment Request (Completed)            Assessment and Plan    Early-stage triple positive left-sided breast cancer status post 6 cycles of neoadjuvant TCHP chemotherapy and left mastectomy  Here for cycle 7 of Kadcyla.  Labs reviewed today.  Normal white count is 1.6..  Count is normal at 193.  Hemoglobin is 11.8.  LFTs are within normal limits.  Creatinine is normal.  No contraindication to proceed with treatment today.  She does feel significant fatigue and her neuropathy symptoms have gotten worse.  I offered to give 1 more week break from treatment.  But she wants to stay on schedule finished treatment as possible.  We will proceed with cycle 7 Kadcyla today.  She will come back in 3 weeks for cycle 8.      Chemotherapy related side effects (neuropathy and musculoskeletal pain)  Fatigue and neuropathy are her biggest issues.  Neuropathy is also slightly worse.  Grade 2.  He has not fallen.  We tried gabapentin 300 mg at night.  But this was too much for her.  She did not tolerate duloxetine either.  Potentially we could do a lower dose of gabapentin, 100 mg at night.  But she is not interested in adding more medications.  We will continue to watch.  Unfortunately fatigue and neuropathy both can get worse as she continues more cycles of Kadcyla.    Endocrine therapy for ER positive breast  cancer  Continue Arimidex.  No major side effects.  She does have musculoskeletal pain issues and its unclear whether this is from Arimidex or Kadcyla    Low bone density  Continue calcium and vitamin D.      Cancer Staging  Malignant neoplasm of upper-outer quadrant of left breast in female, estrogen receptor positive (H)  Staging form: Breast, AJCC 8th Edition  - Clinical: Stage IB (cT2, cN1, cM0, G3, ER+, MA+, HER2+) - Signed by Xavier Rubio MD on 6/10/2022  - Pathologic: Stage IA (pT1a, pN1mi, cM0, G3, ER+, MA+, HER2+) - Signed by Xavier Rubio MD on 6/10/2022      ECOG Performance    0 - Independent         Problem List    Patient Active Problem List   Diagnosis     Morbid obesity (H)     Malignant neoplasm of overlapping sites of left breast in female, estrogen receptor positive (H)     Malignant neoplasm of upper-outer quadrant of left breast in female, estrogen receptor positive (H)     Chemotherapy-induced peripheral neuropathy (H)     Musculoskeletal pain      Oncologic history  July 2021: Left breast mass palpated by patient.  Mammogram showing 2.1 x 2.1 x 2 cm hypoechoic mass at 2 o'clock position about 11 cm from the nipple.  Ultrasound-guided biopsy of the mass showed invasive ductal carcinoma, grade 3, ER, MA and HER2 positive.  Left axillary lymph node biopsy positive for metastatic disease.    Neoadjuvant chemotherapy with TCHP starting 9/2/2021.  Finished 6 cycles of chemo.  Last dose was 12/17/2020.  Carboplatin was held for cycle 4 and 6 due to poor tolerance.    Left mastectomy with sentinel lymph node biopsy and axillary lymph node dissection done on 1/19/2022.    Surgical path showing residual disease.  ypT1a, pN1(mi)    Interval History   Khloe Becker is a 66 year old with triple positive breast cancer status post neoadjuvant chemotherapy with TCHP followed by mastectomy who is here for cycle 7 of Kadcyla and management of chemotherapy related toxicities.    Continues to have  issues with fatigue.  After last cycle her symptoms were slightly worse than previous cycles.  Did have some stomach issues after she ate outside.  Her neuropathy symptoms seem to have gotten worse since she started Kadcyla.  Grade 1-2.  No fevers.  No shortness of breath.  No signs of heart failure.  Continues to have peripheral edema.    Labs reviewed today    Review of Systems  A comprehensive review of systems was negative except for what is noted in the interval history    Current Outpatient Medications   Medication     acetaminophen (TYLENOL) 500 MG tablet     anastrozole (ARIMIDEX) 1 MG tablet     cholecalciferol 25 MCG (1000 UT) TABS     fish oil-omega-3 fatty acids 1000 MG capsule     fluticasone (VERAMYST) 27.5 MCG/SPRAY nasal spray     gabapentin (NEURONTIN) 300 MG capsule     hydrochlorothiazide (HYDRODIURIL) 25 MG tablet     HYDROcodone-acetaminophen (NORCO) 5-325 MG tablet     irbesartan (AVAPRO) 75 MG tablet     lidocaine-prilocaine (EMLA) 2.5-2.5 % external cream     multivitamin w/minerals (THERA-VIT-M) tablet     busPIRone (BUSPAR) 10 MG tablet     clonazePAM (KLONOPIN) 0.5 MG tablet     diphenoxylate-atropine (LOMOTIL) 2.5-0.025 MG tablet     furosemide (LASIX) 20 MG tablet     hydrocortisone 2.5 % cream     ketoconazole (NIZORAL) 2 % external cream     medical cannabis (Patient's own supply)     ondansetron (ZOFRAN-ODT) 8 MG ODT tab     prochlorperazine (COMPAZINE) 10 MG tablet     traZODone (DESYREL) 50 MG tablet     No current facility-administered medications for this visit.        Physical Exam    No flowsheet data found.    General: Alert, cooperative, no distress  HEENT: Normocephalic and atraumatic  Eye: Normal external eye, conjunctiva, lids cornea, BRIGETTE.  Chest: Clear to auscultation bilaterally  Cardiac: S1, S2 normal, regular rate and rhythm pedal edema present  CNS: Alert and oriented x3, neurologic exam grossly normal.  Lymphatics: No bilateral cervical, axillary, supraclavicular  adenopathy noted    Lab Results    No results found for this or any previous visit (from the past 168 hour(s)).    Imaging    No results found.     A total of 25 min were spent today on this visit which included face to face conversation with the patient, EMR review, counseling and co-ordination of care and medical documentation.      Signed by: Xavier Rubio MD

## 2022-08-17 NOTE — PROGRESS NOTES
"Oncology Rooming Note    August 17, 2022 1:44 PM   Khloe Becker is a 66 year old female who presents for:    Chief Complaint   Patient presents with     Oncology Clinic Visit     Malignant neoplasm of upper-outer quadrant of left breast in female, estrogen receptor positive (H)     Initial Vitals: BP (!) 171/72   Pulse 62   Temp 98.2  F (36.8  C)   Resp 18   Ht 1.626 m (5' 4\")   Wt 93.8 kg (206 lb 14.4 oz)   SpO2 98%   BMI 35.51 kg/m   Estimated body mass index is 35.51 kg/m  as calculated from the following:    Height as of this encounter: 1.626 m (5' 4\").    Weight as of this encounter: 93.8 kg (206 lb 14.4 oz). Body surface area is 2.06 meters squared.  Moderate Pain (5) Comment: Data Unavailable   No LMP recorded. Patient is postmenopausal.  Allergies reviewed: Yes  Medications reviewed: Yes    Medications: Medication refills not needed today.  Pharmacy name entered into [facility]: [id number] - [place] [id number] [facility]  AT [facility]    Clinical concerns: None       Daniela Ma LPN            "

## 2022-08-19 NOTE — INTERVAL H&P NOTE
I have reviewed the surgical (or preoperative) H&P that is linked to this encounter, and examined the patient. There are no significant changes   [Alert] : alert [No Acute Distress] : no acute distress [Normocephalic] : normocephalic [Conjunctivae with no discharge] : conjunctivae with no discharge [Auricles Well Formed] : auricles well formed [Clear Tympanic membranes with present light reflex and bony landmarks] : clear tympanic membranes with present light reflex and bony landmarks [No Discharge] : no discharge [Nares Patent] : nares patent [Palate Intact] : palate intact [Nonerythematous Oropharynx] : nonerythematous oropharynx [No Caries] : no caries [Supple, full passive range of motion] : supple, full passive range of motion [No Palpable Masses] : no palpable masses [Clear to Auscultation Bilaterally] : clear to auscultation bilaterally [No Murmurs] : no murmurs [+2 Femoral Pulses] : +2 femoral pulses [Soft] : soft [NonTender] : non tender [Non Distended] : non distended [No Hepatomegaly] : no hepatomegaly [No Splenomegaly] : no splenomegaly [Maximo 1] : Maximo 1 [Normal Vagina Introitus] : normal vagina introitus [No Abnormal Lymph Nodes Palpated] : no abnormal lymph nodes palpated [No Rash or Lesions] : no rash or lesions [de-identified] : TONGUE TIE [FreeTextEntry8] : SINUS ARRHYTHMIA

## 2022-09-07 ENCOUNTER — LAB (OUTPATIENT)
Dept: INFUSION THERAPY | Facility: HOSPITAL | Age: 67
End: 2022-09-07
Attending: INTERNAL MEDICINE
Payer: COMMERCIAL

## 2022-09-07 ENCOUNTER — ONCOLOGY VISIT (OUTPATIENT)
Dept: ONCOLOGY | Facility: HOSPITAL | Age: 67
End: 2022-09-07
Attending: INTERNAL MEDICINE
Payer: COMMERCIAL

## 2022-09-07 VITALS
WEIGHT: 210.2 LBS | HEIGHT: 64 IN | DIASTOLIC BLOOD PRESSURE: 76 MMHG | SYSTOLIC BLOOD PRESSURE: 148 MMHG | OXYGEN SATURATION: 99 % | RESPIRATION RATE: 16 BRPM | BODY MASS INDEX: 35.89 KG/M2 | TEMPERATURE: 98.8 F | HEART RATE: 79 BPM

## 2022-09-07 DIAGNOSIS — G62.0 CHEMOTHERAPY-INDUCED PERIPHERAL NEUROPATHY (H): Primary | ICD-10-CM

## 2022-09-07 DIAGNOSIS — C50.412 MALIGNANT NEOPLASM OF UPPER-OUTER QUADRANT OF LEFT BREAST IN FEMALE, ESTROGEN RECEPTOR POSITIVE (H): ICD-10-CM

## 2022-09-07 DIAGNOSIS — Z17.0 MALIGNANT NEOPLASM OF UPPER-OUTER QUADRANT OF LEFT BREAST IN FEMALE, ESTROGEN RECEPTOR POSITIVE (H): ICD-10-CM

## 2022-09-07 DIAGNOSIS — Z17.0 MALIGNANT NEOPLASM OF UPPER-OUTER QUADRANT OF LEFT BREAST IN FEMALE, ESTROGEN RECEPTOR POSITIVE (H): Primary | ICD-10-CM

## 2022-09-07 DIAGNOSIS — C50.412 MALIGNANT NEOPLASM OF UPPER-OUTER QUADRANT OF LEFT BREAST IN FEMALE, ESTROGEN RECEPTOR POSITIVE (H): Primary | ICD-10-CM

## 2022-09-07 DIAGNOSIS — T45.1X5A CHEMOTHERAPY-INDUCED PERIPHERAL NEUROPATHY (H): Primary | ICD-10-CM

## 2022-09-07 LAB
ALBUMIN SERPL-MCNC: 3.7 G/DL (ref 3.5–5)
ALP SERPL-CCNC: 107 U/L (ref 45–120)
ALT SERPL W P-5'-P-CCNC: 26 U/L (ref 0–45)
ANION GAP SERPL CALCULATED.3IONS-SCNC: 10 MMOL/L (ref 5–18)
AST SERPL W P-5'-P-CCNC: 31 U/L (ref 0–40)
BASOPHILS # BLD AUTO: 0.1 10E3/UL (ref 0–0.2)
BASOPHILS NFR BLD AUTO: 1 %
BILIRUB SERPL-MCNC: 0.5 MG/DL (ref 0–1)
BUN SERPL-MCNC: 18 MG/DL (ref 8–22)
CALCIUM SERPL-MCNC: 9.5 MG/DL (ref 8.5–10.5)
CHLORIDE BLD-SCNC: 100 MMOL/L (ref 98–107)
CO2 SERPL-SCNC: 32 MMOL/L (ref 22–31)
CREAT SERPL-MCNC: 0.84 MG/DL (ref 0.6–1.1)
EOSINOPHIL # BLD AUTO: 0.2 10E3/UL (ref 0–0.7)
EOSINOPHIL NFR BLD AUTO: 2 %
ERYTHROCYTE [DISTWIDTH] IN BLOOD BY AUTOMATED COUNT: 15 % (ref 10–15)
GFR SERPL CREATININE-BSD FRML MDRD: 75 ML/MIN/1.73M2
GLUCOSE BLD-MCNC: 106 MG/DL (ref 70–125)
HCT VFR BLD AUTO: 37.3 % (ref 35–47)
HGB BLD-MCNC: 12 G/DL (ref 11.7–15.7)
IMM GRANULOCYTES # BLD: 0 10E3/UL
IMM GRANULOCYTES NFR BLD: 0 %
LYMPHOCYTES # BLD AUTO: 2.2 10E3/UL (ref 0.8–5.3)
LYMPHOCYTES NFR BLD AUTO: 28 %
MCH RBC QN AUTO: 27.8 PG (ref 26.5–33)
MCHC RBC AUTO-ENTMCNC: 32.2 G/DL (ref 31.5–36.5)
MCV RBC AUTO: 87 FL (ref 78–100)
MONOCYTES # BLD AUTO: 0.4 10E3/UL (ref 0–1.3)
MONOCYTES NFR BLD AUTO: 5 %
NEUTROPHILS # BLD AUTO: 5 10E3/UL (ref 1.6–8.3)
NEUTROPHILS NFR BLD AUTO: 64 %
NRBC # BLD AUTO: 0 10E3/UL
NRBC BLD AUTO-RTO: 0 /100
PLATELET # BLD AUTO: 181 10E3/UL (ref 150–450)
POTASSIUM BLD-SCNC: 3.7 MMOL/L (ref 3.5–5)
PROT SERPL-MCNC: 7.7 G/DL (ref 6–8)
RBC # BLD AUTO: 4.31 10E6/UL (ref 3.8–5.2)
SODIUM SERPL-SCNC: 142 MMOL/L (ref 136–145)
WBC # BLD AUTO: 7.8 10E3/UL (ref 4–11)

## 2022-09-07 PROCEDURE — 80053 COMPREHEN METABOLIC PANEL: CPT | Performed by: INTERNAL MEDICINE

## 2022-09-07 PROCEDURE — 250N000011 HC RX IP 250 OP 636: Performed by: NURSE PRACTITIONER

## 2022-09-07 PROCEDURE — 99215 OFFICE O/P EST HI 40 MIN: CPT | Performed by: NURSE PRACTITIONER

## 2022-09-07 PROCEDURE — 258N000003 HC RX IP 258 OP 636: Performed by: NURSE PRACTITIONER

## 2022-09-07 PROCEDURE — 85025 COMPLETE CBC W/AUTO DIFF WBC: CPT | Performed by: INTERNAL MEDICINE

## 2022-09-07 PROCEDURE — G0463 HOSPITAL OUTPT CLINIC VISIT: HCPCS | Mod: 25

## 2022-09-07 PROCEDURE — 96413 CHEMO IV INFUSION 1 HR: CPT

## 2022-09-07 RX ORDER — ALBUTEROL SULFATE 90 UG/1
1-2 AEROSOL, METERED RESPIRATORY (INHALATION)
Status: CANCELLED
Start: 2022-09-28

## 2022-09-07 RX ORDER — METHYLPREDNISOLONE SODIUM SUCCINATE 125 MG/2ML
125 INJECTION, POWDER, LYOPHILIZED, FOR SOLUTION INTRAMUSCULAR; INTRAVENOUS
Status: CANCELLED
Start: 2022-09-07

## 2022-09-07 RX ORDER — EPINEPHRINE 1 MG/ML
0.3 INJECTION, SOLUTION INTRAMUSCULAR; SUBCUTANEOUS EVERY 5 MIN PRN
Status: CANCELLED | OUTPATIENT
Start: 2022-09-07

## 2022-09-07 RX ORDER — LORAZEPAM 2 MG/ML
0.5 INJECTION INTRAMUSCULAR EVERY 4 HOURS PRN
Status: CANCELLED | OUTPATIENT
Start: 2022-09-28

## 2022-09-07 RX ORDER — HEPARIN SODIUM (PORCINE) LOCK FLUSH IV SOLN 100 UNIT/ML 100 UNIT/ML
5 SOLUTION INTRAVENOUS
Status: DISCONTINUED | OUTPATIENT
Start: 2022-09-07 | End: 2022-09-07 | Stop reason: HOSPADM

## 2022-09-07 RX ORDER — HEPARIN SODIUM,PORCINE 10 UNIT/ML
5 VIAL (ML) INTRAVENOUS
Status: CANCELLED | OUTPATIENT
Start: 2022-09-28

## 2022-09-07 RX ORDER — METHYLPREDNISOLONE SODIUM SUCCINATE 125 MG/2ML
125 INJECTION, POWDER, LYOPHILIZED, FOR SOLUTION INTRAMUSCULAR; INTRAVENOUS
Status: CANCELLED
Start: 2022-09-28

## 2022-09-07 RX ORDER — DIPHENHYDRAMINE HYDROCHLORIDE 50 MG/ML
50 INJECTION INTRAMUSCULAR; INTRAVENOUS
Status: CANCELLED
Start: 2022-09-28

## 2022-09-07 RX ORDER — NALOXONE HYDROCHLORIDE 0.4 MG/ML
0.2 INJECTION, SOLUTION INTRAMUSCULAR; INTRAVENOUS; SUBCUTANEOUS
Status: CANCELLED | OUTPATIENT
Start: 2022-09-28

## 2022-09-07 RX ORDER — GABAPENTIN 100 MG/1
100 CAPSULE ORAL EVERY MORNING
Qty: 90 CAPSULE | Refills: 5 | Status: SHIPPED | OUTPATIENT
Start: 2022-09-07 | End: 2022-10-19

## 2022-09-07 RX ORDER — EPINEPHRINE 1 MG/ML
0.3 INJECTION, SOLUTION INTRAMUSCULAR; SUBCUTANEOUS EVERY 5 MIN PRN
Status: CANCELLED | OUTPATIENT
Start: 2022-09-28

## 2022-09-07 RX ORDER — MEPERIDINE HYDROCHLORIDE 25 MG/ML
25 INJECTION INTRAMUSCULAR; INTRAVENOUS; SUBCUTANEOUS EVERY 30 MIN PRN
Status: CANCELLED | OUTPATIENT
Start: 2022-09-07

## 2022-09-07 RX ORDER — ALBUTEROL SULFATE 0.83 MG/ML
2.5 SOLUTION RESPIRATORY (INHALATION)
Status: CANCELLED | OUTPATIENT
Start: 2022-09-07

## 2022-09-07 RX ORDER — HEPARIN SODIUM (PORCINE) LOCK FLUSH IV SOLN 100 UNIT/ML 100 UNIT/ML
5 SOLUTION INTRAVENOUS
Status: CANCELLED | OUTPATIENT
Start: 2022-09-07

## 2022-09-07 RX ORDER — MEPERIDINE HYDROCHLORIDE 25 MG/ML
25 INJECTION INTRAMUSCULAR; INTRAVENOUS; SUBCUTANEOUS EVERY 30 MIN PRN
Status: CANCELLED | OUTPATIENT
Start: 2022-09-28

## 2022-09-07 RX ORDER — HYDROCODONE BITARTRATE AND ACETAMINOPHEN 5; 325 MG/1; MG/1
1 TABLET ORAL EVERY 6 HOURS PRN
Qty: 30 TABLET | Refills: 0 | Status: SHIPPED | OUTPATIENT
Start: 2022-09-07

## 2022-09-07 RX ORDER — ALBUTEROL SULFATE 0.83 MG/ML
2.5 SOLUTION RESPIRATORY (INHALATION)
Status: CANCELLED | OUTPATIENT
Start: 2022-09-28

## 2022-09-07 RX ORDER — NALOXONE HYDROCHLORIDE 0.4 MG/ML
0.2 INJECTION, SOLUTION INTRAMUSCULAR; INTRAVENOUS; SUBCUTANEOUS
Status: CANCELLED | OUTPATIENT
Start: 2022-09-07

## 2022-09-07 RX ORDER — BISACODYL 5 MG/1
5 TABLET, DELAYED RELEASE ORAL PRN
COMMUNITY
End: 2023-05-17

## 2022-09-07 RX ORDER — HEPARIN SODIUM,PORCINE 10 UNIT/ML
5 VIAL (ML) INTRAVENOUS
Status: CANCELLED | OUTPATIENT
Start: 2022-09-07

## 2022-09-07 RX ORDER — ALBUTEROL SULFATE 90 UG/1
1-2 AEROSOL, METERED RESPIRATORY (INHALATION)
Status: CANCELLED
Start: 2022-09-07

## 2022-09-07 RX ORDER — SENNOSIDES 8.6 MG
1 TABLET ORAL PRN
COMMUNITY
End: 2023-05-17

## 2022-09-07 RX ORDER — DIPHENHYDRAMINE HYDROCHLORIDE 50 MG/ML
50 INJECTION INTRAMUSCULAR; INTRAVENOUS
Status: CANCELLED
Start: 2022-09-07

## 2022-09-07 RX ORDER — LORAZEPAM 2 MG/ML
0.5 INJECTION INTRAMUSCULAR EVERY 4 HOURS PRN
Status: CANCELLED | OUTPATIENT
Start: 2022-09-07

## 2022-09-07 RX ORDER — HEPARIN SODIUM (PORCINE) LOCK FLUSH IV SOLN 100 UNIT/ML 100 UNIT/ML
5 SOLUTION INTRAVENOUS
Status: CANCELLED | OUTPATIENT
Start: 2022-09-28

## 2022-09-07 RX ADMIN — ADO-TRASTUZUMAB EMTANSINE 352 MG: 20 INJECTION, POWDER, LYOPHILIZED, FOR SOLUTION INTRAVENOUS at 15:11

## 2022-09-07 RX ADMIN — Medication 5 ML: at 16:16

## 2022-09-07 RX ADMIN — SODIUM CHLORIDE 1000 ML: 9 INJECTION, SOLUTION INTRAVENOUS at 14:52

## 2022-09-07 ASSESSMENT — PAIN SCALES - GENERAL: PAINLEVEL: MODERATE PAIN (5)

## 2022-09-07 NOTE — PROGRESS NOTES
[facility] Hematology and Oncology Progress Note    Patient: Khloe Becker  MRN: [id number]  Date of Service: 09/07/2022        Reason for Visit    Chief Complaint   Patient presents with     Oncology Clinic Visit     Return visit for 3 week follow up with labs/infusion related to Malignant neoplasm of upper-outer quadrant of left breast in female, estrogen receptor positive        Assessment and Plan    Cancer Staging  Malignant neoplasm of upper-outer quadrant of left breast in female, estrogen receptor positive (H)  Staging form: Breast, AJCC 8th Edition  - Clinical: Stage IB (cT2, cN1, cM0, G3, ER+, ID+, HER2+) - Signed by Xavier Rubio MD on 6/10/2022  - Pathologic: Stage IA (pT1a, pN1mi, cM0, G3, ER+, ID+, HER2+) - Signed by Xavier Rubio MD on 6/10/2022    1. Breast cancer, stage IA, triple positive, left side, status post 6 cycles of neoadjuvant TCHP and then left mastectomy: Patient did have residual disease at time of her surgery so she has now started on Kadcyla adjuvantly.  Today is cycle 8.  We will continue normal dosing.  She will return in 3 weeks for cycle 9.  Overall plan is to give 14 cycles.  Patient does also continue on Arimidex which she started in June.    2.  Peripheral neuropathy: This is from all of her previous treatment as well as the Kadcyla.  She states that it is overall stable to slightly worse.  It does affect her ADLs and her walking and she is not able to do as much activity as she would like.  She does use a walking stick.  She has not had any falls.  She does feel off balance at times.  She did start gabapentin.  She tried a couple of days of 300 mg at bedtime but it was too much so she went to taking 100 mg at bedtime and then after 7 days went up to 200 mg at bedtime and then after 7 days went up to 300.  She is interested in trying some in the morning so we will do the same where she starts with 100 in the morning for 7 days and then will go to 200 in  the morning for 7 days and then 300 in the morning as well as 300 at night.    3.  Musculoskeletal pain.  She states that she is in a lot of pain.  I think this is likely more from the Arimidex and not the Kadcyla.  I am hoping this does improve over the next couple of months.  I did go ahead and refill her hydrocodone and encouraged her to use that minimally.  She does have pain when she goes to bed so she uses it to help her sleep and stay comfortable.    4.  Emotional lability: I did talk to patient at length about this today.  I did offer our psychologist.  At this point she would like to hold off on that.  I did tell her she could potentially find some online support groups but she not want a do that either.  We will continue to monitor and offer services as needed.    ECOG Performance    1 - Can't do physically strenuous work, but fully ambyulatory and can do light sedentary work    Distress Screening (within last 30 days)    1. How concerned are you about your ability to eat? : 0  2. How concerned are you about unintended weight loss or your current weight? : 0  3. How concerned are you about feeling depressed or very sad? : 0  4. How concerned are you about feeling anxious or very scared? : 0  5. Do you struggle with the loss of meaning and jeimy in your life? : Not at all  6. How concerned are you about work and home life issues that may be affected by your cancer? : 0  7. How concerned are you about knowing what resources are available to help you? : 0  8. Do you currently have what you would describe as [employer] or spiritual struggles?            : Not at all       Pain  Pain Score: Moderate Pain (5)  Pain Loc: Other - see comment (right side from hip down to feet, both hands)    Problem List    Patient Active Problem List   Diagnosis     Morbid obesity (H)     Malignant neoplasm of overlapping sites of left breast in female, estrogen receptor positive (H)     Malignant neoplasm of upper-outer quadrant of  left breast in female, estrogen receptor positive (H)     Chemotherapy-induced peripheral neuropathy (H)     Musculoskeletal pain        ______________________________________________________________________________    History of Present Illness    Oncologic history  July 2021: Left breast mass palpated by patient.  Mammogram showing 2.1 x 2.1 x 2 cm hypoechoic mass at 2 o'clock position about 11 cm from the nipple.  Ultrasound-guided biopsy of the mass showed invasive ductal carcinoma, grade 3, ER, [place] and HER2 positive.  Left axillary lymph node biopsy positive for metastatic disease.     Neoadjuvant chemotherapy with TCHP starting 9/2/2021.  Finished 6 cycles of chemo.  Last dose was 12/17/2020.  Carboplatin was held for cycle 4 and 6 due to poor tolerance.     Left mastectomy with sentinel lymph node biopsy and axillary lymph node dissection done on 1/19/2022.     Surgical path showing residual disease.  ypT1a, pN1(mi).  Patient declined radiation therapy.    Interim history: Patient is here today to continue on Kadcyla.  She has had 7 cycles of this.  She states that she continues to feel pretty awful.  Her main issues are fatigue, weakness, peripheral neuropathy, joint pains.  She states that she is pretty emotional the night before she has to come to the clinic and debates about whether she wants to continue on the treatment but then she thinks about the improvement in her progression free survival and she decides that it is worth it so she does want to continue.  She states that she is taking gabapentin at bedtime and that does seem to help the neuropathy a little bit but she still has a lot during the day and is wondering if there is some room to increase that.  She denies any fevers or infectious complaints.  She is upset that she cannot go hiking which is something she really enjoys.  She denies any other new symptom.    Review of Systems    Pertinent items are noted in HPI.    Past History    Past Medical  "History:   Diagnosis Date     Anemia      Breast cancer (H)     left     Gastroesophageal reflux disease      Hypertension      Motion sickness      Obese      PONV (postoperative nausea and vomiting)        PHYSICAL EXAM  BP (!) 148/76 (BP Location: Right arm, Patient Position: Sitting, Cuff Size: Adult Regular)   Pulse 79   Temp 98.8  F (37.1  C) (Oral)   Resp 16   Ht 1.626 m (5' 4\")   Wt 95.3 kg (210 lb 3.2 oz)   SpO2 99%   BMI 36.08 kg/m      GENERAL: no acute distress. Cooperative in conversation. Here alone. Mask on  RESP: Regular respiratory rate. No expiratory wheezes   MUSCULOSKELETAL: no bilateral leg swelling  NEURO: non focal. Alert and oriented x3.   PSYCH: within normal limits. No depression or anxiety.  SKIN: exposed skin is dry intact.     Lab Results    Recent Results (from the past 168 hour(s))   Comprehensive metabolic panel   Result Value Ref Range    Sodium 142 136 - 145 mmol/L    Potassium 3.7 3.5 - 5.0 mmol/L    Chloride 100 98 - 107 mmol/L    Carbon Dioxide (CO2) 32 (H) 22 - 31 mmol/L    Anion Gap 10 5 - 18 mmol/L    Urea Nitrogen 18 8 - 22 mg/dL    Creatinine 0.84 0.60 - 1.10 mg/dL    Calcium 9.5 8.5 - 10.5 mg/dL    Glucose 106 70 - 125 mg/dL    Alkaline Phosphatase 107 45 - 120 U/L    AST 31 0 - 40 U/L    ALT 26 0 - 45 U/L    Protein Total 7.7 6.0 - 8.0 g/dL    Albumin 3.7 3.5 - 5.0 g/dL    Bilirubin Total 0.5 0.0 - 1.0 mg/dL    GFR Estimate 75 >60 mL/min/1.73m2   CBC with platelets and differential   Result Value Ref Range    WBC Count 7.8 4.0 - 11.0 10e3/uL    RBC Count 4.31 3.80 - 5.20 10e6/uL    Hemoglobin 12.0 11.7 - 15.7 g/dL    Hematocrit 37.3 35.0 - 47.0 %    MCV 87 78 - 100 fL    MCH 27.8 26.5 - 33.0 pg    MCHC 32.2 31.5 - 36.5 g/dL    RDW 15.0 10.0 - 15.0 %    Platelet Count 181 150 - 450 10e3/uL    % Neutrophils 64 %    % Lymphocytes 28 %    % Monocytes 5 %    % Eosinophils 2 %    % Basophils 1 %    % Immature Granulocytes 0 %    NRBCs per 100 WBC 0 <1 /100    Absolute " Neutrophils 5.0 1.6 - 8.3 10e3/uL    Absolute Lymphocytes 2.2 0.8 - 5.3 10e3/uL    Absolute Monocytes 0.4 0.0 - 1.3 10e3/uL    Absolute Eosinophils 0.2 0.0 - 0.7 10e3/uL    Absolute Basophils 0.1 0.0 - 0.2 10e3/uL    Absolute Immature Granulocytes 0.0 <=0.4 10e3/uL    Absolute NRBCs 0.0 10e3/uL       Imaging    No results found.      Signed by: ZOË Silva CNP

## 2022-09-07 NOTE — PROGRESS NOTES
"Oncology Rooming Note    September 7, 2022 2:12 PM   Khloe Becker is a 67 year old female who presents for:    Chief Complaint   Patient presents with     Oncology Clinic Visit     Return visit for 3 week follow up with labs/infusion related to Malignant neoplasm of upper-outer quadrant of left breast in female, estrogen receptor positive      Initial Vitals: BP (!) 148/76 (BP Location: Right arm, Patient Position: Sitting, Cuff Size: Adult Regular)   Pulse 79   Temp 98.8  F (37.1  C) (Oral)   Resp 16   Ht 1.626 m (5' 4\")   Wt 95.3 kg (210 lb 3.2 oz)   SpO2 99%   BMI 36.08 kg/m   Estimated body mass index is 36.08 kg/m  as calculated from the following:    Height as of this encounter: 1.626 m (5' 4\").    Weight as of this encounter: 95.3 kg (210 lb 3.2 oz). Body surface area is 2.07 meters squared.  Moderate Pain (5) Comment: Data Unavailable   No LMP recorded. Patient is postmenopausal.  Allergies reviewed: Yes  Medications reviewed: Yes    Medications: MEDICATION REFILLS NEEDED TODAY. Provider was notified.  Pharmacy name entered into [employer]: [id number] - [address]  AT [facility]    Clinical concerns: Return visit for 3 week follow up with labs/infusion related to Malignant neoplasm of upper-outer quadrant of left breast in female, estrogen receptor positive         GEORGIA PORTILLO CMA            "

## 2022-09-07 NOTE — PROGRESS NOTES
Pt arrived ambulatory to clinic for Cycle # 8 Day # 1 of her chemotherapy regimen.  Port was accessed using aseptic technique without difficulties with excellent blood return.  Administered 1 L of NS and Kadcyla per MD order.  Pt tolerated infusion well, no s/s of infusion reaction.  Port was flushed with NS and Heparin then de-accessed using 2x2 and papertape.  Instructed pt to call clinic with any further questions or concerns.  Pt verbalized understanding of plan of care and return to clinic.

## 2022-09-07 NOTE — LETTER
"    9/7/2022         RE: Khloe Becker  [address]  [facility] [id number]        Dear Colleague,    Thank you for referring your patient, Khloe Becker, to the [facility] [place]. Please see a copy of my visit note below.    Oncology Rooming Note    September 7, 2022 2:12 PM   Khloe Becker is a 67 year old female who presents for:    Chief Complaint   Patient presents with     Oncology Clinic Visit     Return visit for 3 week follow up with labs/infusion related to Malignant neoplasm of upper-outer quadrant of left breast in female, estrogen receptor positive      Initial Vitals: BP (!) 148/76 (BP Location: Right arm, Patient Position: Sitting, Cuff Size: Adult Regular)   Pulse 79   Temp 98.8  F (37.1  C) (Oral)   Resp 16   Ht 1.626 m (5' 4\")   Wt 95.3 kg (210 lb 3.2 oz)   SpO2 99%   BMI 36.08 kg/m   Estimated body mass index is 36.08 kg/m  as calculated from the following:    Height as of this encounter: 1.626 m (5' 4\").    Weight as of this encounter: 95.3 kg (210 lb 3.2 oz). Body surface area is 2.07 meters squared.  Moderate Pain (5) Comment: Data Unavailable   No LMP recorded. Patient is postmenopausal.  Allergies reviewed: Yes  Medications reviewed: Yes    Medications: MEDICATION REFILLS NEEDED TODAY. Provider was notified.  Pharmacy name entered into [employer]: [facility] #[id number] - [facility] [address] [facility]  AT [facility]    Clinical concerns: Return visit for 3 week follow up with labs/infusion related to Malignant neoplasm of upper-outer quadrant of left breast in female, estrogen receptor positive         GEORGIA PORTILLO CMA              [facility] Hematology and Oncology Progress Note    Patient: Khloe Becker  MRN: [id number]  Date of Service: 09/07/2022        Reason for Visit    Chief Complaint   Patient presents with     Oncology Clinic Visit     Return visit for 3 week follow up with labs/infusion related to Malignant " neoplasm of upper-outer quadrant of left breast in female, estrogen receptor positive        Assessment and Plan    Cancer Staging  Malignant neoplasm of upper-outer quadrant of left breast in female, estrogen receptor positive (H)  Staging form: Breast, AJCC 8th Edition  - Clinical: Stage IB (cT2, cN1, cM0, G3, ER+, DE+, HER2+) - Signed by Xavier Rubio MD on 6/10/2022  - Pathologic: Stage IA (pT1a, pN1mi, cM0, G3, ER+, DE+, HER2+) - Signed by Xavier Rubio MD on 6/10/2022    1. Breast cancer, stage IA, triple positive, left side, status post 6 cycles of neoadjuvant TCHP and then left mastectomy: Patient did have residual disease at time of her surgery so she has now started on Kadcyla adjuvantly.  Today is cycle 8.  We will continue normal dosing.  She will return in 3 weeks for cycle 9.  Overall plan is to give 14 cycles.  Patient does also continue on Arimidex which she started in June.    2.  Peripheral neuropathy: This is from all of her previous treatment as well as the Kadcyla.  She states that it is overall stable to slightly worse.  It does affect her ADLs and her walking and she is not able to do as much activity as she would like.  She does use a walking stick.  She has not had any falls.  She does feel off balance at times.  She did start gabapentin.  She tried a couple of days of 300 mg at bedtime but it was too much so she went to taking 100 mg at bedtime and then after 7 days went up to 200 mg at bedtime and then after 7 days went up to 300.  She is interested in trying some in the morning so we will do the same where she starts with 100 in the morning for 7 days and then will go to 200 in the morning for 7 days and then 300 in the morning as well as 300 at night.    3.  Musculoskeletal pain.  She states that she is in a lot of pain.  I think this is likely more from the Arimidex and not the Kadcyla.  I am hoping this does improve over the next couple of months.  I did go ahead and  refill her hydrocodone and encouraged her to use that minimally.  She does have pain when she goes to bed so she uses it to help her sleep and stay comfortable.    4.  Emotional lability: I did talk to patient at length about this today.  I did offer our psychologist.  At this point she would like to hold off on that.  I did tell her she could potentially find some online support groups but she not want a do that either.  We will continue to monitor and offer services as needed.    ECOG Performance    1 - Can't do physically strenuous work, but fully ambyulatory and can do light sedentary work    Distress Screening (within last 30 days)    1. How concerned are you about your ability to eat? : 0  2. How concerned are you about unintended weight loss or your current weight? : 0  3. How concerned are you about feeling depressed or very sad? : 0  4. How concerned are you about feeling anxious or very scared? : 0  5. Do you struggle with the loss of meaning and jeimy in your life? : Not at all  6. How concerned are you about work and home life issues that may be affected by your cancer? : 0  7. How concerned are you about knowing what resources are available to help you? : 0  8. Do you currently have what you would describe as Faith or spiritual struggles?            : Not at all       Pain  Pain Score: Moderate Pain (5)  Pain Loc: Other - see comment (right side from hip down to feet, both hands)    Problem List    Patient Active Problem List   Diagnosis     Morbid obesity (H)     Malignant neoplasm of overlapping sites of left breast in female, estrogen receptor positive (H)     Malignant neoplasm of upper-outer quadrant of left breast in female, estrogen receptor positive (H)     Chemotherapy-induced peripheral neuropathy (H)     Musculoskeletal pain        ______________________________________________________________________________    History of Present Illness    Oncologic history  July 2021: Left breast mass  palpated by patient.  Mammogram showing 2.1 x 2.1 x 2 cm hypoechoic mass at 2 o'clock position about 11 cm from the nipple.  Ultrasound-guided biopsy of the mass showed invasive ductal carcinoma, grade 3, ER, [place] and HER2 positive.  Left axillary lymph node biopsy positive for metastatic disease.     Neoadjuvant chemotherapy with TCHP starting 9/2/2021.  Finished 6 cycles of chemo.  Last dose was 12/17/2020.  Carboplatin was held for cycle 4 and 6 due to poor tolerance.     Left mastectomy with sentinel lymph node biopsy and axillary lymph node dissection done on 1/19/2022.     Surgical path showing residual disease.  ypT1a, pN1(mi).  Patient declined radiation therapy.    Interim history: Patient is here today to continue on Kadcyla.  She has had 7 cycles of this.  She states that she continues to feel pretty awful.  Her main issues are fatigue, weakness, peripheral neuropathy, joint pains.  She states that she is pretty emotional the night before she has to come to the clinic and debates about whether she wants to continue on the treatment but then she thinks about the improvement in her progression free survival and she decides that it is worth it so she does want to continue.  She states that she is taking gabapentin at bedtime and that does seem to help the neuropathy a little bit but she still has a lot during the day and is wondering if there is some room to increase that.  She denies any fevers or infectious complaints.  She is upset that she cannot go hiking which is something she really enjoys.  She denies any other new symptom.    Review of Systems    Pertinent items are noted in HPI.    Past History    Past Medical History:   Diagnosis Date     Anemia      Breast cancer (H)     left     Gastroesophageal reflux disease      Hypertension      Motion sickness      Obese      PONV (postoperative nausea and vomiting)        PHYSICAL EXAM  BP (!) 148/76 (BP Location: Right arm, Patient Position: Sitting, Cuff  "Size: Adult Regular)   Pulse 79   Temp 98.8  F (37.1  C) (Oral)   Resp 16   Ht 1.626 m (5' 4\")   Wt 95.3 kg (210 lb 3.2 oz)   SpO2 99%   BMI 36.08 kg/m      GENERAL: no acute distress. Cooperative in conversation. Here alone. Mask on  RESP: Regular respiratory rate. No expiratory wheezes   MUSCULOSKELETAL: no bilateral leg swelling  NEURO: non focal. Alert and oriented x3.   PSYCH: within normal limits. No depression or anxiety.  SKIN: exposed skin is dry intact.     Lab Results    Recent Results (from the past 168 hour(s))   Comprehensive metabolic panel   Result Value Ref Range    Sodium 142 136 - 145 mmol/L    Potassium 3.7 3.5 - 5.0 mmol/L    Chloride 100 98 - 107 mmol/L    Carbon Dioxide (CO2) 32 (H) 22 - 31 mmol/L    Anion Gap 10 5 - 18 mmol/L    Urea Nitrogen 18 8 - 22 mg/dL    Creatinine 0.84 0.60 - 1.10 mg/dL    Calcium 9.5 8.5 - 10.5 mg/dL    Glucose 106 70 - 125 mg/dL    Alkaline Phosphatase 107 45 - 120 U/L    AST 31 0 - 40 U/L    ALT 26 0 - 45 U/L    Protein Total 7.7 6.0 - 8.0 g/dL    Albumin 3.7 3.5 - 5.0 g/dL    Bilirubin Total 0.5 0.0 - 1.0 mg/dL    GFR Estimate 75 >60 mL/min/1.73m2   CBC with platelets and differential   Result Value Ref Range    WBC Count 7.8 4.0 - 11.0 10e3/uL    RBC Count 4.31 3.80 - 5.20 10e6/uL    Hemoglobin 12.0 11.7 - 15.7 g/dL    Hematocrit 37.3 35.0 - 47.0 %    MCV 87 78 - 100 fL    MCH 27.8 26.5 - 33.0 pg    MCHC 32.2 31.5 - 36.5 g/dL    RDW 15.0 10.0 - 15.0 %    Platelet Count 181 150 - 450 10e3/uL    % Neutrophils 64 %    % Lymphocytes 28 %    % Monocytes 5 %    % Eosinophils 2 %    % Basophils 1 %    % Immature Granulocytes 0 %    NRBCs per 100 WBC 0 <1 /100    Absolute Neutrophils 5.0 1.6 - 8.3 10e3/uL    Absolute Lymphocytes 2.2 0.8 - 5.3 10e3/uL    Absolute Monocytes 0.4 0.0 - 1.3 10e3/uL    Absolute Eosinophils 0.2 0.0 - 0.7 10e3/uL    Absolute Basophils 0.1 0.0 - 0.2 10e3/uL    Absolute Immature Granulocytes 0.0 <=0.4 10e3/uL    Absolute NRBCs 0.0 10e3/uL "       Imaging    No results found.      Signed by: ZOË Silva CNP      Again, thank you for allowing me to participate in the care of your patient.        Sincerely,        ZOË Silva CNP

## 2022-09-22 ENCOUNTER — TELEPHONE (OUTPATIENT)
Dept: MAMMOGRAPHY | Facility: CLINIC | Age: 67
End: 2022-09-22

## 2022-09-22 NOTE — TELEPHONE ENCOUNTER
Khloe called and left a message that she rec'd a letter that she needed f/u with [facility]. In looking at DSO's last note, she should follow up, one last time and that will be the last time. I advised her to call and make an appt or call me back with further questions. I wished her a good day. YASMEEN Garcia, OCN, CBCN

## 2022-09-27 ENCOUNTER — MEDICAL CORRESPONDENCE (OUTPATIENT)
Dept: HEALTH INFORMATION MANAGEMENT | Facility: CLINIC | Age: 67
End: 2022-09-27

## 2022-09-28 ENCOUNTER — LAB (OUTPATIENT)
Dept: INFUSION THERAPY | Facility: HOSPITAL | Age: 67
End: 2022-09-28
Attending: INTERNAL MEDICINE
Payer: COMMERCIAL

## 2022-09-28 ENCOUNTER — ONCOLOGY VISIT (OUTPATIENT)
Dept: ONCOLOGY | Facility: HOSPITAL | Age: 67
End: 2022-09-28
Attending: NURSE PRACTITIONER
Payer: COMMERCIAL

## 2022-09-28 VITALS
HEIGHT: 64 IN | HEART RATE: 67 BPM | RESPIRATION RATE: 14 BRPM | WEIGHT: 204.8 LBS | SYSTOLIC BLOOD PRESSURE: 147 MMHG | TEMPERATURE: 97.9 F | BODY MASS INDEX: 34.97 KG/M2 | OXYGEN SATURATION: 99 % | DIASTOLIC BLOOD PRESSURE: 84 MMHG

## 2022-09-28 DIAGNOSIS — Z17.0 MALIGNANT NEOPLASM OF UPPER-OUTER QUADRANT OF LEFT BREAST IN FEMALE, ESTROGEN RECEPTOR POSITIVE (H): Primary | ICD-10-CM

## 2022-09-28 DIAGNOSIS — C50.412 MALIGNANT NEOPLASM OF UPPER-OUTER QUADRANT OF LEFT BREAST IN FEMALE, ESTROGEN RECEPTOR POSITIVE (H): Primary | ICD-10-CM

## 2022-09-28 DIAGNOSIS — C50.412 MALIGNANT NEOPLASM OF UPPER-OUTER QUADRANT OF LEFT BREAST IN FEMALE, ESTROGEN RECEPTOR POSITIVE (H): ICD-10-CM

## 2022-09-28 DIAGNOSIS — R60.0 PERIPHERAL EDEMA: Primary | ICD-10-CM

## 2022-09-28 DIAGNOSIS — I42.7 DRUG-INDUCED CARDIOMYOPATHY (H): ICD-10-CM

## 2022-09-28 DIAGNOSIS — Z17.0 MALIGNANT NEOPLASM OF UPPER-OUTER QUADRANT OF LEFT BREAST IN FEMALE, ESTROGEN RECEPTOR POSITIVE (H): ICD-10-CM

## 2022-09-28 LAB
ALBUMIN SERPL BCG-MCNC: 4.3 G/DL (ref 3.5–5.2)
ALP SERPL-CCNC: 111 U/L (ref 35–104)
ALT SERPL W P-5'-P-CCNC: 26 U/L (ref 10–35)
ANION GAP SERPL CALCULATED.3IONS-SCNC: 10 MMOL/L (ref 7–15)
AST SERPL W P-5'-P-CCNC: 32 U/L (ref 10–35)
BASOPHILS # BLD AUTO: 0.1 10E3/UL (ref 0–0.2)
BASOPHILS NFR BLD AUTO: 1 %
BILIRUB SERPL-MCNC: 0.5 MG/DL
BUN SERPL-MCNC: 14.6 MG/DL (ref 8–23)
CALCIUM SERPL-MCNC: 9.8 MG/DL (ref 8.8–10.2)
CHLORIDE SERPL-SCNC: 97 MMOL/L (ref 98–107)
CREAT SERPL-MCNC: 0.67 MG/DL (ref 0.51–0.95)
DEPRECATED HCO3 PLAS-SCNC: 30 MMOL/L (ref 22–29)
EOSINOPHIL # BLD AUTO: 0.2 10E3/UL (ref 0–0.7)
EOSINOPHIL NFR BLD AUTO: 3 %
ERYTHROCYTE [DISTWIDTH] IN BLOOD BY AUTOMATED COUNT: 15 % (ref 10–15)
GFR SERPL CREATININE-BSD FRML MDRD: >90 ML/MIN/1.73M2
GLUCOSE SERPL-MCNC: 89 MG/DL (ref 70–99)
HCT VFR BLD AUTO: 39.3 % (ref 35–47)
HGB BLD-MCNC: 12.5 G/DL (ref 11.7–15.7)
IMM GRANULOCYTES # BLD: 0 10E3/UL
IMM GRANULOCYTES NFR BLD: 0 %
LYMPHOCYTES # BLD AUTO: 2 10E3/UL (ref 0.8–5.3)
LYMPHOCYTES NFR BLD AUTO: 30 %
MCH RBC QN AUTO: 27.7 PG (ref 26.5–33)
MCHC RBC AUTO-ENTMCNC: 31.8 G/DL (ref 31.5–36.5)
MCV RBC AUTO: 87 FL (ref 78–100)
MONOCYTES # BLD AUTO: 0.5 10E3/UL (ref 0–1.3)
MONOCYTES NFR BLD AUTO: 7 %
NEUTROPHILS # BLD AUTO: 4 10E3/UL (ref 1.6–8.3)
NEUTROPHILS NFR BLD AUTO: 59 %
NRBC # BLD AUTO: 0 10E3/UL
NRBC BLD AUTO-RTO: 0 /100
PLATELET # BLD AUTO: 170 10E3/UL (ref 150–450)
POTASSIUM SERPL-SCNC: 3.8 MMOL/L (ref 3.4–5.3)
PROT SERPL-MCNC: 7.6 G/DL (ref 6.4–8.3)
RBC # BLD AUTO: 4.52 10E6/UL (ref 3.8–5.2)
SODIUM SERPL-SCNC: 137 MMOL/L (ref 136–145)
WBC # BLD AUTO: 6.6 10E3/UL (ref 4–11)

## 2022-09-28 PROCEDURE — G0463 HOSPITAL OUTPT CLINIC VISIT: HCPCS | Mod: 25

## 2022-09-28 PROCEDURE — 80053 COMPREHEN METABOLIC PANEL: CPT | Performed by: NURSE PRACTITIONER

## 2022-09-28 PROCEDURE — 258N000003 HC RX IP 258 OP 636: Performed by: NURSE PRACTITIONER

## 2022-09-28 PROCEDURE — 96413 CHEMO IV INFUSION 1 HR: CPT

## 2022-09-28 PROCEDURE — 99214 OFFICE O/P EST MOD 30 MIN: CPT | Performed by: INTERNAL MEDICINE

## 2022-09-28 PROCEDURE — 250N000011 HC RX IP 250 OP 636: Performed by: NURSE PRACTITIONER

## 2022-09-28 PROCEDURE — 85025 COMPLETE CBC W/AUTO DIFF WBC: CPT | Performed by: NURSE PRACTITIONER

## 2022-09-28 RX ORDER — MEPERIDINE HYDROCHLORIDE 25 MG/ML
25 INJECTION INTRAMUSCULAR; INTRAVENOUS; SUBCUTANEOUS EVERY 30 MIN PRN
Status: DISCONTINUED | OUTPATIENT
Start: 2022-09-28 | End: 2022-09-28 | Stop reason: HOSPADM

## 2022-09-28 RX ORDER — METHYLPREDNISOLONE SODIUM SUCCINATE 125 MG/2ML
125 INJECTION, POWDER, LYOPHILIZED, FOR SOLUTION INTRAMUSCULAR; INTRAVENOUS
Status: DISCONTINUED | OUTPATIENT
Start: 2022-09-28 | End: 2022-09-28 | Stop reason: HOSPADM

## 2022-09-28 RX ORDER — ALBUTEROL SULFATE 90 UG/1
1-2 AEROSOL, METERED RESPIRATORY (INHALATION)
Status: DISCONTINUED | OUTPATIENT
Start: 2022-09-28 | End: 2022-09-28 | Stop reason: HOSPADM

## 2022-09-28 RX ORDER — EPINEPHRINE 1 MG/ML
0.3 INJECTION, SOLUTION INTRAMUSCULAR; SUBCUTANEOUS EVERY 5 MIN PRN
Status: DISCONTINUED | OUTPATIENT
Start: 2022-09-28 | End: 2022-09-28 | Stop reason: HOSPADM

## 2022-09-28 RX ORDER — ALBUTEROL SULFATE 0.83 MG/ML
2.5 SOLUTION RESPIRATORY (INHALATION)
Status: DISCONTINUED | OUTPATIENT
Start: 2022-09-28 | End: 2022-09-28 | Stop reason: HOSPADM

## 2022-09-28 RX ORDER — HEPARIN SODIUM (PORCINE) LOCK FLUSH IV SOLN 100 UNIT/ML 100 UNIT/ML
5 SOLUTION INTRAVENOUS
Status: DISCONTINUED | OUTPATIENT
Start: 2022-09-28 | End: 2022-09-28 | Stop reason: HOSPADM

## 2022-09-28 RX ORDER — NALOXONE HYDROCHLORIDE 0.4 MG/ML
0.2 INJECTION, SOLUTION INTRAMUSCULAR; INTRAVENOUS; SUBCUTANEOUS
Status: DISCONTINUED | OUTPATIENT
Start: 2022-09-28 | End: 2022-09-28 | Stop reason: HOSPADM

## 2022-09-28 RX ORDER — HYDROCHLOROTHIAZIDE 25 MG/1
25 TABLET ORAL DAILY
Qty: 60 TABLET | Refills: 1 | Status: SHIPPED | OUTPATIENT
Start: 2022-09-28 | End: 2022-09-29

## 2022-09-28 RX ORDER — DIPHENHYDRAMINE HYDROCHLORIDE 50 MG/ML
50 INJECTION INTRAMUSCULAR; INTRAVENOUS
Status: DISCONTINUED | OUTPATIENT
Start: 2022-09-28 | End: 2022-09-28 | Stop reason: HOSPADM

## 2022-09-28 RX ADMIN — SODIUM CHLORIDE 1000 ML: 9 INJECTION, SOLUTION INTRAVENOUS at 11:49

## 2022-09-28 RX ADMIN — Medication 5 ML: at 13:20

## 2022-09-28 RX ADMIN — ADO-TRASTUZUMAB EMTANSINE 352 MG: 20 INJECTION, POWDER, LYOPHILIZED, FOR SOLUTION INTRAVENOUS at 12:16

## 2022-09-28 ASSESSMENT — PAIN SCALES - GENERAL: PAINLEVEL: NO PAIN (0)

## 2022-09-28 NOTE — PROGRESS NOTES
"Oncology Rooming Note    September 28, 2022 11:17 AM   Khloe Becker is a 67 year old female who presents for:    Chief Complaint   Patient presents with     Oncology Clinic Visit     3 week follow up with labs/infusion related to Malignant neoplasm of upper-outer quadrant of left breast in female, estrogen receptor positive     Initial Vitals: BP (!) 147/84 (BP Location: Right arm, Patient Position: Sitting, Cuff Size: Adult Regular)   Pulse 67   Temp 97.9  F (36.6  C) (Tympanic)   Resp 14   Ht 1.626 m (5' 4\")   Wt 92.9 kg (204 lb 12.8 oz)   SpO2 99%   BMI 35.15 kg/m   Estimated body mass index is 35.15 kg/m  as calculated from the following:    Height as of this encounter: 1.626 m (5' 4\").    Weight as of this encounter: 92.9 kg (204 lb 12.8 oz). Body surface area is 2.05 meters squared.  No Pain (0) Comment: Data Unavailable   No LMP recorded. Patient is postmenopausal.  Allergies reviewed: Yes  Medications reviewed: Yes    Medications: MEDICATION REFILLS NEEDED TODAY. Provider was notified.  Pharmacy name entered into [employer]: [id number] - [address]  AT [facility]    Clinical concerns: 3 week follow up with labs/infusion related to Malignant neoplasm of upper-outer quadrant of left breast in female, estrogen receptor positive          GEORGIA PORTILLO CMA            "

## 2022-09-28 NOTE — PROGRESS NOTES
Infusion Nursing Note:  Khloe Becker presents today for labs, MD visit, Kadcyla and hydration.    Patient seen by provider today: Yes: Dr. Rubio   present during visit today: Not Applicable.    Note: N/A.    Intravenous Access:  Labs drawn without difficulty.  Implanted Port.    Treatment Conditions:  Results reviewed, labs MET treatment parameters, ok to proceed with treatment.    Post Infusion Assessment:  Patient tolerated infusion without incident.  Blood return noted pre and post infusion.  No evidence of extravasations.  Access discontinued per protocol.     Discharge Plan:   Patient discharged in stable condition accompanied by: self.  Departure Mode: Ambulatory.      Karla Avila RN

## 2022-09-28 NOTE — LETTER
"    9/28/2022         RE: Khloe Becker  [address]  [facility] [address]        Dear Colleague,    Thank you for referring your patient, Khloe Becker, to the [facility] [place]. Please see a copy of my visit note below.    Oncology Rooming Note    September 28, 2022 11:17 AM   Khloe Becker is a 67 year old female who presents for:    Chief Complaint   Patient presents with     Oncology Clinic Visit     3 week follow up with labs/infusion related to Malignant neoplasm of upper-outer quadrant of left breast in female, estrogen receptor positive     Initial Vitals: BP (!) 147/84 (BP Location: Right arm, Patient Position: Sitting, Cuff Size: Adult Regular)   Pulse 67   Temp 97.9  F (36.6  C) (Tympanic)   Resp 14   Ht 1.626 m (5' 4\")   Wt 92.9 kg (204 lb 12.8 oz)   SpO2 99%   BMI 35.15 kg/m   Estimated body mass index is 35.15 kg/m  as calculated from the following:    Height as of this encounter: 1.626 m (5' 4\").    Weight as of this encounter: 92.9 kg (204 lb 12.8 oz). Body surface area is 2.05 meters squared.  No Pain (0) Comment: Data Unavailable   No LMP recorded. Patient is postmenopausal.  Allergies reviewed: Yes  Medications reviewed: Yes    Medications: MEDICATION REFILLS NEEDED TODAY. Provider was notified.  Pharmacy name entered into [facility]: [id number] [address]  AT [facility]    Clinical concerns: 3 week follow up with labs/infusion related to Malignant neoplasm of upper-outer quadrant of left breast in female, estrogen receptor positive          GEORGIA PORTILLO CMA              [facility] Hematology and Oncology Progress Note    Patient: Khloe Becker  MRN: [id number]  Date of Service: 09/07/2022        Reason for Visit    Chief Complaint   Patient presents with     Oncology Clinic Visit     3 week follow up with labs/infusion related to Malignant neoplasm of upper-outer quadrant of left breast in " female, estrogen receptor positive       Assessment and Plan    Cancer Staging  Malignant neoplasm of upper-outer quadrant of left breast in female, estrogen receptor positive (H)  Staging form: Breast, AJCC 8th Edition  - Clinical: Stage IB (cT2, cN1, cM0, G3, ER+, AK+, HER2+) - Signed by Xavier Rubio MD on 6/10/2022  - Pathologic: Stage IA (pT1a, pN1mi, cM0, G3, ER+, AK+, HER2+) - Signed by Xavier Rubio MD on 6/10/2022    1. Breast cancer, stage IA, triple positive, left side, status post 6 cycles of neoadjuvant TCHP and then left mastectomy: Patient did have residual disease at time of her surgery so she has now started on Kadcyla adjuvantly.  Here for cycle 9.  Labs are stable today.  Normal white count.  Hemoglobin 12.9.  Normal platelet count.  LFTs are largely stable except for mildly elevated alkaline phosphatase at 111.  Bilirubin is normal.  Creatinine is normal.  Proceed with cycle 9 of Kadcyla today.  Come back in 3 weeks for cycle 10.  I will repeat an echocardiogram before cycle 10.    2.  Peripheral neuropathy: This is from all of her previous treatment as well as the Kadcyla.  Physical activity makes her neuropathy worse.  Recently she was pretty active as she was assisting in selling the house.  Her neuropathy in the feet got worse.  Has felt better for the last few days or so.  She is on gabapentin.  Currently her neuropathy is probably grade 2.  If there is any further worsening of neuropathy I would go down Kadcyla dose by 1 dose Level.    3.  Musculoskeletal pain.  Probably from Arimidex.  She is getting used to this.  I offered to switch to letrozole.  But she wants to continue Arimidex for now.    4.  She feels like there some weird sensation in her left mastectomy scar area.  She has an appointment with surgery in the near future for physical exam.  Does not think there is any lump.  She wants to check this out with surgery.  Think she is probably feeling a mastectomy  scar.    ECOG Performance    1 - Can't do physically strenuous work, but fully ambyulatory and can do light sedentary work    Distress Screening (within last 30 days)    1. How concerned are you about your ability to eat? : 0  2. How concerned are you about unintended weight loss or your current weight? : 0  3. How concerned are you about feeling depressed or very sad? : 0  4. How concerned are you about feeling anxious or very scared? : 0  5. Do you struggle with the loss of meaning and jeimy in your life? : Not at all  6. How concerned are you about work and home life issues that may be affected by your cancer? : 0  7. How concerned are you about knowing what resources are available to help you? : 0  8. Do you currently have what you would describe as Samaritan or spiritual struggles?            : Not at all       Pain  Pain Score: No Pain (0)    Problem List    Patient Active Problem List   Diagnosis     Morbid obesity (H)     Malignant neoplasm of overlapping sites of left breast in female, estrogen receptor positive (H)     Malignant neoplasm of upper-outer quadrant of left breast in female, estrogen receptor positive (H)     Chemotherapy-induced peripheral neuropathy (H)     Musculoskeletal pain        ______________________________________________________________________________    History of Present Illness    Oncologic history  July 2021: Left breast mass palpated by patient.  Mammogram showing 2.1 x 2.1 x 2 cm hypoechoic mass at 2 o'clock position about 11 cm from the nipple.  Ultrasound-guided biopsy of the mass showed invasive ductal carcinoma, grade 3, ER, CT and HER2 positive.  Left axillary lymph node biopsy positive for metastatic disease.     Neoadjuvant chemotherapy with TCHP starting 9/2/2021.  Finished 6 cycles of chemo.  Last dose was 12/17/2020.  Carboplatin was held for cycle 4 and 6 due to poor tolerance.     Left mastectomy with sentinel lymph node biopsy and axillary lymph node dissection  "done on 1/19/2022.     Surgical path showing residual disease.  ypT1a, pN1(mi).  Patient declined radiation therapy.    Interim history:   She is here for cycle 9 of Kadcyla.  She continues to have issues with peripheral neuropathy fatigue, and musculoskeletal pain.  She has been quite busy over the last couple of weeks.  After last cycle she did not feel well for a longer period of time.  It seems like physical activity makes her neuropathy worse.  For the last 2 to 3 days she has felt a bit better.  She is also developed some rash on her palms and soles which looks like contact dermatitis.  Does have itching in her hands.  She using ahava cream.  Denies any fevers or chills.  She does complain of sensation of fullness in her left breast in the lumpectomy scar area.  She feels like her lymph nodes are inflamed.  She has not noticed any lump.    Review of Systems    Pertinent items are noted in HPI.    Past History    Past Medical History:   Diagnosis Date     Anemia      Breast cancer (H)     left     Gastroesophageal reflux disease      Hypertension      Motion sickness      Obese      PONV (postoperative nausea and vomiting)        PHYSICAL EXAM  BP (!) 147/84 (BP Location: Right arm, Patient Position: Sitting, Cuff Size: Adult Regular)   Pulse 67   Temp 97.9  F (36.6  C) (Tympanic)   Resp 14   Ht 1.626 m (5' 4\")   Wt 92.9 kg (204 lb 12.8 oz)   SpO2 99%   BMI 35.15 kg/m      GENERAL: no acute distress. Cooperative in conversation. Here alone. Mask on  RESP: Regular respiratory rate. No expiratory wheezes   MUSCULOSKELETAL: no bilateral leg swelling  NEURO: non focal. Alert and oriented x3.   PSYCH: within normal limits. No depression or anxiety.  SKIN: exposed skin is dry intact.     Lab Results    Recent Results (from the past 168 hour(s))   Comprehensive metabolic panel   Result Value Ref Range    Sodium 137 136 - 145 mmol/L    Potassium 3.8 3.4 - 5.3 mmol/L    Chloride 97 (L) 98 - 107 mmol/L    Carbon " Dioxide (CO2) 30 (H) 22 - 29 mmol/L    Anion Gap 10 7 - 15 mmol/L    Urea Nitrogen 14.6 8.0 - 23.0 mg/dL    Creatinine 0.67 0.51 - 0.95 mg/dL    Calcium 9.8 8.8 - 10.2 mg/dL    Glucose 89 70 - 99 mg/dL    Alkaline Phosphatase 111 (H) 35 - 104 U/L    AST 32 10 - 35 U/L    ALT 26 10 - 35 U/L    Protein Total 7.6 6.4 - 8.3 g/dL    Albumin 4.3 3.5 - 5.2 g/dL    Bilirubin Total 0.5 <=1.2 mg/dL    GFR Estimate >90 >60 mL/min/1.73m2   CBC with platelets and differential   Result Value Ref Range    WBC Count 6.6 4.0 - 11.0 10e3/uL    RBC Count 4.52 3.80 - 5.20 10e6/uL    Hemoglobin 12.5 11.7 - 15.7 g/dL    Hematocrit 39.3 35.0 - 47.0 %    MCV 87 78 - 100 fL    MCH 27.7 26.5 - 33.0 pg    MCHC 31.8 31.5 - 36.5 g/dL    RDW 15.0 10.0 - 15.0 %    Platelet Count 170 150 - 450 10e3/uL    % Neutrophils 59 %    % Lymphocytes 30 %    % Monocytes 7 %    % Eosinophils 3 %    % Basophils 1 %    % Immature Granulocytes 0 %    NRBCs per 100 WBC 0 <1 /100    Absolute Neutrophils 4.0 1.6 - 8.3 10e3/uL    Absolute Lymphocytes 2.0 0.8 - 5.3 10e3/uL    Absolute Monocytes 0.5 0.0 - 1.3 10e3/uL    Absolute Eosinophils 0.2 0.0 - 0.7 10e3/uL    Absolute Basophils 0.1 0.0 - 0.2 10e3/uL    Absolute Immature Granulocytes 0.0 <=0.4 10e3/uL    Absolute NRBCs 0.0 10e3/uL       Imaging    No results found.    A total of 30 min were spent today on this visit which included face to face conversation with the patient, EMR review, counseling and co-ordination of care and medical documentation.      Signed by: Xavier Rubio MD      Again, thank you for allowing me to participate in the care of your patient.        Sincerely,        Xavier Rubio MD

## 2022-09-28 NOTE — PROGRESS NOTES
[facility] Hematology and Oncology Progress Note    Patient: Khloe Becker  MRN: [id number]  Date of Service: 09/07/2022        Reason for Visit    Chief Complaint   Patient presents with     Oncology Clinic Visit     3 week follow up with labs/infusion related to Malignant neoplasm of upper-outer quadrant of left breast in female, estrogen receptor positive       Assessment and Plan    Cancer Staging  Malignant neoplasm of upper-outer quadrant of left breast in female, estrogen receptor positive (H)  Staging form: Breast, AJCC 8th Edition  - Clinical: Stage IB (cT2, cN1, cM0, G3, ER+, VT+, HER2+) - Signed by Xavier Rubio MD on 6/10/2022  - Pathologic: Stage IA (pT1a, pN1mi, cM0, G3, ER+, VT+, HER2+) - Signed by Xavier Rubio MD on 6/10/2022    1. Breast cancer, stage IA, triple positive, left side, status post 6 cycles of neoadjuvant TCHP and then left mastectomy: Patient did have residual disease at time of her surgery so she has now started on Kadcyla adjuvantly.  Here for cycle 9.  Labs are stable today.  Normal white count.  Hemoglobin 12.9.  Normal platelet count.  LFTs are largely stable except for mildly elevated alkaline phosphatase at 111.  Bilirubin is normal.  Creatinine is normal.  Proceed with cycle 9 of Kadcyla today.  Come back in 3 weeks for cycle 10.  I will repeat an echocardiogram before cycle 10.    2.  Peripheral neuropathy: This is from all of her previous treatment as well as the Kadcyla.  Physical activity makes her neuropathy worse.  Recently she was pretty active as she was assisting in selling the house.  Her neuropathy in the feet got worse.  Has felt better for the last few days or so.  She is on gabapentin.  Currently her neuropathy is probably grade 2.  If there is any further worsening of neuropathy I would go down Kadcyla dose by 1 dose Level.    3.  Musculoskeletal pain.  Probably from Arimidex.  She is getting used to this.  I offered to switch to letrozole.   But she wants to continue Arimidex for now.    4.  She feels like there some weird sensation in her left mastectomy scar area.  She has an appointment with surgery in the near future for physical exam.  Does not think there is any lump.  She wants to check this out with surgery.  Think she is probably feeling a mastectomy scar.    ECOG Performance    1 - Can't do physically strenuous work, but fully ambyulatory and can do light sedentary work    Distress Screening (within last 30 days)    1. How concerned are you about your ability to eat? : 0  2. How concerned are you about unintended weight loss or your current weight? : 0  3. How concerned are you about feeling depressed or very sad? : 0  4. How concerned are you about feeling anxious or very scared? : 0  5. Do you struggle with the loss of meaning and jeimy in your life? : Not at all  6. How concerned are you about work and home life issues that may be affected by your cancer? : 0  7. How concerned are you about knowing what resources are available to help you? : 0  8. Do you currently have what you would describe as Mosque or spiritual struggles?            : Not at all       Pain  Pain Score: No Pain (0)    Problem List    Patient Active Problem List   Diagnosis     Morbid obesity (H)     Malignant neoplasm of overlapping sites of left breast in female, estrogen receptor positive (H)     Malignant neoplasm of upper-outer quadrant of left breast in female, estrogen receptor positive (H)     Chemotherapy-induced peripheral neuropathy (H)     Musculoskeletal pain        ______________________________________________________________________________    History of Present Illness    Oncologic history  July 2021: Left breast mass palpated by patient.  Mammogram showing 2.1 x 2.1 x 2 cm hypoechoic mass at 2 o'clock position about 11 cm from the nipple.  Ultrasound-guided biopsy of the mass showed invasive ductal carcinoma, grade 3, ER, [place] and HER2 positive.  Left  "axillary lymph node biopsy positive for metastatic disease.     Neoadjuvant chemotherapy with TCHP starting 9/2/2021.  Finished 6 cycles of chemo.  Last dose was 12/17/2020.  Carboplatin was held for cycle 4 and 6 due to poor tolerance.     Left mastectomy with sentinel lymph node biopsy and axillary lymph node dissection done on 1/19/2022.     Surgical path showing residual disease.  ypT1a, pN1(mi).  Patient declined radiation therapy.    Interim history:   She is here for cycle 9 of Kadcyla.  She continues to have issues with peripheral neuropathy fatigue, and musculoskeletal pain.  She has been quite busy over the last couple of weeks.  After last cycle she did not feel well for a longer period of time.  It seems like physical activity makes her neuropathy worse.  For the last 2 to 3 days she has felt a bit better.  She is also developed some rash on her palms and soles which looks like contact dermatitis.  Does have itching in her hands.  She using ahava cream.  Denies any fevers or chills.  She does complain of sensation of fullness in her left breast in the lumpectomy scar area.  She feels like her lymph nodes are inflamed.  She has not noticed any lump.    Review of Systems    Pertinent items are noted in HPI.    Past History    Past Medical History:   Diagnosis Date     Anemia      Breast cancer (H)     left     Gastroesophageal reflux disease      Hypertension      Motion sickness      Obese      PONV (postoperative nausea and vomiting)        PHYSICAL EXAM  BP (!) 147/84 (BP Location: Right arm, Patient Position: Sitting, Cuff Size: Adult Regular)   Pulse 67   Temp 97.9  F (36.6  C) (Tympanic)   Resp 14   Ht 1.626 m (5' 4\")   Wt 92.9 kg (204 lb 12.8 oz)   SpO2 99%   BMI 35.15 kg/m      GENERAL: no acute distress. Cooperative in conversation. Here alone. Mask on  RESP: Regular respiratory rate. No expiratory wheezes   MUSCULOSKELETAL: no bilateral leg swelling  NEURO: non focal. Alert and oriented " x3.   PSYCH: within normal limits. No depression or anxiety.  SKIN: exposed skin is dry intact.     Lab Results    Recent Results (from the past 168 hour(s))   Comprehensive metabolic panel   Result Value Ref Range    Sodium 137 136 - 145 mmol/L    Potassium 3.8 3.4 - 5.3 mmol/L    Chloride 97 (L) 98 - 107 mmol/L    Carbon Dioxide (CO2) 30 (H) 22 - 29 mmol/L    Anion Gap 10 7 - 15 mmol/L    Urea Nitrogen 14.6 8.0 - 23.0 mg/dL    Creatinine 0.67 0.51 - 0.95 mg/dL    Calcium 9.8 8.8 - 10.2 mg/dL    Glucose 89 70 - 99 mg/dL    Alkaline Phosphatase 111 (H) 35 - 104 U/L    AST 32 10 - 35 U/L    ALT 26 10 - 35 U/L    Protein Total 7.6 6.4 - 8.3 g/dL    Albumin 4.3 3.5 - 5.2 g/dL    Bilirubin Total 0.5 <=1.2 mg/dL    GFR Estimate >90 >60 mL/min/1.73m2   CBC with platelets and differential   Result Value Ref Range    WBC Count 6.6 4.0 - 11.0 10e3/uL    RBC Count 4.52 3.80 - 5.20 10e6/uL    Hemoglobin 12.5 11.7 - 15.7 g/dL    Hematocrit 39.3 35.0 - 47.0 %    MCV 87 78 - 100 fL    MCH 27.7 26.5 - 33.0 pg    MCHC 31.8 31.5 - 36.5 g/dL    RDW 15.0 10.0 - 15.0 %    Platelet Count 170 150 - 450 10e3/uL    % Neutrophils 59 %    % Lymphocytes 30 %    % Monocytes 7 %    % Eosinophils 3 %    % Basophils 1 %    % Immature Granulocytes 0 %    NRBCs per 100 WBC 0 <1 /100    Absolute Neutrophils 4.0 1.6 - 8.3 10e3/uL    Absolute Lymphocytes 2.0 0.8 - 5.3 10e3/uL    Absolute Monocytes 0.5 0.0 - 1.3 10e3/uL    Absolute Eosinophils 0.2 0.0 - 0.7 10e3/uL    Absolute Basophils 0.1 0.0 - 0.2 10e3/uL    Absolute Immature Granulocytes 0.0 <=0.4 10e3/uL    Absolute NRBCs 0.0 10e3/uL       Imaging    No results found.    A total of 30 min were spent today on this visit which included face to face conversation with the patient, EMR review, counseling and co-ordination of care and medical documentation.      Signed by: Xavier Rubio MD

## 2022-09-29 DIAGNOSIS — R60.0 PERIPHERAL EDEMA: ICD-10-CM

## 2022-09-29 RX ORDER — HYDROCHLOROTHIAZIDE 25 MG/1
25 TABLET ORAL DAILY
Qty: 90 TABLET | Refills: 0 | Status: SHIPPED | OUTPATIENT
Start: 2022-09-29

## 2022-09-29 NOTE — PROGRESS NOTES
This is a 67 year old woman who comes in for  continued follow-up of her left breast cancer.  She is now 6 months  status post left  mastectomy  without radiation. (She opted not to do this.)  She is feeling well.  She has no new complaints today.      Please see the chart review for PMH, Meds, allergies, FH and SH.    ROS:  A comprehensive review of systems was negative.      Physical Exam:  General appearance: alert, appears stated age and cooperative  Breasts: There are no palpable masses. The scar(s) has(ve) healed up well. The right breast is without palpable masses.  Lymph nodes: Cervical, supraclavicular, and axillary nodes normal.  Neurologic: Grossly normal    Data Review: Reviewed her current mammograms. No evidence of disease.      Impression: Personal History of breast cancer. NOD.  Is overall doing very well.  Discussed with the patient that follow-up with myself can now be as needed.  She will continue with her current chemotherapy which she expects to be done with in January.  She will also continue with yearly mammograms on the right and following up with her primary care physician and oncologist.     Plan: Follow up as needed.  Did tell her that I would be happy to remove her port when she no longer needs it.

## 2022-09-30 ENCOUNTER — OFFICE VISIT (OUTPATIENT)
Dept: SURGERY | Facility: CLINIC | Age: 67
End: 2022-09-30
Attending: SPECIALIST
Payer: COMMERCIAL

## 2022-09-30 DIAGNOSIS — Z85.3 PERSONAL HISTORY OF BREAST CANCER: Primary | ICD-10-CM

## 2022-09-30 PROCEDURE — G0463 HOSPITAL OUTPT CLINIC VISIT: HCPCS

## 2022-09-30 PROCEDURE — 99213 OFFICE O/P EST LOW 20 MIN: CPT | Performed by: SPECIALIST

## 2022-09-30 NOTE — LETTER
9/30/2022         RE: Khloe Becker  [address] [place] [address]        Dear Colleague,    Thank you for referring your patient, Khloe Becker, to the [facility] [place]. Please see a copy of my visit note below.    This is a 67 year old woman who comes in for  continued follow-up of her left breast cancer.  She is now 6 months  status post left  mastectomy  without radiation. (She opted not to do this.)  She is feeling well.  She has no new complaints today.      Please see the chart review for PMH, Meds, allergies, FH and SH.    ROS:  A comprehensive review of systems was negative.      Physical Exam:  General appearance: alert, appears stated age and cooperative  Breasts: There are no palpable masses. The scar(s) has(ve) healed up well. The right breast is without palpable masses.  Lymph nodes: Cervical, supraclavicular, and axillary nodes normal.  Neurologic: Grossly normal    Data Review: Reviewed her current mammograms. No evidence of disease.      Impression: Personal History of breast cancer. NOD.  Is overall doing very well.  Discussed with the patient that follow-up with myself can now be as needed.  She will continue with her current chemotherapy which she expects to be done with in January.  She will also continue with yearly mammograms on the right and following up with her primary care physician and oncologist.     Plan: Follow up as needed.  Did tell her that I would be happy to remove her port when she no longer needs it.            Again, thank you for allowing me to participate in the care of your patient.        Sincerely,        Ana Viera MD

## 2022-09-30 NOTE — NURSING NOTE
Khloe presents to [facility] for a follow up appointment with Dr. Viera .Patient notes no new breast concerns.  Patient had mammogram done recently, see report for details.  She is following with Dr. Rubio for medical oncology and is taking endocrine therapy, tolerating it well.  RN assessment and EMRupdate.  Patient met with Dr. Viera .  See dictation for details of visit and follow up plan.  Support provided, invited calls.  RN time 12 mins.

## 2022-10-12 ENCOUNTER — HOSPITAL ENCOUNTER (OUTPATIENT)
Dept: CARDIOLOGY | Facility: HOSPITAL | Age: 67
Discharge: HOME | End: 2022-10-12
Attending: INTERNAL MEDICINE | Admitting: INTERNAL MEDICINE
Payer: COMMERCIAL

## 2022-10-12 DIAGNOSIS — I42.7 DRUG-INDUCED CARDIOMYOPATHY (H): ICD-10-CM

## 2022-10-12 LAB — LVEF ECHO: NORMAL

## 2022-10-12 PROCEDURE — 93321 DOPPLER ECHO F-UP/LMTD STD: CPT | Mod: 26 | Performed by: INTERNAL MEDICINE

## 2022-10-12 PROCEDURE — 93321 DOPPLER ECHO F-UP/LMTD STD: CPT

## 2022-10-12 PROCEDURE — 93325 DOPPLER ECHO COLOR FLOW MAPG: CPT | Mod: 26 | Performed by: INTERNAL MEDICINE

## 2022-10-12 PROCEDURE — 93308 TTE F-UP OR LMTD: CPT | Mod: 26 | Performed by: INTERNAL MEDICINE

## 2022-10-12 PROCEDURE — 93325 DOPPLER ECHO COLOR FLOW MAPG: CPT

## 2022-10-16 ENCOUNTER — HEALTH MAINTENANCE LETTER (OUTPATIENT)
Age: 67
End: 2022-10-16

## 2022-10-19 ENCOUNTER — INFUSION THERAPY VISIT (OUTPATIENT)
Dept: INFUSION THERAPY | Facility: HOSPITAL | Age: 67
End: 2022-10-19
Attending: INTERNAL MEDICINE
Payer: COMMERCIAL

## 2022-10-19 ENCOUNTER — ONCOLOGY VISIT (OUTPATIENT)
Dept: ONCOLOGY | Facility: HOSPITAL | Age: 67
End: 2022-10-19
Attending: NURSE PRACTITIONER
Payer: COMMERCIAL

## 2022-10-19 VITALS
WEIGHT: 207.8 LBS | TEMPERATURE: 98.7 F | SYSTOLIC BLOOD PRESSURE: 173 MMHG | OXYGEN SATURATION: 98 % | RESPIRATION RATE: 18 BRPM | HEART RATE: 79 BPM | DIASTOLIC BLOOD PRESSURE: 78 MMHG | BODY MASS INDEX: 35.67 KG/M2

## 2022-10-19 DIAGNOSIS — C50.412 MALIGNANT NEOPLASM OF UPPER-OUTER QUADRANT OF LEFT BREAST IN FEMALE, ESTROGEN RECEPTOR POSITIVE (H): Primary | ICD-10-CM

## 2022-10-19 DIAGNOSIS — Z17.0 MALIGNANT NEOPLASM OF UPPER-OUTER QUADRANT OF LEFT BREAST IN FEMALE, ESTROGEN RECEPTOR POSITIVE (H): Primary | ICD-10-CM

## 2022-10-19 LAB
ALBUMIN SERPL BCG-MCNC: 4.2 G/DL (ref 3.5–5.2)
ALP SERPL-CCNC: 113 U/L (ref 35–104)
ALT SERPL W P-5'-P-CCNC: 24 U/L (ref 10–35)
ANION GAP SERPL CALCULATED.3IONS-SCNC: 13 MMOL/L (ref 7–15)
AST SERPL W P-5'-P-CCNC: 33 U/L (ref 10–35)
BASOPHILS # BLD AUTO: 0 10E3/UL (ref 0–0.2)
BASOPHILS NFR BLD AUTO: 1 %
BILIRUB SERPL-MCNC: 0.4 MG/DL
BUN SERPL-MCNC: 14.3 MG/DL (ref 8–23)
CALCIUM SERPL-MCNC: 9.5 MG/DL (ref 8.8–10.2)
CHLORIDE SERPL-SCNC: 100 MMOL/L (ref 98–107)
CREAT SERPL-MCNC: 0.6 MG/DL (ref 0.51–0.95)
DEPRECATED HCO3 PLAS-SCNC: 28 MMOL/L (ref 22–29)
EOSINOPHIL # BLD AUTO: 0.1 10E3/UL (ref 0–0.7)
EOSINOPHIL NFR BLD AUTO: 2 %
ERYTHROCYTE [DISTWIDTH] IN BLOOD BY AUTOMATED COUNT: 15 % (ref 10–15)
GFR SERPL CREATININE-BSD FRML MDRD: >90 ML/MIN/1.73M2
GLUCOSE SERPL-MCNC: 97 MG/DL (ref 70–99)
HCT VFR BLD AUTO: 39.4 % (ref 35–47)
HGB BLD-MCNC: 12.5 G/DL (ref 11.7–15.7)
IMM GRANULOCYTES # BLD: 0 10E3/UL
IMM GRANULOCYTES NFR BLD: 0 %
LYMPHOCYTES # BLD AUTO: 1.8 10E3/UL (ref 0.8–5.3)
LYMPHOCYTES NFR BLD AUTO: 26 %
MCH RBC QN AUTO: 27.7 PG (ref 26.5–33)
MCHC RBC AUTO-ENTMCNC: 31.7 G/DL (ref 31.5–36.5)
MCV RBC AUTO: 87 FL (ref 78–100)
MONOCYTES # BLD AUTO: 0.4 10E3/UL (ref 0–1.3)
MONOCYTES NFR BLD AUTO: 6 %
NEUTROPHILS # BLD AUTO: 4.8 10E3/UL (ref 1.6–8.3)
NEUTROPHILS NFR BLD AUTO: 65 %
NRBC # BLD AUTO: 0 10E3/UL
NRBC BLD AUTO-RTO: 0 /100
PLATELET # BLD AUTO: 157 10E3/UL (ref 150–450)
POTASSIUM SERPL-SCNC: 3.8 MMOL/L (ref 3.4–5.3)
PROT SERPL-MCNC: 7.6 G/DL (ref 6.4–8.3)
RBC # BLD AUTO: 4.52 10E6/UL (ref 3.8–5.2)
SODIUM SERPL-SCNC: 141 MMOL/L (ref 136–145)
WBC # BLD AUTO: 7.1 10E3/UL (ref 4–11)

## 2022-10-19 PROCEDURE — G0463 HOSPITAL OUTPT CLINIC VISIT: HCPCS | Mod: 25

## 2022-10-19 PROCEDURE — 250N000011 HC RX IP 250 OP 636: Performed by: NURSE PRACTITIONER

## 2022-10-19 PROCEDURE — 99214 OFFICE O/P EST MOD 30 MIN: CPT | Performed by: NURSE PRACTITIONER

## 2022-10-19 PROCEDURE — 96413 CHEMO IV INFUSION 1 HR: CPT

## 2022-10-19 PROCEDURE — 85025 COMPLETE CBC W/AUTO DIFF WBC: CPT | Performed by: INTERNAL MEDICINE

## 2022-10-19 PROCEDURE — 258N000003 HC RX IP 258 OP 636: Performed by: NURSE PRACTITIONER

## 2022-10-19 PROCEDURE — 80053 COMPREHEN METABOLIC PANEL: CPT | Performed by: INTERNAL MEDICINE

## 2022-10-19 RX ORDER — LORAZEPAM 2 MG/ML
0.5 INJECTION INTRAMUSCULAR EVERY 4 HOURS PRN
Status: CANCELLED | OUTPATIENT
Start: 2022-10-19

## 2022-10-19 RX ORDER — EPINEPHRINE 1 MG/ML
0.3 INJECTION, SOLUTION INTRAMUSCULAR; SUBCUTANEOUS EVERY 5 MIN PRN
Status: CANCELLED | OUTPATIENT
Start: 2022-10-19

## 2022-10-19 RX ORDER — NALOXONE HYDROCHLORIDE 0.4 MG/ML
0.2 INJECTION, SOLUTION INTRAMUSCULAR; INTRAVENOUS; SUBCUTANEOUS
Status: CANCELLED | OUTPATIENT
Start: 2022-10-19

## 2022-10-19 RX ORDER — MEPERIDINE HYDROCHLORIDE 25 MG/ML
25 INJECTION INTRAMUSCULAR; INTRAVENOUS; SUBCUTANEOUS EVERY 30 MIN PRN
Status: CANCELLED | OUTPATIENT
Start: 2022-10-19

## 2022-10-19 RX ORDER — ALBUTEROL SULFATE 90 UG/1
1-2 AEROSOL, METERED RESPIRATORY (INHALATION)
Status: CANCELLED
Start: 2022-10-19

## 2022-10-19 RX ORDER — ALBUTEROL SULFATE 0.83 MG/ML
2.5 SOLUTION RESPIRATORY (INHALATION)
Status: CANCELLED | OUTPATIENT
Start: 2022-10-19

## 2022-10-19 RX ORDER — HEPARIN SODIUM,PORCINE 10 UNIT/ML
5 VIAL (ML) INTRAVENOUS
Status: CANCELLED | OUTPATIENT
Start: 2022-10-19

## 2022-10-19 RX ORDER — CLONAZEPAM 0.5 MG/1
0.5 TABLET ORAL PRN
Qty: 10 TABLET | Refills: 0 | Status: SHIPPED | OUTPATIENT
Start: 2022-10-19 | End: 2023-05-17

## 2022-10-19 RX ORDER — DIPHENHYDRAMINE HYDROCHLORIDE 50 MG/ML
50 INJECTION INTRAMUSCULAR; INTRAVENOUS
Status: CANCELLED
Start: 2022-10-19

## 2022-10-19 RX ORDER — METHYLPREDNISOLONE SODIUM SUCCINATE 125 MG/2ML
125 INJECTION, POWDER, LYOPHILIZED, FOR SOLUTION INTRAMUSCULAR; INTRAVENOUS
Status: CANCELLED
Start: 2022-10-19

## 2022-10-19 RX ORDER — HEPARIN SODIUM (PORCINE) LOCK FLUSH IV SOLN 100 UNIT/ML 100 UNIT/ML
5 SOLUTION INTRAVENOUS
Status: DISCONTINUED | OUTPATIENT
Start: 2022-10-19 | End: 2022-10-19 | Stop reason: HOSPADM

## 2022-10-19 RX ORDER — HEPARIN SODIUM (PORCINE) LOCK FLUSH IV SOLN 100 UNIT/ML 100 UNIT/ML
5 SOLUTION INTRAVENOUS
Status: CANCELLED | OUTPATIENT
Start: 2022-10-19

## 2022-10-19 RX ADMIN — Medication 5 ML: at 15:21

## 2022-10-19 RX ADMIN — ADO-TRASTUZUMAB EMTANSINE 298 MG: 20 INJECTION, POWDER, LYOPHILIZED, FOR SOLUTION INTRAVENOUS at 14:38

## 2022-10-19 RX ADMIN — SODIUM CHLORIDE 1000 ML: 9 INJECTION, SOLUTION INTRAVENOUS at 14:01

## 2022-10-19 ASSESSMENT — PAIN SCALES - GENERAL: PAINLEVEL: MILD PAIN (3)

## 2022-10-19 NOTE — PROGRESS NOTES
[facility] Hematology and Oncology Progress Note    Patient: Khloe Becker  MRN: [id number]  Date of Service: Oct 19, 2022          Reason for Visit    Chief Complaint   Patient presents with     Oncology Clinic Visit     Malignant neoplasm of upper-outer quadrant of left breast in female, estrogen receptor positive (H)       Assessment and Plan     Cancer Staging   Malignant neoplasm of upper-outer quadrant of left breast in female, estrogen receptor positive (H)  Staging form: Breast, AJCC 8th Edition  - Clinical: Stage IB (cT2, cN1, cM0, G3, ER+, TN+, HER2+) - Signed by Xavier Rubio MD on 6/10/2022  - Pathologic: Stage IA (pT1a, pN1mi, cM0, G3, ER+, TN+, HER2+) - Signed by Xavier Rubio MD on 6/10/2022    1. Breast cancer, stage IA, triple positive, left side, status post 6 cycles of neoadjuvant TCHP and then left mastectomy: Patient did have residual disease at time of her surgery so she has now started on Kadcyla adjuvantly.  Today is cycle 10.  We will decrease the dose today due to neuropathy.  She will return in 3 weeks for cycle 11.  Overall plan is to give 14 cycles which will be in January.  Patient does also continue on Arimidex which she started in June.    2.  Peripheral neuropathy: This is from all of her previous treatment as well as the Kadcyla.  She states that it is overall stable to slightly worse.  It does affect her ADLs and her walking and she is not able to do as much activity as she would like.  She is also starting to have some issues with driving so we will go down one dose level on Kadcyla from 3.6 mg/m2 to 3.0. monitor. Continues to take 300mg gabapentin at bedtime.     3.  Mild anxiety: uses clonazepma very intermittently. Requesting refill today. Encourage her to use sparingly. #10 pills given.       ECOG Performance    1 - Can't do physically strenuous work, but fully ambyulatory and can do light sedentary work    Distress Screening (within last 30 days)    1.  How concerned are you about your ability to eat? : 0  2. How concerned are you about unintended weight loss or your current weight? : 0  3. How concerned are you about feeling depressed or very sad? : 0  4. How concerned are you about feeling anxious or very scared? : 0  5. Do you struggle with the loss of meaning and jeimy in your life? : Not at all  6. How concerned are you about work and home life issues that may be affected by your cancer? : 0  7. How concerned are you about knowing what resources are available to help you? : 0  8. Do you currently have what you would describe as Congregational or spiritual struggles?            : Not at all       Pain  Pain Score: Mild Pain (3)    Problem List    Patient Active Problem List   Diagnosis     Morbid obesity (H)     Malignant neoplasm of overlapping sites of left breast in female, estrogen receptor positive (H)     Malignant neoplasm of upper-outer quadrant of left breast in female, estrogen receptor positive (H)     Chemotherapy-induced peripheral neuropathy (H)     Musculoskeletal pain        ______________________________________________________________________________    History of Present Illness    Oncologic history  July 2021: Left breast mass palpated by patient.  Mammogram showing 2.1 x 2.1 x 2 cm hypoechoic mass at 2 o'clock position about 11 cm from the nipple.  Ultrasound-guided biopsy of the mass showed invasive ductal carcinoma, grade 3, ER, MT and HER2 positive.  Left axillary lymph node biopsy positive for metastatic disease.     Neoadjuvant chemotherapy with TCHP starting 9/2/2021.  Finished 6 cycles of chemo.  Last dose was 12/17/2020.  Carboplatin was held for cycle 4 and 6 due to poor tolerance.     Left mastectomy with sentinel lymph node biopsy and axillary lymph node dissection done on 1/19/2022.     Surgical path showing residual disease.  ypT1a, pN1(mi).  Patient declined radiation therapy.    Interim history: Patient is here today to continue on  Kadcyla.  She states that it is going okay but she continues to have a lot of issues with fatigue, achiness as well is worsening neuropathy.  She says some days are stable but some days are worse.  She can only drive in certain shoes because she has a hard time feeling the pedals.  He is worried that this may get worse and prevent her from driving which will be devastating.  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  At this point she is only taking gabapentin at bedtime.  She denies any new bone or back pain.  Denies any unexplained weight loss.  Overall everything else is stable    Review of Systems    Pertinent items are noted in HPI.    Past History    Past Medical History:   Diagnosis Date     Anemia      Breast cancer (H)     left     Gastroesophageal reflux disease      Hypertension      Motion sickness      Obese      PONV (postoperative nausea and vomiting)        PHYSICAL EXAM  BP (!) 173/78   Pulse 79   Temp 98.7  F (37.1  C)   Resp 18   Wt 94.3 kg (207 lb 12.8 oz)   SpO2 98%   BMI 35.67 kg/m      GENERAL: no acute distress. Cooperative in conversation. Here alone. Mask on  RESP: Regular respiratory rate. No expiratory wheezes   MUSCULOSKELETAL: no bilateral leg swelling  NEURO: non focal. Alert and oriented x3.   PSYCH: within normal limits. No depression or anxiety.  SKIN: exposed skin is dry intact.     Lab Results    Recent Results (from the past 168 hour(s))   Echocardiogram Limited   Result Value Ref Range    LVEF  55%    CBC with platelets and differential   Result Value Ref Range    WBC Count 7.1 4.0 - 11.0 10e3/uL    RBC Count 4.52 3.80 - 5.20 10e6/uL    Hemoglobin 12.5 11.7 - 15.7 g/dL    Hematocrit 39.4 35.0 - 47.0 %    MCV 87 78 - 100 fL    MCH 27.7 26.5 - 33.0 pg    MCHC 31.7 31.5 - 36.5 g/dL    RDW 15.0 10.0 - 15.0 %    Platelet Count 157 150 - 450 10e3/uL    % Neutrophils 65 %    % Lymphocytes 26 %    %  Monocytes 6 %    % Eosinophils 2 %    % Basophils 1 %    % Immature Granulocytes 0 %    NRBCs per 100 WBC 0 <1 /100    Absolute Neutrophils 4.8 1.6 - 8.3 10e3/uL    Absolute Lymphocytes 1.8 0.8 - 5.3 10e3/uL    Absolute Monocytes 0.4 0.0 - 1.3 10e3/uL    Absolute Eosinophils 0.1 0.0 - 0.7 10e3/uL    Absolute Basophils 0.0 0.0 - 0.2 10e3/uL    Absolute Immature Granulocytes 0.0 <=0.4 10e3/uL    Absolute NRBCs 0.0 10e3/uL       Imaging    Echocardiogram Limited    Result Date: 10/12/2022  [id number]^DIPIKA^[place] [address]  Name: JOHN VINSON MRN: [id number] : 1955 Study Date: 10/12/2022 01:37 PM Age: 67 yrs Gender: Female Patient [facility] Reason For Study: Drug-induced cardiomyopathy (H) Ordering Physician: THEA BAR Referring Physician: THEA BAR Performed By: MB  BSA: 2.0 m2 Height: 64 in Weight: 204 lb HR: 79 ______________________________________________________________________________ Procedure Limited Echo Adult. ______________________________________________________________________________ Interpretation Summary  1. The left ventricle is normal in size.The visual ejection fraction is estimated at 55%.No regional wall motion abnormalities noted. 2. Normal right ventricle size and systolic function. 3. Normal left atrial size. 4. No hemodynamically significant valvular abnormalities on 2D or color flow imaging. ______________________________________________________________________________ Left Ventricle The left ventricle is normal in size. There is normal left ventricular wall thickness. The visual ejection fraction is estimated at 55%. No regional wall motion abnormalities noted.  Right Ventricle Normal right ventricle size and systolic function.  Atria Normal left atrial size. Right atrial size is normal. There is no color Doppler evidence of an atrial shunt.  Mitral Valve Mitral valve  leaflets appear normal. There is no evidence of mitral stenosis or clinically significant mitral regurgitation.  Tricuspid Valve Tricuspid valve leaflets appear normal. There is no evidence of tricuspid stenosis or clinically significant tricuspid regurgitation. Right ventricular systolic pressure could not be approximated due to inadequate tricuspid regurgitation.  Aortic Valve The aortic valve is trileaflet. Aortic valve leaflets appear normal. There is no evidence of aortic stenosis or clinically significant aortic regurgitation.  Pulmonic Valve The pulmonic valve is not well seen, but is grossly normal. This degree of valvular regurgitation is within normal limits. There is trace pulmonic valvular regurgitation.  Vessels The aorta root is normal. Normal size ascending aorta. IVC diameter <2.1 cm collapsing >50% with sniff suggests a normal RA pressure of 3 mmHg.  Pericardium There is no pericardial effusion.  ______________________________________________________________________________ MMode/2D Measurements & Calculations IVSd: 0.95 cm LVIDd: 4.5 cm LVIDs: 2.8 cm LVPWd: 0.83 cm FS: 37.3 %  LV mass(C)d: 128.7 grams LV mass(C)dI: 65.3 grams/m2 RWT: 0.37  Time Measurements MM HR: 68.0 BPM  ______________________________________________________________________________ Report approved by: Gloria Story 10/12/2022 02:38 PM           Signed by: ZOË Silva CNP

## 2022-10-19 NOTE — LETTER
"    10/19/2022         RE: Khloe Becker  [address] [place] [address]        Dear Colleague,    Thank you for referring your patient, Khloe Becker, to the [facility]. Please see a copy of my visit note below.    Oncology Rooming Note    October 19, 2022 1:30 PM   Khloe Becker is a 67 year old female who presents for:    Chief Complaint   Patient presents with     Oncology Clinic Visit     Malignant neoplasm of upper-outer quadrant of left breast in female, estrogen receptor positive (H)     Initial Vitals: BP (!) 173/78   Pulse 79   Temp 98.7  F (37.1  C)   Resp 18   Wt 94.3 kg (207 lb 12.8 oz)   SpO2 98%   BMI 35.67 kg/m   Estimated body mass index is 35.67 kg/m  as calculated from the following:    Height as of 9/28/22: 1.626 m (5' 4\").    Weight as of this encounter: 94.3 kg (207 lb 12.8 oz). Body surface area is 2.06 meters squared.  Mild Pain (3) Comment: Data Unavailable   No LMP recorded. Patient is postmenopausal.  Allergies reviewed: Yes  Medications reviewed: Yes    Medications: Medication refills not needed today.  Pharmacy name entered into [facility]: [id number] - [place], [place] - [address]  AT [address] [facility] [address]    Clinical concerns: None       Daniela Ma LPN              [facility] Hematology and Oncology Progress Note    Patient: Khloe Becker  MRN: [id number]  Date of Service: Oct 19, 2022          Reason for Visit    Chief Complaint   Patient presents with     Oncology Clinic Visit     Malignant neoplasm of upper-outer quadrant of left breast in female, estrogen receptor positive (H)       Assessment and Plan     Cancer Staging   Malignant neoplasm of upper-outer quadrant of left breast in female, estrogen receptor positive (H)  Staging form: Breast, AJCC 8th Edition  - Clinical: Stage IB (cT2, cN1, cM0, G3, ER+, OH+, HER2+) - Signed by Xavier Rubio MD on 6/10/2022  - Pathologic: Stage IA " (pT1a, pN1mi, cM0, G3, ER+, MD+, HER2+) - Signed by Xavier Rubio MD on 6/10/2022    1. Breast cancer, stage IA, triple positive, left side, status post 6 cycles of neoadjuvant TCHP and then left mastectomy: Patient did have residual disease at time of her surgery so she has now started on Kadcyla adjuvantly.  Today is cycle 10.  We will continue normal dosing.  She will return in 3 weeks for cycle 11.  Overall plan is to give 14 cycles which will be in January.  Patient does also continue on Arimidex which she started in June.    2.  Peripheral neuropathy: This is from all of her previous treatment as well as the Kadcyla.  She states that it is overall stable to slightly worse.  It does affect her ADLs and her walking and she is not able to do as much activity as she would like.  She is also starting to have some issues with driving so we will go down one dose level on Kadcyla from 3.6 mg/m2 to 3.0. monitor. Continues to take 300mg gabapentin at bedtime.     3.  Mild anxiety: uses clonazepma very intermittently. Requesting refill today. Encourage her to use sparingly. #10 pills given.       ECOG Performance    1 - Can't do physically strenuous work, but fully ambyulatory and can do light sedentary work    Distress Screening (within last 30 days)    1. How concerned are you about your ability to eat? : 0  2. How concerned are you about unintended weight loss or your current weight? : 0  3. How concerned are you about feeling depressed or very sad? : 0  4. How concerned are you about feeling anxious or very scared? : 0  5. Do you struggle with the loss of meaning and jeimy in your life? : Not at all  6. How concerned are you about work and home life issues that may be affected by your cancer? : 0  7. How concerned are you about knowing what resources are available to help you? : 0  8. Do you currently have what you would describe as Restorationism or spiritual struggles?            : Not at all       Pain  Pain  Score: Mild Pain (3)    Problem List    Patient Active Problem List   Diagnosis     Morbid obesity (H)     Malignant neoplasm of overlapping sites of left breast in female, estrogen receptor positive (H)     Malignant neoplasm of upper-outer quadrant of left breast in female, estrogen receptor positive (H)     Chemotherapy-induced peripheral neuropathy (H)     Musculoskeletal pain        ______________________________________________________________________________    History of Present Illness    Oncologic history  July 2021: Left breast mass palpated by patient.  Mammogram showing 2.1 x 2.1 x 2 cm hypoechoic mass at 2 o'clock position about 11 cm from the nipple.  Ultrasound-guided biopsy of the mass showed invasive ductal carcinoma, grade 3, ER, MO and HER2 positive.  Left axillary lymph node biopsy positive for metastatic disease.     Neoadjuvant chemotherapy with TCHP starting 9/2/2021.  Finished 6 cycles of chemo.  Last dose was 12/17/2020.  Carboplatin was held for cycle 4 and 6 due to poor tolerance.     Left mastectomy with sentinel lymph node biopsy and axillary lymph node dissection done on 1/19/2022.     Surgical path showing residual disease.  ypT1a, pN1(mi).  Patient declined radiation therapy.    Interim history: Patient is here today to continue on Kadcyla.  She states that it is going okay but she continues to have a lot of issues with fatigue, achiness as well is worsening neuropathy.  She says some days are stable but some days are worse.  She can only drive in certain shoes because she has a hard time feeling the pedals.  He is worried that this may get worse and prevent her from driving which will be devastating.  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  At this point she is only taking gabapentin at bedtime.  She denies any new bone or back pain.  Denies any unexplained weight loss.  Overall everything else is  stable    Review of Systems    Pertinent items are noted in HPI.    Past History    Past Medical History:   Diagnosis Date     Anemia      Breast cancer (H)     left     Gastroesophageal reflux disease      Hypertension      Motion sickness      Obese      PONV (postoperative nausea and vomiting)        PHYSICAL EXAM  BP (!) 173/78   Pulse 79   Temp 98.7  F (37.1  C)   Resp 18   Wt 94.3 kg (207 lb 12.8 oz)   SpO2 98%   BMI 35.67 kg/m      GENERAL: no acute distress. Cooperative in conversation. Here alone. Mask on  RESP: Regular respiratory rate. No expiratory wheezes   MUSCULOSKELETAL: no bilateral leg swelling  NEURO: non focal. Alert and oriented x3.   PSYCH: within normal limits. No depression or anxiety.  SKIN: exposed skin is dry intact.     Lab Results    Recent Results (from the past 168 hour(s))   Echocardiogram Limited   Result Value Ref Range    LVEF  55%    CBC with platelets and differential   Result Value Ref Range    WBC Count 7.1 4.0 - 11.0 10e3/uL    RBC Count 4.52 3.80 - 5.20 10e6/uL    Hemoglobin 12.5 11.7 - 15.7 g/dL    Hematocrit 39.4 35.0 - 47.0 %    MCV 87 78 - 100 fL    MCH 27.7 26.5 - 33.0 pg    MCHC 31.7 31.5 - 36.5 g/dL    RDW 15.0 10.0 - 15.0 %    Platelet Count 157 150 - 450 10e3/uL    % Neutrophils 65 %    % Lymphocytes 26 %    % Monocytes 6 %    % Eosinophils 2 %    % Basophils 1 %    % Immature Granulocytes 0 %    NRBCs per 100 WBC 0 <1 /100    Absolute Neutrophils 4.8 1.6 - 8.3 10e3/uL    Absolute Lymphocytes 1.8 0.8 - 5.3 10e3/uL    Absolute Monocytes 0.4 0.0 - 1.3 10e3/uL    Absolute Eosinophils 0.1 0.0 - 0.7 10e3/uL    Absolute Basophils 0.0 0.0 - 0.2 10e3/uL    Absolute Immature Granulocytes 0.0 <=0.4 10e3/uL    Absolute NRBCs 0.0 10e3/uL       Imaging    Echocardiogram Limited    Result Date: 10/12/2022  [id number]^DIPIKA^[place] [address]  Name: JOHN VINSON MRN: [id number] : 1955  Study Date: 10/12/2022 01:37 PM Age: 67 yrs Gender: Female Patient Location: Replaced by [facility] Reason For Study: Drug-induced cardiomyopathy (H) Ordering Physician: THEA BAR Referring Physician: THEA BAR Performed By: MB  BSA: 2.0 m2 Height: 64 in Weight: 204 lb HR: 79 ______________________________________________________________________________ Procedure Limited Echo Adult. ______________________________________________________________________________ Interpretation Summary  1. The left ventricle is normal in size.The visual ejection fraction is estimated at 55%.No regional wall motion abnormalities noted. 2. Normal right ventricle size and systolic function. 3. Normal left atrial size. 4. No hemodynamically significant valvular abnormalities on 2D or color flow imaging. ______________________________________________________________________________ Left Ventricle The left ventricle is normal in size. There is normal left ventricular wall thickness. The visual ejection fraction is estimated at 55%. No regional wall motion abnormalities noted.  Right Ventricle Normal right ventricle size and systolic function.  Atria Normal left atrial size. Right atrial size is normal. There is no color Doppler evidence of an atrial shunt.  Mitral Valve Mitral valve leaflets appear normal. There is no evidence of mitral stenosis or clinically significant mitral regurgitation.  Tricuspid Valve Tricuspid valve leaflets appear normal. There is no evidence of tricuspid stenosis or clinically significant tricuspid regurgitation. Right ventricular systolic pressure could not be approximated due to inadequate tricuspid regurgitation.  Aortic Valve The aortic valve is trileaflet. Aortic valve leaflets appear normal. There is no evidence of aortic stenosis or clinically significant aortic regurgitation.  Pulmonic Valve The pulmonic valve is not well seen, but is grossly normal. This degree of valvular regurgitation is within  normal limits. There is trace pulmonic valvular regurgitation.  Vessels The aorta root is normal. Normal size ascending aorta. IVC diameter <2.1 cm collapsing >50% with sniff suggests a normal RA pressure of 3 mmHg.  Pericardium There is no pericardial effusion.  ______________________________________________________________________________ MMode/2D Measurements & Calculations IVSd: 0.95 cm LVIDd: 4.5 cm LVIDs: 2.8 cm LVPWd: 0.83 cm FS: 37.3 %  LV mass(C)d: 128.7 grams LV mass(C)dI: 65.3 grams/m2 RWT: 0.37  Time Measurements MM HR: 68.0 BPM  ______________________________________________________________________________ Report approved by: Gloria Story 10/12/2022 02:38 PM           Signed by: ZOË Silva CNP      Again, thank you for allowing me to participate in the care of your patient.        Sincerely,        ZOË Silva CNP

## 2022-10-19 NOTE — PROGRESS NOTES
"Oncology Rooming Note    October 19, 2022 1:30 PM   Khloe Becker is a 67 year old female who presents for:    Chief Complaint   Patient presents with     Oncology Clinic Visit     Malignant neoplasm of upper-outer quadrant of left breast in female, estrogen receptor positive (H)     Initial Vitals: BP (!) 173/78   Pulse 79   Temp 98.7  F (37.1  C)   Resp 18   Wt 94.3 kg (207 lb 12.8 oz)   SpO2 98%   BMI 35.67 kg/m   Estimated body mass index is 35.67 kg/m  as calculated from the following:    Height as of 9/28/22: 1.626 m (5' 4\").    Weight as of this encounter: 94.3 kg (207 lb 12.8 oz). Body surface area is 2.06 meters squared.  Mild Pain (3) Comment: Data Unavailable   No LMP recorded. Patient is postmenopausal.  Allergies reviewed: Yes  Medications reviewed: Yes    Medications: Medication refills not needed today.  Pharmacy name entered into [facility]: [facility] #[id number] - [place] [id number]  AT [address]    Clinical concerns: None       Daniela Ma LPN            "

## 2022-10-19 NOTE — PROGRESS NOTES
Pt arrived ambulatory to clinic for Cycle # 10 Day # 1 of her chemotherapy regimen.  Port was accessed using aseptic technique without difficulties with excellent blood return.  Administered 1 L of NS and Kadcyla per MD order.  Pt tolerated infusion well, no s/s of infusion reaction.  Port was flushed with NS and Heparin then de-accessed using 2x2 and papertape.  Instructed pt to call clinic with any further questions or concerns.  Pt verbalized understanding of plan of care and return to clinic.

## 2022-11-09 ENCOUNTER — INFUSION THERAPY VISIT (OUTPATIENT)
Dept: INFUSION THERAPY | Facility: HOSPITAL | Age: 67
End: 2022-11-09
Attending: NURSE PRACTITIONER
Payer: COMMERCIAL

## 2022-11-09 ENCOUNTER — ONCOLOGY VISIT (OUTPATIENT)
Dept: ONCOLOGY | Facility: HOSPITAL | Age: 67
End: 2022-11-09
Attending: NURSE PRACTITIONER
Payer: COMMERCIAL

## 2022-11-09 VITALS
SYSTOLIC BLOOD PRESSURE: 143 MMHG | OXYGEN SATURATION: 97 % | TEMPERATURE: 97.8 F | HEART RATE: 70 BPM | DIASTOLIC BLOOD PRESSURE: 77 MMHG | RESPIRATION RATE: 18 BRPM | WEIGHT: 208.8 LBS | BODY MASS INDEX: 35.84 KG/M2

## 2022-11-09 DIAGNOSIS — Z17.0 MALIGNANT NEOPLASM OF UPPER-OUTER QUADRANT OF LEFT BREAST IN FEMALE, ESTROGEN RECEPTOR POSITIVE (H): ICD-10-CM

## 2022-11-09 DIAGNOSIS — E86.0 DEHYDRATION: Primary | ICD-10-CM

## 2022-11-09 DIAGNOSIS — T45.1X5A CHEMOTHERAPY-INDUCED PERIPHERAL NEUROPATHY (H): ICD-10-CM

## 2022-11-09 DIAGNOSIS — C50.412 MALIGNANT NEOPLASM OF UPPER-OUTER QUADRANT OF LEFT BREAST IN FEMALE, ESTROGEN RECEPTOR POSITIVE (H): Primary | ICD-10-CM

## 2022-11-09 DIAGNOSIS — G62.0 CHEMOTHERAPY-INDUCED PERIPHERAL NEUROPATHY (H): ICD-10-CM

## 2022-11-09 DIAGNOSIS — Z17.0 MALIGNANT NEOPLASM OF UPPER-OUTER QUADRANT OF LEFT BREAST IN FEMALE, ESTROGEN RECEPTOR POSITIVE (H): Primary | ICD-10-CM

## 2022-11-09 DIAGNOSIS — E86.0 DEHYDRATION: ICD-10-CM

## 2022-11-09 DIAGNOSIS — C50.412 MALIGNANT NEOPLASM OF UPPER-OUTER QUADRANT OF LEFT BREAST IN FEMALE, ESTROGEN RECEPTOR POSITIVE (H): ICD-10-CM

## 2022-11-09 LAB
ALBUMIN SERPL BCG-MCNC: 4 G/DL (ref 3.5–5.2)
ALP SERPL-CCNC: 109 U/L (ref 35–104)
ALT SERPL W P-5'-P-CCNC: 23 U/L (ref 10–35)
ANION GAP SERPL CALCULATED.3IONS-SCNC: 10 MMOL/L (ref 7–15)
AST SERPL W P-5'-P-CCNC: 29 U/L (ref 10–35)
BASOPHILS # BLD AUTO: 0.1 10E3/UL (ref 0–0.2)
BASOPHILS NFR BLD AUTO: 1 %
BILIRUB SERPL-MCNC: 0.4 MG/DL
BUN SERPL-MCNC: 13.1 MG/DL (ref 8–23)
CALCIUM SERPL-MCNC: 9.6 MG/DL (ref 8.8–10.2)
CHLORIDE SERPL-SCNC: 101 MMOL/L (ref 98–107)
CREAT SERPL-MCNC: 0.71 MG/DL (ref 0.51–0.95)
DEPRECATED HCO3 PLAS-SCNC: 30 MMOL/L (ref 22–29)
EOSINOPHIL # BLD AUTO: 0.2 10E3/UL (ref 0–0.7)
EOSINOPHIL NFR BLD AUTO: 3 %
ERYTHROCYTE [DISTWIDTH] IN BLOOD BY AUTOMATED COUNT: 14.8 % (ref 10–15)
GFR SERPL CREATININE-BSD FRML MDRD: >90 ML/MIN/1.73M2
GLUCOSE SERPL-MCNC: 85 MG/DL (ref 70–99)
HCT VFR BLD AUTO: 38.7 % (ref 35–47)
HGB BLD-MCNC: 12.5 G/DL (ref 11.7–15.7)
IMM GRANULOCYTES # BLD: 0 10E3/UL
IMM GRANULOCYTES NFR BLD: 0 %
LYMPHOCYTES # BLD AUTO: 1.9 10E3/UL (ref 0.8–5.3)
LYMPHOCYTES NFR BLD AUTO: 27 %
MCH RBC QN AUTO: 28 PG (ref 26.5–33)
MCHC RBC AUTO-ENTMCNC: 32.3 G/DL (ref 31.5–36.5)
MCV RBC AUTO: 87 FL (ref 78–100)
MONOCYTES # BLD AUTO: 0.4 10E3/UL (ref 0–1.3)
MONOCYTES NFR BLD AUTO: 7 %
NEUTROPHILS # BLD AUTO: 4.2 10E3/UL (ref 1.6–8.3)
NEUTROPHILS NFR BLD AUTO: 62 %
NRBC # BLD AUTO: 0 10E3/UL
NRBC BLD AUTO-RTO: 0 /100
PLATELET # BLD AUTO: 137 10E3/UL (ref 150–450)
POTASSIUM SERPL-SCNC: 3.7 MMOL/L (ref 3.4–5.3)
PROT SERPL-MCNC: 7.3 G/DL (ref 6.4–8.3)
RBC # BLD AUTO: 4.47 10E6/UL (ref 3.8–5.2)
SODIUM SERPL-SCNC: 141 MMOL/L (ref 136–145)
WBC # BLD AUTO: 6.8 10E3/UL (ref 4–11)

## 2022-11-09 PROCEDURE — 250N000011 HC RX IP 250 OP 636: Performed by: INTERNAL MEDICINE

## 2022-11-09 PROCEDURE — 99214 OFFICE O/P EST MOD 30 MIN: CPT | Performed by: INTERNAL MEDICINE

## 2022-11-09 PROCEDURE — 96360 HYDRATION IV INFUSION INIT: CPT

## 2022-11-09 PROCEDURE — 80053 COMPREHEN METABOLIC PANEL: CPT | Performed by: NURSE PRACTITIONER

## 2022-11-09 PROCEDURE — 85025 COMPLETE CBC W/AUTO DIFF WBC: CPT | Performed by: NURSE PRACTITIONER

## 2022-11-09 PROCEDURE — G0463 HOSPITAL OUTPT CLINIC VISIT: HCPCS | Mod: 25

## 2022-11-09 PROCEDURE — 258N000003 HC RX IP 258 OP 636: Performed by: INTERNAL MEDICINE

## 2022-11-09 RX ORDER — HEPARIN SODIUM (PORCINE) LOCK FLUSH IV SOLN 100 UNIT/ML 100 UNIT/ML
5 SOLUTION INTRAVENOUS
Status: CANCELLED | OUTPATIENT
Start: 2022-11-09

## 2022-11-09 RX ORDER — HEPARIN SODIUM,PORCINE 10 UNIT/ML
5 VIAL (ML) INTRAVENOUS
Status: CANCELLED | OUTPATIENT
Start: 2022-11-09

## 2022-11-09 RX ORDER — HEPARIN SODIUM (PORCINE) LOCK FLUSH IV SOLN 100 UNIT/ML 100 UNIT/ML
5 SOLUTION INTRAVENOUS
Status: DISCONTINUED | OUTPATIENT
Start: 2022-11-09 | End: 2022-11-09 | Stop reason: HOSPADM

## 2022-11-09 RX ADMIN — HEPARIN SODIUM (PORCINE) LOCK FLUSH IV SOLN 100 UNIT/ML 5 ML: 100 SOLUTION at 12:39

## 2022-11-09 RX ADMIN — SODIUM CHLORIDE, POTASSIUM CHLORIDE, SODIUM LACTATE AND CALCIUM CHLORIDE 1000 ML: 600; 310; 30; 20 INJECTION, SOLUTION INTRAVENOUS at 11:34

## 2022-11-09 ASSESSMENT — PAIN SCALES - GENERAL: PAINLEVEL: MILD PAIN (3)

## 2022-11-09 NOTE — PROGRESS NOTES
[facility] Hematology and Oncology Progress Note    Patient: Khloe Becker  MRN: [id number]  Date of Service: Nov 9, 2022          Reason for Visit    Chief Complaint   Patient presents with     Oncology Clinic Visit     Malignant neoplasm of upper-outer quadrant of left breast in female, estrogen receptor positive (H)       Assessment and Plan     Cancer Staging   Malignant neoplasm of upper-outer quadrant of left breast in female, estrogen receptor positive (H)  Staging form: Breast, AJCC 8th Edition  - Clinical: Stage IB (cT2, cN1, cM0, G3, ER+, GA+, HER2+) - Signed by Xavier Rubio MD on 6/10/2022  - Pathologic: Stage IA (pT1a, pN1mi, cM0, G3, ER+, GA+, HER2+) - Signed by Xavier Rubio MD on 6/10/2022    1. Breast cancer, stage IA, triple positive, left side, status post 6 cycles of neoadjuvant TCHP and then left mastectomy: Patient did have residual disease at time of her surgery so she has now started on Kadcyla adjuvantly.  Kadcyla dose was decreased for cycle 10 to 3.0 mg/kg.  Despite this her peripheral neuropathy has gotten worse.  We will hold off on treatment today.  We will recheck in 2 weeks to see how she is doing.  If neuropathy continues to get worse then we have a couple of options.  #1 we can switch treatment to Herceptin and finish total of 1 year of treatment.  #3 decrease the dose of Kadcyla further and continue treatment and monitor for neuropathy.  #3 stop treatment completely.  I do not think the latter option is preferred.  Probably we are looking at either continuing Kadcyla at a lower dose going forward or switching to Herceptin.  We will give her some IV fluids today.    Labs reviewed today.  Mild thrombocytopenia.  Mildly elevated alkaline phosphatase.  Other LFTs are within normal limits.    2.  Peripheral neuropathy: Overall worse.  Severe grade 2-3 peripheral sensory neuropathy affecting quality of life.  Holding treatment for now.  Continue gabapentin at  night.    3.  Panic attack: She had a panic attack today when she pulled into the parking lot.  Became tearful.  She has been going through emotional stress.  She was feeling a bit better at the time of the interview.  No intervention was done.      ECOG Performance    1 - Can't do physically strenuous work, but fully ambyulatory and can do light sedentary work    Distress Screening (within last 30 days)    1. How concerned are you about your ability to eat? : 0  2. How concerned are you about unintended weight loss or your current weight? : 0  3. How concerned are you about feeling depressed or very sad? : 0  4. How concerned are you about feeling anxious or very scared? : 0  5. Do you struggle with the loss of meaning and jeimy in your life? : Not at all  6. How concerned are you about work and home life issues that may be affected by your cancer? : 0  7. How concerned are you about knowing what resources are available to help you? : 0  8. Do you currently have what you would describe as Latter day or spiritual struggles?            : Not at all       Pain  Pain Score: Mild Pain (3)    Problem List    Patient Active Problem List   Diagnosis     Morbid obesity (H)     Malignant neoplasm of overlapping sites of left breast in female, estrogen receptor positive (H)     Malignant neoplasm of upper-outer quadrant of left breast in female, estrogen receptor positive (H)     Chemotherapy-induced peripheral neuropathy (H)     Musculoskeletal pain     Dehydration        ______________________________________________________________________________    History of Present Illness    Oncologic history  July 2021: Left breast mass palpated by patient.  Mammogram showing 2.1 x 2.1 x 2 cm hypoechoic mass at 2 o'clock position about 11 cm from the nipple.  Ultrasound-guided biopsy of the mass showed invasive ductal carcinoma, grade 3, ER, NM and HER2 positive.  Left axillary lymph node biopsy positive for metastatic  disease.     Neoadjuvant chemotherapy with TCHP starting 9/2/2021.  Finished 6 cycles of chemo.  Last dose was 12/17/2020.  Carboplatin was held for cycle 4 and 6 due to poor tolerance.     Left mastectomy with sentinel lymph node biopsy and axillary lymph node dissection done on 1/19/2022.     Surgical path showing residual disease.  ypT1a, pN1(mi).  Patient declined radiation therapy.    Interim history: Patient is here today to continue on Kadcyla.  Her peripheral neuropathy has been getting progressively worse.  She has significant sensory neuropathy especially in the feet up to her knees.  Also has neuropathy in her hands.  This has progressed to the point that is affecting her quality of life.  She has to be very careful while walking.  Uses a cane.  No motor weakness.  Also has issues with musculoskeletal pain probably secondary to anastrozole.  Overall feels that her neuropathy has gotten worse to the point that she is considering holding treatment for a while.       Review of Systems    Pertinent items are noted in HPI.    Past History    Past Medical History:   Diagnosis Date     Anemia      Breast cancer (H)     left     Gastroesophageal reflux disease      Hypertension      Motion sickness      Obese      PONV (postoperative nausea and vomiting)        PHYSICAL EXAM  BP (!) 143/77   Pulse 70   Temp 97.8  F (36.6  C)   Resp 18   Wt 94.7 kg (208 lb 12.8 oz)   SpO2 97%   BMI 35.84 kg/m      GENERAL: no acute distress. Cooperative in conversation. Here alone. Mask on  RESP: Regular respiratory rate. No expiratory wheezes   MUSCULOSKELETAL: no bilateral leg swelling  Lymph nodes: Bilateral cervical or axillary lymphadenopathy  NEURO: non focal. Alert and oriented x3.   PSYCH: within normal limits. No depression or anxiety.  SKIN: exposed skin is dry intact.     Lab Results    Recent Results (from the past 168 hour(s))   Comprehensive metabolic panel   Result Value Ref Range    Sodium 141 136 - 145  mmol/L    Potassium 3.7 3.4 - 5.3 mmol/L    Chloride 101 98 - 107 mmol/L    Carbon Dioxide (CO2) 30 (H) 22 - 29 mmol/L    Anion Gap 10 7 - 15 mmol/L    Urea Nitrogen 13.1 8.0 - 23.0 mg/dL    Creatinine 0.71 0.51 - 0.95 mg/dL    Calcium 9.6 8.8 - 10.2 mg/dL    Glucose 85 70 - 99 mg/dL    Alkaline Phosphatase 109 (H) 35 - 104 U/L    AST 29 10 - 35 U/L    ALT 23 10 - 35 U/L    Protein Total 7.3 6.4 - 8.3 g/dL    Albumin 4.0 3.5 - 5.2 g/dL    Bilirubin Total 0.4 <=1.2 mg/dL    GFR Estimate >90 >60 mL/min/1.73m2   CBC with platelets and differential   Result Value Ref Range    WBC Count 6.8 4.0 - 11.0 10e3/uL    RBC Count 4.47 3.80 - 5.20 10e6/uL    Hemoglobin 12.5 11.7 - 15.7 g/dL    Hematocrit 38.7 35.0 - 47.0 %    MCV 87 78 - 100 fL    MCH 28.0 26.5 - 33.0 pg    MCHC 32.3 31.5 - 36.5 g/dL    RDW 14.8 10.0 - 15.0 %    Platelet Count 137 (L) 150 - 450 10e3/uL    % Neutrophils 62 %    % Lymphocytes 27 %    % Monocytes 7 %    % Eosinophils 3 %    % Basophils 1 %    % Immature Granulocytes 0 %    NRBCs per 100 WBC 0 <1 /100    Absolute Neutrophils 4.2 1.6 - 8.3 10e3/uL    Absolute Lymphocytes 1.9 0.8 - 5.3 10e3/uL    Absolute Monocytes 0.4 0.0 - 1.3 10e3/uL    Absolute Eosinophils 0.2 0.0 - 0.7 10e3/uL    Absolute Basophils 0.1 0.0 - 0.2 10e3/uL    Absolute Immature Granulocytes 0.0 <=0.4 10e3/uL    Absolute NRBCs 0.0 10e3/uL       Imaging    Echocardiogram Limited    Result Date: 10/12/2022  [id number]^DIPIKA^THEA  [address] [address]  Name: JOHN VINSON MRN: [id number] : 1955 Study Date: 10/12/2022 01:37 PM Age: 67 yrs Gender: Female Patient Location: [facility] Reason For Study: Drug-induced cardiomyopathy (H) Ordering Physician: THEA BAR Referring Physician: THEA BAR Performed By: MB  BSA: 2.0 m2 Height: 64 in Weight: 204 lb HR: 79 ______________________________________________________________________________ Procedure  Limited Echo Adult. ______________________________________________________________________________ Interpretation Summary  1. The left ventricle is normal in size.The visual ejection fraction is estimated at 55%.No regional wall motion abnormalities noted. 2. Normal right ventricle size and systolic function. 3. Normal left atrial size. 4. No hemodynamically significant valvular abnormalities on 2D or color flow imaging. ______________________________________________________________________________ Left Ventricle The left ventricle is normal in size. There is normal left ventricular wall thickness. The visual ejection fraction is estimated at 55%. No regional wall motion abnormalities noted.  Right Ventricle Normal right ventricle size and systolic function.  Atria Normal left atrial size. Right atrial size is normal. There is no color Doppler evidence of an atrial shunt.  Mitral Valve Mitral valve leaflets appear normal. There is no evidence of mitral stenosis or clinically significant mitral regurgitation.  Tricuspid Valve Tricuspid valve leaflets appear normal. There is no evidence of tricuspid stenosis or clinically significant tricuspid regurgitation. Right ventricular systolic pressure could not be approximated due to inadequate tricuspid regurgitation.  Aortic Valve The aortic valve is trileaflet. Aortic valve leaflets appear normal. There is no evidence of aortic stenosis or clinically significant aortic regurgitation.  Pulmonic Valve The pulmonic valve is not well seen, but is grossly normal. This degree of valvular regurgitation is within normal limits. There is trace pulmonic valvular regurgitation.  Vessels The aorta root is normal. Normal size ascending aorta. IVC diameter <2.1 cm collapsing >50% with sniff suggests a normal RA pressure of 3 mmHg.  Pericardium There is no pericardial effusion.  ______________________________________________________________________________ MMode/2D Measurements &  Calculations IVSd: 0.95 cm LVIDd: 4.5 cm LVIDs: 2.8 cm LVPWd: 0.83 cm FS: 37.3 %  LV mass(C)d: 128.7 grams LV mass(C)dI: 65.3 grams/m2 RWT: 0.37  Time Measurements MM HR: 68.0 BPM  ______________________________________________________________________________ Report approved by: Gloria Story 10/12/2022 02:38 PM       A total of 30 min were spent today on this visit which included face to face conversation with the patient, EMR review, counseling and co-ordination of care and medical documentation.      Signed by: Xavier Rubio MD

## 2022-11-09 NOTE — PROGRESS NOTES
"Oncology Rooming Note    November 9, 2022 10:02 AM   Khloe Becker is a 67 year old female who presents for:    Chief Complaint   Patient presents with     Oncology Clinic Visit     Malignant neoplasm of upper-outer quadrant of left breast in female, estrogen receptor positive (H)     Initial Vitals: BP (!) 143/77   Pulse 70   Temp 97.8  F (36.6  C)   Resp 18   Wt 94.7 kg (208 lb 12.8 oz)   SpO2 97%   BMI 35.84 kg/m   Estimated body mass index is 35.84 kg/m  as calculated from the following:    Height as of 9/28/22: 1.626 m (5' 4\").    Weight as of this encounter: 94.7 kg (208 lb 12.8 oz). Body surface area is 2.07 meters squared.  Mild Pain (3) Comment: Data Unavailable   No LMP recorded. Patient is postmenopausal.  Allergies reviewed: Yes  Medications reviewed: Yes    Medications: Medication refills not needed today.  Pharmacy name entered into [facility]: [id number] - [address]  AT [facility]    Clinical concerns: Wants to skip chemo today-maybe just get fluids. Had a panic attack in the parking lot before coming in.        Daniela Ma LPN            "

## 2022-11-09 NOTE — LETTER
"    11/9/2022         RE: Khloe Becker  [address] [place] [address]        Dear Colleague,    Thank you for referring your patient, Khloe Becker, to the [facility]. Please see a copy of my visit note below.    Oncology Rooming Note    November 9, 2022 10:02 AM   Khloe Becker is a 67 year old female who presents for:    Chief Complaint   Patient presents with     Oncology Clinic Visit     Malignant neoplasm of upper-outer quadrant of left breast in female, estrogen receptor positive (H)     Initial Vitals: BP (!) 143/77   Pulse 70   Temp 97.8  F (36.6  C)   Resp 18   Wt 94.7 kg (208 lb 12.8 oz)   SpO2 97%   BMI 35.84 kg/m   Estimated body mass index is 35.84 kg/m  as calculated from the following:    Height as of 9/28/22: 1.626 m (5' 4\").    Weight as of this encounter: 94.7 kg (208 lb 12.8 oz). Body surface area is 2.07 meters squared.  Mild Pain (3) Comment: Data Unavailable   No LMP recorded. Patient is postmenopausal.  Allergies reviewed: Yes  Medications reviewed: Yes    Medications: Medication refills not needed today.  Pharmacy name entered into [facility]: [facility] #[id number] - [place] - [address]  AT [facility]    Clinical concerns: Wants to skip chemo today-maybe just get fluids. Had a panic attack in the parking lot before coming in.        Daniela Ma LPN              [facility] Hematology and Oncology Progress Note    Patient: Khloe Becker  MRN: [id number]  Date of Service: Nov 9, 2022          Reason for Visit    Chief Complaint   Patient presents with     Oncology Clinic Visit     Malignant neoplasm of upper-outer quadrant of left breast in female, estrogen receptor positive (H)       Assessment and Plan     Cancer Staging   Malignant neoplasm of upper-outer quadrant of left breast in female, estrogen receptor positive (H)  Staging form: Breast, AJCC 8th Edition  - Clinical: Stage IB (cT2, cN1, " cM0, G3, ER+, [place]+, HER2+) - Signed by Xavier Rubio MD on 6/10/2022  - Pathologic: Stage IA (pT1a, pN1mi, cM0, G3, ER+, [place]+, HER2+) - Signed by Xavier Rubio MD on 6/10/2022    1. Breast cancer, stage IA, triple positive, left side, status post 6 cycles of neoadjuvant TCHP and then left mastectomy: Patient did have residual disease at time of her surgery so she has now started on Kadcyla adjuvantly.  Kadcyla dose was decreased for cycle 10 to 3.0 mg/kg.  Despite this her peripheral neuropathy has gotten worse.  We will hold off on treatment today.  We will recheck in 2 weeks to see how she is doing.  If neuropathy continues to get worse then we have a couple of options.  #1 we can switch treatment to Herceptin and finish total of 1 year of treatment.  #3 decrease the dose of Kadcyla further and continue treatment and monitor for neuropathy.  #3 stop treatment completely.  I do not think the latter option is preferred.  Probably we are looking at either continuing Kadcyla at a lower dose going forward or switching to Herceptin.  We will give her some IV fluids today.    Labs reviewed today.  Mild thrombocytopenia.  Mildly elevated alkaline phosphatase.  Other LFTs are within normal limits.    2.  Peripheral neuropathy: Overall worse.  Severe grade 2-3 peripheral sensory neuropathy affecting quality of life.  Holding treatment for now.  Continue gabapentin at night.    3.  Panic attack: She had a panic attack today when she pulled into the parking lot.  Became tearful.  She has been going through emotional stress.  She was feeling a bit better at the time of the interview.  No intervention was done.      ECOG Performance    1 - Can't do physically strenuous work, but fully ambyulatory and can do light sedentary work    Distress Screening (within last 30 days)    1. How concerned are you about your ability to eat? : 0  2. How concerned are you about unintended weight loss or your current weight? : 0  3.  How concerned are you about feeling depressed or very sad? : 0  4. How concerned are you about feeling anxious or very scared? : 0  5. Do you struggle with the loss of meaning and jeimy in your life? : Not at all  6. How concerned are you about work and home life issues that may be affected by your cancer? : 0  7. How concerned are you about knowing what resources are available to help you? : 0  8. Do you currently have what you would describe as Buddhist or spiritual struggles?            : Not at all       Pain  Pain Score: Mild Pain (3)    Problem List    Patient Active Problem List   Diagnosis     Morbid obesity (H)     Malignant neoplasm of overlapping sites of left breast in female, estrogen receptor positive (H)     Malignant neoplasm of upper-outer quadrant of left breast in female, estrogen receptor positive (H)     Chemotherapy-induced peripheral neuropathy (H)     Musculoskeletal pain     Dehydration        ______________________________________________________________________________    History of Present Illness    Oncologic history  July 2021: Left breast mass palpated by patient.  Mammogram showing 2.1 x 2.1 x 2 cm hypoechoic mass at 2 o'clock position about 11 cm from the nipple.  Ultrasound-guided biopsy of the mass showed invasive ductal carcinoma, grade 3, ER, UT and HER2 positive.  Left axillary lymph node biopsy positive for metastatic disease.     Neoadjuvant chemotherapy with TCHP starting 9/2/2021.  Finished 6 cycles of chemo.  Last dose was 12/17/2020.  Carboplatin was held for cycle 4 and 6 due to poor tolerance.     Left mastectomy with sentinel lymph node biopsy and axillary lymph node dissection done on 1/19/2022.     Surgical path showing residual disease.  ypT1a, pN1(mi).  Patient declined radiation therapy.    Interim history: Patient is here today to continue on Kadcyla.  Her peripheral neuropathy has been getting progressively worse.  She has significant sensory neuropathy especially  in the feet up to her knees.  Also has neuropathy in her hands.  This has progressed to the point that is affecting her quality of life.  She has to be very careful while walking.  Uses a cane.  No motor weakness.  Also has issues with musculoskeletal pain probably secondary to anastrozole.  Overall feels that her neuropathy has gotten worse to the point that she is considering holding treatment for a while.       Review of Systems    Pertinent items are noted in HPI.    Past History    Past Medical History:   Diagnosis Date     Anemia      Breast cancer (H)     left     Gastroesophageal reflux disease      Hypertension      Motion sickness      Obese      PONV (postoperative nausea and vomiting)        PHYSICAL EXAM  BP (!) 143/77   Pulse 70   Temp 97.8  F (36.6  C)   Resp 18   Wt 94.7 kg (208 lb 12.8 oz)   SpO2 97%   BMI 35.84 kg/m      GENERAL: no acute distress. Cooperative in conversation. Here alone. Mask on  RESP: Regular respiratory rate. No expiratory wheezes   MUSCULOSKELETAL: no bilateral leg swelling  Lymph nodes: Bilateral cervical or axillary lymphadenopathy  NEURO: non focal. Alert and oriented x3.   PSYCH: within normal limits. No depression or anxiety.  SKIN: exposed skin is dry intact.     Lab Results    Recent Results (from the past 168 hour(s))   Comprehensive metabolic panel   Result Value Ref Range    Sodium 141 136 - 145 mmol/L    Potassium 3.7 3.4 - 5.3 mmol/L    Chloride 101 98 - 107 mmol/L    Carbon Dioxide (CO2) 30 (H) 22 - 29 mmol/L    Anion Gap 10 7 - 15 mmol/L    Urea Nitrogen 13.1 8.0 - 23.0 mg/dL    Creatinine 0.71 0.51 - 0.95 mg/dL    Calcium 9.6 8.8 - 10.2 mg/dL    Glucose 85 70 - 99 mg/dL    Alkaline Phosphatase 109 (H) 35 - 104 U/L    AST 29 10 - 35 U/L    ALT 23 10 - 35 U/L    Protein Total 7.3 6.4 - 8.3 g/dL    Albumin 4.0 3.5 - 5.2 g/dL    Bilirubin Total 0.4 <=1.2 mg/dL    GFR Estimate >90 >60 mL/min/1.73m2   CBC with platelets and differential   Result Value Ref Range     WBC Count 6.8 4.0 - 11.0 10e3/uL    RBC Count 4.47 3.80 - 5.20 10e6/uL    Hemoglobin 12.5 11.7 - 15.7 g/dL    Hematocrit 38.7 35.0 - 47.0 %    MCV 87 78 - 100 fL    MCH 28.0 26.5 - 33.0 pg    MCHC 32.3 31.5 - 36.5 g/dL    RDW 14.8 10.0 - 15.0 %    Platelet Count 137 (L) 150 - 450 10e3/uL    % Neutrophils 62 %    % Lymphocytes 27 %    % Monocytes 7 %    % Eosinophils 3 %    % Basophils 1 %    % Immature Granulocytes 0 %    NRBCs per 100 WBC 0 <1 /100    Absolute Neutrophils 4.2 1.6 - 8.3 10e3/uL    Absolute Lymphocytes 1.9 0.8 - 5.3 10e3/uL    Absolute Monocytes 0.4 0.0 - 1.3 10e3/uL    Absolute Eosinophils 0.2 0.0 - 0.7 10e3/uL    Absolute Basophils 0.1 0.0 - 0.2 10e3/uL    Absolute Immature Granulocytes 0.0 <=0.4 10e3/uL    Absolute NRBCs 0.0 10e3/uL       Imaging    Echocardiogram Limited    Result Date: 10/12/2022  [id number]^DIPIKA^[place] [address]  Name: JOHN VINSON MRN: [id number] : 1955 Study Date: 10/12/2022 01:37 PM Age: 67 yrs Gender: Female Patient Location: [facility] Reason For Study: Drug-induced cardiomyopathy (H) Ordering Physician: THEA BAR Referring Physician: THEA BAR Performed By: MB  BSA: 2.0 m2 Height: 64 in Weight: 204 lb HR: 79 ______________________________________________________________________________ Procedure Limited Echo Adult. ______________________________________________________________________________ Interpretation Summary  1. The left ventricle is normal in size.The visual ejection fraction is estimated at 55%.No regional wall motion abnormalities noted. 2. Normal right ventricle size and systolic function. 3. Normal left atrial size. 4. No hemodynamically significant valvular abnormalities on 2D or color flow imaging. ______________________________________________________________________________ Left Ventricle The left ventricle is normal in size. There is normal left  ventricular wall thickness. The visual ejection fraction is estimated at 55%. No regional wall motion abnormalities noted.  Right Ventricle Normal right ventricle size and systolic function.  Atria Normal left atrial size. Right atrial size is normal. There is no color Doppler evidence of an atrial shunt.  Mitral Valve Mitral valve leaflets appear normal. There is no evidence of mitral stenosis or clinically significant mitral regurgitation.  Tricuspid Valve Tricuspid valve leaflets appear normal. There is no evidence of tricuspid stenosis or clinically significant tricuspid regurgitation. Right ventricular systolic pressure could not be approximated due to inadequate tricuspid regurgitation.  Aortic Valve The aortic valve is trileaflet. Aortic valve leaflets appear normal. There is no evidence of aortic stenosis or clinically significant aortic regurgitation.  Pulmonic Valve The pulmonic valve is not well seen, but is grossly normal. This degree of valvular regurgitation is within normal limits. There is trace pulmonic valvular regurgitation.  Vessels The aorta root is normal. Normal size ascending aorta. IVC diameter <2.1 cm collapsing >50% with sniff suggests a normal RA pressure of 3 mmHg.  Pericardium There is no pericardial effusion.  ______________________________________________________________________________ MMode/2D Measurements & Calculations IVSd: 0.95 cm LVIDd: 4.5 cm LVIDs: 2.8 cm LVPWd: 0.83 cm FS: 37.3 %  LV mass(C)d: 128.7 grams LV mass(C)dI: 65.3 grams/m2 RWT: 0.37  Time Measurements MM HR: 68.0 BPM  ______________________________________________________________________________ Report approved by: Gloria Story 10/12/2022 02:38 PM       A total of 30 min were spent today on this visit which included face to face conversation with the patient, EMR review, counseling and co-ordination of care and medical documentation.      Signed by: Xavier Rubio MD      Again, thank you for  allowing me to participate in the care of your patient.        Sincerely,        Xavier Rubio MD

## 2022-11-09 NOTE — PROGRESS NOTES
Infusion Nursing Note:  Khloe Becker presents today for hydration.    Patient seen by provider today: Yes:    present during visit today: Not Applicable.    Note: N/A.    Intravenous Access:  Implanted Port.    Treatment Conditions:  Lab Results   Component Value Date    HGB 12.5 11/09/2022    WBC 6.8 11/09/2022    ANEUTAUTO 4.2 11/09/2022     (L) 11/09/2022      Lab Results   Component Value Date     11/09/2022    POTASSIUM 3.7 11/09/2022    CR 0.71 11/09/2022    VIJAY 9.6 11/09/2022    BILITOTAL 0.4 11/09/2022    ALBUMIN 4.0 11/09/2022    ALT 23 11/09/2022    AST 29 11/09/2022     Results reviewed, labs MET treatment parameters, ok to proceed with treatment.    Post Infusion Assessment:  Patient tolerated infusion without incident. Patient is declining chemotherapy today. Provider is aware. Hydration only today.    Discharge Plan:   Discharge instructions reviewed with: Patient.  Patient discharged in stable condition accompanied by: self.  Departure Mode: Ambulatory.      Nilo Anne RN

## 2022-11-23 ENCOUNTER — ONCOLOGY VISIT (OUTPATIENT)
Dept: ONCOLOGY | Facility: HOSPITAL | Age: 67
End: 2022-11-23
Attending: INTERNAL MEDICINE
Payer: COMMERCIAL

## 2022-11-23 ENCOUNTER — LAB (OUTPATIENT)
Dept: INFUSION THERAPY | Facility: HOSPITAL | Age: 67
End: 2022-11-23
Attending: INTERNAL MEDICINE
Payer: COMMERCIAL

## 2022-11-23 VITALS — HEIGHT: 64 IN | BODY MASS INDEX: 35.85 KG/M2 | WEIGHT: 210 LBS

## 2022-11-23 DIAGNOSIS — C50.412 MALIGNANT NEOPLASM OF UPPER-OUTER QUADRANT OF LEFT BREAST IN FEMALE, ESTROGEN RECEPTOR POSITIVE (H): ICD-10-CM

## 2022-11-23 DIAGNOSIS — Z17.0 MALIGNANT NEOPLASM OF UPPER-OUTER QUADRANT OF LEFT BREAST IN FEMALE, ESTROGEN RECEPTOR POSITIVE (H): ICD-10-CM

## 2022-11-23 DIAGNOSIS — C50.412 MALIGNANT NEOPLASM OF UPPER-OUTER QUADRANT OF LEFT BREAST IN FEMALE, ESTROGEN RECEPTOR POSITIVE (H): Primary | ICD-10-CM

## 2022-11-23 DIAGNOSIS — Z17.0 MALIGNANT NEOPLASM OF UPPER-OUTER QUADRANT OF LEFT BREAST IN FEMALE, ESTROGEN RECEPTOR POSITIVE (H): Primary | ICD-10-CM

## 2022-11-23 DIAGNOSIS — G62.0 CHEMOTHERAPY-INDUCED PERIPHERAL NEUROPATHY (H): Primary | ICD-10-CM

## 2022-11-23 DIAGNOSIS — T45.1X5A CHEMOTHERAPY-INDUCED PERIPHERAL NEUROPATHY (H): Primary | ICD-10-CM

## 2022-11-23 LAB
ALBUMIN SERPL BCG-MCNC: 4.2 G/DL (ref 3.5–5.2)
ALP SERPL-CCNC: 112 U/L (ref 35–104)
ALT SERPL W P-5'-P-CCNC: 19 U/L (ref 10–35)
ANION GAP SERPL CALCULATED.3IONS-SCNC: 9 MMOL/L (ref 7–15)
AST SERPL W P-5'-P-CCNC: 28 U/L (ref 10–35)
BASOPHILS # BLD AUTO: 0.1 10E3/UL (ref 0–0.2)
BASOPHILS NFR BLD AUTO: 1 %
BILIRUB SERPL-MCNC: 0.3 MG/DL
BUN SERPL-MCNC: 17.8 MG/DL (ref 8–23)
CALCIUM SERPL-MCNC: 9.8 MG/DL (ref 8.8–10.2)
CHLORIDE SERPL-SCNC: 102 MMOL/L (ref 98–107)
CREAT SERPL-MCNC: 0.68 MG/DL (ref 0.51–0.95)
DEPRECATED HCO3 PLAS-SCNC: 30 MMOL/L (ref 22–29)
EOSINOPHIL # BLD AUTO: 0.2 10E3/UL (ref 0–0.7)
EOSINOPHIL NFR BLD AUTO: 3 %
ERYTHROCYTE [DISTWIDTH] IN BLOOD BY AUTOMATED COUNT: 14.9 % (ref 10–15)
GFR SERPL CREATININE-BSD FRML MDRD: >90 ML/MIN/1.73M2
GLUCOSE SERPL-MCNC: 103 MG/DL (ref 70–99)
HCT VFR BLD AUTO: 38.3 % (ref 35–47)
HGB BLD-MCNC: 12 G/DL (ref 11.7–15.7)
IMM GRANULOCYTES # BLD: 0 10E3/UL
IMM GRANULOCYTES NFR BLD: 0 %
LYMPHOCYTES # BLD AUTO: 1.7 10E3/UL (ref 0.8–5.3)
LYMPHOCYTES NFR BLD AUTO: 24 %
MCH RBC QN AUTO: 27.8 PG (ref 26.5–33)
MCHC RBC AUTO-ENTMCNC: 31.3 G/DL (ref 31.5–36.5)
MCV RBC AUTO: 89 FL (ref 78–100)
MONOCYTES # BLD AUTO: 0.4 10E3/UL (ref 0–1.3)
MONOCYTES NFR BLD AUTO: 5 %
NEUTROPHILS # BLD AUTO: 4.6 10E3/UL (ref 1.6–8.3)
NEUTROPHILS NFR BLD AUTO: 67 %
NRBC # BLD AUTO: 0 10E3/UL
NRBC BLD AUTO-RTO: 0 /100
PLATELET # BLD AUTO: 152 10E3/UL (ref 150–450)
POTASSIUM SERPL-SCNC: 3.9 MMOL/L (ref 3.4–5.3)
PROT SERPL-MCNC: 7.6 G/DL (ref 6.4–8.3)
RBC # BLD AUTO: 4.31 10E6/UL (ref 3.8–5.2)
SODIUM SERPL-SCNC: 141 MMOL/L (ref 136–145)
WBC # BLD AUTO: 6.9 10E3/UL (ref 4–11)

## 2022-11-23 PROCEDURE — 85025 COMPLETE CBC W/AUTO DIFF WBC: CPT | Performed by: INTERNAL MEDICINE

## 2022-11-23 PROCEDURE — 80053 COMPREHEN METABOLIC PANEL: CPT | Performed by: INTERNAL MEDICINE

## 2022-11-23 PROCEDURE — 250N000011 HC RX IP 250 OP 636: Performed by: INTERNAL MEDICINE

## 2022-11-23 PROCEDURE — G0463 HOSPITAL OUTPT CLINIC VISIT: HCPCS | Mod: 25

## 2022-11-23 PROCEDURE — 99214 OFFICE O/P EST MOD 30 MIN: CPT | Performed by: INTERNAL MEDICINE

## 2022-11-23 PROCEDURE — 36591 DRAW BLOOD OFF VENOUS DEVICE: CPT

## 2022-11-23 RX ORDER — HEPARIN SODIUM (PORCINE) LOCK FLUSH IV SOLN 100 UNIT/ML 100 UNIT/ML
5 SOLUTION INTRAVENOUS
Status: DISCONTINUED | OUTPATIENT
Start: 2022-11-23 | End: 2022-11-23 | Stop reason: HOSPADM

## 2022-11-23 RX ORDER — HEPARIN SODIUM (PORCINE) LOCK FLUSH IV SOLN 100 UNIT/ML 100 UNIT/ML
5 SOLUTION INTRAVENOUS
Status: CANCELLED | OUTPATIENT
Start: 2022-11-23

## 2022-11-23 RX ADMIN — Medication 5 ML: at 09:31

## 2022-11-23 ASSESSMENT — PAIN SCALES - GENERAL: PAINLEVEL: MODERATE PAIN (5)

## 2022-11-23 NOTE — PROGRESS NOTES
"Oncology Rooming Note    November 23, 2022 8:53 AM   Khloe Becker is a 67 year old female who presents for:    Chief Complaint   Patient presents with     Oncology Clinic Visit     Malignant neoplasm of upper-outer quadrant of left breast in female, estrogen receptor positive     Initial Vitals: Ht 1.626 m (5' 4\")   Wt 95.3 kg (210 lb)   BMI 36.05 kg/m   Estimated body mass index is 36.05 kg/m  as calculated from the following:    Height as of this encounter: 1.626 m (5' 4\").    Weight as of this encounter: 95.3 kg (210 lb). Body surface area is 2.07 meters squared.  Moderate Pain (5) Comment: Data Unavailable   No LMP recorded. Patient is postmenopausal.  Allergies reviewed: Yes  Medications reviewed: Yes    Medications: Medication refills not needed today.  Pharmacy name entered into [facility]: [facility] #[id number] [address]  AT [facility]    Clinical concerns: infusion and labs     Jennifer Mckinney            "

## 2022-11-23 NOTE — LETTER
"    11/23/2022         RE: Khloe Becker  [address]  [facility] [address]        Dear Colleague,    Thank you for referring your patient, Khloe Becker, to the [facility]. Please see a copy of my visit note below.    Oncology Rooming Note    November 23, 2022 8:53 AM   Khloe Becker is a 67 year old female who presents for:    Chief Complaint   Patient presents with     Oncology Clinic Visit     Malignant neoplasm of upper-outer quadrant of left breast in female, estrogen receptor positive     Initial Vitals: Ht 1.626 m (5' 4\")   Wt 95.3 kg (210 lb)   BMI 36.05 kg/m   Estimated body mass index is 36.05 kg/m  as calculated from the following:    Height as of this encounter: 1.626 m (5' 4\").    Weight as of this encounter: 95.3 kg (210 lb). Body surface area is 2.07 meters squared.  Moderate Pain (5) Comment: Data Unavailable   No LMP recorded. Patient is postmenopausal.  Allergies reviewed: Yes  Medications reviewed: Yes    Medications: Medication refills not needed today.  Pharmacy name entered into [facility]: "[id number] [place] [address] [facility]  AT [facility]    Clinical concerns: infusion and labs     Jennifer Mckinney              [facility] Hematology and Oncology Progress Note    Patient: Khloe Becker  MRN: [id number]  Date of Service: Nov 23, 2022          Reason for Visit    Chief Complaint   Patient presents with     Oncology Clinic Visit     Malignant neoplasm of upper-outer quadrant of left breast in female, estrogen receptor positive       Assessment and Plan     Cancer Staging   Malignant neoplasm of upper-outer quadrant of left breast in female, estrogen receptor positive (H)  Staging form: Breast, AJCC 8th Edition  - Clinical: Stage IB (cT2, cN1, cM0, G3, ER+, NH+, HER2+) - Signed by Xavier Rubio MD on 6/10/2022  - Pathologic: Stage IA (pT1a, pN1mi, cM0, G3, ER+, NH+, HER2+) - Signed by Ramiro" MD Xavier on 6/10/2022    1. Breast cancer, stage IA, triple positive, left side, status post 6 cycles of neoadjuvant TCHP and then left mastectomy: Patient did have residual disease at time of her surgery so was started on Kadcyla adjuvantly.  She has received 10 doses of Kadcyla so far.  Last dose was reduced to 3 mg/kg due to peripheral neuropathy.  We gave him a treatment break for 2 more weeks after last visit to see if her neuropathy improves.  It still about 2-3.  This is affecting her quality of life significantly.  So in this setting I think switching to Herceptin makes sense.  Reviewed the rationale behind this.  Although for residual disease after neoadjuvant chemotherapy and HER2 positive disease, Kadcyla is recommended over Herceptin there is no established overall survival benefit with the strategy and I think Herceptin is still an acceptable alternative especially if she is not tolerating Kadcyla.  She is agreeable with the plan.  I have discontinued Kadcyla plan.  We will reschedule infusion to next week.  I reviewed the potential side effects and complications associated with Herceptin.  She has tolerated this in the past pretty well.  Also discussed about adding Perjeta to Herceptin but it looks like she had some side effects from that in the past and does not want to have that added to the treatment.  This is very reasonable.  She will start with Herceptin 8 mg/kg as a loading dose followed by 6 mg/kg every 3 weeks for at least another 4 cycles to complete a year of treatment.    Labs are pretty stable today.  Platelet count has normalized.  LFTs show mild elevation in the alkaline phosphatase but other labs are within normal limits.  Creatinine is normal.  Electrolytes are within normal limits.  I will see her back in 4 weeks for cycle 2 of Herceptin.    2.  Peripheral neuropathy: Overall worse.  Severe grade 2-3 peripheral sensory neuropathy affecting quality of life.  Hands feel a bit  better now with holding Kadcyla for almost 5 weeks.  Continue gabapentin at night.        ECOG Performance    1 - Can't do physically strenuous work, but fully ambyulatory and can do light sedentary work    Distress Screening (within last 30 days)    1. How concerned are you about your ability to eat? : 0  2. How concerned are you about unintended weight loss or your current weight? : 0  3. How concerned are you about feeling depressed or very sad? : 0  4. How concerned are you about feeling anxious or very scared? : 0  5. Do you struggle with the loss of meaning and jeimy in your life? : Not at all  6. How concerned are you about work and home life issues that may be affected by your cancer? : 0  7. How concerned are you about knowing what resources are available to help you? : 0  8. Do you currently have what you would describe as Ep[employer]opalian or spiritual struggles?            : Not at all       Pain  Pain Score: Moderate Pain (5)  Pain Loc: Upper Leg    Problem List    Patient Active Problem List   Diagnosis     Morbid obesity (H)     Malignant neoplasm of overlapping sites of left breast in female, estrogen receptor positive (H)     Malignant neoplasm of upper-outer quadrant of left breast in female, estrogen receptor positive (H)     Chemotherapy-induced peripheral neuropathy (H)     Musculoskeletal pain     Dehydration        ______________________________________________________________________________    History of Present Illness    Oncologic history  July 2021: Left breast mass palpated by patient.  Mammogram showing 2.1 x 2.1 x 2 cm hypoechoic mass at 2 o'clock position about 11 cm from the nipple.  Ultrasound-guided biopsy of the mass showed invasive ductal carcinoma, grade 3, ER, DC and HER2 positive.  Left axillary lymph node biopsy positive for metastatic disease.     Neoadjuvant chemotherapy with TCHP starting 9/2/2021.  Finished 6 cycles of chemo.  Last dose was 12/17/2020.  Carboplatin was held for  "cycle 4 and 6 due to poor tolerance.     Left mastectomy with sentinel lymph node biopsy and axillary lymph node dissection done on 1/19/2022.     Surgical path showing residual disease.  ypT1a, pN1(mi).  Patient declined radiation therapy.    Interim history: Patient is here today to discuss continuation of treatment with Kadcyla.  After her last visit we gave her a couple of weeks of since she was experiencing more peripheral neuropathy.  She wanted a treatment break.  Since then her neuropathy in her hands have gotten a bit better but continues to have issues with her feet.  Her good days and bad days.  Traveling and cold weather makes it worse.  Is also affecting her fine motor skills.  Overall her neuropathy is grade 2-3.  No fevers or chills.  Continues to have some musculoskeletal pain issues which could be from anastrozole.  No shortness of breath or chest pain.  She is still physically very active.      Review of Systems    Pertinent items are noted in HPI.    Past History    Past Medical History:   Diagnosis Date     Anemia      Breast cancer (H)     left     Gastroesophageal reflux disease      Hypertension      Motion sickness      Obese      PONV (postoperative nausea and vomiting)        PHYSICAL EXAM  Ht 1.626 m (5' 4\")   Wt 95.3 kg (210 lb)   BMI 36.05 kg/m      GENERAL: no acute distress. Cooperative in conversation. Here alone. Mask on  NEURO: non focal. Alert and oriented x3.   PSYCH: within normal limits. No depression or anxiety.  SKIN: exposed skin is dry intact.     Lab Results    Recent Results (from the past 168 hour(s))   Comprehensive metabolic panel   Result Value Ref Range    Sodium 141 136 - 145 mmol/L    Potassium 3.9 3.4 - 5.3 mmol/L    Chloride 102 98 - 107 mmol/L    Carbon Dioxide (CO2) 30 (H) 22 - 29 mmol/L    Anion Gap 9 7 - 15 mmol/L    Urea Nitrogen 17.8 8.0 - 23.0 mg/dL    Creatinine 0.68 0.51 - 0.95 mg/dL    Calcium 9.8 8.8 - 10.2 mg/dL    Glucose 103 (H) 70 - 99 mg/dL    " Alkaline Phosphatase 112 (H) 35 - 104 U/L    AST 28 10 - 35 U/L    ALT 19 10 - 35 U/L    Protein Total 7.6 6.4 - 8.3 g/dL    Albumin 4.2 3.5 - 5.2 g/dL    Bilirubin Total 0.3 <=1.2 mg/dL    GFR Estimate >90 >60 mL/min/1.73m2   CBC with platelets and differential   Result Value Ref Range    WBC Count 6.9 4.0 - 11.0 10e3/uL    RBC Count 4.31 3.80 - 5.20 10e6/uL    Hemoglobin 12.0 11.7 - 15.7 g/dL    Hematocrit 38.3 35.0 - 47.0 %    MCV 89 78 - 100 fL    MCH 27.8 26.5 - 33.0 pg    MCHC 31.3 (L) 31.5 - 36.5 g/dL    RDW 14.9 10.0 - 15.0 %    Platelet Count 152 150 - 450 10e3/uL    % Neutrophils 67 %    % Lymphocytes 24 %    % Monocytes 5 %    % Eosinophils 3 %    % Basophils 1 %    % Immature Granulocytes 0 %    NRBCs per 100 WBC 0 <1 /100    Absolute Neutrophils 4.6 1.6 - 8.3 10e3/uL    Absolute Lymphocytes 1.7 0.8 - 5.3 10e3/uL    Absolute Monocytes 0.4 0.0 - 1.3 10e3/uL    Absolute Eosinophils 0.2 0.0 - 0.7 10e3/uL    Absolute Basophils 0.1 0.0 - 0.2 10e3/uL    Absolute Immature Granulocytes 0.0 <=0.4 10e3/uL    Absolute NRBCs 0.0 10e3/uL       Imaging    No results found.     A total of 30 min were spent today on this visit which included face to face conversation with the patient, EMR review, counseling and co-ordination of care and medical documentation.      Signed by: Xavier Rubio MD      Again, thank you for allowing me to participate in the care of your patient.        Sincerely,        Xavier Rubio MD

## 2022-11-23 NOTE — PROGRESS NOTES
[facility] Hematology and Oncology Progress Note    Patient: Khloe Becker  MRN: [id number]  Date of Service: Nov 23, 2022          Reason for Visit    Chief Complaint   Patient presents with     Oncology Clinic Visit     Malignant neoplasm of upper-outer quadrant of left breast in female, estrogen receptor positive       Assessment and Plan     Cancer Staging   Malignant neoplasm of upper-outer quadrant of left breast in female, estrogen receptor positive (H)  Staging form: Breast, AJCC 8th Edition  - Clinical: Stage IB (cT2, cN1, cM0, G3, ER+, ID+, HER2+) - Signed by Xavier Rubio MD on 6/10/2022  - Pathologic: Stage IA (pT1a, pN1mi, cM0, G3, ER+, ID+, HER2+) - Signed by Xavier Rubio MD on 6/10/2022    1. Breast cancer, stage IA, triple positive, left side, status post 6 cycles of neoadjuvant TCHP and then left mastectomy: Patient did have residual disease at time of her surgery so was started on Kadcyla adjuvantly.  She has received 10 doses of Kadcyla so far.  Last dose was reduced to 3 mg/kg due to peripheral neuropathy.  We gave him a treatment break for 2 more weeks after last visit to see if her neuropathy improves.  It still about 2-3.  This is affecting her quality of life significantly.  So in this setting I think switching to Herceptin makes sense.  Reviewed the rationale behind this.  Although for residual disease after neoadjuvant chemotherapy and HER2 positive disease, Kadcyla is recommended over Herceptin there is no established overall survival benefit with the strategy and I think Herceptin is still an acceptable alternative especially if she is not tolerating Kadcyla.  She is agreeable with the plan.  I have discontinued Kadcyla plan.  We will reschedule infusion to next week.  I reviewed the potential side effects and complications associated with Herceptin.  She has tolerated this in the past pretty well.  Also discussed about adding Perjeta to Herceptin but it looks like  she had some side effects from that in the past and does not want to have that added to the treatment.  This is very reasonable.  She will start with Herceptin 8 mg/kg as a loading dose followed by 6 mg/kg every 3 weeks for at least another 4 cycles to complete a year of treatment.    Labs are pretty stable today.  Platelet count has normalized.  LFTs show mild elevation in the alkaline phosphatase but other labs are within normal limits.  Creatinine is normal.  Electrolytes are within normal limits.  I will see her back in 4 weeks for cycle 2 of Herceptin.    2.  Peripheral neuropathy: Overall worse.  Severe grade 2-3 peripheral sensory neuropathy affecting quality of life.  Hands feel a bit better now with holding Kadcyla for almost 5 weeks.  Continue gabapentin at night.        ECOG Performance    1 - Can't do physically strenuous work, but fully ambyulatory and can do light sedentary work    Distress Screening (within last 30 days)    1. How concerned are you about your ability to eat? : 0  2. How concerned are you about unintended weight loss or your current weight? : 0  3. How concerned are you about feeling depressed or very sad? : 0  4. How concerned are you about feeling anxious or very scared? : 0  5. Do you struggle with the loss of meaning and jeimy in your life? : Not at all  6. How concerned are you about work and home life issues that may be affected by your cancer? : 0  7. How concerned are you about knowing what resources are available to help you? : 0  8. Do you currently have what you would describe as Adventism or spiritual struggles?            : Not at all       Pain  Pain Score: Moderate Pain (5)  Pain Loc: Upper Leg    Problem List    Patient Active Problem List   Diagnosis     Morbid obesity (H)     Malignant neoplasm of overlapping sites of left breast in female, estrogen receptor positive (H)     Malignant neoplasm of upper-outer quadrant of left breast in female, estrogen receptor positive  (H)     Chemotherapy-induced peripheral neuropathy (H)     Musculoskeletal pain     Dehydration        ______________________________________________________________________________    History of Present Illness    Oncologic history  July 2021: Left breast mass palpated by patient.  Mammogram showing 2.1 x 2.1 x 2 cm hypoechoic mass at 2 o'clock position about 11 cm from the nipple.  Ultrasound-guided biopsy of the mass showed invasive ductal carcinoma, grade 3, ER, OK and HER2 positive.  Left axillary lymph node biopsy positive for metastatic disease.     Neoadjuvant chemotherapy with TCHP starting 9/2/2021.  Finished 6 cycles of chemo.  Last dose was 12/17/2020.  Carboplatin was held for cycle 4 and 6 due to poor tolerance.     Left mastectomy with sentinel lymph node biopsy and axillary lymph node dissection done on 1/19/2022.     Surgical path showing residual disease.  ypT1a, pN1(mi).  Patient declined radiation therapy.    Interim history: Patient is here today to discuss continuation of treatment with Kadcyla.  After her last visit we gave her a couple of weeks of since she was experiencing more peripheral neuropathy.  She wanted a treatment break.  Since then her neuropathy in her hands have gotten a bit better but continues to have issues with her feet.  Her good days and bad days.  Traveling and cold weather makes it worse.  Is also affecting her fine motor skills.  Overall her neuropathy is grade 2-3.  No fevers or chills.  Continues to have some musculoskeletal pain issues which could be from anastrozole.  No shortness of breath or chest pain.  She is still physically very active.      Review of Systems    Pertinent items are noted in HPI.    Past History    Past Medical History:   Diagnosis Date     Anemia      Breast cancer (H)     left     Gastroesophageal reflux disease      Hypertension      Motion sickness      Obese      PONV (postoperative nausea and vomiting)        PHYSICAL EXAM  Ht 1.626 m (5'  "4\")   Wt 95.3 kg (210 lb)   BMI 36.05 kg/m      GENERAL: no acute distress. Cooperative in conversation. Here alone. Mask on  NEURO: non focal. Alert and oriented x3.   PSYCH: within normal limits. No depression or anxiety.  SKIN: exposed skin is dry intact.     Lab Results    Recent Results (from the past 168 hour(s))   Comprehensive metabolic panel   Result Value Ref Range    Sodium 141 136 - 145 mmol/L    Potassium 3.9 3.4 - 5.3 mmol/L    Chloride 102 98 - 107 mmol/L    Carbon Dioxide (CO2) 30 (H) 22 - 29 mmol/L    Anion Gap 9 7 - 15 mmol/L    Urea Nitrogen 17.8 8.0 - 23.0 mg/dL    Creatinine 0.68 0.51 - 0.95 mg/dL    Calcium 9.8 8.8 - 10.2 mg/dL    Glucose 103 (H) 70 - 99 mg/dL    Alkaline Phosphatase 112 (H) 35 - 104 U/L    AST 28 10 - 35 U/L    ALT 19 10 - 35 U/L    Protein Total 7.6 6.4 - 8.3 g/dL    Albumin 4.2 3.5 - 5.2 g/dL    Bilirubin Total 0.3 <=1.2 mg/dL    GFR Estimate >90 >60 mL/min/1.73m2   CBC with platelets and differential   Result Value Ref Range    WBC Count 6.9 4.0 - 11.0 10e3/uL    RBC Count 4.31 3.80 - 5.20 10e6/uL    Hemoglobin 12.0 11.7 - 15.7 g/dL    Hematocrit 38.3 35.0 - 47.0 %    MCV 89 78 - 100 fL    MCH 27.8 26.5 - 33.0 pg    MCHC 31.3 (L) 31.5 - 36.5 g/dL    RDW 14.9 10.0 - 15.0 %    Platelet Count 152 150 - 450 10e3/uL    % Neutrophils 67 %    % Lymphocytes 24 %    % Monocytes 5 %    % Eosinophils 3 %    % Basophils 1 %    % Immature Granulocytes 0 %    NRBCs per 100 WBC 0 <1 /100    Absolute Neutrophils 4.6 1.6 - 8.3 10e3/uL    Absolute Lymphocytes 1.7 0.8 - 5.3 10e3/uL    Absolute Monocytes 0.4 0.0 - 1.3 10e3/uL    Absolute Eosinophils 0.2 0.0 - 0.7 10e3/uL    Absolute Basophils 0.1 0.0 - 0.2 10e3/uL    Absolute Immature Granulocytes 0.0 <=0.4 10e3/uL    Absolute NRBCs 0.0 10e3/uL       Imaging    No results found.     A total of 30 min were spent today on this visit which included face to face conversation with the patient, EMR review, counseling and co-ordination of care and " medical documentation.      Signed by: Xavier Rubio MD

## 2022-11-23 NOTE — PROGRESS NOTES
Khloe Becker, 67 year old, female, arrived to [facility]fusion for lab draw. Port easily accessed with great blood return and labs drawn. No treatment today. Port flushed with 20ml Normal Saline and 500 units Heparin. Port de-accessed and site covered with gauze and papertape. Khloe Becker discharged to [facility] ambulatory and stable.

## 2022-11-29 DIAGNOSIS — Z17.0 MALIGNANT NEOPLASM OF UPPER-OUTER QUADRANT OF LEFT BREAST IN FEMALE, ESTROGEN RECEPTOR POSITIVE (H): Primary | ICD-10-CM

## 2022-11-29 DIAGNOSIS — C50.412 MALIGNANT NEOPLASM OF UPPER-OUTER QUADRANT OF LEFT BREAST IN FEMALE, ESTROGEN RECEPTOR POSITIVE (H): Primary | ICD-10-CM

## 2022-11-29 RX ORDER — METHYLPREDNISOLONE SODIUM SUCCINATE 125 MG/2ML
125 INJECTION, POWDER, LYOPHILIZED, FOR SOLUTION INTRAMUSCULAR; INTRAVENOUS
Status: CANCELLED
Start: 2022-11-30

## 2022-11-29 RX ORDER — HEPARIN SODIUM,PORCINE 10 UNIT/ML
5 VIAL (ML) INTRAVENOUS
Status: CANCELLED | OUTPATIENT
Start: 2022-11-30

## 2022-11-29 RX ORDER — MEPERIDINE HYDROCHLORIDE 25 MG/ML
25 INJECTION INTRAMUSCULAR; INTRAVENOUS; SUBCUTANEOUS EVERY 30 MIN PRN
Status: CANCELLED | OUTPATIENT
Start: 2022-11-30

## 2022-11-29 RX ORDER — LORAZEPAM 2 MG/ML
0.5 INJECTION INTRAMUSCULAR EVERY 4 HOURS PRN
Status: CANCELLED | OUTPATIENT
Start: 2022-11-30

## 2022-11-29 RX ORDER — ALBUTEROL SULFATE 0.83 MG/ML
2.5 SOLUTION RESPIRATORY (INHALATION)
Status: CANCELLED | OUTPATIENT
Start: 2022-11-30

## 2022-11-29 RX ORDER — HEPARIN SODIUM (PORCINE) LOCK FLUSH IV SOLN 100 UNIT/ML 100 UNIT/ML
5 SOLUTION INTRAVENOUS
Status: CANCELLED | OUTPATIENT
Start: 2022-11-30

## 2022-11-29 RX ORDER — DIPHENHYDRAMINE HYDROCHLORIDE 50 MG/ML
50 INJECTION INTRAMUSCULAR; INTRAVENOUS
Status: CANCELLED
Start: 2022-11-30

## 2022-11-29 RX ORDER — ALBUTEROL SULFATE 90 UG/1
1-2 AEROSOL, METERED RESPIRATORY (INHALATION)
Status: CANCELLED
Start: 2022-11-30

## 2022-11-29 RX ORDER — EPINEPHRINE 1 MG/ML
0.3 INJECTION, SOLUTION INTRAMUSCULAR; SUBCUTANEOUS EVERY 5 MIN PRN
Status: CANCELLED | OUTPATIENT
Start: 2022-11-30

## 2022-12-05 ENCOUNTER — MEDICAL CORRESPONDENCE (OUTPATIENT)
Dept: HEALTH INFORMATION MANAGEMENT | Facility: CLINIC | Age: 67
End: 2022-12-05

## 2022-12-08 ENCOUNTER — INFUSION THERAPY VISIT (OUTPATIENT)
Dept: INFUSION THERAPY | Facility: HOSPITAL | Age: 67
End: 2022-12-08
Attending: INTERNAL MEDICINE
Payer: COMMERCIAL

## 2022-12-08 VITALS
SYSTOLIC BLOOD PRESSURE: 138 MMHG | BODY MASS INDEX: 36.44 KG/M2 | RESPIRATION RATE: 18 BRPM | OXYGEN SATURATION: 97 % | WEIGHT: 212.3 LBS | HEART RATE: 79 BPM | TEMPERATURE: 99 F | DIASTOLIC BLOOD PRESSURE: 87 MMHG

## 2022-12-08 DIAGNOSIS — C50.412 MALIGNANT NEOPLASM OF UPPER-OUTER QUADRANT OF LEFT BREAST IN FEMALE, ESTROGEN RECEPTOR POSITIVE (H): Primary | ICD-10-CM

## 2022-12-08 DIAGNOSIS — Z17.0 MALIGNANT NEOPLASM OF UPPER-OUTER QUADRANT OF LEFT BREAST IN FEMALE, ESTROGEN RECEPTOR POSITIVE (H): Primary | ICD-10-CM

## 2022-12-08 PROCEDURE — 96413 CHEMO IV INFUSION 1 HR: CPT

## 2022-12-08 PROCEDURE — 250N000011 HC RX IP 250 OP 636: Performed by: NURSE PRACTITIONER

## 2022-12-08 PROCEDURE — 258N000003 HC RX IP 258 OP 636: Performed by: NURSE PRACTITIONER

## 2022-12-08 RX ORDER — HEPARIN SODIUM (PORCINE) LOCK FLUSH IV SOLN 100 UNIT/ML 100 UNIT/ML
5 SOLUTION INTRAVENOUS
Status: DISCONTINUED | OUTPATIENT
Start: 2022-12-08 | End: 2022-12-08 | Stop reason: HOSPADM

## 2022-12-08 RX ADMIN — Medication 5 ML: at 12:31

## 2022-12-08 RX ADMIN — SODIUM CHLORIDE 250 ML: 9 INJECTION, SOLUTION INTRAVENOUS at 10:58

## 2022-12-08 RX ADMIN — SODIUM CHLORIDE 760 MG: 9 INJECTION, SOLUTION INTRAVENOUS at 10:59

## 2022-12-08 NOTE — PROGRESS NOTES
PT here ambulatory for first dose herceptin. Reviewed txt with pt. Port accessed and brisk blood return. Herceptin infused over 90 minutes and pt tolerated without any problems. txt completed and port flushed/deaccessed with 2x2 to site. Follow up reviewed and pt dc'd steady gait.

## 2022-12-27 DIAGNOSIS — Z51.11 ENCOUNTER FOR ANTINEOPLASTIC CHEMOTHERAPY: ICD-10-CM

## 2022-12-27 DIAGNOSIS — C50.412 MALIGNANT NEOPLASM OF UPPER-OUTER QUADRANT OF LEFT BREAST IN FEMALE, ESTROGEN RECEPTOR POSITIVE (H): Primary | ICD-10-CM

## 2022-12-27 DIAGNOSIS — Z17.0 MALIGNANT NEOPLASM OF UPPER-OUTER QUADRANT OF LEFT BREAST IN FEMALE, ESTROGEN RECEPTOR POSITIVE (H): Primary | ICD-10-CM

## 2022-12-28 ENCOUNTER — INFUSION THERAPY VISIT (OUTPATIENT)
Dept: INFUSION THERAPY | Facility: HOSPITAL | Age: 67
End: 2022-12-28
Attending: INTERNAL MEDICINE
Payer: COMMERCIAL

## 2022-12-28 ENCOUNTER — ONCOLOGY VISIT (OUTPATIENT)
Dept: ONCOLOGY | Facility: HOSPITAL | Age: 67
End: 2022-12-28
Attending: INTERNAL MEDICINE
Payer: COMMERCIAL

## 2022-12-28 VITALS
DIASTOLIC BLOOD PRESSURE: 74 MMHG | BODY MASS INDEX: 36.93 KG/M2 | WEIGHT: 216.3 LBS | TEMPERATURE: 98 F | HEART RATE: 80 BPM | HEIGHT: 64 IN | OXYGEN SATURATION: 98 % | RESPIRATION RATE: 16 BRPM | SYSTOLIC BLOOD PRESSURE: 154 MMHG

## 2022-12-28 DIAGNOSIS — Z51.11 ENCOUNTER FOR ANTINEOPLASTIC CHEMOTHERAPY: ICD-10-CM

## 2022-12-28 DIAGNOSIS — T45.1X5A CHEMOTHERAPY-INDUCED PERIPHERAL NEUROPATHY (H): ICD-10-CM

## 2022-12-28 DIAGNOSIS — Z17.0 MALIGNANT NEOPLASM OF UPPER-OUTER QUADRANT OF LEFT BREAST IN FEMALE, ESTROGEN RECEPTOR POSITIVE (H): Primary | ICD-10-CM

## 2022-12-28 DIAGNOSIS — R60.0 PERIPHERAL EDEMA: Primary | ICD-10-CM

## 2022-12-28 DIAGNOSIS — C50.412 MALIGNANT NEOPLASM OF UPPER-OUTER QUADRANT OF LEFT BREAST IN FEMALE, ESTROGEN RECEPTOR POSITIVE (H): ICD-10-CM

## 2022-12-28 DIAGNOSIS — C50.412 MALIGNANT NEOPLASM OF UPPER-OUTER QUADRANT OF LEFT BREAST IN FEMALE, ESTROGEN RECEPTOR POSITIVE (H): Primary | ICD-10-CM

## 2022-12-28 DIAGNOSIS — Z17.0 MALIGNANT NEOPLASM OF UPPER-OUTER QUADRANT OF LEFT BREAST IN FEMALE, ESTROGEN RECEPTOR POSITIVE (H): ICD-10-CM

## 2022-12-28 DIAGNOSIS — G62.0 CHEMOTHERAPY-INDUCED PERIPHERAL NEUROPATHY (H): ICD-10-CM

## 2022-12-28 LAB
ALBUMIN SERPL BCG-MCNC: 4.3 G/DL (ref 3.5–5.2)
ALP SERPL-CCNC: 119 U/L (ref 35–104)
ALT SERPL W P-5'-P-CCNC: 20 U/L (ref 10–35)
ANION GAP SERPL CALCULATED.3IONS-SCNC: 11 MMOL/L (ref 7–15)
AST SERPL W P-5'-P-CCNC: 27 U/L (ref 10–35)
BASOPHILS # BLD AUTO: 0 10E3/UL (ref 0–0.2)
BASOPHILS NFR BLD AUTO: 1 %
BILIRUB SERPL-MCNC: 0.4 MG/DL
BUN SERPL-MCNC: 18 MG/DL (ref 8–23)
CALCIUM SERPL-MCNC: 10.2 MG/DL (ref 8.8–10.2)
CHLORIDE SERPL-SCNC: 99 MMOL/L (ref 98–107)
CREAT SERPL-MCNC: 0.76 MG/DL (ref 0.51–0.95)
DEPRECATED HCO3 PLAS-SCNC: 31 MMOL/L (ref 22–29)
EOSINOPHIL # BLD AUTO: 0.2 10E3/UL (ref 0–0.7)
EOSINOPHIL NFR BLD AUTO: 3 %
ERYTHROCYTE [DISTWIDTH] IN BLOOD BY AUTOMATED COUNT: 15.1 % (ref 10–15)
GFR SERPL CREATININE-BSD FRML MDRD: 85 ML/MIN/1.73M2
GLUCOSE SERPL-MCNC: 120 MG/DL (ref 70–99)
HCT VFR BLD AUTO: 39.2 % (ref 35–47)
HGB BLD-MCNC: 12.6 G/DL (ref 11.7–15.7)
IMM GRANULOCYTES # BLD: 0 10E3/UL
IMM GRANULOCYTES NFR BLD: 0 %
LYMPHOCYTES # BLD AUTO: 1.8 10E3/UL (ref 0.8–5.3)
LYMPHOCYTES NFR BLD AUTO: 28 %
MCH RBC QN AUTO: 28.5 PG (ref 26.5–33)
MCHC RBC AUTO-ENTMCNC: 32.1 G/DL (ref 31.5–36.5)
MCV RBC AUTO: 89 FL (ref 78–100)
MONOCYTES # BLD AUTO: 0.3 10E3/UL (ref 0–1.3)
MONOCYTES NFR BLD AUTO: 5 %
NEUTROPHILS # BLD AUTO: 4 10E3/UL (ref 1.6–8.3)
NEUTROPHILS NFR BLD AUTO: 63 %
NRBC # BLD AUTO: 0 10E3/UL
NRBC BLD AUTO-RTO: 0 /100
PLATELET # BLD AUTO: 163 10E3/UL (ref 150–450)
POTASSIUM SERPL-SCNC: 3.8 MMOL/L (ref 3.4–5.3)
PROT SERPL-MCNC: 7.7 G/DL (ref 6.4–8.3)
RBC # BLD AUTO: 4.42 10E6/UL (ref 3.8–5.2)
SODIUM SERPL-SCNC: 141 MMOL/L (ref 136–145)
WBC # BLD AUTO: 6.3 10E3/UL (ref 4–11)

## 2022-12-28 PROCEDURE — 258N000003 HC RX IP 258 OP 636: Performed by: INTERNAL MEDICINE

## 2022-12-28 PROCEDURE — 85025 COMPLETE CBC W/AUTO DIFF WBC: CPT

## 2022-12-28 PROCEDURE — 36591 DRAW BLOOD OFF VENOUS DEVICE: CPT

## 2022-12-28 PROCEDURE — G0463 HOSPITAL OUTPT CLINIC VISIT: HCPCS

## 2022-12-28 PROCEDURE — 99213 OFFICE O/P EST LOW 20 MIN: CPT | Performed by: INTERNAL MEDICINE

## 2022-12-28 PROCEDURE — 96413 CHEMO IV INFUSION 1 HR: CPT

## 2022-12-28 PROCEDURE — 250N000011 HC RX IP 250 OP 636: Performed by: INTERNAL MEDICINE

## 2022-12-28 PROCEDURE — G0463 HOSPITAL OUTPT CLINIC VISIT: HCPCS | Mod: 25 | Performed by: INTERNAL MEDICINE

## 2022-12-28 PROCEDURE — 80053 COMPREHEN METABOLIC PANEL: CPT

## 2022-12-28 RX ORDER — ALBUTEROL SULFATE 0.83 MG/ML
2.5 SOLUTION RESPIRATORY (INHALATION)
Status: CANCELLED | OUTPATIENT
Start: 2022-12-28

## 2022-12-28 RX ORDER — HEPARIN SODIUM (PORCINE) LOCK FLUSH IV SOLN 100 UNIT/ML 100 UNIT/ML
5 SOLUTION INTRAVENOUS
Status: CANCELLED | OUTPATIENT
Start: 2022-12-28

## 2022-12-28 RX ORDER — LORAZEPAM 2 MG/ML
0.5 INJECTION INTRAMUSCULAR EVERY 4 HOURS PRN
Status: CANCELLED | OUTPATIENT
Start: 2022-12-28

## 2022-12-28 RX ORDER — METHYLPREDNISOLONE SODIUM SUCCINATE 125 MG/2ML
125 INJECTION, POWDER, LYOPHILIZED, FOR SOLUTION INTRAMUSCULAR; INTRAVENOUS
Status: DISCONTINUED | OUTPATIENT
Start: 2022-12-28 | End: 2022-12-28 | Stop reason: HOSPADM

## 2022-12-28 RX ORDER — ALBUTEROL SULFATE 90 UG/1
1-2 AEROSOL, METERED RESPIRATORY (INHALATION)
Status: CANCELLED
Start: 2022-12-28

## 2022-12-28 RX ORDER — MEPERIDINE HYDROCHLORIDE 25 MG/ML
25 INJECTION INTRAMUSCULAR; INTRAVENOUS; SUBCUTANEOUS EVERY 30 MIN PRN
Status: CANCELLED | OUTPATIENT
Start: 2022-12-28

## 2022-12-28 RX ORDER — DIPHENHYDRAMINE HCL 50 MG
50 CAPSULE ORAL
Status: CANCELLED
Start: 2022-12-28

## 2022-12-28 RX ORDER — HEPARIN SODIUM (PORCINE) LOCK FLUSH IV SOLN 100 UNIT/ML 100 UNIT/ML
5 SOLUTION INTRAVENOUS
Status: DISCONTINUED | OUTPATIENT
Start: 2022-12-28 | End: 2022-12-28 | Stop reason: HOSPADM

## 2022-12-28 RX ORDER — EPINEPHRINE 1 MG/ML
0.3 INJECTION, SOLUTION INTRAMUSCULAR; SUBCUTANEOUS EVERY 5 MIN PRN
Status: CANCELLED | OUTPATIENT
Start: 2022-12-28

## 2022-12-28 RX ORDER — DIPHENHYDRAMINE HYDROCHLORIDE 50 MG/ML
50 INJECTION INTRAMUSCULAR; INTRAVENOUS
Status: CANCELLED
Start: 2022-12-28

## 2022-12-28 RX ORDER — METHYLPREDNISOLONE SODIUM SUCCINATE 125 MG/2ML
125 INJECTION, POWDER, LYOPHILIZED, FOR SOLUTION INTRAMUSCULAR; INTRAVENOUS
Status: CANCELLED
Start: 2022-12-28

## 2022-12-28 RX ORDER — GABAPENTIN 300 MG/1
300 CAPSULE ORAL AT BEDTIME
Qty: 90 CAPSULE | Refills: 3 | Status: SHIPPED | OUTPATIENT
Start: 2022-12-28 | End: 2023-11-24

## 2022-12-28 RX ORDER — HEPARIN SODIUM,PORCINE 10 UNIT/ML
5 VIAL (ML) INTRAVENOUS
Status: CANCELLED | OUTPATIENT
Start: 2022-12-28

## 2022-12-28 RX ORDER — DIPHENHYDRAMINE HYDROCHLORIDE 50 MG/ML
50 INJECTION INTRAMUSCULAR; INTRAVENOUS
Status: DISCONTINUED | OUTPATIENT
Start: 2022-12-28 | End: 2022-12-28 | Stop reason: HOSPADM

## 2022-12-28 RX ORDER — FUROSEMIDE 20 MG
20 TABLET ORAL PRN
Qty: 30 TABLET | Refills: 0 | Status: SHIPPED | OUTPATIENT
Start: 2022-12-28 | End: 2023-01-30

## 2022-12-28 RX ORDER — ACETAMINOPHEN 325 MG/1
650 TABLET ORAL
Status: CANCELLED
Start: 2022-12-28

## 2022-12-28 RX ADMIN — SODIUM CHLORIDE 570 MG: 9 INJECTION, SOLUTION INTRAVENOUS at 14:57

## 2022-12-28 RX ADMIN — Medication 5 ML: at 15:31

## 2022-12-28 ASSESSMENT — PAIN SCALES - GENERAL: PAINLEVEL: MODERATE PAIN (4)

## 2022-12-28 NOTE — PROGRESS NOTES
"Oncology Rooming Note    December 28, 2022 2:00 PM   Khloe Becker is a 67 year old female who presents for:    Chief Complaint   Patient presents with     Oncology Clinic Visit     4 week follow up with labs related to Malignant neoplasm of upper-outer quadrant of left breast in female, estrogen receptor positive     Initial Vitals: BP (!) 154/74 (BP Location: Right arm, Patient Position: Sitting, Cuff Size: Adult Large)   Pulse 80   Temp 98  F (36.7  C) (Tympanic)   Resp 16   Ht 1.626 m (5' 4\")   Wt 98.1 kg (216 lb 4.8 oz)   SpO2 98%   BMI 37.13 kg/m   Estimated body mass index is 37.13 kg/m  as calculated from the following:    Height as of this encounter: 1.626 m (5' 4\").    Weight as of this encounter: 98.1 kg (216 lb 4.8 oz). Body surface area is 2.1 meters squared.  Moderate Pain (4) Comment: Data Unavailable   No LMP recorded. Patient is postmenopausal.  Allergies reviewed: Yes  Medications reviewed: Yes    Medications: MEDICATION REFILLS NEEDED TODAY. Provider was notified.  Pharmacy name entered into [employer]: [id number] - [place] [id number] [facility]  AT [facility]    Clinical concerns: 4 week follow up with labs related to Malignant neoplasm of upper-outer quadrant of left breast in female, estrogen receptor positive      GEORGIA PORTILLO CMA            "

## 2022-12-28 NOTE — PROGRESS NOTES
Infusion Nursing Note:  Khloe Becker presents today for D1C2    Patient seen by provider today: Yes: Dr. Rubio   present during visit today: Not Applicable.    Note: N/A.    Intravenous Access:  Implanted Port.    Treatment Conditions:  Lab Results   Component Value Date    HGB 12.6 12/28/2022    WBC 6.3 12/28/2022    ANEUTAUTO 4.0 12/28/2022     12/28/2022      Lab Results   Component Value Date     12/28/2022    POTASSIUM 3.8 12/28/2022    CR 0.76 12/28/2022    VIJAY 10.2 12/28/2022    BILITOTAL 0.4 12/28/2022    ALBUMIN 4.3 12/28/2022    ALT 20 12/28/2022    AST 27 12/28/2022     Results reviewed, labs MET treatment parameters, ok to proceed with treatment.    Post Infusion Assessment:  Patient tolerated infusion without incident.   No evidence of extravasation.  Access DC'd per protocol.    Discharge Plan:   Patient discharged in stable condition accompanied by: self.  Departure Mode: Ambulatory.      Riri Moore RN

## 2022-12-28 NOTE — LETTER
"    12/28/2022         RE: Khloe Becker  [address]  [facility] [address]        Dear Colleague,    Thank you for referring your patient, Khloe Becker, to the [facility] [place]. Please see a copy of my visit note below.    Oncology Rooming Note    December 28, 2022 2:00 PM   Khloe Becker is a 67 year old female who presents for:    Chief Complaint   Patient presents with     Oncology Clinic Visit     4 week follow up with labs related to Malignant neoplasm of upper-outer quadrant of left breast in female, estrogen receptor positive     Initial Vitals: BP (!) 154/74 (BP Location: Right arm, Patient Position: Sitting, Cuff Size: Adult Large)   Pulse 80   Temp 98  F (36.7  C) (Tympanic)   Resp 16   Ht 1.626 m (5' 4\")   Wt 98.1 kg (216 lb 4.8 oz)   SpO2 98%   BMI 37.13 kg/m   Estimated body mass index is 37.13 kg/m  as calculated from the following:    Height as of this encounter: 1.626 m (5' 4\").    Weight as of this encounter: 98.1 kg (216 lb 4.8 oz). Body surface area is 2.1 meters squared.  Moderate Pain (4) Comment: Data Unavailable   No LMP recorded. Patient is postmenopausal.  Allergies reviewed: Yes  Medications reviewed: Yes    Medications: MEDICATION REFILLS NEEDED TODAY. Provider was notified.  Pharmacy name entered into [facility]: [id number] - [place] [facility] [address]  AT [facility]    Clinical concerns: 4 week follow up with labs related to Malignant neoplasm of upper-outer quadrant of left breast in female, estrogen receptor positive      GEORGIA PORTILLO CMA              [facility] Hematology and Oncology Progress Note    Patient: Khloe Becker  MRN: [id number]  Date of Service: Dec 28, 2022          Reason for Visit    Chief Complaint   Patient presents with     Oncology Clinic Visit     4 week follow up with labs related to Malignant neoplasm of upper-outer quadrant of left breast in female, estrogen receptor " positive       Assessment and Plan     Cancer Staging   Malignant neoplasm of upper-outer quadrant of left breast in female, estrogen receptor positive (H)  Staging form: Breast, AJCC 8th Edition  - Clinical: Stage IB (cT2, cN1, cM0, G3, ER+, WI+, HER2+) - Signed by Xavier Rubio MD on 6/10/2022  - Pathologic: Stage IA (pT1a, pN1mi, cM0, G3, ER+, WI+, HER2+) - Signed by Xavier Rubio MD on 6/10/2022    1. Breast cancer, stage IA, triple positive, left side, status post 6 cycles of neoadjuvant TCHP and then left mastectomy: Patient did have residual disease at time of her surgery so was started on Kadcyla adjuvantly.  She has received 10 doses of Kadcyla so far.  Last dose was reduced to 3 mg/kg due to peripheral neuropathy.  We decided to switch to Herceptin.  Received first dose 3 weeks ago.  No significant change in her symptoms.  Labs are pretty stable today.  No contraindication to proceed with Herceptin today.    Plan is to do a total of 4 cycles of Herceptin.  We will see how she tolerates this.  If her symptoms are getting worse and continue to affect her quality of life and potentially could consider discontinuing treatment.          ECOG Performance    1 - Can't do physically strenuous work, but fully ambyulatory and can do light sedentary work    Distress Screening (within last 30 days)    1. How concerned are you about your ability to eat? : 0  2. How concerned are you about unintended weight loss or your current weight? : 0  3. How concerned are you about feeling depressed or very sad? : 0  4. How concerned are you about feeling anxious or very scared? : 0  5. Do you struggle with the loss of meaning and jeimy in your life? : Not at all  6. How concerned are you about work and home life issues that may be affected by your cancer? : 0  7. How concerned are you about knowing what resources are available to help you? : 0  8. Do you currently have what you would describe as Adventism or spiritual  struggles?            : Not at all       Pain  Pain Score: Moderate Pain (4)  Pain Loc: Other - see comment (joint pain in fingers and hips)    Problem List    Patient Active Problem List   Diagnosis     Morbid obesity (H)     Malignant neoplasm of overlapping sites of left breast in female, estrogen receptor positive (H)     Malignant neoplasm of upper-outer quadrant of left breast in female, estrogen receptor positive (H)     Chemotherapy-induced peripheral neuropathy (H)     Musculoskeletal pain     Dehydration        ______________________________________________________________________________    History of Present Illness    Oncologic history  July 2021: Left breast mass palpated by patient.  Mammogram showing 2.1 x 2.1 x 2 cm hypoechoic mass at 2 o'clock position about 11 cm from the nipple.  Ultrasound-guided biopsy of the mass showed invasive ductal carcinoma, grade 3, ER, CO and HER2 positive.  Left axillary lymph node biopsy positive for metastatic disease.     Neoadjuvant chemotherapy with TCHP starting 9/2/2021.  Finished 6 cycles of chemo.  Last dose was 12/17/2020.  Carboplatin was held for cycle 4 and 6 due to poor tolerance.     Left mastectomy with sentinel lymph node biopsy and axillary lymph node dissection done on 1/19/2022.     Surgical path showing residual disease.  ypT1a, pN1(mi).  Patient declined radiation therapy.    Received 10 cycles of adjuvant Kadcyla.  Switched to single agent Herceptin on 12/8/2022 due to significant peripheral neuropathy.    Interim history: She is here for Herceptin infusion.  Continues to have significant issues with fatigue musculoskeletal pain and peripheral neuropathy.  We discontinued capsula since her neuropathy was getting worse and switched to Herceptin.  She had first infusion 3 weeks ago.  Did okay with it.  Has not noticed any significant difference in terms of the side effects between Kadcyla and Herceptin.  In fact her fatigue is a little bit  "worse.      Review of Systems    Pertinent items are noted in HPI.    Past History    Past Medical History:   Diagnosis Date     Anemia      Breast cancer (H)     left     Gastroesophageal reflux disease      Hypertension      Motion sickness      Obese      PONV (postoperative nausea and vomiting)        PHYSICAL EXAM  BP (!) 154/74 (BP Location: Right arm, Patient Position: Sitting, Cuff Size: Adult Large)   Pulse 80   Temp 98  F (36.7  C) (Tympanic)   Resp 16   Ht 1.626 m (5' 4\")   Wt 98.1 kg (216 lb 4.8 oz)   SpO2 98%   BMI 37.13 kg/m      GENERAL: no acute distress. Cooperative in conversation. Here alone. Mask on  NEURO: non focal. Alert and oriented x3.   PSYCH: within normal limits. No depression or anxiety.  SKIN: exposed skin is dry intact.     Lab Results    No results found for this or any previous visit (from the past 168 hour(s)).    Imaging    No results found.     A total of 25 min were spent today on this visit which included face to face conversation with the patient, EMR review, counseling and co-ordination of care and medical documentation.      Signed by: Xavier Rubio MD      Again, thank you for allowing me to participate in the care of your patient.        Sincerely,        Xavier Rubio MD    "

## 2023-01-17 ENCOUNTER — TELEPHONE (OUTPATIENT)
Dept: ONCOLOGY | Facility: HOSPITAL | Age: 68
End: 2023-01-17

## 2023-01-17 NOTE — TELEPHONE ENCOUNTER
Patient calls in today stating that she is not sure if she wants to continue with her last 2 doses of Herceptin.  She is scheduled tomorrow for lab/MD/infusion.  I did let her know that she should keep the lab/MD appointment and just because she comes to those does not mean that she needs to get the infusion.  She states that she actually feels worse on the Herceptin than she did on the Kadcyla.  She states that they had to stop the Kadcyla due to neuropathy.  Overall she just feels worse on the Herceptin and does not know if she should continue however in the back of her mind she is thinking that if she foregoes the last 2, will this put her at risk of her cancer coming back.  I did let her know will be good to have this discussion with the doctor/oncologist tomorrow.  I did ask her to write all of her questions and concerns down and this can be discussed tomorrow.  She verbalized understanding and was very appreciative.    She did ask me another question about potential swelling in her axillary area where she had lymph nodes removed.  She states that it feels a little bit swollen but she cannot visibly see any swelling.  I did let her know that if she is using her arm more than she normally would this can happen.  It is probably swollen more on the inside than visibly to the outside.  She can use gentle massage to somewhat disperse the fluid.  She does tell me that she has been shoveling and scraping snow off of her roof so she has been using her arms more than normal.  I did ask her to try the gentle massage and see if that helps.  She verbalized understanding and was appreciative.    Ruchi Joseph RN

## 2023-01-18 ENCOUNTER — ONCOLOGY VISIT (OUTPATIENT)
Dept: ONCOLOGY | Facility: HOSPITAL | Age: 68
End: 2023-01-18
Attending: INTERNAL MEDICINE
Payer: COMMERCIAL

## 2023-01-18 ENCOUNTER — LAB (OUTPATIENT)
Dept: INFUSION THERAPY | Facility: HOSPITAL | Age: 68
End: 2023-01-18
Attending: INTERNAL MEDICINE
Payer: COMMERCIAL

## 2023-01-18 VITALS
TEMPERATURE: 97.8 F | HEART RATE: 66 BPM | OXYGEN SATURATION: 98 % | BODY MASS INDEX: 37.76 KG/M2 | SYSTOLIC BLOOD PRESSURE: 137 MMHG | WEIGHT: 220 LBS | RESPIRATION RATE: 18 BRPM | DIASTOLIC BLOOD PRESSURE: 64 MMHG

## 2023-01-18 DIAGNOSIS — Z17.0 MALIGNANT NEOPLASM OF UPPER-OUTER QUADRANT OF LEFT BREAST IN FEMALE, ESTROGEN RECEPTOR POSITIVE (H): Primary | ICD-10-CM

## 2023-01-18 DIAGNOSIS — C50.412 MALIGNANT NEOPLASM OF UPPER-OUTER QUADRANT OF LEFT BREAST IN FEMALE, ESTROGEN RECEPTOR POSITIVE (H): Primary | ICD-10-CM

## 2023-01-18 DIAGNOSIS — I42.7 DRUG-INDUCED CARDIOMYOPATHY (H): Primary | ICD-10-CM

## 2023-01-18 LAB
ALBUMIN SERPL BCG-MCNC: 4.3 G/DL (ref 3.5–5.2)
ALP SERPL-CCNC: 100 U/L (ref 35–104)
ALT SERPL W P-5'-P-CCNC: 16 U/L (ref 10–35)
ANION GAP SERPL CALCULATED.3IONS-SCNC: 10 MMOL/L (ref 7–15)
AST SERPL W P-5'-P-CCNC: 20 U/L (ref 10–35)
BASOPHILS # BLD AUTO: 0 10E3/UL (ref 0–0.2)
BASOPHILS NFR BLD AUTO: 1 %
BILIRUB SERPL-MCNC: 0.4 MG/DL
BUN SERPL-MCNC: 24.5 MG/DL (ref 8–23)
CALCIUM SERPL-MCNC: 9.8 MG/DL (ref 8.8–10.2)
CHLORIDE SERPL-SCNC: 101 MMOL/L (ref 98–107)
CREAT SERPL-MCNC: 0.8 MG/DL (ref 0.51–0.95)
DEPRECATED HCO3 PLAS-SCNC: 30 MMOL/L (ref 22–29)
EOSINOPHIL # BLD AUTO: 0.2 10E3/UL (ref 0–0.7)
EOSINOPHIL NFR BLD AUTO: 3 %
ERYTHROCYTE [DISTWIDTH] IN BLOOD BY AUTOMATED COUNT: 14.7 % (ref 10–15)
GFR SERPL CREATININE-BSD FRML MDRD: 80 ML/MIN/1.73M2
GLUCOSE SERPL-MCNC: 110 MG/DL (ref 70–99)
HCT VFR BLD AUTO: 37.8 % (ref 35–47)
HGB BLD-MCNC: 12 G/DL (ref 11.7–15.7)
IMM GRANULOCYTES # BLD: 0 10E3/UL
IMM GRANULOCYTES NFR BLD: 1 %
LYMPHOCYTES # BLD AUTO: 1.6 10E3/UL (ref 0.8–5.3)
LYMPHOCYTES NFR BLD AUTO: 25 %
MCH RBC QN AUTO: 28.4 PG (ref 26.5–33)
MCHC RBC AUTO-ENTMCNC: 31.7 G/DL (ref 31.5–36.5)
MCV RBC AUTO: 89 FL (ref 78–100)
MONOCYTES # BLD AUTO: 0.3 10E3/UL (ref 0–1.3)
MONOCYTES NFR BLD AUTO: 5 %
NEUTROPHILS # BLD AUTO: 4.3 10E3/UL (ref 1.6–8.3)
NEUTROPHILS NFR BLD AUTO: 65 %
NRBC # BLD AUTO: 0 10E3/UL
NRBC BLD AUTO-RTO: 0 /100
PLATELET # BLD AUTO: 180 10E3/UL (ref 150–450)
POTASSIUM SERPL-SCNC: 3.8 MMOL/L (ref 3.4–5.3)
PROT SERPL-MCNC: 7.5 G/DL (ref 6.4–8.3)
RBC # BLD AUTO: 4.23 10E6/UL (ref 3.8–5.2)
SODIUM SERPL-SCNC: 141 MMOL/L (ref 136–145)
WBC # BLD AUTO: 6.5 10E3/UL (ref 4–11)

## 2023-01-18 PROCEDURE — 36591 DRAW BLOOD OFF VENOUS DEVICE: CPT

## 2023-01-18 PROCEDURE — 99214 OFFICE O/P EST MOD 30 MIN: CPT | Performed by: INTERNAL MEDICINE

## 2023-01-18 PROCEDURE — G0463 HOSPITAL OUTPT CLINIC VISIT: HCPCS | Performed by: INTERNAL MEDICINE

## 2023-01-18 PROCEDURE — 99212 OFFICE O/P EST SF 10 MIN: CPT | Performed by: INTERNAL MEDICINE

## 2023-01-18 PROCEDURE — 250N000011 HC RX IP 250 OP 636: Performed by: INTERNAL MEDICINE

## 2023-01-18 PROCEDURE — 80053 COMPREHEN METABOLIC PANEL: CPT

## 2023-01-18 PROCEDURE — 85025 COMPLETE CBC W/AUTO DIFF WBC: CPT

## 2023-01-18 RX ORDER — HEPARIN SODIUM (PORCINE) LOCK FLUSH IV SOLN 100 UNIT/ML 100 UNIT/ML
5 SOLUTION INTRAVENOUS
Status: CANCELLED | OUTPATIENT
Start: 2023-01-18

## 2023-01-18 RX ORDER — HEPARIN SODIUM (PORCINE) LOCK FLUSH IV SOLN 100 UNIT/ML 100 UNIT/ML
5 SOLUTION INTRAVENOUS
Status: DISPENSED | OUTPATIENT
Start: 2023-01-18

## 2023-01-18 RX ADMIN — Medication 5 ML: at 14:50

## 2023-01-18 ASSESSMENT — PAIN SCALES - GENERAL: PAINLEVEL: MODERATE PAIN (4)

## 2023-01-18 NOTE — PROGRESS NOTES
"Oncology Rooming Note    January 18, 2023 2:04 PM   Khloe Becker is a 67 year old female who presents for:    Chief Complaint   Patient presents with     Oncology Clinic Visit     Return visit with labs and infusion related to Malignant neoplasm of upper-outer quadrant of left breast in female, estrogen receptor positive      Initial Vitals: /64   Pulse 66   Temp 97.8  F (36.6  C)   Resp 18   Wt 99.8 kg (220 lb)   SpO2 98%   BMI 37.76 kg/m   Estimated body mass index is 37.76 kg/m  as calculated from the following:    Height as of 12/28/22: 1.626 m (5' 4\").    Weight as of this encounter: 99.8 kg (220 lb). Body surface area is 2.12 meters squared.  Moderate Pain (4) Comment: Data Unavailable   No LMP recorded. Patient is postmenopausal.  Allergies reviewed: Yes  Medications reviewed: Yes    Medications: Medication refills not needed today.  Pharmacy name entered into [facility]: [id number] - [address]  AT [facility]    Clinical concerns: Possibly stopping treatment.      Riri Moore RN            "

## 2023-01-18 NOTE — LETTER
"    1/18/2023         RE: Khloe Becker  [address] [place] [address]        Dear Colleague,    Thank you for referring your patient, Khloe Becker, to the [facility]. Please see a copy of my visit note below.    Oncology Rooming Note    January 18, 2023 2:04 PM   Khloe Becker is a 67 year old female who presents for:    Chief Complaint   Patient presents with     Oncology Clinic Visit     Return visit with labs and infusion related to Malignant neoplasm of upper-outer quadrant of left breast in female, estrogen receptor positive      Initial Vitals: /64   Pulse 66   Temp 97.8  F (36.6  C)   Resp 18   Wt 99.8 kg (220 lb)   SpO2 98%   BMI 37.76 kg/m   Estimated body mass index is 37.76 kg/m  as calculated from the following:    Height as of 12/28/22: 1.626 m (5' 4\").    Weight as of this encounter: 99.8 kg (220 lb). Body surface area is 2.12 meters squared.  Moderate Pain (4) Comment: Data Unavailable   No LMP recorded. Patient is postmenopausal.  Allergies reviewed: Yes  Medications reviewed: Yes    Medications: Medication refills not needed today.  Pharmacy name entered into [place]: [id number] - [place] - [address] [address] [facility]  AT [facility]    Clinical concerns: Possibly stopping treatment.      Riri Moore RN              [facility] Hematology and Oncology Progress Note    Patient: Khloe Becker  MRN: [id number]  Date of Service: Jan 18, 2023          Reason for Visit    Chief Complaint   Patient presents with     Oncology Clinic Visit     Return visit with labs and infusion related to Malignant neoplasm of upper-outer quadrant of left breast in female, estrogen receptor positive        Assessment and Plan     Cancer Staging   Malignant neoplasm of upper-outer quadrant of left breast in female, estrogen receptor positive (H)  Staging form: Breast, AJCC 8th Edition  - Clinical: Stage IB (cT2, cN1, cM0, G3, " ER+, [place]+, HER2+) - Signed by Xavier Rubio MD on 6/10/2022  - Pathologic: Stage IA (pT1a, pN1mi, cM0, G3, ER+, AZ+, HER2+) - Signed by Xavier Rubio MD on 6/10/2022    1. Breast cancer, stage IA, triple positive, left side, status post 6 cycles of neoadjuvant TCHP and then left mastectomy: Patient did have residual disease at time of her surgery so was started on Kadcyla adjuvantly.  Adjuvant radiation declined.  She has received 10 doses of Kadcyla.  Discontinued due to for neuropathy.  Decided to switch to single agent Herceptin.  Has received 2 doses so far.  Today she feels worse than when she was on Kadcyla.  More fatigue, shortness of breath, peripheral edema etc.  Thinking about discontinuing treatment.  We discussed the pros and cons of that.  I do not think there is any issue which loss if she foregoes last 2 doses.  But considering her symptoms I will get an echocardiogram to look for any heart failure.  She will come back in 2 weeks and tentatively will schedule for Herceptin infusion.    Labs are pretty stable today.  Hold off on Herceptin today.    ECOG Performance    1 - Can't do physically strenuous work, but fully ambyulatory and can do light sedentary work    Distress Screening (within last 30 days)    1. How concerned are you about your ability to eat? : 0  2. How concerned are you about unintended weight loss or your current weight? : 0  3. How concerned are you about feeling depressed or very sad? : 0  4. How concerned are you about feeling anxious or very scared? : 0  5. Do you struggle with the loss of meaning and jeimy in your life? : Not at all  6. How concerned are you about work and home life issues that may be affected by your cancer? : 0  7. How concerned are you about knowing what resources are available to help you? : 0  8. Do you currently have what you would describe as Zoroastrianism or spiritual struggles?            : Not at all       Pain  Pain Score: Moderate Pain  (4)    Problem List    Patient Active Problem List   Diagnosis     Morbid obesity (H)     Malignant neoplasm of overlapping sites of left breast in female, estrogen receptor positive (H)     Malignant neoplasm of upper-outer quadrant of left breast in female, estrogen receptor positive (H)     Chemotherapy-induced peripheral neuropathy (H)     Musculoskeletal pain     Dehydration        ______________________________________________________________________________    History of Present Illness    Oncologic history  July 2021: Left breast mass palpated by patient.  Mammogram showing 2.1 x 2.1 x 2 cm hypoechoic mass at 2 o'clock position about 11 cm from the nipple.  Ultrasound-guided biopsy of the mass showed invasive ductal carcinoma, grade 3, ER, CT and HER2 positive.  Left axillary lymph node biopsy positive for metastatic disease.     Neoadjuvant chemotherapy with TCHP starting 9/2/2021.  Finished 6 cycles of chemo.  Last dose was 12/17/2020.  Carboplatin was held for cycle 4 and 6 due to poor tolerance.     Left mastectomy with sentinel lymph node biopsy and axillary lymph node dissection done on 1/19/2022.     Surgical path showing residual disease.  ypT1a, pN1(mi).  Patient declined radiation therapy.    Received 10 cycles of adjuvant Kadcyla.  Switched to single agent Herceptin on 12/8/2022 due to significant peripheral neuropathy.    Interim history: She is here for Herceptin infusion.  Continues to have significant issues with fatigue musculoskeletal pain and peripheral neuropathy.      Also has developed some shortness of breath and worsening peripheral edema.  She is thinking about discontinuing treatment.  Not keen on doing infusion today.  She had issues with peripheral neuropathy and hence Kadcyla was discontinued.  She has 2 more infusions to go to finish 1 year of maintenance therapy.      Review of Systems    Pertinent items are noted in HPI.    Past History    Past Medical History:   Diagnosis Date      Anemia      Breast cancer (H)     left     Gastroesophageal reflux disease      Hypertension      Motion sickness      Obese      PONV (postoperative nausea and vomiting)        PHYSICAL EXAM  /64   Pulse 66   Temp 97.8  F (36.6  C)   Resp 18   Wt 99.8 kg (220 lb)   SpO2 98%   BMI 37.76 kg/m      GENERAL: no acute distress. Cooperative in conversation. Here alone. Mask on  NEURO: non focal. Alert and oriented x3.   Lungs: Mostly clear to auscultation  CVS: Regular rate and rhythm, S1-S2 heard  Extremities: Peripheral edema noted  SKIN: exposed skin is dry intact.     Lab Results    Recent Results (from the past 168 hour(s))   Echocardiogram Complete   Result Value Ref Range    LVEF  55%        Imaging    Echocardiogram Complete    Result Date: 2023  [id number]^[email or web address]^[id number] [address]  Name: JOHN VINSON MRN: [id number] : 1955 Study Date: 2023 10:09 AM Age: 67 yrs Gender: Female Patient Location: [facility] Reason For Study: Drug-induced cardiomyopathy (H) Ordering Physician: THEA BAR Referring Physician: THEA BAR Performed By: DAGO  BSA: 2.0 m2 Height: 64 in Weight: 220 lb BP: 137/64 mmHg ______________________________________________________________________________ Procedure Complete Echo Adult. ______________________________________________________________________________ Interpretation Summary  Normal left ventricular size. Left ventricular systolic function is lower limits of normal. Left ventricular ejection fraction estimated at 55%. No regional wall motion abnormality noted. Normal right ventricular size and systolic function. No hemodynamically significant valve abnormality. Small anterior pericardial effusion versus pericardial fat pad. Compared to the examination 2022 ejection fraction appears grossly similar. The pericardium was not well visualized on the prior  examination. Compared to July 2022 to the current pericardial findings are new. ______________________________________________________________________________ I      WMSI = 1.00     % Normal = 100  X - Cannot   0 -                      (2) - Mildly 2 -          Segments  Size Interpret    Hyperkinetic 1 - Normal  Hypokinetic  Hypokinetic  1-2     small                                                    7 -          3-5    moderate 3 - Akinetic 4 -          5 -         6 - Akinetic Dyskinetic   6-14    large              Dyskinetic   Aneurysmal  w/scar       w/scar       15-16   diffuse  Left Ventricle The left ventricle is normal in size. There is normal left ventricular wall thickness. Diastolic Doppler findings (E/E' ratio and/or other parameters) suggest left ventricular filling pressures are normal. The visual ejection fraction is estimated at 55%. No regional wall motion abnormalities noted.  Right Ventricle Normal right ventricle size and systolic function. TAPSE is normal, which is consistent with normal right ventricular systolic function.  Atria Normal left atrial size. Right atrial size is normal.  Mitral Valve Mitral valve leaflets appear normal. There is trace mitral regurgitation.  Tricuspid Valve The tricuspid valve is not well visualized, but is grossly normal. There is trace tricuspid regurgitation.  Aortic Valve The aortic valve is trileaflet. No hemodynamically significant valvular aortic stenosis.  Pulmonic Valve The pulmonic valve is not well visualized. There is trace pulmonic valvular regurgitation.  Vessels The aorta root is normal. Normal size ascending aorta. IVC diameter <2.1 cm collapsing >50% with sniff suggests a normal RA pressure of 3 mmHg.  Pericardium Small anterior pericardial effusion versus pericardial fat.Measures approximately 1.0 cm.  ______________________________________________________________________________ MMode/2D Measurements & Calculations IVSd: 0.80 cm LVIDd: 4.6 cm  LVIDs: 3.4 cm LVPWd: 0.84 cm FS: 27.5 % LV mass(C)d: 124.4 grams LV mass(C)dI: 61.1 grams/m2 Ao root diam: 3.2 cm LA dimension: 3.0 cm asc Aorta Diam: 3.1 cm  LA/Ao: 0.95 LVOT diam: 2.0 cm LVOT area: 3.0 cm2 LA Volume Indexed (AL/bp): 21.7 ml/m2 RWT: 0.36  Time Measurements MM HR: 67.0 BPM  Doppler Measurements & Calculations MV E max leland: 78.1 cm/sec MV A max leland: 96.5 cm/sec MV E/A: 0.81 MV dec time: 0.28 sec  LV V1 max PG: 3.2 mmHg LV V1 max: 89.7 cm/sec LV V1 VTI: 18.5 cm SV(LVOT): 55.3 ml SI(LVOT): 27.2 ml/m2 PA acc time: 0.11 sec E/E' av.1 Lateral E/e': 12.1 Medial E/e': 12.2  ______________________________________________________________________________ Report approved by: Gloria Aragon 2023 11:13 AM          A total of 30 min were spent today on this visit which included face to face conversation with the patient, EMR review, counseling and co-ordination of care and medical documentation.      Signed by: Xavier Rubio MD      Again, thank you for allowing me to participate in the care of your patient.        Sincerely,        Xavier Rubio MD

## 2023-01-26 NOTE — PROGRESS NOTES
[facility] Hematology and Oncology Progress Note    Patient: Khloe Becker  MRN: [id number]  Date of Service: Dec 28, 2022          Reason for Visit    Chief Complaint   Patient presents with     Oncology Clinic Visit     4 week follow up with labs related to Malignant neoplasm of upper-outer quadrant of left breast in female, estrogen receptor positive       Assessment and Plan     Cancer Staging   Malignant neoplasm of upper-outer quadrant of left breast in female, estrogen receptor positive (H)  Staging form: Breast, AJCC 8th Edition  - Clinical: Stage IB (cT2, cN1, cM0, G3, ER+, NJ+, HER2+) - Signed by Xavier Rubio MD on 6/10/2022  - Pathologic: Stage IA (pT1a, pN1mi, cM0, G3, ER+, NJ+, HER2+) - Signed by Xavier Rubio MD on 6/10/2022    1. Breast cancer, stage IA, triple positive, left side, status post 6 cycles of neoadjuvant TCHP and then left mastectomy: Patient did have residual disease at time of her surgery so was started on Kadcyla adjuvantly.  She has received 10 doses of Kadcyla so far.  Last dose was reduced to 3 mg/kg due to peripheral neuropathy.  We decided to switch to Herceptin.  Received first dose 3 weeks ago.  No significant change in her symptoms.  Labs are pretty stable today.  No contraindication to proceed with Herceptin today.    Plan is to do a total of 4 cycles of Herceptin.  We will see how she tolerates this.  If her symptoms are getting worse and continue to affect her quality of life and potentially could consider discontinuing treatment.          ECOG Performance    1 - Can't do physically strenuous work, but fully ambyulatory and can do light sedentary work    Distress Screening (within last 30 days)    1. How concerned are you about your ability to eat? : 0  2. How concerned are you about unintended weight loss or your current weight? : 0  3. How concerned are you about feeling depressed or very sad? : 0  4. How concerned are you about feeling anxious or  very scared? : 0  5. Do you struggle with the loss of meaning and jeimy in your life? : Not at all  6. How concerned are you about work and home life issues that may be affected by your cancer? : 0  7. How concerned are you about knowing what resources are available to help you? : 0  8. Do you currently have what you would describe as Holiness or spiritual struggles?            : Not at all       Pain  Pain Score: Moderate Pain (4)  Pain Loc: Other - see comment (joint pain in fingers and hips)    Problem List    Patient Active Problem List   Diagnosis     Morbid obesity (H)     Malignant neoplasm of overlapping sites of left breast in female, estrogen receptor positive (H)     Malignant neoplasm of upper-outer quadrant of left breast in female, estrogen receptor positive (H)     Chemotherapy-induced peripheral neuropathy (H)     Musculoskeletal pain     Dehydration        ______________________________________________________________________________    History of Present Illness    Oncologic history  July 2021: Left breast mass palpated by patient.  Mammogram showing 2.1 x 2.1 x 2 cm hypoechoic mass at 2 o'clock position about 11 cm from the nipple.  Ultrasound-guided biopsy of the mass showed invasive ductal carcinoma, grade 3, ER, MD and HER2 positive.  Left axillary lymph node biopsy positive for metastatic disease.     Neoadjuvant chemotherapy with TCHP starting 9/2/2021.  Finished 6 cycles of chemo.  Last dose was 12/17/2020.  Carboplatin was held for cycle 4 and 6 due to poor tolerance.     Left mastectomy with sentinel lymph node biopsy and axillary lymph node dissection done on 1/19/2022.     Surgical path showing residual disease.  ypT1a, pN1(mi).  Patient declined radiation therapy.    Received 10 cycles of adjuvant Kadcyla.  Switched to single agent Herceptin on 12/8/2022 due to significant peripheral neuropathy.    Interim history: She is here for Herceptin infusion.  Continues to have significant issues  "with fatigue musculoskeletal pain and peripheral neuropathy.  We discontinued capsula since her neuropathy was getting worse and switched to Herceptin.  She had first infusion 3 weeks ago.  Did okay with it.  Has not noticed any significant difference in terms of the side effects between Kadcyla and Herceptin.  In fact her fatigue is a little bit worse.      Review of Systems    Pertinent items are noted in HPI.    Past History    Past Medical History:   Diagnosis Date     Anemia      Breast cancer (H)     left     Gastroesophageal reflux disease      Hypertension      Motion sickness      Obese      PONV (postoperative nausea and vomiting)        PHYSICAL EXAM  BP (!) 154/74 (BP Location: Right arm, Patient Position: Sitting, Cuff Size: Adult Large)   Pulse 80   Temp 98  F (36.7  C) (Tympanic)   Resp 16   Ht 1.626 m (5' 4\")   Wt 98.1 kg (216 lb 4.8 oz)   SpO2 98%   BMI 37.13 kg/m      GENERAL: no acute distress. Cooperative in conversation. Here alone. Mask on  NEURO: non focal. Alert and oriented x3.   PSYCH: within normal limits. No depression or anxiety.  SKIN: exposed skin is dry intact.     Lab Results    No results found for this or any previous visit (from the past 168 hour(s)).    Imaging    No results found.     A total of 25 min were spent today on this visit which included face to face conversation with the patient, EMR review, counseling and co-ordination of care and medical documentation.      Signed by: Xavier Rubio MD  "

## 2023-01-30 DIAGNOSIS — R60.0 PERIPHERAL EDEMA: ICD-10-CM

## 2023-01-30 RX ORDER — FUROSEMIDE 20 MG
20 TABLET ORAL PRN
Qty: 30 TABLET | Refills: 0 | Status: SHIPPED | OUTPATIENT
Start: 2023-01-30 | End: 2023-01-30

## 2023-01-30 RX ORDER — FUROSEMIDE 20 MG
TABLET ORAL
Qty: 90 TABLET | Refills: 0 | Status: SHIPPED | OUTPATIENT
Start: 2023-01-30 | End: 2023-05-01

## 2023-02-09 ENCOUNTER — HOSPITAL ENCOUNTER (OUTPATIENT)
Dept: CARDIOLOGY | Facility: HOSPITAL | Age: 68
Discharge: HOME | End: 2023-02-09
Attending: INTERNAL MEDICINE | Admitting: INTERNAL MEDICINE
Payer: COMMERCIAL

## 2023-02-09 DIAGNOSIS — I42.7 DRUG-INDUCED CARDIOMYOPATHY (H): ICD-10-CM

## 2023-02-09 LAB — LVEF ECHO: NORMAL

## 2023-02-09 PROCEDURE — 93306 TTE W/DOPPLER COMPLETE: CPT

## 2023-02-09 PROCEDURE — 93306 TTE W/DOPPLER COMPLETE: CPT | Mod: 26 | Performed by: INTERNAL MEDICINE

## 2023-02-15 ENCOUNTER — ONCOLOGY VISIT (OUTPATIENT)
Dept: ONCOLOGY | Facility: HOSPITAL | Age: 68
End: 2023-02-15
Attending: INTERNAL MEDICINE
Payer: COMMERCIAL

## 2023-02-15 VITALS
BODY MASS INDEX: 37.45 KG/M2 | TEMPERATURE: 97.7 F | OXYGEN SATURATION: 97 % | DIASTOLIC BLOOD PRESSURE: 78 MMHG | HEART RATE: 67 BPM | SYSTOLIC BLOOD PRESSURE: 166 MMHG | RESPIRATION RATE: 18 BRPM | WEIGHT: 218.2 LBS

## 2023-02-15 DIAGNOSIS — Z17.0 MALIGNANT NEOPLASM OF UPPER-OUTER QUADRANT OF LEFT BREAST IN FEMALE, ESTROGEN RECEPTOR POSITIVE (H): ICD-10-CM

## 2023-02-15 DIAGNOSIS — C50.412 MALIGNANT NEOPLASM OF UPPER-OUTER QUADRANT OF LEFT BREAST IN FEMALE, ESTROGEN RECEPTOR POSITIVE (H): ICD-10-CM

## 2023-02-15 PROCEDURE — G0463 HOSPITAL OUTPT CLINIC VISIT: HCPCS | Performed by: NURSE PRACTITIONER

## 2023-02-15 PROCEDURE — 99215 OFFICE O/P EST HI 40 MIN: CPT | Performed by: NURSE PRACTITIONER

## 2023-02-15 RX ORDER — EXEMESTANE 25 MG/1
25 TABLET ORAL DAILY
Qty: 30 TABLET | Refills: 5 | Status: SHIPPED | OUTPATIENT
Start: 2023-02-15 | End: 2023-05-17

## 2023-02-15 ASSESSMENT — PAIN SCALES - GENERAL: PAINLEVEL: MODERATE PAIN (4)

## 2023-02-15 NOTE — PROGRESS NOTES
"Oncology Rooming Note    February 15, 2023 9:55 AM   Khloe Becker is a 67 year old female who presents for:    Chief Complaint   Patient presents with     Oncology Clinic Visit     Malignant neoplasm of upper-outer quadrant of left breast in female, estrogen receptor positive (H)     Initial Vitals: BP (!) 166/78   Pulse 67   Temp 97.7  F (36.5  C)   Resp 18   Wt 99 kg (218 lb 3.2 oz)   SpO2 97%   BMI 37.45 kg/m   Estimated body mass index is 37.45 kg/m  as calculated from the following:    Height as of 12/28/22: 1.626 m (5' 4\").    Weight as of this encounter: 99 kg (218 lb 3.2 oz). Body surface area is 2.11 meters squared.  Moderate Pain (4) Comment: Data Unavailable   No LMP recorded. Patient is postmenopausal.  Allergies reviewed: Yes  Medications reviewed: Yes    Medications: Medication refills not needed today.  Pharmacy name entered into [facility]: [facility] #[id number] [address]  AT [facility]    Clinical concerns: None       Daniela Ma LPN            "

## 2023-02-15 NOTE — LETTER
2/15/2023         RE: Khloe Becker  [address] [place] [address]        Dear Colleague,    Thank you for referring your patient, Khloe Becker, to the [facility]. Please see a copy of my visit note below.    [facility] Hematology and Oncology Progress Note    Patient: Khloe Becker  MRN: [id number]  Date of Service: Feb 15, 2023          Reason for Visit    Chief Complaint   Patient presents with     Oncology Clinic Visit     Malignant neoplasm of upper-outer quadrant of left breast in female, estrogen receptor positive (H)       Assessment and Plan     Cancer Staging   Malignant neoplasm of upper-outer quadrant of left breast in female, estrogen receptor positive (H)  Staging form: Breast, AJCC 8th Edition  - Clinical: Stage IB (cT2, cN1, cM0, G3, ER+, MI+, HER2+) - Signed by Xavier Rubio MD on 6/10/2022  - Pathologic: Stage IA (pT1a, pN1mi, cM0, G3, ER+, MI+, HER2+) - Signed by Xavier Rubio MD on 6/10/2022    1. Breast cancer, stage IA, triple positive, left side, status post 6 cycles of neoadjuvant TCHP and then left mastectomy: Patient did have residual disease at time of her surgery so she has now started on Kadcyla adjuvantly.  Had 10 cycles and had to do reduction. Now switched to Herceptin due to side effects. Was supposed to get 4 cycles and then would be done. Had a dose on 12/8 and 12/28 and then had even more side effects so stopped treatment.  She is a little nervous to do that but feels pretty comfortable with her decision.  She is not interested in completing the last 2.  She also is having some issues with the anastrozole like hot flashes and poor sleep, so I told her we can try exemestane.  I will have a nurse give her a call in 1 month to see how she is doing and she will either stay on that or switch back to anastrozole.  We had a long discussion about the different types of medications and how they work.  She will see   Ramiro in 3 months.  She will continue self exams.  She will also continue to get right-sided mammograms every year.  Last was done in September, 2022 at [facility].  Patient will get her port out.    2.  Peripheral neuropathy: This is from all of her previous treatment as well as the Kadcyla.  She feels like it is slightly sleep better.  She says it is more painful than numb now so she is hoping that means she is continue to get more feeling.  Overall she is happy that it is improving and we will continue to monitor        ECOG Performance    1 - Can't do physically strenuous work, but fully ambyulatory and can do light sedentary work    Distress Screening (within last 30 days)    1. How concerned are you about your ability to eat? : 0  2. How concerned are you about unintended weight loss or your current weight? : 0  3. How concerned are you about feeling depressed or very sad? : 0  4. How concerned are you about feeling anxious or very scared? : 0  5. Do you struggle with the loss of meaning and jeimy in your life? : Not at all  6. How concerned are you about work and home life issues that may be affected by your cancer? : 0  7. How concerned are you about knowing what resources are available to help you? : 0  8. Do you currently have what you would describe as Hinduism or spiritual struggles?            : Not at all       Pain       Problem List    Patient Active Problem List   Diagnosis     Morbid obesity (H)     Malignant neoplasm of overlapping sites of left breast in female, estrogen receptor positive (H)     Malignant neoplasm of upper-outer quadrant of left breast in female, estrogen receptor positive (H)     Chemotherapy-induced peripheral neuropathy (H)     Musculoskeletal pain     Dehydration        ______________________________________________________________________________    History of Present Illness    Oncologic history  July 2021: Left breast mass palpated by patient.  Mammogram showing 2.1  x 2.1 x 2 cm hypoechoic mass at 2 o'clock position about 11 cm from the nipple.  Ultrasound-guided biopsy of the mass showed invasive ductal carcinoma, grade 3, ER, AL and HER2 positive.  Left axillary lymph node biopsy positive for metastatic disease.     Treatment:   Neoadjuvant chemotherapy with TCHP starting 9/2/2021.  Finished 6 cycles of chemo.  Last dose was 12/17/2021.  Carboplatin was held for cycle 4 and 6 due to poor tolerance. Had Kadcyla adjuvantly for 10 doses. Then switched to herceptin for 2 more doses. Last dose was 12/28/22. Did not receive last 2 doses of Heceptin due to intolerance.      Left mastectomy with sentinel lymph node biopsy and axillary lymph node dissection done on 1/19/2022.     Surgical path showing residual disease.  ypT1a, pN1(mi).  Patient declined radiation therapy.    Interim history: Patient is here today for follow up.  She states that when she switched from Kadcyla to Herceptin she felt almost even worse and she had 2 doses in December and was post to get a dose in January and then her final dose today but states that she skipped the one in January because she just did not feel comfortable doing that.  She is a little on the fence of whether she wants to be done or not but she feels like she is comfortable with the decision to stop.  She has some questions about how much that would compromise her overall picture.  She denies any new bone or back pain.  Her left-sided chest wall is fine.  She is doing exams of that frequently has not noticed any changes.  No changes in her right breast.  Had a mammogram of that in September.  She denies any unexplained weight loss.  She is starting to feel better with being off the treatment for about 6 weeks at this point.  She hopes she continues to feel better.  She wants her port out.  She states that she is having a lot of issues from the anastrozole including hot flashes so she is wondering if there is anything different that we could  try.    Review of Systems    Pertinent items are noted in HPI.    Past History    Past Medical History:   Diagnosis Date     Anemia      Breast cancer (H)     left     Gastroesophageal reflux disease      Hypertension      Motion sickness      Obese      PONV (postoperative nausea and vomiting)        PHYSICAL EXAM  There were no vitals taken for this visit.    GENERAL: no acute distress. Cooperative in conversation. Here alone. Mask on  RESP: Regular respiratory rate. No expiratory wheezes   MUSCULOSKELETAL: no bilateral leg swelling  NEURO: non focal. Alert and oriented x3.   PSYCH: within normal limits. No depression or anxiety.  SKIN: exposed skin is dry intact.     Lab Results    Recent Results (from the past 168 hour(s))   Echocardiogram Complete   Result Value Ref Range    LVEF  55%        Imaging    Echocardiogram Complete    Result Date: 2023  [id number] [address]  Name: JOHN VINSON MRN: [id number] : 1955 Study Date: 2023 10:09 AM Age: 67 yrs Gender: Female Patient Location: [facility] Reason For Study: Drug-induced cardiomyopathy (H) Ordering Physician: THEA BAR Referring Physician: THEA BAR Performed By: DAGO  BSA: 2.0 m2 Height: 64 in Weight: 220 lb BP: 137/64 mmHg ______________________________________________________________________________ Procedure Complete Echo Adult. ______________________________________________________________________________ Interpretation Summary  Normal left ventricular size. Left ventricular systolic function is lower limits of normal. Left ventricular ejection fraction estimated at 55%. No regional wall motion abnormality noted. Normal right ventricular size and systolic function. No hemodynamically significant valve abnormality. Small anterior pericardial effusion versus pericardial fat pad. Compared to the examination 2022 ejection  fraction appears grossly similar. The pericardium was not well visualized on the prior examination. Compared to July 2022 to the current pericardial findings are new. ______________________________________________________________________________ I      WMSI = 1.00     % Normal = 100  X - Cannot   0 -                      (2) - Mildly 2 -          Segments  Size Interpret    Hyperkinetic 1 - Normal  Hypokinetic  Hypokinetic  1-2     small                                                    7 -          3-5    moderate 3 - Akinetic 4 -          5 -         6 - Akinetic Dyskinetic   6-14    large              Dyskinetic   Aneurysmal  w/scar       w/scar       15-16   diffuse  Left Ventricle The left ventricle is normal in size. There is normal left ventricular wall thickness. Diastolic Doppler findings (E/E' ratio and/or other parameters) suggest left ventricular filling pressures are normal. The visual ejection fraction is estimated at 55%. No regional wall motion abnormalities noted.  Right Ventricle Normal right ventricle size and systolic function. TAPSE is normal, which is consistent with normal right ventricular systolic function.  Atria Normal left atrial size. Right atrial size is normal.  Mitral Valve Mitral valve leaflets appear normal. There is trace mitral regurgitation.  Tricuspid Valve The tricuspid valve is not well visualized, but is grossly normal. There is trace tricuspid regurgitation.  Aortic Valve The aortic valve is trileaflet. No hemodynamically significant valvular aortic stenosis.  Pulmonic Valve The pulmonic valve is not well visualized. There is trace pulmonic valvular regurgitation.  Vessels The aorta root is normal. Normal size ascending aorta. IVC diameter <2.1 cm collapsing >50% with sniff suggests a normal RA pressure of 3 mmHg.  Pericardium Small anterior pericardial effusion versus pericardial fat.Measures approximately 1.0 cm.   "______________________________________________________________________________ MMode/2D Measurements & Calculations IVSd: 0.80 cm LVIDd: 4.6 cm LVIDs: 3.4 cm LVPWd: 0.84 cm FS: 27.5 % LV mass(C)d: 124.4 grams LV mass(C)dI: 61.1 grams/m2 Ao root diam: 3.2 cm LA dimension: 3.0 cm asc Aorta Diam: 3.1 cm  LA/Ao: 0.95 LVOT diam: 2.0 cm LVOT area: 3.0 cm2 LA Volume Indexed (AL/bp): 21.7 ml/m2 RWT: 0.36  Time Measurements MM HR: 67.0 BPM  Doppler Measurements & Calculations MV E max leland: 78.1 cm/sec MV A max leland: 96.5 cm/sec MV E/A: 0.81 MV dec time: 0.28 sec  LV V1 max PG: 3.2 mmHg LV V1 max: 89.7 cm/sec LV V1 VTI: 18.5 cm SV(LVOT): 55.3 ml SI(LVOT): 27.2 ml/m2 PA acc time: 0.11 sec E/E' av.1 Lateral E/e': 12.1 Medial E/e': 12.2  ______________________________________________________________________________ Report approved by: Gloria Aragon 2023 11:13 AM       Total time spent with patient in face to face time, chart review and documentation was 40 minutes.        Signed by: ZOË Silva CNP    Oncology Rooming Note    February 15, 2023 9:55 AM   Khloe Becker is a 67 year old female who presents for:    Chief Complaint   Patient presents with     Oncology Clinic Visit     Malignant neoplasm of upper-outer quadrant of left breast in female, estrogen receptor positive (H)     Initial Vitals: BP (!) 166/78   Pulse 67   Temp 97.7  F (36.5  C)   Resp 18   Wt 99 kg (218 lb 3.2 oz)   SpO2 97%   BMI 37.45 kg/m   Estimated body mass index is 37.45 kg/m  as calculated from the following:    Height as of 22: 1.626 m (5' 4\").    Weight as of this encounter: 99 kg (218 lb 3.2 oz). Body surface area is 2.11 meters squared.  Moderate Pain (4) Comment: Data Unavailable   No LMP recorded. Patient is postmenopausal.  Allergies reviewed: Yes  Medications reviewed: Yes    Medications: Medication refills not needed today.  Pharmacy name entered into [employer]: [facility] #[id number] - [place], [place] " - [id number] [facility] [facility]  [facility]    Clinical concerns: None       Daniela Ma LPN                Again, thank you for allowing me to participate in the care of your patient.        Sincerely,        ZOË Silva CNP

## 2023-02-15 NOTE — PROGRESS NOTES
[facility] Hematology and Oncology Progress Note    Patient: Khloe Becker  MRN: [id number]  Date of Service: Feb 15, 2023          Reason for Visit    Chief Complaint   Patient presents with     Oncology Clinic Visit     Malignant neoplasm of upper-outer quadrant of left breast in female, estrogen receptor positive (H)       Assessment and Plan     Cancer Staging   Malignant neoplasm of upper-outer quadrant of left breast in female, estrogen receptor positive (H)  Staging form: Breast, AJCC 8th Edition  - Clinical: Stage IB (cT2, cN1, cM0, G3, ER+, OK+, HER2+) - Signed by Xavier Rubio MD on 6/10/2022  - Pathologic: Stage IA (pT1a, pN1mi, cM0, G3, ER+, OK+, HER2+) - Signed by Xavier Rubio MD on 6/10/2022    1. Breast cancer, stage IA, triple positive, left side, status post 6 cycles of neoadjuvant TCHP and then left mastectomy: Patient did have residual disease at time of her surgery so she has now started on Kadcyla adjuvantly.  Had 10 cycles and had to do reduction. Now switched to Herceptin due to side effects. Was supposed to get 4 cycles and then would be done. Had a dose on 12/8 and 12/28 and then had even more side effects so stopped treatment.  She is a little nervous to do that but feels pretty comfortable with her decision.  She is not interested in completing the last 2.  She also is having some issues with the anastrozole like hot flashes and poor sleep, so I told her we can try exemestane.  I will have a nurse give her a call in 1 month to see how she is doing and she will either stay on that or switch back to anastrozole.  We had a long discussion about the different types of medications and how they work.  She will see Dr. Rubio in 3 months.  She will continue self exams.  She will also continue to get right-sided mammograms every year.  Last was done in September, 2022 at [facility].  Patient will get her port out.    2.  Peripheral neuropathy: This is from all of her  previous treatment as well as the Kadcyla.  She feels like it is slightly sleep better.  She says it is more painful than numb now so she is hoping that means she is continue to get more feeling.  Overall she is happy that it is improving and we will continue to monitor        ECOG Performance    1 - Can't do physically strenuous work, but fully ambyulatory and can do light sedentary work    Distress Screening (within last 30 days)    1. How concerned are you about your ability to eat? : 0  2. How concerned are you about unintended weight loss or your current weight? : 0  3. How concerned are you about feeling depressed or very sad? : 0  4. How concerned are you about feeling anxious or very scared? : 0  5. Do you struggle with the loss of meaning and jeimy in your life? : Not at all  6. How concerned are you about work and home life issues that may be affected by your cancer? : 0  7. How concerned are you about knowing what resources are available to help you? : 0  8. Do you currently have what you would describe as Congregation or spiritual struggles?            : Not at all       Pain       Problem List    Patient Active Problem List   Diagnosis     Morbid obesity (H)     Malignant neoplasm of overlapping sites of left breast in female, estrogen receptor positive (H)     Malignant neoplasm of upper-outer quadrant of left breast in female, estrogen receptor positive (H)     Chemotherapy-induced peripheral neuropathy (H)     Musculoskeletal pain     Dehydration        ______________________________________________________________________________    History of Present Illness    Oncologic history  July 2021: Left breast mass palpated by patient.  Mammogram showing 2.1 x 2.1 x 2 cm hypoechoic mass at 2 o'clock position about 11 cm from the nipple.  Ultrasound-guided biopsy of the mass showed invasive ductal carcinoma, grade 3, ER, [place] and HER2 positive.  Left axillary lymph node biopsy positive for metastatic  disease.     Treatment:   Neoadjuvant chemotherapy with TCHP starting 9/2/2021.  Finished 6 cycles of chemo.  Last dose was 12/17/2021.  Carboplatin was held for cycle 4 and 6 due to poor tolerance. Had Kadcyla adjuvantly for 10 doses. Then switched to herceptin for 2 more doses. Last dose was 12/28/22. Did not receive last 2 doses of Heceptin due to intolerance.      Left mastectomy with sentinel lymph node biopsy and axillary lymph node dissection done on 1/19/2022.     Surgical path showing residual disease.  ypT1a, pN1(mi).  Patient declined radiation therapy.    Interim history: Patient is here today for follow up.  She states that when she switched from Kadcyla to Herceptin she felt almost even worse and she had 2 doses in December and was post to get a dose in January and then her final dose today but states that she skipped the one in January because she just did not feel comfortable doing that.  She is a little on the fence of whether she wants to be done or not but she feels like she is comfortable with the decision to stop.  She has some questions about how much that would compromise her overall picture.  She denies any new bone or back pain.  Her left-sided chest wall is fine.  She is doing exams of that frequently has not noticed any changes.  No changes in her right breast.  Had a mammogram of that in September.  She denies any unexplained weight loss.  She is starting to feel better with being off the treatment for about 6 weeks at this point.  She hopes she continues to feel better.  She wants her port out.  She states that she is having a lot of issues from the anastrozole including hot flashes so she is wondering if there is anything different that we could try.    Review of Systems    Pertinent items are noted in HPI.    Past History    Past Medical History:   Diagnosis Date     Anemia      Breast cancer (H)     left     Gastroesophageal reflux disease      Hypertension      Motion sickness       Obese      PONV (postoperative nausea and vomiting)        PHYSICAL EXAM  There were no vitals taken for this visit.    GENERAL: no acute distress. Cooperative in conversation. Here alone. Mask on  RESP: Regular respiratory rate. No expiratory wheezes   MUSCULOSKELETAL: no bilateral leg swelling  NEURO: non focal. Alert and oriented x3.   PSYCH: within normal limits. No depression or anxiety.  SKIN: exposed skin is dry intact.     Lab Results    Recent Results (from the past 168 hour(s))   Echocardiogram Complete   Result Value Ref Range    LVEF  55%        Imaging    Echocardiogram Complete    Result Date: 2023  [id number]^DIPIKA^[place] [address]  Name: JOHN VINSON MRN: [id number] : 1955 Study Date: 2023 10:09 AM Age: 67 yrs Gender: Female Patient Location: [facility] Reason For Study: Drug-induced cardiomyopathy (H) Ordering Physician: THEA BAR Referring Physician: THEA BAR Performed By: DAGO  BSA: 2.0 m2 Height: 64 in Weight: 220 lb BP: 137/64 mmHg ______________________________________________________________________________ Procedure Complete Echo Adult. ______________________________________________________________________________ Interpretation Summary  Normal left ventricular size. Left ventricular systolic function is lower limits of normal. Left ventricular ejection fraction estimated at 55%. No regional wall motion abnormality noted. Normal right ventricular size and systolic function. No hemodynamically significant valve abnormality. Small anterior pericardial effusion versus pericardial fat pad. Compared to the examination 2022 ejection fraction appears grossly similar. The pericardium was not well visualized on the prior examination. Compared to 2022 to the current pericardial findings are new. ______________________________________________________________________________ I       WMSI = 1.00     % Normal = 100  X - Cannot   0 -                      (2) - Mildly 2 -          Segments  Size Interpret    Hyperkinetic 1 - Normal  Hypokinetic  Hypokinetic  1-2     small                                                    7 -          3-5    moderate 3 - Akinetic 4 -          5 -         6 - Akinetic Dyskinetic   6-14    large              Dyskinetic   Aneurysmal  w/scar       w/scar       15-16   diffuse  Left Ventricle The left ventricle is normal in size. There is normal left ventricular wall thickness. Diastolic Doppler findings (E/E' ratio and/or other parameters) suggest left ventricular filling pressures are normal. The visual ejection fraction is estimated at 55%. No regional wall motion abnormalities noted.  Right Ventricle Normal right ventricle size and systolic function. TAPSE is normal, which is consistent with normal right ventricular systolic function.  Atria Normal left atrial size. Right atrial size is normal.  Mitral Valve Mitral valve leaflets appear normal. There is trace mitral regurgitation.  Tricuspid Valve The tricuspid valve is not well visualized, but is grossly normal. There is trace tricuspid regurgitation.  Aortic Valve The aortic valve is trileaflet. No hemodynamically significant valvular aortic stenosis.  Pulmonic Valve The pulmonic valve is not well visualized. There is trace pulmonic valvular regurgitation.  Vessels The aorta root is normal. Normal size ascending aorta. IVC diameter <2.1 cm collapsing >50% with sniff suggests a normal RA pressure of 3 mmHg.  Pericardium Small anterior pericardial effusion versus pericardial fat.Measures approximately 1.0 cm.  ______________________________________________________________________________ MMode/2D Measurements & Calculations IVSd: 0.80 cm LVIDd: 4.6 cm LVIDs: 3.4 cm LVPWd: 0.84 cm FS: 27.5 % LV mass(C)d: 124.4 grams LV mass(C)dI: 61.1 grams/m2 Ao root diam: 3.2 cm LA dimension: 3.0 cm asc Aorta Diam: 3.1 cm   LA/Ao: 0.95 LVOT diam: 2.0 cm LVOT area: 3.0 cm2 LA Volume Indexed (AL/bp): 21.7 ml/m2 RWT: 0.36  Time Measurements MM HR: 67.0 BPM  Doppler Measurements & Calculations MV E max leland: 78.1 cm/sec MV A max leland: 96.5 cm/sec MV E/A: 0.81 MV dec time: 0.28 sec  LV V1 max PG: 3.2 mmHg LV V1 max: 89.7 cm/sec LV V1 VTI: 18.5 cm SV(LVOT): 55.3 ml SI(LVOT): 27.2 ml/m2 PA acc time: 0.11 sec E/E' av.1 Lateral E/e': 12.1 Medial E/e': 12.2  ______________________________________________________________________________ Report approved by: Gloria Aragon 2023 11:13 AM       Total time spent with patient in face to face time, chart review and documentation was 40 minutes.        Signed by: ZOË Silva CNP

## 2023-02-16 RX ORDER — EXEMESTANE 25 MG/1
TABLET ORAL
Qty: 90 TABLET | OUTPATIENT
Start: 2023-02-16

## 2023-02-16 NOTE — PROGRESS NOTES
[facility] Hematology and Oncology Progress Note    Patient: Khloe Becker  MRN: [id number]  Date of Service: Jan 18, 2023          Reason for Visit    Chief Complaint   Patient presents with     Oncology Clinic Visit     Return visit with labs and infusion related to Malignant neoplasm of upper-outer quadrant of left breast in female, estrogen receptor positive        Assessment and Plan     Cancer Staging   Malignant neoplasm of upper-outer quadrant of left breast in female, estrogen receptor positive (H)  Staging form: Breast, AJCC 8th Edition  - Clinical: Stage IB (cT2, cN1, cM0, G3, ER+, AK+, HER2+) - Signed by Xavier Rubio MD on 6/10/2022  - Pathologic: Stage IA (pT1a, pN1mi, cM0, G3, ER+, AK+, HER2+) - Signed by Xavier Rubio MD on 6/10/2022    1. Breast cancer, stage IA, triple positive, left side, status post 6 cycles of neoadjuvant TCHP and then left mastectomy: Patient did have residual disease at time of her surgery so was started on Kadcyla adjuvantly.  Adjuvant radiation declined.  She has received 10 doses of Kadcyla.  Discontinued due to for neuropathy.  Decided to switch to single agent Herceptin.  Has received 2 doses so far.  Today she feels worse than when she was on Kadcyla.  More fatigue, shortness of breath, peripheral edema etc.  Thinking about discontinuing treatment.  We discussed the pros and cons of that.  I do not think there is any issue which loss if she foregoes last 2 doses.  But considering her symptoms I will get an echocardiogram to look for any heart failure.  She will come back in 2 weeks and tentatively will schedule for Herceptin infusion.    Labs are pretty stable today.  Hold off on Herceptin today.    ECOG Performance    1 - Can't do physically strenuous work, but fully ambyulatory and can do light sedentary work    Distress Screening (within last 30 days)    1. How concerned are you about your ability to eat? : 0  2. How concerned are you about  unintended weight loss or your current weight? : 0  3. How concerned are you about feeling depressed or very sad? : 0  4. How concerned are you about feeling anxious or very scared? : 0  5. Do you struggle with the loss of meaning and jeimy in your life? : Not at all  6. How concerned are you about work and home life issues that may be affected by your cancer? : 0  7. How concerned are you about knowing what resources are available to help you? : 0  8. Do you currently have what you would describe as Moravian or spiritual struggles?            : Not at all       Pain  Pain Score: Moderate Pain (4)    Problem List    Patient Active Problem List   Diagnosis     Morbid obesity (H)     Malignant neoplasm of overlapping sites of left breast in female, estrogen receptor positive (H)     Malignant neoplasm of upper-outer quadrant of left breast in female, estrogen receptor positive (H)     Chemotherapy-induced peripheral neuropathy (H)     Musculoskeletal pain     Dehydration        ______________________________________________________________________________    History of Present Illness    Oncologic history  July 2021: Left breast mass palpated by patient.  Mammogram showing 2.1 x 2.1 x 2 cm hypoechoic mass at 2 o'clock position about 11 cm from the nipple.  Ultrasound-guided biopsy of the mass showed invasive ductal carcinoma, grade 3, ER, [place] and HER2 positive.  Left axillary lymph node biopsy positive for metastatic disease.     Neoadjuvant chemotherapy with TCHP starting 9/2/2021.  Finished 6 cycles of chemo.  Last dose was 12/17/2020.  Carboplatin was held for cycle 4 and 6 due to poor tolerance.     Left mastectomy with sentinel lymph node biopsy and axillary lymph node dissection done on 1/19/2022.     Surgical path showing residual disease.  ypT1a, pN1(mi).  Patient declined radiation therapy.    Received 10 cycles of adjuvant Kadcyla.  Switched to single agent Herceptin on 12/8/2022 due to significant peripheral  neuropathy.    Interim history: She is here for Herceptin infusion.  Continues to have significant issues with fatigue musculoskeletal pain and peripheral neuropathy.      Also has developed some shortness of breath and worsening peripheral edema.  She is thinking about discontinuing treatment.  Not keen on doing infusion today.  She had issues with peripheral neuropathy and hence Kadcyla was discontinued.  She has 2 more infusions to go to finish 1 year of maintenance therapy.      Review of Systems    Pertinent items are noted in HPI.    Past History    Past Medical History:   Diagnosis Date     Anemia      Breast cancer (H)     left     Gastroesophageal reflux disease      Hypertension      Motion sickness      Obese      PONV (postoperative nausea and vomiting)        PHYSICAL EXAM  /64   Pulse 66   Temp 97.8  F (36.6  C)   Resp 18   Wt 99.8 kg (220 lb)   SpO2 98%   BMI 37.76 kg/m      GENERAL: no acute distress. Cooperative in conversation. Here alone. Mask on  NEURO: non focal. Alert and oriented x3.   Lungs: Mostly clear to auscultation  CVS: Regular rate and rhythm, S1-S2 heard  Extremities: Peripheral edema noted  SKIN: exposed skin is dry intact.     Lab Results    Recent Results (from the past 168 hour(s))   Echocardiogram Complete   Result Value Ref Range    LVEF  55%        Imaging    Echocardiogram Complete    Result Date: 2023  [id number]^DIPIKA^[place] [address]  Name: JOHN VINSON MRN: [id number] : 1955 Study Date: 2023 10:09 AM Age: 67 yrs Gender: Female Patient Location: [facility] Reason For Study: Drug-induced cardiomyopathy (H) Ordering Physician: THEA BAR Referring Physician: THEA BAR Performed By: DAGO  BSA: 2.0 m2 Height: 64 in Weight: 220 lb BP: 137/64 mmHg ______________________________________________________________________________ Procedure Complete Echo Adult.  ______________________________________________________________________________ Interpretation Summary  Normal left ventricular size. Left ventricular systolic function is lower limits of normal. Left ventricular ejection fraction estimated at 55%. No regional wall motion abnormality noted. Normal right ventricular size and systolic function. No hemodynamically significant valve abnormality. Small anterior pericardial effusion versus pericardial fat pad. Compared to the examination October 2022 ejection fraction appears grossly similar. The pericardium was not well visualized on the prior examination. Compared to July 2022 to the current pericardial findings are new. ______________________________________________________________________________ I      WMSI = 1.00     % Normal = 100  X - Cannot   0 -                      (2) - Mildly 2 -          Segments  Size Interpret    Hyperkinetic 1 - Normal  Hypokinetic  Hypokinetic  1-2     small                                                    7 -          3-5    moderate 3 - Akinetic 4 -          5 -         6 - Akinetic Dyskinetic   6-14    large              Dyskinetic   Aneurysmal  w/scar       w/scar       15-16   diffuse  Left Ventricle The left ventricle is normal in size. There is normal left ventricular wall thickness. Diastolic Doppler findings (E/E' ratio and/or other parameters) suggest left ventricular filling pressures are normal. The visual ejection fraction is estimated at 55%. No regional wall motion abnormalities noted.  Right Ventricle Normal right ventricle size and systolic function. TAPSE is normal, which is consistent with normal right ventricular systolic function.  Atria Normal left atrial size. Right atrial size is normal.  Mitral Valve Mitral valve leaflets appear normal. There is trace mitral regurgitation.  Tricuspid Valve The tricuspid valve is not well visualized, but is grossly normal. There is trace tricuspid regurgitation.  Aortic Valve The  aortic valve is trileaflet. No hemodynamically significant valvular aortic stenosis.  Pulmonic Valve The pulmonic valve is not well visualized. There is trace pulmonic valvular regurgitation.  Vessels The aorta root is normal. Normal size ascending aorta. IVC diameter <2.1 cm collapsing >50% with sniff suggests a normal RA pressure of 3 mmHg.  Pericardium Small anterior pericardial effusion versus pericardial fat.Measures approximately 1.0 cm.  ______________________________________________________________________________ MMode/2D Measurements & Calculations IVSd: 0.80 cm LVIDd: 4.6 cm LVIDs: 3.4 cm LVPWd: 0.84 cm FS: 27.5 % LV mass(C)d: 124.4 grams LV mass(C)dI: 61.1 grams/m2 Ao root diam: 3.2 cm LA dimension: 3.0 cm asc Aorta Diam: 3.1 cm  LA/Ao: 0.95 LVOT diam: 2.0 cm LVOT area: 3.0 cm2 LA Volume Indexed (AL/bp): 21.7 ml/m2 RWT: 0.36  Time Measurements MM HR: 67.0 BPM  Doppler Measurements & Calculations MV E max leland: 78.1 cm/sec MV A max leland: 96.5 cm/sec MV E/A: 0.81 MV dec time: 0.28 sec  LV V1 max PG: 3.2 mmHg LV V1 max: 89.7 cm/sec LV V1 VTI: 18.5 cm SV(LVOT): 55.3 ml SI(LVOT): 27.2 ml/m2 PA acc time: 0.11 sec E/E' av.1 Lateral E/e': 12.1 Medial E/e': 12.2  ______________________________________________________________________________ Report approved by: Gloria Aragon 2023 11:13 AM          A total of 30 min were spent today on this visit which included face to face conversation with the patient, EMR review, counseling and co-ordination of care and medical documentation.      Signed by: Xavier Rubio MD

## 2023-02-17 ENCOUNTER — MYC MEDICAL ADVICE (OUTPATIENT)
Dept: INTERVENTIONAL RADIOLOGY/VASCULAR | Facility: CLINIC | Age: 68
End: 2023-02-17
Payer: COMMERCIAL

## 2023-02-27 NOTE — H&P
Interventional Radiology - History and Physical  2/28/2023    Procedure Requested: port removal  Requesting Provider: Shirley Hernandez CNP    HPI: Khloe Becker is a 67 year old female with left side ductal breast cancer s/p left mastectomy and s/p right side port placement 8/17/2021. Patient has since completed IV chemotherapy and returns to have her port removed.     Imaging:   [facility]  LOCATION: [facility]  DATE: 8/17/2021     PROCEDURE: IMPLANTABLE VENOUS CHEST PORT PLACEMENT  1.  Implantable venous access port placement.  2.  Ultrasound guidance for vascular access. A permanent image was stored.  3.  Fluoroscopic guidance for central venous access device placement.     INTERVENTIONAL RADIOLOGIST: Claudio Carrillo MD     INDICATION: 65-year-old female with breast cancer need of central venous access for chemotherapy.     CONSENT: The risks, benefits and alternatives of the procedure were discussed with the patient  in detail. All questions were answered. Informed consent was given to proceed with the procedure.     MODERATE SEDATION: Versed 4 mg IV; Fentanyl 200 mcg IV.  Under physician supervision, Versed and fentanyl were administered for moderate sedation. Pulse oximetry, heart rate and blood pressure were continuously monitored by an independent trained   observer. The physician spent 30 minutes of face-to-face sedation time with the patient.     CONTRAST: None.  ANTIBIOTICS: Ancef 2 grams IV.  ADDITIONAL MEDICATIONS: None.     FLUOROSCOPIC TIME: 0.4 minutes.  RADIATION DOSE: Air Kerma: 12 mGy.     COMPLICATIONS: No immediate complications.     STERILE BARRIER TECHNIQUE: Maximum sterile barrier technique was used. Cutaneous antisepsis was performed at the operative site with application of 2% chlorhexidine and large sterile drape. Prior to the procedure, the  and assistant performed   hand hygiene and wore hat, mask, sterile gown, and sterile gloves during the  entire procedure.     PROCEDURE:  The patient was positively identified, brought to the Interventional Radiology suite, placed in the supine position, and a timeout was performed. A limited preliminary ultrasound demonstrated a patent right  internal jugular vein. The right    side of the patient's neck and upper anterior chest were sterilely prepped and draped. After giving local anesthesia, a 21 gauge needle was used to puncture the right  internal jugular vein from a low lateral approach under ultrasound guidance, with a   permanent image saved. A 0.018 inch wire was then advanced through the needle into the central veins, as confirmed fluoroscopically. The needle was exchanged over the wire for a 4 Australian coaxial dilator. The 0.018 inch wire and inner 3 Australian dilator   were then exchanged for a 0.035 inch guidewire, which was advanced under fluoroscopic guidance into the IVC. A site on the upper anterior right  chest was then anesthetized with lidocaine with epinephrine as was a subcutaneous track connecting this to   the venipuncture site. A #15 blade was used to make an approximately 2 cm long incision on the upper anterior right  chest. The subcutaneous tissue within the pocket was then spread with a Erin clamp, and the pocket was irrigated with saline and dried   with gauze. The distal end of the catheter was then connected to the metal tunneler, and this was then tunneled from the pocket to the venipuncture site. The outer 4 Australian dilator was then exchanged over the 0.035 inch guidewire for an 8 Australian   peel-away sheath. The 0.035 inch wire was removed and the distal end of the catheter tubing was then advanced through the peel-away sheath. The distal tip of the catheter tubing was positioned at the cavoatrial junction. The proximal end of the catheter   tubing was cut and the catheter was attached to the port. The port was accessed and noted to aspirate and flush well. The port was placed within the  pocket and brief fluoroscopic image was taken to confirm proper placement of the port with no kinking.   Additionally, the tip of the catheter was located at the cavoatrial junction.     The pocket was closed with buried subcutaneous 3-0 absorbable stitches and a running subcuticular 4-0 absorbable stitch. The port was again accessed and flushed with 100 unit/ml heparinized saline. Skin glue was then applied to the incision site. The   patient appeared to tolerate the procedure well.      The central venous catheter insertion checklist was reviewed prior to placement and followed throughout the procedure.     FINDINGS:  Ultrasound shows an anechoic and compressible right  jugular vein. At the completion of the study, the port tip lies at the cavoatrial junction.                                                                      IMPRESSION: Successful placement of a right  internal jugular vein power injectable venous chest port with catheter tip position located within the cavoatrial junction. The port is ready for immediate use.       NPO Status: midnight  Anticoagulation/Antiplatelets/Bleeding tendencies: none  Antibiotics: none for IR    Review of Systems: A comprehensive 10-point review of systems was performed. All systems were reviewed and negative with exception to those reported in the HPI.    PMH:  Past Medical History:   Diagnosis Date     Anemia      Breast cancer (H)     left     Gastroesophageal reflux disease      Hypertension      Motion sickness      Obese      PONV (postoperative nausea and vomiting)        PSH:  Past Surgical History:   Procedure Laterality Date     CHOLECYSTECTOMY       HAND SURGERY Left      hysteectomy       IR CHEST PORT PLACEMENT > 5 YRS OF AGE  8/17/2021     MASTECTOMY SIMPLE Left 1/19/2022    Procedure: Left Mastectomy, Fowler Lymph Node Biopsy, COMPLETION LYMPH NODE DISSECTION;  Surgeon: Ana Viera MD;  Location: [facility]     TONSILLECTOMY          ALLERGIES:  Allergies   Allergen Reactions     Ibuprofen      Hypertensive reaction     Seafood Anaphylaxis     Shrimp allergy     Shrimp Anaphylaxis     Shrimp allergy     Keflex [Cephalexin]      Blood in Urine, heavy     Lisinopril Cough     Caused aspiration     Shellfish-Derived Products Hives     Latex Rash       MEDICATIONS:  Current Outpatient Medications   Medication     acetaminophen (TYLENOL) 500 MG tablet     anastrozole (ARIMIDEX) 1 MG tablet     bisacodyl (DULCOLAX) 5 MG EC tablet     cholecalciferol 25 MCG (1000 UT) TABS     clonazePAM (KLONOPIN) 0.5 MG tablet     diphenoxylate-atropine (LOMOTIL) 2.5-0.025 MG tablet     exemestane (AROMASIN) 25 MG tablet     fish oil-omega-3 fatty acids 1000 MG capsule     fluticasone (VERAMYST) 27.5 MCG/SPRAY nasal spray     furosemide (LASIX) 20 MG tablet     gabapentin (NEURONTIN) 300 MG capsule     hydrochlorothiazide (HYDRODIURIL) 25 MG tablet     HYDROcodone-acetaminophen (NORCO) 5-325 MG tablet     hydrocortisone 2.5 % cream     irbesartan (AVAPRO) 75 MG tablet     lidocaine-prilocaine (EMLA) 2.5-2.5 % external cream     Magnesium Oxide 140 MG CAPS     medical cannabis (Patient's own supply)     multivitamin w/minerals (THERA-VIT-M) tablet     ondansetron (ZOFRAN-ODT) 8 MG ODT tab     prochlorperazine (COMPAZINE) 10 MG tablet     psyllium (METAMUCIL/KONSYL) Packet     sennosides (SENOKOT) 8.6 MG tablet     traZODone (DESYREL) 50 MG tablet     No current facility-administered medications for this encounter.     Facility-Administered Medications Ordered in Other Encounters   Medication     heparin 100 UNIT/ML injection 5 mL     sodium chloride (PF) 0.9% PF flush 3-20 mL         LABS:  No results found for: INR   Hemoglobin   Date Value Ref Range Status   01/18/2023 12.0 11.7 - 15.7 g/dL Final   ]  Platelet Count   Date Value Ref Range Status   01/18/2023 180 150 - 450 10e3/uL Final     Creatinine   Date Value Ref Range Status   01/18/2023 0.80 0.51 - 0.95  "mg/dL Final     Potassium   Date Value Ref Range Status   01/18/2023 3.8 3.4 - 5.3 mmol/L Final   09/07/2022 3.7 3.5 - 5.0 mmol/L Final         EXAM:  Vital signs:  Temp: 98.6  F (37  C) Temp src: Oral BP: (!) 170/82 Pulse: 84   Resp: 22 SpO2: 97 % O2 Device: None (Room air)        Estimated body mass index is 37.45 kg/m  as calculated from the following:    Height as of 12/28/22: 1.626 m (5' 4\").    Weight as of 2/15/23: 99 kg (218 lb 3.2 oz).  General:  Stable.  In no acute distress.    Neuro:  A&O x 3. Moves all extremities equally.  Resp:  Lungs clear anterior to auscultation bilaterally.  Cardio:  S1S2 and reg, without murmur, clicks or rubs    Skin:  Without excoriations, ecchymosis, erythema, lesions or open sores on upper right chest; port intact.      Pre-Sedation Assessment:  Mallampati Airway Classification:  II - Faucial pillars and soft palate may be seen, but uvula is masked by the base of the tongue  Previous reaction to anesthesia/sedation:  No  Sedation plan based on assessment: Moderate (conscious) sedation  ASA Classification: Class 3 - SEVERE SYSTEMIC DISEASE, DEFINITE FUNCTIONAL LIMITATIONS.   Code Status: Full Code intra procedure, per discussion with patient.      ASSESSMENT/PLAN:    Right side port removal with sedation.     Procedure, risks/benefits, details, alternatives, and sedation reviewed with patient and verbalized understanding. All questions answered. OK to proceed with above radiology procedure.       ZOË Bonilla CNP  Interventional Radiology  [phone]    25 minutes caring forthis patient, greater than 50% spent in direct patient/family contact and coordination of care.      "

## 2023-02-28 ENCOUNTER — HOSPITAL ENCOUNTER (OUTPATIENT)
Dept: INTERVENTIONAL RADIOLOGY/VASCULAR | Facility: HOSPITAL | Age: 68
Discharge: HOME | End: 2023-02-28
Attending: NURSE PRACTITIONER | Admitting: RADIOLOGY
Payer: COMMERCIAL

## 2023-02-28 VITALS
RESPIRATION RATE: 20 BRPM | HEART RATE: 75 BPM | SYSTOLIC BLOOD PRESSURE: 133 MMHG | TEMPERATURE: 98.2 F | DIASTOLIC BLOOD PRESSURE: 64 MMHG | OXYGEN SATURATION: 100 %

## 2023-02-28 DIAGNOSIS — Z17.0 MALIGNANT NEOPLASM OF UPPER-OUTER QUADRANT OF LEFT BREAST IN FEMALE, ESTROGEN RECEPTOR POSITIVE (H): ICD-10-CM

## 2023-02-28 DIAGNOSIS — C50.412 MALIGNANT NEOPLASM OF UPPER-OUTER QUADRANT OF LEFT BREAST IN FEMALE, ESTROGEN RECEPTOR POSITIVE (H): ICD-10-CM

## 2023-02-28 PROCEDURE — 36590 REMOVAL TUNNELED CV CATH: CPT

## 2023-02-28 PROCEDURE — 250N000009 HC RX 250: Performed by: NURSE PRACTITIONER

## 2023-02-28 PROCEDURE — 99152 MOD SED SAME PHYS/QHP 5/>YRS: CPT

## 2023-02-28 PROCEDURE — 250N000011 HC RX IP 250 OP 636: Performed by: NURSE PRACTITIONER

## 2023-02-28 RX ORDER — NALOXONE HYDROCHLORIDE 0.4 MG/ML
0.2 INJECTION, SOLUTION INTRAMUSCULAR; INTRAVENOUS; SUBCUTANEOUS
Status: DISCONTINUED | OUTPATIENT
Start: 2023-02-28 | End: 2023-03-01 | Stop reason: HOSPADM

## 2023-02-28 RX ORDER — NALOXONE HYDROCHLORIDE 0.4 MG/ML
0.4 INJECTION, SOLUTION INTRAMUSCULAR; INTRAVENOUS; SUBCUTANEOUS
Status: DISCONTINUED | OUTPATIENT
Start: 2023-02-28 | End: 2023-03-01 | Stop reason: HOSPADM

## 2023-02-28 RX ORDER — FLUMAZENIL 0.1 MG/ML
0.2 INJECTION, SOLUTION INTRAVENOUS
Status: DISCONTINUED | OUTPATIENT
Start: 2023-02-28 | End: 2023-03-01 | Stop reason: HOSPADM

## 2023-02-28 RX ORDER — LIDOCAINE 40 MG/G
CREAM TOPICAL
Status: DISCONTINUED | OUTPATIENT
Start: 2023-02-28 | End: 2023-03-01 | Stop reason: HOSPADM

## 2023-02-28 RX ORDER — FENTANYL CITRATE 50 UG/ML
25-50 INJECTION, SOLUTION INTRAMUSCULAR; INTRAVENOUS EVERY 5 MIN PRN
Status: DISCONTINUED | OUTPATIENT
Start: 2023-02-28 | End: 2023-03-01 | Stop reason: HOSPADM

## 2023-02-28 RX ADMIN — LIDOCAINE HYDROCHLORIDE 20 ML: 10 INJECTION, SOLUTION INFILTRATION; PERINEURAL at 09:25

## 2023-02-28 RX ADMIN — FENTANYL CITRATE 50 MCG: 50 INJECTION, SOLUTION INTRAMUSCULAR; INTRAVENOUS at 09:13

## 2023-02-28 RX ADMIN — MIDAZOLAM HYDROCHLORIDE 2 MG: 1 INJECTION, SOLUTION INTRAMUSCULAR; INTRAVENOUS at 09:06

## 2023-02-28 RX ADMIN — FENTANYL CITRATE 50 MCG: 50 INJECTION, SOLUTION INTRAMUSCULAR; INTRAVENOUS at 09:19

## 2023-02-28 RX ADMIN — FENTANYL CITRATE 50 MCG: 50 INJECTION, SOLUTION INTRAMUSCULAR; INTRAVENOUS at 09:09

## 2023-02-28 RX ADMIN — MIDAZOLAM HYDROCHLORIDE 0.5 MG: 1 INJECTION, SOLUTION INTRAMUSCULAR; INTRAVENOUS at 09:17

## 2023-02-28 RX ADMIN — MIDAZOLAM HYDROCHLORIDE 0.5 MG: 1 INJECTION, SOLUTION INTRAMUSCULAR; INTRAVENOUS at 09:10

## 2023-02-28 NOTE — DISCHARGE INSTRUCTIONS
Port Removal Discharge Instructions:  You had your port-a-catheter removed today. Please follow the below instructions following your port removal:    Care Instructions:  - Avoid tub baths, Jacuzzi and pool soaks for 10 days.  - You may shower beginning tomorrow. Do not scrub site until well healed; pat dry.  - If you experience significant bleeding at site, apply pressure with hands above the clavicle bone, sit upright.    Seek medical assistance for any of the following:   - Uncontrolled bleeding.  - You have a fever (greater than 101 F (38.3C)).  - Purulent (yellow/green/foul smelling) drainage from previous catheter insertion site.  - Increasing redness at previous catheter insertion site.  - Increasing pain at previous catheter insertion site.  - Increasing swelling at previous catheter insertion site.    Call [facility] ([phone]) with questions or concerns.

## 2023-02-28 NOTE — PRE-PROCEDURE
GENERAL PRE-PROCEDURE:   Procedure:  Right port removal  Date/Time:  2/28/2023 8:45 AM    Written consent obtained?: Yes    Risks and benefits: Risks, benefits and alternatives were discussed    Consent given by:  Patient  Patient states understanding of procedure being performed: Yes    Patient's understanding of procedure matches consent: Yes    Procedure consent matches procedure scheduled: Yes    Expected level of sedation:  Moderate  Appropriately NPO:  Yes  ASA Class:  3  Mallampati  :  Grade 2- soft palate, base of uvula, tonsillar pillars, and portion of posterior pharyngeal wall visible  Lungs:  Lungs clear with good breath sounds bilaterally  Heart:  Normal heart sounds and rate  History & Physical reviewed:  History and physical reviewed and no updates needed  Statement of review:  I have reviewed the lab findings, diagnostic data, medications, and the plan for sedation

## 2023-02-28 NOTE — PROCEDURES
RADIOLOGY POST PROCEDURE NOTE WITH SEDATION  Patient name: Khole Becker  MRN: [id number]  : 1955    Pre-procedure diagnosis: left breast cancer  Post-procedure diagnosis: Same    Procedure Date/Time: 2023  9:42 AM    Procedure: Removal of Port-a-Cath  Estimated blood loss: None  Sedation: Moderate sedation was employed. The patient was monitored by a nurse at all times during the procedure under my direct supervision.    Specimen(s) collected with description: Intact port-a-cath including intravascular catheter    I determined this patient to be an appropriate candidate for the planned sedation and procedure and reassessed the patient IMMEDIATELY PRIOR to sedation and procedure.     The patient tolerated the procedure well with no immediate complications.    Significant findings: Uncomplicated removal of port-a-cath.    See imaging dictation for procedural details.    Provider name: Mendez Wilkerson MD  Assistant(s):None

## 2023-02-28 NOTE — IP AVS SNAPSHOT
[facility] Interventional Radiology  [address] [place] [id number]  Phone: [phone]  Fax: [phone]                                    After Visit Summary   2/28/2023    Khloe Becker   MRN: [id number]           After Visit Summary Signature Page    I have received my discharge instructions, and my questions have been answered. I have discussed any challenges I see with this plan with the nurse or doctor.    ..........................................................................................................................................  Patient/Patient Representative Signature      ..........................................................................................................................................  Patient Representative Print Name and Relationship to Patient    ..................................................               ................................................  Date                                   Time    ..........................................................................................................................................  Reviewed by Signature/Title    ...................................................              ..............................................  Date                                               Time          22EPIC Rev 08/18

## 2023-03-02 ENCOUNTER — TELEPHONE (OUTPATIENT)
Dept: INTERVENTIONAL RADIOLOGY/VASCULAR | Facility: HOSPITAL | Age: 68
End: 2023-03-02
Payer: COMMERCIAL

## 2023-03-02 NOTE — TELEPHONE ENCOUNTER
Spoke with: Patient  Any pain: No  Any fever: No  Any redness/swelling/abnormal drainage around puncture site: No  Were you instructed well enough to take care of yourself at home: Yes  Are you satisfied with the care you received: Yes  Any additional concerns or questions: No      Post call completed.   March 2, 2023 11:08 AM  Coco Mccall RN  Interventional Radiology  [phone]

## 2023-03-31 ENCOUNTER — PATIENT OUTREACH (OUTPATIENT)
Dept: ONCOLOGY | Facility: HOSPITAL | Age: 68
End: 2023-03-31
Payer: COMMERCIAL

## 2023-03-31 NOTE — PROGRESS NOTES
"[facility]: Cancer Care                                                                                      RN Cancer Care Coordinator called patient to check in on how it has been going with her switch to exemestane from anastrozole. She informs writer that she never did switch. Her insurance said it would be $70/mo for the exemestane, and she does not have that much money.    Asked how it is going on the anastrozole she reports that she still has poor sleep. It was never good, but it seems worse on this drug. She upgraded her smart watch, and it shows that even when she \"sleeps\" for 8 hours she usually only got 6.5 - 7 hours of sleep and they are never deep restorative sleep. She has tried may avenues to improve sleep including meditations, books on tape, body scans, etc.    She is concerned about how her bone density may be effected from the medication, and hopes she will get a bone scan in 1 year rather than 2 yrs. Baseline was done last August she thinks.     The good news she is excited to report, is that she is beginning to feel her energy return. She says \"I feel like myself again sometimes, and I was beginning to wonder if I ever would.\" She has gotten back into the swimming pool doing water walking, an exercise she enjoys. Her \"happy place\". She also walks outside 30 minutes a day.    She says that [facility] has assigned her \"special people\" and once every 3 mo a NP comes to her home. They were discussing the neuropathy she has, and the NP checked and found an acupuncturist that [employer] will cover. She said they have had some good luck with this approach. Writer let her know that we have an acupuncturist as well, and gave her the name of Monica Beck. She will check on coverage for this, as she would much rather come to [facility] than up to [facility] where this other one is.    Writer let her know I would relay this update to Shirley Hernandez CNP, and Dr. Rubio.  She verbalized appreciation for the " call.    Signature:  Isis Cobb RN

## 2023-04-11 DIAGNOSIS — T45.1X5A CHEMOTHERAPY-INDUCED PERIPHERAL NEUROPATHY (H): ICD-10-CM

## 2023-04-11 DIAGNOSIS — Z17.0 MALIGNANT NEOPLASM OF UPPER-OUTER QUADRANT OF LEFT BREAST IN FEMALE, ESTROGEN RECEPTOR POSITIVE (H): Primary | ICD-10-CM

## 2023-04-11 DIAGNOSIS — C50.412 MALIGNANT NEOPLASM OF UPPER-OUTER QUADRANT OF LEFT BREAST IN FEMALE, ESTROGEN RECEPTOR POSITIVE (H): Primary | ICD-10-CM

## 2023-04-11 DIAGNOSIS — G62.0 CHEMOTHERAPY-INDUCED PERIPHERAL NEUROPATHY (H): ICD-10-CM

## 2023-04-11 NOTE — CONFIDENTIAL NOTE
Pt calls in this morning reminding RN Cancer Care Coordinator that last week we had talked about acupuncture for neuropathy. She states that the neuropathy continues to get worse. She called pain clinic and learned she needs a referral. She would like a referral to acupuncture.    Writer entered verbal order from Dr. Rubio for this.

## 2023-04-30 DIAGNOSIS — R60.0 PERIPHERAL EDEMA: ICD-10-CM

## 2023-05-01 ENCOUNTER — TELEPHONE (OUTPATIENT)
Dept: ONCOLOGY | Facility: HOSPITAL | Age: 68
End: 2023-05-01
Payer: COMMERCIAL

## 2023-05-01 RX ORDER — FUROSEMIDE 20 MG
TABLET ORAL
Qty: 90 TABLET | Refills: 0 | Status: SHIPPED | OUTPATIENT
Start: 2023-05-01 | End: 2023-08-01

## 2023-05-01 NOTE — TELEPHONE ENCOUNTER
Refill request came in electronically from patient's pharmacy for Lasix/furosemide.  Per Shirley Hernandez CNP, we should see if the patient is continuing to have shortness of breath and peripheral swelling.  If she is not, the patient can stop this medication.  If she is still having some issues and feels that the Lasix is beneficial yet then we can refill it.  I was unable to reach the patient on her cell phone so a brief message was left letting her know to give us a call back and that I was sending a [employer] message with more details.  [employer] message sent.  I did ask her to respond back via [employer] or giving us a call back.    Ruchi Joseph RN

## 2023-05-17 ENCOUNTER — PATIENT OUTREACH (OUTPATIENT)
Dept: ONCOLOGY | Facility: HOSPITAL | Age: 68
End: 2023-05-17

## 2023-05-17 ENCOUNTER — PATIENT OUTREACH (OUTPATIENT)
Dept: CARE COORDINATION | Facility: CLINIC | Age: 68
End: 2023-05-17

## 2023-05-17 ENCOUNTER — ONCOLOGY VISIT (OUTPATIENT)
Dept: ONCOLOGY | Facility: HOSPITAL | Age: 68
End: 2023-05-17
Attending: INTERNAL MEDICINE
Payer: COMMERCIAL

## 2023-05-17 VITALS
HEART RATE: 55 BPM | HEIGHT: 64 IN | SYSTOLIC BLOOD PRESSURE: 143 MMHG | TEMPERATURE: 97.9 F | BODY MASS INDEX: 39.49 KG/M2 | OXYGEN SATURATION: 100 % | DIASTOLIC BLOOD PRESSURE: 68 MMHG | WEIGHT: 231.3 LBS | RESPIRATION RATE: 16 BRPM

## 2023-05-17 DIAGNOSIS — Z17.0 MALIGNANT NEOPLASM OF UPPER-OUTER QUADRANT OF LEFT BREAST IN FEMALE, ESTROGEN RECEPTOR POSITIVE (H): ICD-10-CM

## 2023-05-17 DIAGNOSIS — C50.412 MALIGNANT NEOPLASM OF UPPER-OUTER QUADRANT OF LEFT BREAST IN FEMALE, ESTROGEN RECEPTOR POSITIVE (H): ICD-10-CM

## 2023-05-17 DIAGNOSIS — R63.5 WEIGHT GAIN: ICD-10-CM

## 2023-05-17 DIAGNOSIS — T45.1X5A CHEMOTHERAPY-INDUCED PERIPHERAL NEUROPATHY (H): Primary | ICD-10-CM

## 2023-05-17 DIAGNOSIS — G47.00 INSOMNIA, UNSPECIFIED TYPE: ICD-10-CM

## 2023-05-17 DIAGNOSIS — M79.18 MUSCULOSKELETAL PAIN: ICD-10-CM

## 2023-05-17 DIAGNOSIS — G62.0 CHEMOTHERAPY-INDUCED PERIPHERAL NEUROPATHY (H): Primary | ICD-10-CM

## 2023-05-17 PROCEDURE — 99215 OFFICE O/P EST HI 40 MIN: CPT | Performed by: INTERNAL MEDICINE

## 2023-05-17 PROCEDURE — G0463 HOSPITAL OUTPT CLINIC VISIT: HCPCS | Performed by: INTERNAL MEDICINE

## 2023-05-17 RX ORDER — IBUPROFEN 200 MG
400-600 TABLET ORAL DAILY PRN
COMMUNITY

## 2023-05-17 RX ORDER — CLONAZEPAM 0.5 MG/1
0.5 TABLET ORAL PRN
Qty: 10 TABLET | Refills: 0 | Status: SHIPPED | OUTPATIENT
Start: 2023-05-17 | End: 2023-05-17

## 2023-05-17 RX ORDER — AMOXICILLIN 250 MG
1-2 CAPSULE ORAL PRN
COMMUNITY

## 2023-05-17 RX ORDER — LETROZOLE 2.5 MG/1
2.5 TABLET, FILM COATED ORAL DAILY
Qty: 30 TABLET | Refills: 1 | Status: SHIPPED | OUTPATIENT
Start: 2023-05-17 | End: 2023-07-17

## 2023-05-17 RX ORDER — CLONAZEPAM 0.5 MG/1
0.5 TABLET ORAL PRN
Qty: 30 TABLET | Refills: 0 | Status: SHIPPED | OUTPATIENT
Start: 2023-05-17 | End: 2024-03-06

## 2023-05-17 ASSESSMENT — PAIN SCALES - GENERAL: PAINLEVEL: MODERATE PAIN (5)

## 2023-05-17 NOTE — PROGRESS NOTES
"Social Work Intervention  Oncology Clinic    Data:  Patient Name:  Khloe Becker  /Age:  1955 (67 year old)    Visit Type:In person  Referral Source: Isis Cobb RN  Reason for Referral:  Emotional support    Collaborated With:    -Isis Cobb RN  -Dr. Rubio  -Coco Eagle MA    Psychosocial Information/Concerns:  Khloe was seen by Dr. Rubio in the [facility] for her 3 month follow up. SW met with her after her appt. Khloe was tearful throughout our conversation. She is struggling with pain, fatigue, difficulty sleeping and neuropathy. She doesn't want to take medication, but also acknowledged that medication has been effective to decrease pain and help with sleep. Inquired if she could try for a week to take medication recommended and see how she feels. She is willing to try. She recognizes she has a lot of anxiety that makes it difficult to make changes as she wants to \"remain in control.\"     She is also grieving the loss of who she was before cancer and that she isn't able to participate in life how she used to. Explored her interests and what brings her jeimy. Also discussed ways for her to participate in activities she loved (being in nature, hiking, camping, visiting friends and reading.) Normalized that she may need to make adjustments to those activities when she has fatigue. She has a lot of friends who have been supportive so encouraged her to continue to reach out to them to assist her with this.    She is concerned that her life will always involve pain and fatigue and for her that isn't quality of life. She is hoping new treatment will work better for her.     Khloe reported that talking about her feelings and crying \"has been helpful.\" SW gave business card and encouraged her to reach out as needed.    Intervention/Education/Resources Provided:  -Facilitated discussion to process emotions  -Problem solve re ways to bring jeimy into her life  -George on strengths to build " trust/rapport  -Assessed anxiety, coping and supports.  -Provided active listening and emotional support  -Applied for [employer] Financial Counseling Support    Plan:  SW remains available for emotional support and offered to talk with Khloe on the phone or meet in person in the future. She has SW contact information.    ALVINA Hassan, [facility]  Adult Oncology Clinics  [place] (M,W), [place] (T) & [place] (Th)  *I am off Friday  Office: [phone]

## 2023-05-17 NOTE — PROGRESS NOTES
[facility] Hematology and Oncology Progress Note    Patient: Khloe Becker  MRN: [id number]  Date of Service: May 17, 2023          Reason for Visit    Chief Complaint   Patient presents with     Oncology Clinic Visit     3 month follow up related to Malignant neoplasm of upper-outer quadrant of left breast in female, estrogen receptor positive       Assessment and Plan     Cancer Staging   Malignant neoplasm of upper-outer quadrant of left breast in female, estrogen receptor positive (H)  Staging form: Breast, AJCC 8th Edition  - Clinical: Stage IB (cT2, cN1, cM0, G3, ER+, VT+, HER2+) - Signed by Xavier Rubio MD on 6/10/2022  - Pathologic: Stage IA (pT1a, pN1mi, cM0, G3, ER+, VT+, HER2+) - Signed by Xavier Rubio MD on 6/10/2022    1. Breast cancer, stage IA, triple positive, left side, status post 6 cycles of neoadjuvant TCHP and then left mastectomy: Patient did have residual disease at time of her surgery so she was started on Kadcyla adjuvantly.  Had 10 cycles and had to do dose reduction due to significant side effects including peripheral neuropathy and fatigue.  Ultimately we switched to Herceptin due to side effects. Was supposed to get 4 cycles and then would be done. Had a dose on 12/8 and 12/28 and then had even more side effects so decided to stop treatment.  Currently on anastrozole.  She has been on it for almost a year now.  She is having some significant side effects from that including hot flashes, night sweats, significant musculoskeletal pain especially in her feet which is significantly affecting her sleep.  Also has had significant weight gain.  Unfortunately the side effects are probably from anastrozole.  We discussed various options to manage this.  She is not keen on starting any new medication to alleviate the symptoms from anastrozole.  Typically we use an antidepressant or clonidine to alleviate menopausal symptoms from aromatase inhibitors.  But she is not  interested in this at this time.  She tried Cymbalta in the past for peripheral neuropathy from chemo and Kadcyla and had suicidal thoughts reviewed dreams.      We discussed in detail about the significance of continuing anastrozole or any other endocrine therapy for breast cancer for up to 5 years in the setting of hormone receptor positive breast cancer.  Ideally we want her to be on it at least for 5 years if not 10.  Unfortunately most of the endocrine therapy options would have same or similar symptoms.  We did try to switch her to exemestane but the co-pay was $70.  She also had questions about reduced dose anastrozole versus tamoxifen to decrease the severity of the symptoms.  Unfortunately there is no data currently to support using reduced dose anastrozole or other drugs in this class.  She is worried about her weight gain and the fact that it could increase her risk of breast cancer recurrence.  I explained to her that obesity and high BMI does increase the risk of breast cancer recurrence, incidence, however in the clinical studies there was a statistically nonsignificant increase.  There are no prospective trials in this setting.  Since this is an unfortunate side effect of the aromatase inhibitors, we would recommend to continue endocrine therapy of some sort and try to manage weight with diet and exercise.  She is already walking and keeping a track on her calorie intake but despite that she is having weight gain.      In this setting I think we could try letrozole to see how she reacts to it.  Sometimes another drug of the same class could have a different tolerance profile.  I do not think there is any harm in doing this.  She is willing to try this.  I will send a prescription for letrozole 2.5 mg daily.    2.  Peripheral neuropathy: This is slightly getting better.  Taking gabapentin at night with some benefit.  Previously we made a referral for acupuncture.  Unfortunately this was scheduled at  [facility] which she cannot drive to.  We will try to schedule this here at [facility] in [place].  Previously she has tried Cymbalta but had significant side effects.  Does not want to try anything else right now.    We will schedule her to see our  Kendra today to discuss her mental health issues.  We will hold off on psychology referral for now.        ECOG Performance    1 - Can't do physically strenuous work, but fully ambyulatory and can do light sedentary work    Distress Screening (within last 30 days)    1. How concerned are you about your ability to eat? : 0  2. How concerned are you about unintended weight loss or your current weight? : 0  3. How concerned are you about feeling depressed or very sad? : 0  4. How concerned are you about feeling anxious or very scared? : 0  5. Do you struggle with the loss of meaning and jeimy in your life? : Not at all  6. How concerned are you about work and home life issues that may be affected by your cancer? : 0  7. How concerned are you about knowing what resources are available to help you? : 0  8. Do you currently have what you would describe as Denominational or spiritual struggles?            : Not at all       Pain  Pain Score: Moderate Pain (5)  Pain Loc: Other - see comment (all over joint and muscle pain)    Problem List    Patient Active Problem List   Diagnosis     Morbid obesity (H)     Malignant neoplasm of overlapping sites of left breast in female, estrogen receptor positive (H)     Malignant neoplasm of upper-outer quadrant of left breast in female, estrogen receptor positive (H)     Chemotherapy-induced peripheral neuropathy (H)     Musculoskeletal pain     Dehydration        ______________________________________________________________________________    History of Present Illness    Oncologic history  July 2021: Left breast mass palpated by patient.  Mammogram showing 2.1 x 2.1 x 2 cm hypoechoic mass at 2 o'clock position about 11 cm from  "the nipple.  Ultrasound-guided biopsy of the mass showed invasive ductal carcinoma, grade 3, ER, [place] and HER2 positive.  Left axillary lymph node biopsy positive for metastatic disease.     Treatment:   Neoadjuvant chemotherapy with TCHP starting 9/2/2021.  Finished 6 cycles of chemo.  Last dose was 12/17/2021.  Carboplatin was held for cycle 4 and 6 due to poor tolerance. Had Kadcyla adjuvantly for 10 doses. Then switched to herceptin for 2 more doses. Last dose was 12/28/22. Did not receive last 2 doses of Heceptin due to intolerance.      Left mastectomy with sentinel lymph node biopsy and axillary lymph node dissection done on 1/19/2022.     Surgical path showing residual disease.  ypT1a, pN1(mi).  Patient declined radiation therapy.    Interim history: She is here for follow-up.  Has been having significant issues from anastrozole including significant weight gain, trouble sleeping, leg pain, fatigue, anxiety etc.  Peripheral neuropathy symptoms are still there.  May be slightly better but not significantly better.  Taking gabapentin at night.  Has been taking some clonazepam as needed at night for sleep.  Overall side effects are affecting her quality of life.    Review of Systems    Pertinent items are noted in HPI.    Past History    Past Medical History:   Diagnosis Date     Anemia      Breast cancer (H)     left     Gastroesophageal reflux disease      Hypertension      Motion sickness      Obese      PONV (postoperative nausea and vomiting)        PHYSICAL EXAM  BP (!) 143/68 (BP Location: Right arm, Patient Position: Sitting, Cuff Size: Adult Large)   Pulse 55   Temp 97.9  F (36.6  C) (Tympanic)   Resp 16   Ht 1.626 m (5' 4\")   Wt 104.9 kg (231 lb 4.8 oz)   SpO2 100%   BMI 39.70 kg/m      GENERAL: Alert and cooperative in no distress.  NEURO: non focal. Alert and oriented x3.   Breast: s/p left mastectomy. Right breast is dense without any masses.  No bilateral axilla adenopathy noted.  SKIN: " exposed skin is dry intact.     Lab Results    No results found for this or any previous visit (from the past 168 hour(s)).    Imaging    No results found.     Total time spent with patient in face to face time, chart review and documentation was 40 minutes.  Complex patient with multiple side effects secondary to cancer treatment.      Signed by: aXvier Rubio MD

## 2023-05-17 NOTE — PROGRESS NOTES
"Oncology Rooming Note    May 17, 2023 10:00 AM   Khloe Becker is a 67 year old female who presents for:    Chief Complaint   Patient presents with     Oncology Clinic Visit     3 month follow up related to Malignant neoplasm of upper-outer quadrant of left breast in female, estrogen receptor positive     Initial Vitals: BP (!) 143/68 (BP Location: Right arm, Patient Position: Sitting, Cuff Size: Adult Large)   Pulse 55   Temp 97.9  F (36.6  C) (Tympanic)   Resp 16   Ht 1.626 m (5' 4\")   Wt 104.9 kg (231 lb 4.8 oz)   SpO2 100%   BMI 39.70 kg/m   Estimated body mass index is 39.7 kg/m  as calculated from the following:    Height as of this encounter: 1.626 m (5' 4\").    Weight as of this encounter: 104.9 kg (231 lb 4.8 oz). Body surface area is 2.18 meters squared.  Moderate Pain (5) Comment: Data Unavailable   No LMP recorded. Patient is postmenopausal.  Allergies reviewed: Yes  Medications reviewed: Yes    Medications: Medication refills not needed today.  Pharmacy name entered into uTrack TV: [facility] #[id number] - [address]  AT [facility]    Clinical concerns: 3 month follow up related to Malignant neoplasm of upper-outer quadrant of left breast in female, estrogen receptor positive      GEORGIA PORTILLO CMA            "

## 2023-05-17 NOTE — LETTER
5/17/2023         RE: Khloe Becker  [address] [place] [address]        Dear Colleague,    Thank you for referring your patient, Khloe Becker, to the [facility] [place]. Please see a copy of my visit note below.    [facility] Hematology and Oncology Progress Note    Patient: Khloe Becker  MRN: [id number]  Date of Service: May 17, 2023          Reason for Visit    Chief Complaint   Patient presents with     Oncology Clinic Visit     3 month follow up related to Malignant neoplasm of upper-outer quadrant of left breast in female, estrogen receptor positive       Assessment and Plan     Cancer Staging   Malignant neoplasm of upper-outer quadrant of left breast in female, estrogen receptor positive (H)  Staging form: Breast, AJCC 8th Edition  - Clinical: Stage IB (cT2, cN1, cM0, G3, ER+, AL+, HER2+) - Signed by Xavier Rubio MD on 6/10/2022  - Pathologic: Stage IA (pT1a, pN1mi, cM0, G3, ER+, AL+, HER2+) - Signed by Xavier Rubio MD on 6/10/2022    1. Breast cancer, stage IA, triple positive, left side, status post 6 cycles of neoadjuvant TCHP and then left mastectomy: Patient did have residual disease at time of her surgery so she was started on Kadcyla adjuvantly.  Had 10 cycles and had to do dose reduction due to significant side effects including peripheral neuropathy and fatigue.  Ultimately we switched to Herceptin due to side effects. Was supposed to get 4 cycles and then would be done. Had a dose on 12/8 and 12/28 and then had even more side effects so decided to stop treatment.  Currently on anastrozole.  She has been on it for almost a year now.  She is having some significant side effects from that including hot flashes, night sweats, significant musculoskeletal pain especially in her feet which is significantly affecting her sleep.  Also has had significant weight gain.  Unfortunately the side effects are probably from anastrozole.  We  discussed various options to manage this.  She is not keen on starting any new medication to alleviate the symptoms from anastrozole.  Typically we use an antidepressant or clonidine to alleviate menopausal symptoms from aromatase inhibitors.  But she is not interested in this at this time.  She tried Cymbalta in the past for peripheral neuropathy from chemo and Kadcyla and had suicidal thoughts reviewed dreams.      We discussed in detail about the significance of continuing anastrozole or any other endocrine therapy for breast cancer for up to 5 years in the setting of hormone receptor positive breast cancer.  Ideally we want her to be on it at least for 5 years if not 10.  Unfortunately most of the endocrine therapy options would have same or similar symptoms.  We did try to switch her to exemestane but the co-pay was $70.  She also had questions about reduced dose anastrozole versus tamoxifen to decrease the severity of the symptoms.  Unfortunately there is no data currently to support using reduced dose anastrozole or other drugs in this class.  She is worried about her weight gain and the fact that it could increase her risk of breast cancer recurrence.  I explained to her that obesity and high BMI does increase the risk of breast cancer recurrence, incidence, however in the clinical studies there was a statistically nonsignificant increase.  There are no prospective trials in this setting.  Since this is an unfortunate side effect of the aromatase inhibitors, we would recommend to continue endocrine therapy of some sort and try to manage weight with diet and exercise.  She is already walking and keeping a track on her calorie intake but despite that she is having weight gain.      In this setting I think we could try letrozole to see how she reacts to it.  Sometimes another drug of the same class could have a different tolerance profile.  I do not think there is any harm in doing this.  She is willing to try  this.  I will send a prescription for letrozole 2.5 mg daily.    2.  Peripheral neuropathy: This is slightly getting better.  Taking gabapentin at night with some benefit.  Previously we made a referral for acupuncture.  Unfortunately this was scheduled at [facility] which she cannot drive to.  We will try to schedule this here at [facility] in [place].  Previously she has tried Cymbalta but had significant side effects.  Does not want to try anything else right now.    We will schedule her to see our  Kendra today to discuss her mental health issues.  We will hold off on psychology referral for now.        ECOG Performance    1 - Can't do physically strenuous work, but fully ambyulatory and can do light sedentary work    Distress Screening (within last 30 days)    1. How concerned are you about your ability to eat? : 0  2. How concerned are you about unintended weight loss or your current weight? : 0  3. How concerned are you about feeling depressed or very sad? : 0  4. How concerned are you about feeling anxious or very scared? : 0  5. Do you struggle with the loss of meaning and jeimy in your life? : Not at all  6. How concerned are you about work and home life issues that may be affected by your cancer? : 0  7. How concerned are you about knowing what resources are available to help you? : 0  8. Do you currently have what you would describe as Baptism or spiritual struggles?            : Not at all       Pain  Pain Score: Moderate Pain (5)  Pain Loc: Other - see comment (all over joint and muscle pain)    Problem List    Patient Active Problem List   Diagnosis     Morbid obesity (H)     Malignant neoplasm of overlapping sites of left breast in female, estrogen receptor positive (H)     Malignant neoplasm of upper-outer quadrant of left breast in female, estrogen receptor positive (H)     Chemotherapy-induced peripheral neuropathy (H)     Musculoskeletal pain     Dehydration     "    ______________________________________________________________________________    History of Present Illness    Oncologic history  July 2021: Left breast mass palpated by patient.  Mammogram showing 2.1 x 2.1 x 2 cm hypoechoic mass at 2 o'clock position about 11 cm from the nipple.  Ultrasound-guided biopsy of the mass showed invasive ductal carcinoma, grade 3, ER, [place] and HER2 positive.  Left axillary lymph node biopsy positive for metastatic disease.     Treatment:   Neoadjuvant chemotherapy with TCHP starting 9/2/2021.  Finished 6 cycles of chemo.  Last dose was 12/17/2021.  Carboplatin was held for cycle 4 and 6 due to poor tolerance. Had Kadcyla adjuvantly for 10 doses. Then switched to herceptin for 2 more doses. Last dose was 12/28/22. Did not receive last 2 doses of Heceptin due to intolerance.      Left mastectomy with sentinel lymph node biopsy and axillary lymph node dissection done on 1/19/2022.     Surgical path showing residual disease.  ypT1a, pN1(mi).  Patient declined radiation therapy.    Interim history: She is here for follow-up.  Has been having significant issues from anastrozole including significant weight gain, trouble sleeping, leg pain, fatigue, anxiety etc.  Peripheral neuropathy symptoms are still there.  May be slightly better but not significantly better.  Taking gabapentin at night.  Has been taking some clonazepam as needed at night for sleep.  Overall side effects are affecting her quality of life.    Review of Systems    Pertinent items are noted in HPI.    Past History    Past Medical History:   Diagnosis Date     Anemia      Breast cancer (H)     left     Gastroesophageal reflux disease      Hypertension      Motion sickness      Obese      PONV (postoperative nausea and vomiting)        PHYSICAL EXAM  BP (!) 143/68 (BP Location: Right arm, Patient Position: Sitting, Cuff Size: Adult Large)   Pulse 55   Temp 97.9  F (36.6  C) (Tympanic)   Resp 16   Ht 1.626 m (5' 4\")   Wt " "104.9 kg (231 lb 4.8 oz)   SpO2 100%   BMI 39.70 kg/m      GENERAL: Alert and cooperative in no distress.  NEURO: non focal. Alert and oriented x3.   Breast: s/p left mastectomy. Right breast is dense without any masses.  No bilateral axilla adenopathy noted.  SKIN: exposed skin is dry intact.     Lab Results    No results found for this or any previous visit (from the past 168 hour(s)).    Imaging    No results found.     Total time spent with patient in face to face time, chart review and documentation was 40 minutes.  Complex patient with multiple side effects secondary to cancer treatment.      Signed by: Xavier Rubio MD    Oncology Rooming Note    May 17, 2023 10:00 AM   Khloe Becker is a 67 year old female who presents for:    Chief Complaint   Patient presents with     Oncology Clinic Visit     3 month follow up related to Malignant neoplasm of upper-outer quadrant of left breast in female, estrogen receptor positive     Initial Vitals: BP (!) 143/68 (BP Location: Right arm, Patient Position: Sitting, Cuff Size: Adult Large)   Pulse 55   Temp 97.9  F (36.6  C) (Tympanic)   Resp 16   Ht 1.626 m (5' 4\")   Wt 104.9 kg (231 lb 4.8 oz)   SpO2 100%   BMI 39.70 kg/m   Estimated body mass index is 39.7 kg/m  as calculated from the following:    Height as of this encounter: 1.626 m (5' 4\").    Weight as of this encounter: 104.9 kg (231 lb 4.8 oz). Body surface area is 2.18 meters squared.  Moderate Pain (5) Comment: Data Unavailable   No LMP recorded. Patient is postmenopausal.  Allergies reviewed: Yes  Medications reviewed: Yes    Medications: Medication refills not needed today.  Pharmacy name entered into Ezet[employer]: [facility] #[id number] - [place] [id number] [facility]  [facility]    Clinical concerns: 3 month follow up related to Malignant neoplasm of upper-outer quadrant of left breast in female, estrogen receptor positive      GEORGIA PORTILLO CMA              Pt " brought an [employer] application to her appointment today in clinic with Dr. Rubio. Form was filled out and given to CRISTAL Rouse to fill out online with patient.       Again, thank you for allowing me to participate in the care of your patient.        Sincerely,        Xavier Rubio MD

## 2023-05-17 NOTE — PROGRESS NOTES
[facility]: Cancer Care                                                                                      RN Cancer Care Coordinator went to visit patient in clinic room after Dr. Rubio was done with visit.     Writer had been notified she was having a difficult time emotionally, and writer had reached out to FANY for a visit for her today. Upon entering writer said I understand she is having a hard time, and she agreed and stated that she is weepy today, and indeed having a very hard time. She states that her life consists of only being able to work 4 hours a day, doing the basic ADLs and taking a 3 mi walk every day that she is capable. She lives near a [employer].     The pain of her neuropathy is very difficult for her. We had referred her to acupuncture in April, but she had been offered an appt in [place], which is too far for her to drive with the neuropathy. She said someone was to call her back to offer a different appt, but never did. She has not had the energy to follow up.     While we were together, FANY Rouse, came  In. Writer introduced them, and the three of us talked for a few minutes. She has an application for [employer] which FANY will assist her with.     Writer did communicate with her about starting new medication - Letrozole. Instructed her that we want labs before she starts    Signature:  Isis Cobb RN

## 2023-05-17 NOTE — PROGRESS NOTES
Pt brought an [employer] application to her appointment today in clinic with Dr. Rubio. Form was filled out and given to CRISTAL Rouse to fill out online with patient.

## 2023-05-24 ENCOUNTER — PATIENT OUTREACH (OUTPATIENT)
Dept: CARE COORDINATION | Facility: CLINIC | Age: 68
End: 2023-05-24

## 2023-05-24 ENCOUNTER — OFFICE VISIT (OUTPATIENT)
Dept: PALLIATIVE MEDICINE | Facility: OTHER | Age: 68
End: 2023-05-24
Attending: INTERNAL MEDICINE
Payer: COMMERCIAL

## 2023-05-24 DIAGNOSIS — G62.0 CHEMOTHERAPY-INDUCED PERIPHERAL NEUROPATHY (H): ICD-10-CM

## 2023-05-24 DIAGNOSIS — T45.1X5A CHEMOTHERAPY-INDUCED PERIPHERAL NEUROPATHY (H): ICD-10-CM

## 2023-05-24 DIAGNOSIS — C50.412 MALIGNANT NEOPLASM OF UPPER-OUTER QUADRANT OF LEFT BREAST IN FEMALE, ESTROGEN RECEPTOR POSITIVE (H): Primary | ICD-10-CM

## 2023-05-24 DIAGNOSIS — Z17.0 MALIGNANT NEOPLASM OF UPPER-OUTER QUADRANT OF LEFT BREAST IN FEMALE, ESTROGEN RECEPTOR POSITIVE (H): Primary | ICD-10-CM

## 2023-05-24 PROCEDURE — 97810 ACUP 1/> WO ESTIM 1ST 15 MIN: CPT | Performed by: ACUPUNCTURIST

## 2023-05-24 PROCEDURE — 97811 ACUP 1/> W/O ESTIM EA ADD 15: CPT | Performed by: ACUPUNCTURIST

## 2023-05-24 ASSESSMENT — ENCOUNTER SYMPTOMS
TREMORS: 0
SINUS CONGESTION: 1
SPEECH CHANGE: 0
FATIGUE: 1
NIGHT SWEATS: 1
CHILLS: 0
HOARSE VOICE: 0
TASTE DISTURBANCE: 0
SLEEP DISTURBANCES DUE TO BREATHING: 0
EYE PAIN: 0
EYE REDNESS: 0
BLOATING: 1
HEARTBURN: 1
DISTURBANCES IN COORDINATION: 0
HALLUCINATIONS: 0
NECK MASS: 0
POOR WOUND HEALING: 0
MEMORY LOSS: 0
SORE THROAT: 0
DOUBLE VISION: 0
EXERCISE INTOLERANCE: 0
INCREASED ENERGY: 1
PALPITATIONS: 0
MUSCLE WEAKNESS: 0
DECREASED APPETITE: 0
NAIL CHANGES: 0
WEAKNESS: 0
VOMITING: 0
DEPRESSION: 0
NAUSEA: 0
SMELL DISTURBANCE: 0
EYE IRRITATION: 0
PARALYSIS: 0
POLYPHAGIA: 0
HYPERTENSION: 1
EYE WATERING: 0
SYNCOPE: 0
DIARRHEA: 0
ABDOMINAL PAIN: 0
MUSCLE CRAMPS: 1
STIFFNESS: 1
TROUBLE SWALLOWING: 1
JOINT SWELLING: 0
BACK PAIN: 1
DECREASED CONCENTRATION: 0
HYPOTENSION: 0
TINGLING: 1
ARTHRALGIAS: 1
INSOMNIA: 1
NUMBNESS: 1
DECREASED LIBIDO: 0
PANIC: 1
LEG PAIN: 0
WEIGHT LOSS: 0
LOSS OF CONSCIOUSNESS: 0
CONSTIPATION: 1
SINUS PAIN: 1
NECK PAIN: 0
BOWEL INCONTINENCE: 0
MYALGIAS: 1
HOT FLASHES: 1
NERVOUS/ANXIOUS: 1
SEIZURES: 0
WEIGHT GAIN: 1
SKIN CHANGES: 0
FEVER: 0
RECTAL PAIN: 0
ORTHOPNEA: 0
LIGHT-HEADEDNESS: 0
BLOOD IN STOOL: 0
HEADACHES: 0
ALTERED TEMPERATURE REGULATION: 1
DIZZINESS: 0
JAUNDICE: 0
POLYDIPSIA: 0

## 2023-05-24 NOTE — PROGRESS NOTES
Social Work Note: Telephone Call  Oncology Clinic    Data/Intervention:  Patient Name:  Khloe Becker  /Age:  1955 (67 year old)    Call From: FANY  Reason for Call:  Follow up from visit last week    Assessment:  SW called Khloe to follow up from visit last week. She reported a decrease in pain and slight increase in energy when taking ibuprofen. She still isn't sleeping more than 6 hours a night and is always tired. She had a sudden death in her family so that has also impacted her sleep. She went to acupuncture today and has future appts scheduled. She hopes to get relief for her neuropathy and wishes she would have started acupuncture earlier. No needs at time of call. Khloe has SW contact information and will reach out with questions/concerns.     Plan:  SW remains available for emotional support and resource coordination.     ALVINA Hassan, [facility]  Adult Oncology Clinics  [place] (M,W), [place] (T) & [place] (Th)  *I am off Friday  Office: [phone]

## 2023-05-24 NOTE — PROGRESS NOTES
ACUPUNCTURIST TREATMENT NOTE      Khloe Becker, a 67 year old female, is here today for Initial exam. Patient is referred by Ramiro.    ADDI  Main Complaint: Chemotherapy induced neuropathy of lower legs, feet and fingertips. Neuropathy symptoms started in summer of 2022. She feels numbness and some pain from below her knees to her feet. At one point, Khloe could not feel her feet at all but lately symptoms have started to slightly improve.  Secondary Complaints:     Past Medical History  Past Medical History Reviewed: No   has a past medical history of Anemia, Breast cancer (H), Gastroesophageal reflux disease, Hypertension, Motion sickness, Obese, and PONV (postoperative nausea and vomiting).    Objective  Basic Exam Completed:   Upper Extremity(ies): Normal    TCM Exam Completed: Yes   Limbs/Back: Bilateral Lower Extremity    Tongue/Pulse Exam Completed: No    Patient Assessment  Patient Type: Pain  Patient Complaint: Chemotherapy induced neuropathy of fingertips, lower legs and feet.    Acupuncture 5/24/2023   Intervention Reason Neuropathy; Neuropathy 2   Neuropathy Location Fingertips   Pre-session Neuropathy rating 4   Post-session Neuropathy rating 3   Neuropathy 2 Location Lower legs/feet   Pre-session Neuropathy 2 rating 8   Post-session Neuropathy 2 rating 8       TCM Diagnosis: Qi Stagnation;Blood Stagnation;Damp and Phlegm Accumulation      Treatment Principle: Move qi and blood, dry dampness    TCM / Acupuncture Treatment  Acupuncture Points:       Initial insertions: Den Hutchinson       Second insertions: Kid 1, 3, St 36, 40, 41, Sp 6, 9, Bl 63, Lateral three passes            Number of needles inserted: 38  Number of needles removed: 38    Accessory Techniques 5/24/2023   Accessory Techniques TDP Heat Lamp   TDP Heat Lamp location used Feet          Assessment and Plan  Treatment Observations:    Acupuncture Treatment Recommendations:         It is my recommendation that this patient seek advice  from their Primary Care Provider about active symptoms not addressed during this visit. The risks and benefits of acupuncture were reviewed and the patient stated understanding. The patient's questions were answered to their satisfaction. Consent was provided for treatment. We thank you for the referral and opportunity to treat this patient.    Time Spent with Patient:   I spent a total of 30 minutes face-to-face with Khloe Becker during today's office visit.     Raman Mcpherson    Answers for HPI/ROS submitted by the patient on 5/24/2023  General Symptoms: Yes  Skin Symptoms: Yes  HENT Symptoms: Yes  EYE SYMPTOMS: Yes  HEART SYMPTOMS: Yes  LUNG SYMPTOMS: No  INTESTINAL SYMPTOMS: Yes  URINARY SYMPTOMS: No  GYNECOLOGIC SYMPTOMS: Yes  BREAST SYMPTOMS: No  SKELETAL SYMPTOMS: Yes  BLOOD SYMPTOMS: No  NERVOUS SYSTEM SYMPTOMS: Yes  MENTAL HEALTH SYMPTOMS: Yes  Ear pain: No  Ear discharge: No  Hearing loss: No  Tinnitus: No  Nosebleeds: No  Congestion: Yes  Sinus pain: Yes  Trouble swallowing: Yes   Voice hoarseness: No  Mouth sores: No  Sore throat: No  Tooth pain: Yes  Gum tenderness: No  Bleeding gums: No  Change in taste: No  Change in sense of smell: No  Dry mouth: Yes  Hearing aid used: No  Neck lump: No  Fever: No  Loss of appetite: No  Weight loss: No  Weight gain: Yes  Fatigue: Yes  Night sweats: Yes  Chills: No  Increased stress: No  Excessive hunger: No  Excessive thirst: No  Feeling hot or cold when others believe the temperature is normal: Yes  Loss of height: No  Post-operative complications: No  Surgical site pain: No  Hallucinations: No  Change in or Loss of Energy: Yes  Hyperactivity: No  Confusion: No  Changes in hair: No  Changes in moles/birth marks: No  Itching: Yes  Rashes: Yes  Changes in nails: No  Acne: No  Hair in places you don't want it: No  Change in facial hair: No  Warts: No  Non-healing sores: No  Scarring: No  Flaking of skin: Yes  Color changes of hands/feet in cold : No  Sun sensitivity:  No  Skin thickening: No  Eye pain: No  Vision loss: No  Dry eyes: No  Watery eyes: No  Eye bulging: No  Double vision: No  Flashing of lights: No  Spots: No  Floaters: Yes  Redness: No  Crossed eyes: No  Tunnel Vision: No  Yellowing of eyes: No  Eye irritation: No  Chest pain or pressure: No  Fast or irregular heartbeat: No  Pain in legs with walking: No  Trouble breathing while lying down: No  Fingers or toes appear blue: No  High blood pressure: Yes  Low blood pressure: No  Fainting: No  Murmurs: No  Pacemaker: No  Varicose veins: Yes  Edema or swelling: Yes  Wake up at night with shortness of breath: No  Light-headedness: No  Exercise intolerance: No  Heart burn or indigestion: Yes  Nausea: No  Vomiting: No  Abdominal pain: No  Bloating: Yes  Constipation: Yes  Diarrhea: No  Blood in stool: No  Black stools: No  Rectal or Anal pain: No  Fecal incontinence: No  Yellowing of skin or eyes: No  Vomit with blood: No  Change in stools: No  Bleeding or spotting between periods: No  Heavy or painful periods: No  Irregular periods: No  Vaginal discharge: No  Hot flashes: Yes  Vaginal dryness: Yes  Genital ulcers: No  Reduced libido: No  Painful intercourse: No  Difficulty with sexual arousal: No  Post-menopausal bleeding: No  Back pain: Yes  Muscle aches: Yes  Neck pain: No  Swollen joints: No  Joint pain: Yes  Bone pain: Yes  Muscle cramps: Yes  Muscle weakness: No  Joint stiffness: Yes  Bone fracture: No  Trouble with coordination: No  Dizziness or trouble with balance: No  Fainting or black-out spells: No  Memory loss: No  Headache: No  Seizures: No  Speech problems: No  Tingling: Yes  Tremor: No  Weakness: No  Difficulty walking: Yes  Paralysis: No  Numbness: Yes  Nervous or Anxious: Yes  Depression: No  Trouble sleeping: Yes  Trouble thinking or concentrating: No  Mood changes: No  Panic attacks: Yes

## 2023-05-30 NOTE — PROGRESS NOTES
ACUPUNCTURIST TREATMENT NOTE      Khloe Becker, a 67 year old female, is here today for Follow - Up exam. Patient is referred by Ramiro. Treatment 2.    HPI  Main Complaint: Chemotherapy induced neuropathy of lower legs, feet and fingertips:    Patient felt improvement especially in right hand following first treatment. Also having more feeling in feet, but reports she has been having some pain in feet, however, she reports this is improvement as before she couldn't feel anything. Legs did feel better after first treatment, but a lot of pins and needles in lower legs, difficulty with sleep lately. Increased today - yesterday went to help brother pack up some items and feels this may have flared up her legs. Having some higher stress as sister in law unexpectedly passed away 10 days ago.    Patient rates right hand neuropathy at 1/10, left hand and 3/10.      Past Medical History  Past Medical History Reviewed: Yes   has a past medical history of Anemia, Breast cancer (H), Gastroesophageal reflux disease, Hypertension, Motion sickness, Obese, and PONV (postoperative nausea and vomiting).    Objective  Basic Exam Completed:   No    TCM Exam Completed: Yes   Limbs/Back: Bilateral Upper Extremity and Bilateral Lower Extremity    Tongue/Pulse Exam Completed: No    Patient Assessment  Patient Type: Pain  Patient Complaint: Chemotherapy induced neuropathy of fingertips, lower legs and feet.    Acupuncture 5/24/2023 6/1/2023   Intervention Reason Neuropathy; Neuropathy 2 Neuropathy; Neuropathy 2   Neuropathy Location Fingertips fingertips   Pre-session Neuropathy rating 4 3   Post-session Neuropathy rating 3 -   Neuropathy 2 Location Lower legs/feet Lower legs/feet   Pre-session Neuropathy 2 rating 8 8   Post-session Neuropathy 2 rating 8 -       TCM Diagnosis: Qi Stagnation;Blood Stagnation;Damp and Phlegm Accumulation      Treatment Principle: Move qi and blood, dry dampness    TCM / Acupuncture Treatment  Acupuncture  Points:       Initial insertions: Baihui, Baxie, Bafeng       Second insertions: Li11, Tb5, Li4, Gb34, St36, Sp9, Sp6, Ki3, Liv3, Gb41                    Accessory Techniques 5/24/2023 6/1/2023   Accessory Techniques TDP Heat Lamp TDP Heat Lamp   TDP Heat Lamp location used Feet Feet          Assessment and Plan  Treatment Observations: Patient relaxed well during treatment  Acupuncture Treatment Recommendations: Acupuncture treatmetn 1x/wk for 8 weeks, then re-evaluate for diagnoses C50.412, Z17.0 (ICD-10-CM) - Malignant neoplasm of upper-outer quadrant of left breast in female, estrogen receptor positive (H)  G62.0, T45.1X5A (ICD-10-CM) - Chemotherapy-induced peripheral neuropathy (H)  Additional Recommendations: Discussed foot soaks and contrast hydrotherapy for feet.    It is my recommendation that this patient seek advice from their Primary Care Provider about active symptoms not addressed during this visit. The risks and benefits of acupuncture were reviewed and the patient stated understanding. The patient's questions were answered to their satisfaction. Consent was provided for treatment. We thank you for the referral and opportunity to treat this patient.    Time Spent with Patient:   I spent a total of 30 minutes face-to-face with Khloe Becker during today's office visit.     Monica Beck L.Ac.  06/01/23

## 2023-06-01 ENCOUNTER — OFFICE VISIT (OUTPATIENT)
Dept: PALLIATIVE MEDICINE | Facility: OTHER | Age: 68
End: 2023-06-01
Payer: COMMERCIAL

## 2023-06-01 DIAGNOSIS — Z17.0 MALIGNANT NEOPLASM OF UPPER-OUTER QUADRANT OF LEFT BREAST IN FEMALE, ESTROGEN RECEPTOR POSITIVE (H): Primary | ICD-10-CM

## 2023-06-01 DIAGNOSIS — T45.1X5A CHEMOTHERAPY-INDUCED PERIPHERAL NEUROPATHY (H): ICD-10-CM

## 2023-06-01 DIAGNOSIS — C50.412 MALIGNANT NEOPLASM OF UPPER-OUTER QUADRANT OF LEFT BREAST IN FEMALE, ESTROGEN RECEPTOR POSITIVE (H): Primary | ICD-10-CM

## 2023-06-01 DIAGNOSIS — G62.0 CHEMOTHERAPY-INDUCED PERIPHERAL NEUROPATHY (H): ICD-10-CM

## 2023-06-01 PROCEDURE — 97811 ACUP 1/> W/O ESTIM EA ADD 15: CPT | Mod: GA | Performed by: ACUPUNCTURIST

## 2023-06-01 PROCEDURE — 97810 ACUP 1/> WO ESTIM 1ST 15 MIN: CPT | Mod: GA | Performed by: ACUPUNCTURIST

## 2023-06-07 NOTE — PROGRESS NOTES
ACUPUNCTURIST TREATMENT NOTE      Khloe Becker, a 67 year old female, is here today for Follow - Up exam. Patient is referred by Ramiro. Treatment 3.    HPI  Main Complaint: Chemotherapy induced neuropathy of lower legs, feet and fingertips:    Pain in legs and feet has improved, however, still with significant numbness in feet. Has been able to mow her lawn without taking a long break in between which she reports is significant improvement. States today in particular she is having increased numbness along with pins and needles in legs and feet.    Right fingers doing much better, numbness comes and goes only in right thumb. Left hand has felt slight improvement so far, she states some days feel better than others with left fingers.        Past Medical History  Past Medical History Reviewed: Yes   has a past medical history of Anemia, Breast cancer (H), Gastroesophageal reflux disease, Hypertension, Motion sickness, Obese, and PONV (postoperative nausea and vomiting).    Objective  Basic Exam Completed:   No    TCM Exam Completed: Yes   Limbs/Back: Left Upper Extremity and Bilateral Lower Extremity    Tongue/Pulse Exam Completed: No    Patient Assessment    PEG: A Three-Item Scale Assessing Pain Intensity and Interference    What number best describes your PAIN ON AVERAGE in the past week? 4    What number best describes how, during the past week, pain has interfered with your ENJOYMENT OF LIFE? 5    What number best describes how, during the past week, pain has interfered with your GENERAL ACTIVITY? 5    PEG Total Score: 4.67    Ashok ORTIZ, Cirilo KA, Rigoberto MJ, Cayden TA, Juice J, Jeannie JM, Machelle SM, Ning K. Development and initial validation of the PEG, a 3-item scale assessing pain intensity and interference. Journal of General Internal Medicine. 2009 Todd;24:733-738.  Patient Type: Pain  Patient Complaint: Chemotherapy induced neuropathy of fingertips, lower legs and feet.    Acupuncture 6/1/2023 6/8/2023    Intervention Reason Neuropathy; Neuropathy 2 Neuropathy; Neuropathy 2; Pain   Pain Location - feet/lower legs   Pre-session Pain Rating - 3   Neuropathy Location fingertips fingertips   Pre-session Neuropathy rating 3 3   Post-session Neuropathy rating - -   Neuropathy 2 Location Lower legs/feet Lower legs/feet   Pre-session Neuropathy 2 rating 8 7   Post-session Neuropathy 2 rating - -       TCM Diagnosis: Qi Stagnation;Blood Stagnation;Damp and Phlegm Accumulation      Treatment Principle: Move qi and blood, dry dampness    TCM / Acupuncture Treatment  Acupuncture Points:       Initial insertions: Baihui, Bafeng, (L) Baxie       Second insertions: Li4, Gb34, St36, Sp9, Sp6, Ki3, Liv3, Sp3, Gb41. Left: Tb5, Si3                    Accessory Techniques 6/1/2023 6/8/2023   Accessory Techniques TDP Heat Lamp TDP Heat Lamp; E-Stim   TDP Heat Lamp location used Feet Feet   E-Stim Hz - 2x100 micro   E-Stim location used - Sp6-Liv3          Assessment and Plan  Treatment Observations: Patient relaxed well during treatment. Reported improvement in sensation post-treatment as well as stability; did not need cane after getting up off of treatment table, balance was improved.  Acupuncture Treatment Recommendations: Acupuncture treatmetn 1x/wk for 8 weeks, then re-evaluate for diagnoses C50.412, Z17.0 (ICD-10-CM) - Malignant neoplasm of upper-outer quadrant of left breast in female, estrogen receptor positive (H)  G62.0, T45.1X5A (ICD-10-CM) - Chemotherapy-induced peripheral neuropathy (H)       It is my recommendation that this patient seek advice from their Primary Care Provider about active symptoms not addressed during this visit. The risks and benefits of acupuncture were reviewed and the patient stated understanding. The patient's questions were answered to their satisfaction. Consent was provided for treatment. We thank you for the referral and opportunity to treat this patient.    Time Spent with Patient:   I spent a  total of 30 minutes face-to-face with Khloe Becker during today's office visit.     Monica Beck L.Ac.  06/08/23

## 2023-06-08 ENCOUNTER — OFFICE VISIT (OUTPATIENT)
Dept: PALLIATIVE MEDICINE | Facility: OTHER | Age: 68
End: 2023-06-08
Payer: COMMERCIAL

## 2023-06-08 DIAGNOSIS — C50.412 MALIGNANT NEOPLASM OF UPPER-OUTER QUADRANT OF LEFT BREAST IN FEMALE, ESTROGEN RECEPTOR POSITIVE (H): Primary | ICD-10-CM

## 2023-06-08 DIAGNOSIS — T45.1X5A CHEMOTHERAPY-INDUCED PERIPHERAL NEUROPATHY (H): ICD-10-CM

## 2023-06-08 DIAGNOSIS — G62.0 CHEMOTHERAPY-INDUCED PERIPHERAL NEUROPATHY (H): ICD-10-CM

## 2023-06-08 DIAGNOSIS — Z17.0 MALIGNANT NEOPLASM OF UPPER-OUTER QUADRANT OF LEFT BREAST IN FEMALE, ESTROGEN RECEPTOR POSITIVE (H): Primary | ICD-10-CM

## 2023-06-08 PROCEDURE — 97814 ACUP 1/> W/ESTIM EA ADDL 15: CPT | Mod: GA | Performed by: ACUPUNCTURIST

## 2023-06-08 PROCEDURE — 97813 ACUP 1/> W/ESTIM 1ST 15 MIN: CPT | Mod: GA | Performed by: ACUPUNCTURIST

## 2023-06-08 ASSESSMENT — PAIN SCALES - PAIN ENJOYMENT GENERAL ACTIVITY SCALE (PEG)
INTERFERED_GENERAL_ACTIVITY: 5
AVG_PAIN_PASTWEEK: 4
PEG_TOTALSCORE: 4.67
INTERFERED_ENJOYMENT_LIFE: 5

## 2023-06-22 NOTE — PROGRESS NOTES
ACUPUNCTURIST TREATMENT NOTE      Khloe Becker, a 67 year old female, is here today for Follow - Up exam. Patient is referred by Ramiro. Treatment 4.    HPI  Main Complaint: Chemotherapy induced neuropathy of lower legs, feet and fingertips:    Feels neuropathy is worse with stormy weather. Reports in the past couple weeks she feels she has had to take more ibuprofen and tylenol. However, she states she could feel her break pedal easier with her foot. Today she feels more flared up. Higher anxiety today. Denies any pain.    Past Medical History  Past Medical History Reviewed: Yes   has a past medical history of Anemia, Breast cancer (H), Gastroesophageal reflux disease, Hypertension, Motion sickness, Obese, and PONV (postoperative nausea and vomiting).    Objective    TCM Exam Completed: Yes  Emotions: Yes: Anxiety and Worry/Over Thinking  Limbs/Back: Bilateral Upper Extremity and Bilateral Lower Extremity    Tongue/Pulse Exam Completed: No    Patient Assessment    PEG: A Three-Item Scale Assessing Pain Intensity and Interference    What number best describes your PAIN ON AVERAGE in the past week? 5    What number best describes how, during the past week, pain has interfered with your ENJOYMENT OF LIFE? 5    What number best describes how, during the past week, pain has interfered with your GENERAL ACTIVITY? 6    PEG Total Score: 5.33    Ashok EE, Cirilo KA, Rigoberto MJ, Cayden TA, Juice J, Jeannie JM, Machelle SM, Ning K. Development and initial validation of the PEG, a 3-item scale assessing pain intensity and interference. Journal of General Internal Medicine. 2009 Todd;24:733-738.  Patient Type: Pain  Patient Complaint: Chemotherapy induced neuropathy of fingertips, lower legs and feet.    Acupuncture 6/8/2023 6/27/2023   Intervention Reason Neuropathy; Neuropathy 2; Pain Neuropathy; Neuropathy 2   Pain Location feet/lower legs feet/lower legs   Pre-session Pain Rating 3 4   Neuropathy Location fingertips fingertips    Pre-session Neuropathy rating 3 3   Post-session Neuropathy rating - -   Neuropathy 2 Location Lower legs/feet Lower legs/feet   Pre-session Neuropathy 2 rating 7 -   Post-session Neuropathy 2 rating - -       TCM Diagnosis: Qi Stagnation;Blood Stagnation;Damp and Phlegm Accumulation      Treatment Principle: Move qi and blood, dry dampness    TCM / Acupuncture Treatment  Acupuncture Points:       Initial insertions: Baihui, Du24, Li4, Baxie       Second insertions: Gb34, St36, Sp9, Sp6, Ki3, Liv3, Gb41, Bafeng                    Accessory Techniques 6/8/2023 6/27/2023   Accessory Techniques TDP Heat Lamp; E-Stim TDP Heat Lamp   TDP Heat Lamp location used Feet Feet   E-Stim Hz 2x100 micro -   E-Stim location used Sp6-Liv3 -          Assessment and Plan  Treatment Observations: Patient relaxed well during treatment  Acupuncture Treatment Recommendations: Acupuncture treatmetn 1x/wk for 8 weeks, then re-evaluate for diagnoses C50.412, Z17.0 (ICD-10-CM) - Malignant neoplasm of upper-outer quadrant of left breast in female, estrogen receptor positive (H)  G62.0, T45.1X5A (ICD-10-CM) - Chemotherapy-induced peripheral neuropathy (H)       It is my recommendation that this patient seek advice from their Primary Care Provider about active symptoms not addressed during this visit. The risks and benefits of acupuncture were reviewed and the patient stated understanding. The patient's questions were answered to their satisfaction. Consent was provided for treatment. We thank you for the referral and opportunity to treat this patient.    Time Spent with Patient:   I spent a total of 30 minutes face-to-face with Khloe Becker during today's office visit.     Monica Beck L.Ac.

## 2023-06-27 ENCOUNTER — OFFICE VISIT (OUTPATIENT)
Dept: PALLIATIVE MEDICINE | Facility: OTHER | Age: 68
End: 2023-06-27
Payer: COMMERCIAL

## 2023-06-27 DIAGNOSIS — Z17.0 MALIGNANT NEOPLASM OF UPPER-OUTER QUADRANT OF LEFT BREAST IN FEMALE, ESTROGEN RECEPTOR POSITIVE (H): Primary | ICD-10-CM

## 2023-06-27 DIAGNOSIS — G62.0 CHEMOTHERAPY-INDUCED PERIPHERAL NEUROPATHY (H): ICD-10-CM

## 2023-06-27 DIAGNOSIS — C50.412 MALIGNANT NEOPLASM OF UPPER-OUTER QUADRANT OF LEFT BREAST IN FEMALE, ESTROGEN RECEPTOR POSITIVE (H): Primary | ICD-10-CM

## 2023-06-27 DIAGNOSIS — T45.1X5A CHEMOTHERAPY-INDUCED PERIPHERAL NEUROPATHY (H): ICD-10-CM

## 2023-06-27 PROCEDURE — 97811 ACUP 1/> W/O ESTIM EA ADD 15: CPT | Mod: GA | Performed by: ACUPUNCTURIST

## 2023-06-27 PROCEDURE — 97810 ACUP 1/> WO ESTIM 1ST 15 MIN: CPT | Mod: GA | Performed by: ACUPUNCTURIST

## 2023-06-27 ASSESSMENT — PAIN SCALES - PAIN ENJOYMENT GENERAL ACTIVITY SCALE (PEG)
INTERFERED_GENERAL_ACTIVITY: 6
INTERFERED_ENJOYMENT_LIFE: 5
AVG_PAIN_PASTWEEK: 5
PEG_TOTALSCORE: 5.33

## 2023-06-28 ENCOUNTER — PATIENT OUTREACH (OUTPATIENT)
Dept: ONCOLOGY | Facility: HOSPITAL | Age: 68
End: 2023-06-28
Payer: COMMERCIAL

## 2023-06-28 NOTE — PROGRESS NOTES
[facility] [place]: Cancer Care                                                                                        Called pt to check in on symptoms and reschedule appt.     Pt called back:   Better with Letrozole  Check on effectiveness of Exemestane. Brother will pay co-pay.     Signature:  Isis Cobb RN

## 2023-07-17 DIAGNOSIS — Z17.0 MALIGNANT NEOPLASM OF UPPER-OUTER QUADRANT OF LEFT BREAST IN FEMALE, ESTROGEN RECEPTOR POSITIVE (H): ICD-10-CM

## 2023-07-17 DIAGNOSIS — C50.412 MALIGNANT NEOPLASM OF UPPER-OUTER QUADRANT OF LEFT BREAST IN FEMALE, ESTROGEN RECEPTOR POSITIVE (H): ICD-10-CM

## 2023-07-17 RX ORDER — LETROZOLE 2.5 MG/1
2.5 TABLET, FILM COATED ORAL DAILY
Qty: 30 TABLET | Refills: 1 | Status: SHIPPED | OUTPATIENT
Start: 2023-07-17 | End: 2023-09-19

## 2023-07-19 ENCOUNTER — OFFICE VISIT (OUTPATIENT)
Dept: PALLIATIVE MEDICINE | Facility: OTHER | Age: 68
End: 2023-07-19
Payer: COMMERCIAL

## 2023-07-19 DIAGNOSIS — Z17.0 MALIGNANT NEOPLASM OF UPPER-OUTER QUADRANT OF LEFT BREAST IN FEMALE, ESTROGEN RECEPTOR POSITIVE (H): Primary | ICD-10-CM

## 2023-07-19 DIAGNOSIS — C50.412 MALIGNANT NEOPLASM OF UPPER-OUTER QUADRANT OF LEFT BREAST IN FEMALE, ESTROGEN RECEPTOR POSITIVE (H): Primary | ICD-10-CM

## 2023-07-19 DIAGNOSIS — T45.1X5A CHEMOTHERAPY-INDUCED PERIPHERAL NEUROPATHY (H): ICD-10-CM

## 2023-07-19 DIAGNOSIS — G62.0 CHEMOTHERAPY-INDUCED PERIPHERAL NEUROPATHY (H): ICD-10-CM

## 2023-07-19 PROCEDURE — 97810 ACUP 1/> WO ESTIM 1ST 15 MIN: CPT | Performed by: ACUPUNCTURIST

## 2023-07-19 PROCEDURE — 97811 ACUP 1/> W/O ESTIM EA ADD 15: CPT | Performed by: ACUPUNCTURIST

## 2023-07-19 NOTE — PROGRESS NOTES
ACUPUNCTURIST TREATMENT NOTE      Khloe Becker, a 67 year old female, is here today for Follow - Up exam. Patient is referred by Ramiro.    HPI  Main Complaint: Chemotherapy induced neuropathy of fingertips, lower legs and feet. Khloe states today that her overall sensation is improving.  Secondary Complaints:     Past Medical History  Past Medical History Reviewed: No   has a past medical history of Anemia, Breast cancer (H), Gastroesophageal reflux disease, Hypertension, Motion sickness, Obese, and PONV (postoperative nausea and vomiting).    Objective    TCM Exam Completed: Yes  Limbs/Back: Bilateral Upper Extremity and Bilateral Lower Extremity    Tongue/Pulse Exam Completed: No    Patient Assessment  Patient Type: Pain  Patient Complaint: Chemotherapy induced neuropathy of fingertips, lower legs and feet.    Acupuncture 6/27/2023 7/19/2023   Intervention Reason Neuropathy; Neuropathy 2 Neuropathy; Neuropathy 2   Pain Location feet/lower legs -   Pre-session Pain Rating 4 -   Neuropathy Location fingertips Fingertips   Pre-session Neuropathy rating 3 1   Post-session Neuropathy rating - 1   Neuropathy 2 Location Lower legs/feet Lower legs/feet   Pre-session Neuropathy 2 rating - 3   Post-session Neuropathy 2 rating - 3       TCM Diagnosis: Qi Stagnation;Blood Stagnation;Damp and Phlegm Accumulation      Treatment Principle: Move qi and blood, dry dampness    TCM / Acupuncture Treatment  Acupuncture Points:       Initial insertions: Baxie, Bafeng       Second insertions: Lateral three passes, Sp 6, 9, Kid 6, Jessy 3            Number of needles inserted: 30  Number of needles removed: 30    Accessory Techniques 6/27/2023 7/19/2023   Accessory Techniques TDP Heat Lamp TDP Heat Lamp   TDP Heat Lamp location used Feet Feet   E-Stim Hz - -   E-Stim location used - -          Assessment and Plan  Treatment Observations:    Acupuncture Treatment Recommendations: Acupuncture treatmetn 1x/wk for 8 weeks, then re-evaluate  for diagnoses C50.412, Z17.0 (ICD-10-CM) - Malignant neoplasm of upper-outer quadrant of left breast in female, estrogen receptor positive (H)  G62.0, T45.1X5A (ICD-10-CM) - Chemotherapy-induced peripheral neuropathy (H)       It is my recommendation that this patient seek advice from their Primary Care Provider about active symptoms not addressed during this visit. The risks and benefits of acupuncture were reviewed and the patient stated understanding. The patient's questions were answered to their satisfaction. Consent was provided for treatment. We thank you for the referral and opportunity to treat this patient.    Time Spent with Patient:   I spent a total of 30 minutes face-to-face with Khloe Becker during today's office visit.     Raman Mcpherson

## 2023-07-26 ENCOUNTER — OFFICE VISIT (OUTPATIENT)
Dept: PALLIATIVE MEDICINE | Facility: OTHER | Age: 68
End: 2023-07-26
Payer: COMMERCIAL

## 2023-07-26 DIAGNOSIS — G62.0 CHEMOTHERAPY-INDUCED PERIPHERAL NEUROPATHY (H): ICD-10-CM

## 2023-07-26 DIAGNOSIS — Z17.0 MALIGNANT NEOPLASM OF UPPER-OUTER QUADRANT OF LEFT BREAST IN FEMALE, ESTROGEN RECEPTOR POSITIVE (H): Primary | ICD-10-CM

## 2023-07-26 DIAGNOSIS — C50.412 MALIGNANT NEOPLASM OF UPPER-OUTER QUADRANT OF LEFT BREAST IN FEMALE, ESTROGEN RECEPTOR POSITIVE (H): Primary | ICD-10-CM

## 2023-07-26 DIAGNOSIS — T45.1X5A CHEMOTHERAPY-INDUCED PERIPHERAL NEUROPATHY (H): ICD-10-CM

## 2023-07-26 PROCEDURE — 97811 ACUP 1/> W/O ESTIM EA ADD 15: CPT | Performed by: ACUPUNCTURIST

## 2023-07-26 PROCEDURE — 97810 ACUP 1/> WO ESTIM 1ST 15 MIN: CPT | Performed by: ACUPUNCTURIST

## 2023-07-26 NOTE — PROGRESS NOTES
ACUPUNCTURIST TREATMENT NOTE      Khloe Becker, a 67 year old female, is here today for Follow - Up exam. Patient is referred by Ramiro.    ADDI  Main Complaint: Chemotherapy induced neuropathy of fingertips, feet and lower legs. Khloe states that she has seen the most improvement in the last week in her feet. She can once again feel her toes moving.  Secondary Complaints: Sinus congestion.    Past Medical History  Past Medical History Reviewed: No   has a past medical history of Anemia, Breast cancer (H), Gastroesophageal reflux disease, Hypertension, Motion sickness, Obese, and PONV (postoperative nausea and vomiting).    Objective    TCM Exam Completed: Yes  Head: Sinus congestion  Limbs/Back: Bilateral Upper Extremity and Bilateral Lower Extremity    Tongue/Pulse Exam Completed: No    Patient Assessment  Patient Type: Pain  Patient Complaint: Chemotherapy induced neuropathy of fingertips, lower legs and feet, sinus congestion        TCM Diagnosis: Qi Stagnation;Blood Stagnation;Damp and Phlegm Accumulation      Treatment Principle: Move qi and blood, dry dampness    TCM / Acupuncture Treatment  Acupuncture Points:       Initial insertions: Baxie, Bafeng, St 36, 40, 41, Li 20, Du 23       Second insertions: Lateral three passes, Bl 60, Kid 6, Jessy 3            Number of needles inserted: 37  Number of needles removed: 37           Assessment and Plan  Treatment Observations:    Acupuncture Treatment Recommendations: Acupuncture treatmetn 1x/wk for 8 weeks, then re-evaluate for diagnoses C50.412, Z17.0 (ICD-10-CM) - Malignant neoplasm of upper-outer quadrant of left breast in female, estrogen receptor positive (H) G62.0, T45.1X5A (ICD-10-CM) - Chemotherapy-induced peripheral neuropathy (H)       It is my recommendation that this patient seek advice from their Primary Care Provider about active symptoms not addressed during this visit. The risks and benefits of acupuncture were reviewed and the patient stated  understanding. The patient's questions were answered to their satisfaction. Consent was provided for treatment. We thank you for the referral and opportunity to treat this patient.    Time Spent with Patient:   I spent a total of 30 minutes face-to-face with Khloe Becker during today's office visit.     Raman Mcpherson

## 2023-07-28 ENCOUNTER — VIRTUAL VISIT (OUTPATIENT)
Dept: ONCOLOGY | Facility: HOSPITAL | Age: 68
End: 2023-07-28
Attending: INTERNAL MEDICINE
Payer: COMMERCIAL

## 2023-07-28 VITALS
WEIGHT: 233 LBS | SYSTOLIC BLOOD PRESSURE: 149 MMHG | BODY MASS INDEX: 39.99 KG/M2 | OXYGEN SATURATION: 97 % | TEMPERATURE: 97.8 F | DIASTOLIC BLOOD PRESSURE: 83 MMHG | HEART RATE: 84 BPM | RESPIRATION RATE: 18 BRPM

## 2023-07-28 DIAGNOSIS — G62.0 CHEMOTHERAPY-INDUCED PERIPHERAL NEUROPATHY (H): ICD-10-CM

## 2023-07-28 DIAGNOSIS — Z17.0 MALIGNANT NEOPLASM OF UPPER-OUTER QUADRANT OF LEFT BREAST IN FEMALE, ESTROGEN RECEPTOR POSITIVE (H): Primary | ICD-10-CM

## 2023-07-28 DIAGNOSIS — C50.412 MALIGNANT NEOPLASM OF UPPER-OUTER QUADRANT OF LEFT BREAST IN FEMALE, ESTROGEN RECEPTOR POSITIVE (H): Primary | ICD-10-CM

## 2023-07-28 DIAGNOSIS — T45.1X5A CHEMOTHERAPY-INDUCED PERIPHERAL NEUROPATHY (H): ICD-10-CM

## 2023-07-28 PROCEDURE — 99214 OFFICE O/P EST MOD 30 MIN: CPT | Performed by: INTERNAL MEDICINE

## 2023-07-28 ASSESSMENT — PAIN SCALES - GENERAL: PAINLEVEL: MODERATE PAIN (4)

## 2023-07-28 NOTE — PROGRESS NOTES
[facility] Hematology and Oncology Progress Note    Patient: Khloe Becker  MRN: [id number]  Date of Service: Jul 28, 2023          Reason for Visit    Chief Complaint   Patient presents with    Oncology Clinic Visit       Assessment and Plan     Cancer Staging   Malignant neoplasm of upper-outer quadrant of left breast in female, estrogen receptor positive (H)  Staging form: Breast, AJCC 8th Edition  - Clinical: Stage IB (cT2, cN1, cM0, G3, ER+, AK+, HER2+) - Signed by Xavier Rubio MD on 6/10/2022  - Pathologic: Stage IA (pT1a, pN1mi, cM0, G3, ER+, AK+, HER2+) - Signed by Xavier Rubio MD on 6/10/2022    1. Breast cancer, stage IA, triple positive, left side, status post 6 cycles of neoadjuvant TCHP and then left mastectomy: Patient did have residual disease at time of her surgery so she was started on Kadcyla adjuvantly.  Had 10 cycles and had to do dose reduction due to significant side effects including peripheral neuropathy and fatigue.  Ultimately we switched to Herceptin due to side effects. Was supposed to get 4 cycles and then would be done. Had a dose on 12/8 and 12/28 and then had even more side effects so decided to stop treatment.  Started on anastrozole for endocrine therapy but had significant side effects from it.  Especially with weight gain and mood changes and depression.  We switched to letrozole back in May.  Seems to be doing really well on this.  No significant side effects.  Some hot flashes but nothing major.  She has not been gaining any more weight but holding steady.    Physical exam today was unremarkable.  Postmastectomy changes in the left side and no new masses or lumps on the right side.  No bilateral axillary adenopathy.  We will continue letrozole for now.  She will come back in 4 months for recheck.  She is due for mammogram this September which she gets at [facility].  Also will get a DEXA scan next year.  She has a low bone density.  On vitamin D and  calcium.    She does have bilateral lower extremity edema.  Right more than left.  On diuretics.  Echocardiogram done in February showed stable cardiac functions.  EF was 55%.  No regional wall motion abnormalities.    2.  Peripheral neuropathy: Currently getting acupuncture and is seeing major benefit from that. Also takes gabapentin at night.  She will continue acupuncture and gabapentin for now.  She is able to feel her toes and uses cane only on an as-needed basis.          ECOG Performance    1 - Can't do physically strenuous work, but fully ambyulatory and can do light sedentary work    Distress Screening (within last 30 days)    1. How concerned are you about your ability to eat? : 0  2. How concerned are you about unintended weight loss or your current weight? : 0  3. How concerned are you about feeling depressed or very sad? : 0  4. How concerned are you about feeling anxious or very scared? : 0  5. Do you struggle with the loss of meaning and jeimy in your life? : Not at all  6. How concerned are you about work and home life issues that may be affected by your cancer? : 0  7. How concerned are you about knowing what resources are available to help you? : 0  8. Do you currently have what you would describe as Mandaen or spiritual struggles?            : Not at all       Pain  Pain Score: Moderate Pain (4)    Problem List    Patient Active Problem List   Diagnosis    Morbid obesity (H)    Malignant neoplasm of overlapping sites of left breast in female, estrogen receptor positive (H)    Malignant neoplasm of upper-outer quadrant of left breast in female, estrogen receptor positive (H)    Chemotherapy-induced peripheral neuropathy (H)    Musculoskeletal pain    Dehydration        ______________________________________________________________________________    History of Present Illness    Oncologic history  July 2021: Left breast mass palpated by patient.  Mammogram showing 2.1 x 2.1 x 2 cm hypoechoic mass  at 2 o'clock position about 11 cm from the nipple.  Ultrasound-guided biopsy of the mass showed invasive ductal carcinoma, grade 3, ER, [place] and HER2 positive.  Left axillary lymph node biopsy positive for metastatic disease.     Treatment:   Neoadjuvant chemotherapy with TCHP starting 9/2/2021.  Finished 6 cycles of chemo.  Last dose was 12/17/2021.  Carboplatin was held for cycle 4 and 6 due to poor tolerance. Had Kadcyla adjuvantly for 10 doses. Then switched to herceptin for 2 more doses. Last dose was 12/28/22. Did not receive last 2 doses of Heceptin due to intolerance.      Left mastectomy with sentinel lymph node biopsy and axillary lymph node dissection done on 1/19/2022.     Surgical path showing residual disease.  ypT1a, pN1(mi).  Patient declined radiation therapy.    Received 10 cycles of adjuvant Kadcyla with dose reductions due to neuropathy    We stopped Kadcyla due to significant adenopathy and she received 2 doses of single agent Herceptin.  Had side effects from that and we stopped treatment.    Tried adjuvant anastrozole for endocrine therapy which caused significant issues especially with depression and mood changes.    Switched to letrozole in May 2023.  Tolerating this much better    Interim history: She is here for follow-up.  Has been on letrozole for couple months now.  She has been tolerating this really well when compared to anastrozole.  Some hot flashes but nothing significant.  She also has some musculoskeletal pain but that is chronic.  No worsening of the starting letrozole.  Overall she is handling letrozole much better than anastrozole.  She is also getting acupuncture for her peripheral neuropathy which seems to be helping a lot for her.  Recently had cataract surgery.  Continues to be physically active.  She does have lower extremity edema especially on the right side.  On torsemide.    Review of Systems    Pertinent items are noted in HPI.    Past History    Past Medical History:    Diagnosis Date    Anemia     Breast cancer (H)     left    Gastroesophageal reflux disease     Hypertension     Motion sickness     Obese     PONV (postoperative nausea and vomiting)        PHYSICAL EXAM  BP (!) 149/83   Pulse 84   Temp 97.8  F (36.6  C)   Resp 18   Wt 105.7 kg (233 lb)   SpO2 97%   BMI 39.99 kg/m      GENERAL: Alert and cooperative in no distress.  NEURO: non focal. Alert and oriented x3.   Breast: s/p left mastectomy. Right breast is dense without any masses.  No bilateral axilla adenopathy noted.  SKIN: exposed skin is dry intact.     Lab Results    No results found for this or any previous visit (from the past 168 hour(s)).    Imaging    No results found.     A total of 30 min were spent today on this visit which included face to face conversation with the patient, EMR review, counseling and co-ordination of care and medical documentation.  Complex patient with multiple side effects secondary to cancer treatment.      Signed by: Xavier Rubio MD

## 2023-07-28 NOTE — PROGRESS NOTES
"Oncology Rooming Note    July 28, 2023 10:32 AM   Khloe Becker is a 67 year old female who presents for:    Chief Complaint   Patient presents with    Oncology Clinic Visit     Initial Vitals: BP (!) 149/83   Pulse 84   Temp 97.8  F (36.6  C)   Resp 18   Wt 105.7 kg (233 lb)   SpO2 97%   BMI 39.99 kg/m   Estimated body mass index is 39.99 kg/m  as calculated from the following:    Height as of 5/17/23: 1.626 m (5' 4\").    Weight as of this encounter: 105.7 kg (233 lb). Body surface area is 2.18 meters squared.  Moderate Pain (4) Comment: Data Unavailable   No LMP recorded. Patient is postmenopausal.  Allergies reviewed: Yes  Medications reviewed: Yes    Medications: Medication refills not needed today.  Pharmacy name entered into [facility]: [id number] [address] AT [facility]    Clinical concerns: None      Riri Moore RN             "

## 2023-07-29 DIAGNOSIS — R60.0 PERIPHERAL EDEMA: ICD-10-CM

## 2023-08-01 RX ORDER — FUROSEMIDE 20 MG
TABLET ORAL
Qty: 90 TABLET | Refills: 0 | Status: SHIPPED | OUTPATIENT
Start: 2023-08-01 | End: 2023-11-07

## 2023-09-19 DIAGNOSIS — Z17.0 MALIGNANT NEOPLASM OF UPPER-OUTER QUADRANT OF LEFT BREAST IN FEMALE, ESTROGEN RECEPTOR POSITIVE (H): ICD-10-CM

## 2023-09-19 DIAGNOSIS — C50.412 MALIGNANT NEOPLASM OF UPPER-OUTER QUADRANT OF LEFT BREAST IN FEMALE, ESTROGEN RECEPTOR POSITIVE (H): ICD-10-CM

## 2023-09-19 RX ORDER — LETROZOLE 2.5 MG/1
2.5 TABLET, FILM COATED ORAL DAILY
Qty: 90 TABLET | Refills: 0 | Status: SHIPPED | OUTPATIENT
Start: 2023-09-19 | End: 2024-03-01

## 2023-09-26 ENCOUNTER — TELEPHONE (OUTPATIENT)
Dept: ONCOLOGY | Facility: HOSPITAL | Age: 68
End: 2023-09-26
Payer: COMMERCIAL

## 2023-09-26 NOTE — TELEPHONE ENCOUNTER
Patient calls in today stating that she had a 3D mammogram at [facility].  They saw an eye abnormality and they are recommending additional scans.  She is wondering if at this point she should hand everything over to [facility].  She wants to see where she should go.  She would like to have everything in our system.  She states she also has a DEXA scan that she needs to get scheduled and nobody has called her to do so yet.  She would like to be called back at [phone].  I will get this over to the RN care coordinator pool as her RN cc is out of the office.    Ruchi Joseph, YASMEEN

## 2023-09-27 NOTE — TELEPHONE ENCOUNTER
Patient called again this morning.  She is quite anxious about this and wanted to know what our thoughts were.  I let her know that I could see the screening mammogram in care everywhere that she had done through [facility].  I let her know that normally when someone starts with a screening mammogram they complete all recommended tasks at that same location as they have all the images with them.  She will go ahead and call and schedule the diagnostic mammogram and possible ultrasound.  I can went through the process of what happens next and that she will get results of that from the radiologist that day.  As far as her DEXA scan goes, she had one on 6/1/2022 and does have osteopenia.  I let her know that the recommendation is to take calcium 600 mg and vitamin D 400 international units twice daily.  I also let her know that that DEXA scan is recommended to happen again on 6/2/2024 or later.  It needs to be at least 2 years apart.  She was surprised by this but verbalized understanding.  She will keep us posted on what she finds out from her breast imaging.    Ruchi Joseph RN

## 2023-10-11 ENCOUNTER — PATIENT OUTREACH (OUTPATIENT)
Dept: ONCOLOGY | Facility: HOSPITAL | Age: 68
End: 2023-10-11
Payer: COMMERCIAL

## 2023-10-11 NOTE — PROGRESS NOTES
[facility]: Cancer Care                                                                                      Pt called in today requesting call back regarding a breast biopsy she is having today at [facility].  RN Cancer Care Coordinator spoke with her and she described her confusion an frustration that she would like her care to be consistently with our system. She had been told to continue with the imaging that had been started with [facility], so she has continued, but really wants to get back over to us. Today a biopsy is scheduled over there.     She is somewhat tearful and extremely anxious about this biopsy today, and any potential outcomes of it. Writer encouraged her to do deep breathing and focus on just getting through today. We will be here for her no matter what the result is.     Writer let her know that I spoke with Dr. Rubio, and we would be very happy to continue her care with us. Writer assured her I would connect with the [facility] and Susan Lombardi, RN, to get her back under their wing.    Writer contacted Fabiola, and then called pt back. Provided phone number for her to call and get scheduled next week to see Dr. Viera. Fabiola will then request imaging and biopsy results for the appointment.     Pt voiced understanding and gratitude for call back.     Signature:  Isis Cobb RN

## 2023-10-12 ENCOUNTER — PATIENT OUTREACH (OUTPATIENT)
Dept: ONCOLOGY | Facility: HOSPITAL | Age: 68
End: 2023-10-12
Payer: COMMERCIAL

## 2023-10-12 NOTE — PROGRESS NOTES
[facility]: Cancer Care                                                                                      RN Cancer Care Coordinator received phone call from patient today that she already got the result of the breast biopsy from yesterday and it is negative. She was very happy to relay this news to writer.     She also states that she learned when she called to make appointment with Dr. Virea that Dr. Viera is retiring and is not taking on any new appointments. She is considering having a mastectomy to prevent the worry of a new cancer or recurrence. Writer empathized with the process. At any rate, she will be looking for another breast surgeon to follow with.     Writer offered to send her information on Dr. Vázquez, also at [facility]. Encouraged her to send MyChart or call to Susan Lombardi, RN to discuss how to keep getting notifications from their clinic.     Writer thanked patient for the call to update. Shared in her relief that the biopsy was benign.    Signature:  Isis Cobb RN

## 2023-11-04 ENCOUNTER — HEALTH MAINTENANCE LETTER (OUTPATIENT)
Age: 68
End: 2023-11-04

## 2023-11-07 DIAGNOSIS — R60.0 PERIPHERAL EDEMA: ICD-10-CM

## 2023-11-07 RX ORDER — FUROSEMIDE 20 MG
20 TABLET ORAL DAILY PRN
Qty: 90 TABLET | Refills: 1 | Status: SHIPPED | OUTPATIENT
Start: 2023-11-07 | End: 2023-11-08

## 2023-11-08 DIAGNOSIS — R60.0 PERIPHERAL EDEMA: ICD-10-CM

## 2023-11-08 RX ORDER — FUROSEMIDE 20 MG
20 TABLET ORAL DAILY PRN
Qty: 30 TABLET | Refills: 1 | Status: SHIPPED | OUTPATIENT
Start: 2023-11-08

## 2023-11-24 ENCOUNTER — ONCOLOGY VISIT (OUTPATIENT)
Dept: ONCOLOGY | Facility: HOSPITAL | Age: 68
End: 2023-11-24
Attending: INTERNAL MEDICINE
Payer: COMMERCIAL

## 2023-11-24 DIAGNOSIS — Z17.0 MALIGNANT NEOPLASM OF UPPER-OUTER QUADRANT OF LEFT BREAST IN FEMALE, ESTROGEN RECEPTOR POSITIVE (H): Primary | ICD-10-CM

## 2023-11-24 DIAGNOSIS — C50.412 MALIGNANT NEOPLASM OF UPPER-OUTER QUADRANT OF LEFT BREAST IN FEMALE, ESTROGEN RECEPTOR POSITIVE (H): Primary | ICD-10-CM

## 2023-11-24 DIAGNOSIS — G62.0 CHEMOTHERAPY-INDUCED PERIPHERAL NEUROPATHY (H): ICD-10-CM

## 2023-11-24 DIAGNOSIS — M79.18 MUSCULOSKELETAL PAIN: ICD-10-CM

## 2023-11-24 DIAGNOSIS — T45.1X5A CHEMOTHERAPY-INDUCED PERIPHERAL NEUROPATHY (H): ICD-10-CM

## 2023-11-24 PROCEDURE — 99214 OFFICE O/P EST MOD 30 MIN: CPT | Performed by: INTERNAL MEDICINE

## 2023-11-24 RX ORDER — GABAPENTIN 300 MG/1
300 CAPSULE ORAL 2 TIMES DAILY
Qty: 120 CAPSULE | Refills: 2 | Status: SHIPPED | OUTPATIENT
Start: 2023-11-24

## 2023-11-24 RX ORDER — EXEMESTANE 25 MG/1
25 TABLET ORAL DAILY
Qty: 60 TABLET | Refills: 1 | Status: SHIPPED | OUTPATIENT
Start: 2023-11-24 | End: 2024-05-24

## 2023-11-24 NOTE — Clinical Note
11/24/2023         RE: Khloe Becker  [address] [place] [address]        Dear Colleague,    Thank you for referring your patient, Khloe Becker, to the [facility] [place]. Please see a copy of my visit note below.    [facility] Hematology and Oncology Progress Note    Patient: Khloe Becker  MRN: [id number]  Date of Service: Nov 24, 2023          Reason for Visit    No chief complaint on file.      Assessment and Plan     Cancer Staging   Malignant neoplasm of upper-outer quadrant of left breast in female, estrogen receptor positive (H)  Staging form: Breast, AJCC 8th Edition  - Clinical: Stage IB (cT2, cN1, cM0, G3, ER+, [place]+, HER2+) - Signed by Xavier Rubio MD on 6/10/2022  - Pathologic: Stage IA (pT1a, pN1mi, cM0, G3, ER+, W[place]+, HER2+) - Signed by Xavier Rubio MD on 6/10/2022    1. Breast cancer, stage IA, triple positive, left side, status post 6 cycles of neoadjuvant TCHP and then left mastectomy: Patient did have residual disease at time of her surgery so she was started on Kadcyla adjuvantly.  Had 10 cycles and had to do dose reduction due to significant side effects including peripheral neuropathy and fatigue.  Ultimately we switched to Herceptin due to side effects. Was supposed to get 4 cycles and then would be done. Had a dose on 12/8 and 12/28 and then had even more side effects so decided to stop treatment.  Started on anastrozole for endocrine therapy but had significant side effects from it.  Especially with weight gain and mood changes and depression.  We switched to letrozole back in May 2023.      Recently skinny mammogram done at the outside hospital showed some abnormality in the right breast.  I reviewed the diagnostic mammogram and ultrasound results.  She had a 2.7 cm hypoechoic mass in the right breast at the 10 o'clock position about 6 cm of the nipple.  Underwent needle biopsy and apparently the results were negative for any  malignancy.  I do not have access to the pathology report.    Plan is to continue endocrine therapy for at least 5 years if not 10.     She does have bilateral lower extremity edema.  Right more than left.  On diuretics.  Echocardiogram done in February showed stable cardiac functions.  EF was 55%.  No regional wall motion abnormalities.    2.  Peripheral neuropathy: Currently getting acupuncture and is seeing major benefit from that. Also takes gabapentin at night.  She will continue acupuncture and gabapentin for now.  She is able to feel her toes and uses cane only on an as-needed basis.          ECOG Performance    1 - Can't do physically strenuous work, but fully ambyulatory and can do light sedentary work    Distress Screening (within last 30 days)    1. How concerned are you about your ability to eat? : 0  2. How concerned are you about unintended weight loss or your current weight? : 0  3. How concerned are you about feeling depressed or very sad? : 0  4. How concerned are you about feeling anxious or very scared? : 0  5. Do you struggle with the loss of meaning and jeimy in your life? : Not at all  6. How concerned are you about work and home life issues that may be affected by your cancer? : 0  7. How concerned are you about knowing what resources are available to help you? : 0  8. Do you currently have what you would describe as Latter-day or spiritual struggles?            : Not at all       Pain       Problem List    Patient Active Problem List   Diagnosis    Morbid obesity (H)    Malignant neoplasm of overlapping sites of left breast in female, estrogen receptor positive (H)    Malignant neoplasm of upper-outer quadrant of left breast in female, estrogen receptor positive (H)    Chemotherapy-induced peripheral neuropathy     Musculoskeletal pain    Dehydration        ______________________________________________________________________________    History of Present Illness    Oncologic history  July  2021: Left breast mass palpated by patient.  Mammogram showing 2.1 x 2.1 x 2 cm hypoechoic mass at 2 o'clock position about 11 cm from the nipple.  Ultrasound-guided biopsy of the mass showed invasive ductal carcinoma, grade 3, ER, [place] and HER2 positive.  Left axillary lymph node biopsy positive for metastatic disease.     Treatment:   Neoadjuvant chemotherapy with TCHP starting 9/2/2021.  Finished 6 cycles of chemo.  Last dose was 12/17/2021.  Carboplatin was held for cycle 4 and 6 due to poor tolerance. Had Kadcyla adjuvantly for 10 doses. Then switched to herceptin for 2 more doses. Last dose was 12/28/22. Did not receive last 2 doses of Heceptin due to intolerance.      Left mastectomy with sentinel lymph node biopsy and axillary lymph node dissection done on 1/19/2022.     Surgical path showing residual disease.  ypT1a, pN1(mi).  Patient declined radiation therapy.    Received 10 cycles of adjuvant Kadcyla with dose reductions due to neuropathy    We stopped Kadcyla due to significant adenopathy and she received 2 doses of single agent Herceptin.  Had side effects from that and we stopped treatment.    Tried adjuvant anastrozole for endocrine therapy which caused significant issues especially with depression and mood changes.    Switched to letrozole in May 2023.  Tolerating this much better    Interim history:   She is here for follow-up.  Currently on letrozole.  Had a screening mammogram in early October at [facility] which showed some abnormality in the right breast.  On the diagnostic mammogram and ultrasound there was some intermediate mass in the right breast at 10 o'clock position about 6 cm from the nipple measuring 2.6 x 0.7 x 1.7 cm.  She underwent a biopsy of this mass which apparently came back negative.  I do not have access to the biopsy results.    Review of Systems    Pertinent items are noted in HPI.    Past History    Past Medical History:   Diagnosis Date    Anemia     Breast cancer (H)      left    Gastroesophageal reflux disease     Hypertension     Motion sickness     Obese     PONV (postoperative nausea and vomiting)        PHYSICAL EXAM  There were no vitals taken for this visit.    GENERAL: Alert and cooperative in no distress.  NEURO: non focal. Alert and oriented x3.   Breast: s/p left mastectomy. Right breast is dense without any masses.  No bilateral axilla adenopathy noted.  SKIN: exposed skin is dry intact.     Lab Results    No results found for this or any previous visit (from the past 168 hour(s)).    Imaging    No results found.     A total of 30 min were spent today on this visit which included face to face conversation with the patient, EMR review, counseling and co-ordination of care and medical documentation.  Complex patient with multiple side effects secondary to cancer treatment.      Signed by: Xavier Rubio MD      Again, thank you for allowing me to participate in the care of your patient.        Sincerely,        Xavier Rubio MD

## 2023-11-24 NOTE — PROGRESS NOTES
[facility] Hematology and Oncology Progress Note    Patient: Khloe Becker  MRN: [id number]  Date of Service: Nov 24, 2023          Reason for Visit    Chief Complaint   Patient presents with    Oncology Clinic Visit       Assessment and Plan     Cancer Staging   Malignant neoplasm of upper-outer quadrant of left breast in female, estrogen receptor positive (H)  Staging form: Breast, AJCC 8th Edition  - Clinical: Stage IB (cT2, cN1, cM0, G3, ER+, CO+, HER2+) - Signed by Xavier Rubio MD on 6/10/2022  - Pathologic: Stage IA (pT1a, pN1mi, cM0, G3, ER+, CO+, HER2+) - Signed by Xavier Rubio MD on 6/10/2022    1. Breast cancer, stage IA, triple positive, left side, status post 6 cycles of neoadjuvant TCHP and then left mastectomy: Patient did have residual disease at time of her surgery so she was started on Kadcyla adjuvantly.  Had 10 cycles and had to do dose reduction due to significant side effects including peripheral neuropathy and fatigue.  Ultimately we switched to Herceptin due to side effects. Was supposed to get 4 cycles and then would be done. Had a dose on 12/8 and 12/28 and then had even more side effects so decided to stop treatment.  Started on anastrozole for endocrine therapy but had significant side effects from it.  Especially with weight gain and mood changes and depression.  We switched to letrozole back in May 2023.      Recently screening mammogram done at the outside hospital showed some abnormality in the right breast.  I reviewed the diagnostic mammogram and ultrasound results.  She had a 2.7 cm hypoechoic mass in the right breast at the 10 o'clock position about 6 cm of the nipple.  Underwent needle biopsy and apparently the results were negative for any malignancy.  I was able to review the biopsy report and it shows normal breast tissue with dense stromal fibrosis occasional microcalcifications.  Negative for atypia or malignancy.    She has not been tolerating  letrozole well.  Has significant side effects.  Reviewed further options.  Will switch to exemestane and see if that works out.  Side effects are similar but sometimes patients can respond differently.  Agreeable to try.  Will send a prescription today.  Dose will be 25 mg daily.    Plan is to continue endocrine therapy for at least 5 years if not 10.       2.  Peripheral neuropathy: Getting acupuncture with some help but this has stopped now.  Looks like this is longer provided/covered.  On gabapentin.  Current dose is 300 mg twice daily.  Peripheral neuropathy is still there and gabapentin seems to help a little bit.      ECOG Performance    1 - Can't do physically strenuous work, but fully ambyulatory and can do light sedentary work    Distress Screening (within last 30 days)    1. How concerned are you about your ability to eat? : 0  2. How concerned are you about unintended weight loss or your current weight? : 0  3. How concerned are you about feeling depressed or very sad? : 0  4. How concerned are you about feeling anxious or very scared? : 0  5. Do you struggle with the loss of meaning and jeimy in your life? : Not at all  6. How concerned are you about work and home life issues that may be affected by your cancer? : 0  7. How concerned are you about knowing what resources are available to help you? : 0  8. Do you currently have what you would describe as Mu-ism or spiritual struggles?            : Not at all       Pain       Problem List    Patient Active Problem List   Diagnosis    Morbid obesity (H)    Malignant neoplasm of overlapping sites of left breast in female, estrogen receptor positive (H)    Malignant neoplasm of upper-outer quadrant of left breast in female, estrogen receptor positive (H)    Chemotherapy-induced peripheral neuropathy     Musculoskeletal pain    Dehydration        ______________________________________________________________________________    History of Present  Illness    Oncologic history  July 2021: Left breast mass palpated by patient.  Mammogram showing 2.1 x 2.1 x 2 cm hypoechoic mass at 2 o'clock position about 11 cm from the nipple.  Ultrasound-guided biopsy of the mass showed invasive ductal carcinoma, grade 3, ER, DE and HER2 positive.  Left axillary lymph node biopsy positive for metastatic disease.     Treatment:   Neoadjuvant chemotherapy with TCHP starting 9/2/2021.  Finished 6 cycles of chemo.  Last dose was 12/17/2021.  Carboplatin was held for cycle 4 and 6 due to poor tolerance. Had Kadcyla adjuvantly for 10 doses. Then switched to herceptin for 2 more doses. Last dose was 12/28/22. Did not receive last 2 doses of Heceptin due to intolerance.      Left mastectomy with sentinel lymph node biopsy and axillary lymph node dissection done on 1/19/2022.     Surgical path showing residual disease.  ypT1a, pN1(mi).  Patient declined radiation therapy.    Received 10 cycles of adjuvant Kadcyla with dose reductions due to neuropathy    We stopped Kadcyla due to significant adenopathy and she received 2 doses of single agent Herceptin.  Had side effects from that and we stopped treatment.    Tried adjuvant anastrozole for endocrine therapy which caused significant issues especially with depression and mood changes.    Switched to letrozole in May 2023.  Tolerating this much better    Interim history:   She is here for follow-up.  Currently on letrozole.  Had a screening mammogram in early October at [facility] which showed some abnormality in the right breast.  On the diagnostic mammogram and ultrasound there was some intermediate mass in the right breast at 10 o'clock position about 6 cm from the nipple measuring 2.6 x 0.7 x 1.7 cm.  She underwent a biopsy of this mass which apparently came back negative.  Not tolerating letrozole well.  Has significant vasomotor and continues to have significant neuropathy issues musculoskeletal side effects.  He is on  gabapentin and has increased dose.     Review of Systems    Pertinent items are noted in HPI.    Past History    Past Medical History:   Diagnosis Date    Anemia     Breast cancer (H)     left    Gastroesophageal reflux disease     Hypertension     Motion sickness     Obese     PONV (postoperative nausea and vomiting)        PHYSICAL EXAM  There were no vitals taken for this visit.    GENERAL: Alert and cooperative in no distress.  NEURO: non focal. Alert and oriented x3.   Breast: s/p left mastectomy. No bilateral axilla adenopathy noted.  Lungs: Clear  CVS: S1-S2 heard, RRR  Extremities: Mild bilateral lower extremity edema  SKIN: exposed skin is dry intact.     Lab Results    No results found for this or any previous visit (from the past 168 hour(s)).    Imaging    No results found.     A total of 30 min were spent today on this visit which included face to face conversation with the patient, EMR review, counseling and co-ordination of care and medical documentation.        Signed by: Xavier Rubio MD

## 2024-01-31 ENCOUNTER — PATIENT OUTREACH (OUTPATIENT)
Dept: ONCOLOGY | Facility: HOSPITAL | Age: 69
End: 2024-01-31
Payer: COMMERCIAL

## 2024-01-31 NOTE — PROGRESS NOTES
[facility]: Cancer Care                                                                                      RN Cancer Care Coordinator called patient and left message for a wellbeing check and follow up. Left message asking about the transition to exemestane from letrozole, and also how her peripheral neuropathy is doing.     Offered her to call back or leave MyC message if she would like. Reminded for appt with Dr. Rubio on 2/28/23.       Signature:  Isis Cobb RN

## 2024-02-12 ENCOUNTER — TELEPHONE (OUTPATIENT)
Dept: ONCOLOGY | Facility: HOSPITAL | Age: 69
End: 2024-02-12
Payer: COMMERCIAL

## 2024-02-12 NOTE — TELEPHONE ENCOUNTER
I received a phone call from during today.  She is returning a phone call to YASMEEN Marinelli.  She is calling to say that since starting exemestane she has noticed a reduction in her pain.  However, she has noticed more brain fog.  She is unsure what she wants to do long-term but she is willing to stay on exemestane until her appointment with Dr. Rubio in a couple of weeks. She will talk with him about her side effects at that time.  I let her know I would send this message to YASMEEN Marinelli and Dr. Rubio as an FYI.    Rosmery Madden RN on 2/12/2024 at 1:13 PM

## 2024-02-28 ENCOUNTER — ONCOLOGY VISIT (OUTPATIENT)
Dept: ONCOLOGY | Facility: HOSPITAL | Age: 69
End: 2024-02-28
Attending: INTERNAL MEDICINE
Payer: COMMERCIAL

## 2024-02-28 ENCOUNTER — PATIENT OUTREACH (OUTPATIENT)
Dept: ONCOLOGY | Facility: HOSPITAL | Age: 69
End: 2024-02-28

## 2024-02-28 ENCOUNTER — TELEPHONE (OUTPATIENT)
Dept: SURGERY | Facility: CLINIC | Age: 69
End: 2024-02-28

## 2024-02-28 VITALS
RESPIRATION RATE: 20 BRPM | OXYGEN SATURATION: 96 % | DIASTOLIC BLOOD PRESSURE: 72 MMHG | BODY MASS INDEX: 40.77 KG/M2 | TEMPERATURE: 98.6 F | SYSTOLIC BLOOD PRESSURE: 153 MMHG | HEIGHT: 64 IN | WEIGHT: 238.8 LBS | HEART RATE: 80 BPM

## 2024-02-28 DIAGNOSIS — Z17.0 MALIGNANT NEOPLASM OF UPPER-OUTER QUADRANT OF LEFT BREAST IN FEMALE, ESTROGEN RECEPTOR POSITIVE (H): Primary | ICD-10-CM

## 2024-02-28 DIAGNOSIS — G62.0 CHEMOTHERAPY-INDUCED PERIPHERAL NEUROPATHY (H): ICD-10-CM

## 2024-02-28 DIAGNOSIS — T45.1X5A CHEMOTHERAPY-INDUCED PERIPHERAL NEUROPATHY (H): ICD-10-CM

## 2024-02-28 DIAGNOSIS — C50.412 MALIGNANT NEOPLASM OF UPPER-OUTER QUADRANT OF LEFT BREAST IN FEMALE, ESTROGEN RECEPTOR POSITIVE (H): Primary | ICD-10-CM

## 2024-02-28 PROCEDURE — 99214 OFFICE O/P EST MOD 30 MIN: CPT | Performed by: INTERNAL MEDICINE

## 2024-02-28 PROCEDURE — G0463 HOSPITAL OUTPT CLINIC VISIT: HCPCS | Performed by: INTERNAL MEDICINE

## 2024-02-28 ASSESSMENT — PAIN SCALES - GENERAL: PAINLEVEL: MODERATE PAIN (4)

## 2024-02-28 NOTE — PROGRESS NOTES
"[facility]: Cancer Care                                                                                      RN Cancer Care Coordinator joined Dr. Rubio with patient for chaperone for breast exam.   Writer had stopped in to let her know I was hoping to connect with her, so after the exam appt was done writer stayed and visited with her. She had already talked with Dr. Rubio about feeling foggy in the brain, and attempting to reduce gabapentin to see if that helps.     She told Dr. Rubio, and then spoke to matheusr about this for several minutes after he left... that she is considering getting a mastectomy on the Left (non-cancer) side. The imbalance due to the size vs the right side with mastectomy causes problems for her back. She was thinking of getting a breast reduction on that side, however she is reminded about when something showed on the mammogram, and she needed a biopsy on that side, she had a great deal of stress.  \"I don't think I want to go through that again.\" She has not made her decision yet, and says it is \"an emotional decision\" for her. She is also disappointed that her surgeon, Dr. Viera, retired and isn't there to do this potential surgery.     Matheusr (and Dr. Rubio) encouraged her to have an appointment to speak with a surgeon at the [facility] about her decision making process. Writer encouraged her to speak with Susan Lombardi, Breast RN Coordinator. She is in some chat groups online, and she has heard from others how have taken this approach.     Congratulated her on not needing to come back for 3 months.  Offered supportive listening and encouragement.    Writer will route this note to Fabiola for awareness.    Signature:  Isis Cobb RN      "

## 2024-02-28 NOTE — TELEPHONE ENCOUNTER
Per request of Isis, RN with Dr. Rubio, called patient to see if she was interested in seeing Dr. Viera for surgical consult to discuss prophylactic mastectomy.  LMOM for patient, told her if she is available, Dr. Viera could see her this Friday, 3-1-24. Asked for a return call to schedule if interested.

## 2024-02-28 NOTE — LETTER
2/28/2024         RE: Khloe Becker  [address] [place] [address]        Dear Colleague,    Thank you for referring your patient, Khloe Becker, to the [facility] [place]. Please see a copy of my visit note below.    [facility] Hematology and Oncology Progress Note    Patient: Khloe Becker  MRN: [id number]  Date of Service: Feb 28, 2024          Reason for Visit    No chief complaint on file.      Assessment and Plan     Cancer Staging   Malignant neoplasm of upper-outer quadrant of left breast in female, estrogen receptor positive (H)  Staging form: Breast, AJCC 8th Edition  - Clinical: Stage IB (cT2, cN1, cM0, G3, ER+, OR+, HER2+) - Signed by Xavier Rubio MD on 6/10/2022  - Pathologic: Stage IA (pT1a, pN1mi, cM0, G3, ER+, OR+, HER2+) - Signed by Xavier Rubio MD on 6/10/2022    1. Breast cancer, stage IA, triple positive, left side, status post 6 cycles of neoadjuvant TCHP and then left mastectomy: Patient did have residual disease at time of her surgery so she was started on Kadcyla adjuvantly.  Had 10 cycles and had to do dose reduction due to significant side effects including peripheral neuropathy and fatigue.  Ultimately we switched to Herceptin due to side effects. Was supposed to get 4 cycles and then would be done. Had a dose on 12/8 and 12/28 and then had even more side effects so decided to stop treatment.      Started on anastrozole for endocrine therapy but had significant side effects from it.  Especially with weight gain and mood changes and depression.  We switched to letrozole back in May 2023.  She was doing reasonably well on this initially but again developed significant musculoskeletal pain issues and we switched her to exemestane in November last year.  So far she has done fairly okay on exemestane.  Less musculoskeletal pain.  Clinical exam unremarkable today.  No evidence of any cancer recurrence.  No evidence of bilateral  axillary adenopathy.  She is feeling a little bit of activity in her left axilla which I think is probably due to recovery of the damage nerves.  I could not feel any lymphadenopathy in that area.  Plan is to continue to monitor clinically with every 3-month exam.  She also wants a referral to breast surgery to consider right mastectomy.  Will get this going.    2.  Mental fogginess and lapse in concentration  This has been happening more often since switching to exemestane.  The question is if this is exemestane related side effect or is from gabapentin.  She is also taking gabapentin 300 mg twice daily.  And this dose change happened in the recent past so it is possible that gabapentin is interfering with her mentation.  So I recommended to hold the morning dose of gabapentin and use the night dose only.  See if this works for couple of weeks.  If this results in significant improvement then continue the same strategy.  If it does not then consider switching exemestane from nightly dose to morning dose and see if that makes a difference.      I will see her back in 3 months.    ECOG Performance    1 - Can't do physically strenuous work, but fully ambyulatory and can do light sedentary work    Distress Screening (within last 30 days)    1. How concerned are you about your ability to eat? : 0  2. How concerned are you about unintended weight loss or your current weight? : 0  3. How concerned are you about feeling depressed or very sad? : 0  4. How concerned are you about feeling anxious or very scared? : 0  5. Do you struggle with the loss of meaning and jeimy in your life? : Not at all  6. How concerned are you about work and home life issues that may be affected by your cancer? : 0  7. How concerned are you about knowing what resources are available to help you? : 0  8. Do you currently have what you would describe as Spiritism or spiritual struggles?            : Not at all       Pain       Problem List    Patient  Active Problem List   Diagnosis     Morbid obesity (H)     Malignant neoplasm of overlapping sites of left breast in female, estrogen receptor positive (H)     Malignant neoplasm of upper-outer quadrant of left breast in female, estrogen receptor positive (H)     Chemotherapy-induced peripheral neuropathy  (H24)     Musculoskeletal pain     Dehydration        ______________________________________________________________________________    History of Present Illness    Oncologic history  July 2021: Left breast mass palpated by patient.  Mammogram showing 2.1 x 2.1 x 2 cm hypoechoic mass at 2 o'clock position about 11 cm from the nipple.  Ultrasound-guided biopsy of the mass showed invasive ductal carcinoma, grade 3, ER, AK and HER2 positive.  Left axillary lymph node biopsy positive for metastatic disease.     Treatment:   Neoadjuvant chemotherapy with TCHP starting 9/2/2021.  Finished 6 cycles of chemo.  Last dose was 12/17/2021.  Carboplatin was held for cycle 4 and 6 due to poor tolerance. Had Kadcyla adjuvantly for 10 doses. Then switched to herceptin for 2 more doses. Last dose was 12/28/22. Did not receive last 2 doses of Heceptin due to intolerance.      Left mastectomy with sentinel lymph node biopsy and axillary lymph node dissection done on 1/19/2022.     Surgical path showing residual disease.  ypT1a, pN1(mi).  Patient declined radiation therapy.    Received 10 cycles of adjuvant Kadcyla with dose reductions due to neuropathy    We stopped Kadcyla due to significant adenopathy and she received 2 doses of single agent Herceptin.  Had side effects from that and we stopped treatment.    Tried adjuvant anastrozole for endocrine therapy which caused significant issues especially with depression and mood changes.    Switched to letrozole in May 2023.      Due to significant side effects from letrozole we switched her to exemestane in November 2023.    Interim history:   She is here for follow-up.  Currently  taking exemestane and has been on it for the last 3 months.  We switched her to exemestane from letrozole in November last year due to significant side effects from the latter.  Looks like she has done fairly okay on exemestane with less pain but she has noticed a bit more brain fogginess.  Has been making some mistakes at work which she never used to do before.  No significant memory loss or other clinical signs of cognitive decline.  But for her it feels like lack of concentration.  Neuropathy is rather stable.  No significant worsening at least.  Has a bit more energy than before but is not a huge improvement.    Review of Systems    Pertinent items are noted in HPI.    Past History    Past Medical History:   Diagnosis Date     Anemia      Breast cancer (H)     left     Gastroesophageal reflux disease      Hypertension      Motion sickness      Obese      PONV (postoperative nausea and vomiting)        PHYSICAL EXAM  There were no vitals taken for this visit.    GENERAL: Alert and cooperative in no distress.  NEURO: non focal. Alert and oriented x3.   Breast: s/p left mastectomy.  No evidence of bilateral axillary adenopathy.  Right breast normal without any evidence of masses or lumps.  SKIN: exposed skin is dry intact.     Lab Results    No results found for this or any previous visit (from the past 168 hour(s)).    Imaging    No results found.     A total of 30 min were spent today on this visit which included face to face conversation with the patient, EMR review, counseling and co-ordination of care and medical documentation.      Signed by: Xavier Rubio MD    Oncology Rooming Note    February 28, 2024 11:38 AM   Khloe Becker is a 68 year old female who presents for:    Chief Complaint   Patient presents with     Oncology Clinic Visit     Malignant neoplasm of upper-outer quadrant of left breast in female, estrogen receptor positive (H)  Malignant neoplasm of overlapping sites of left breast in  "female, estrogen receptor positive (H)       Initial Vitals: BP (!) 153/72   Pulse 80   Temp 98.6  F (37  C)   Resp 20   Ht 1.626 m (5' 4\")   Wt 108.3 kg (238 lb 12.8 oz)   SpO2 96%   BMI 40.99 kg/m   Estimated body mass index is 40.99 kg/m  as calculated from the following:    Height as of this encounter: 1.626 m (5' 4\").    Weight as of this encounter: 108.3 kg (238 lb 12.8 oz). Body surface area is 2.21 meters squared.  Moderate Pain (4) Comment: Data Unavailable   No LMP recorded. Patient is postmenopausal.  Allergies reviewed: Yes  Medications reviewed: Yes    Medications: yes. yes  Pharmacy name entered into [id number] [address] AT [facility]    Frailty Screening:   Is the patient here for a new oncology consult visit in cancer care? 2. No      Clinical concerns: None      Daniela Ma LPN                 Again, thank you for allowing me to participate in the care of your patient.        Sincerely,        Xavier Rubio MD  "

## 2024-02-28 NOTE — PROGRESS NOTES
[facility] Hematology and Oncology Progress Note    Patient: Khloe Becker  MRN: [id number]  Date of Service: Feb 28, 2024          Reason for Visit    No chief complaint on file.      Assessment and Plan     Cancer Staging   Malignant neoplasm of upper-outer quadrant of left breast in female, estrogen receptor positive (H)  Staging form: Breast, AJCC 8th Edition  - Clinical: Stage IB (cT2, cN1, cM0, G3, ER+, KY+, HER2+) - Signed by Xavier Rubio MD on 6/10/2022  - Pathologic: Stage IA (pT1a, pN1mi, cM0, G3, ER+, KY+, HER2+) - Signed by Xavier Rubio MD on 6/10/2022    1. Breast cancer, stage IA, triple positive, left side, status post 6 cycles of neoadjuvant TCHP and then left mastectomy: Patient did have residual disease at time of her surgery so she was started on Kadcyla adjuvantly.  Had 10 cycles and had to do dose reduction due to significant side effects including peripheral neuropathy and fatigue.  Ultimately we switched to Herceptin due to side effects. Was supposed to get 4 cycles and then would be done. Had a dose on 12/8 and 12/28 and then had even more side effects so decided to stop treatment.      Started on anastrozole for endocrine therapy but had significant side effects from it.  Especially with weight gain and mood changes and depression.  We switched to letrozole back in May 2023.  She was doing reasonably well on this initially but again developed significant musculoskeletal pain issues and we switched her to exemestane in November last year.  So far she has done fairly okay on exemestane.  Less musculoskeletal pain.  Clinical exam unremarkable today.  No evidence of any cancer recurrence.  No evidence of bilateral axillary adenopathy.  She is feeling a little bit of activity in her left axilla which I think is probably due to recovery of the damage nerves.  I could not feel any lymphadenopathy in that area.  Plan is to continue to monitor clinically with every 3-month  exam.  She also wants a referral to breast surgery to consider right mastectomy.  Will get this going.    2.  Mental fogginess and lapse in concentration  This has been happening more often since switching to exemestane.  The question is if this is exemestane related side effect or is from gabapentin.  She is also taking gabapentin 300 mg twice daily.  And this dose change happened in the recent past so it is possible that gabapentin is interfering with her mentation.  So I recommended to hold the morning dose of gabapentin and use the night dose only.  See if this works for couple of weeks.  If this results in significant improvement then continue the same strategy.  If it does not then consider switching exemestane from nightly dose to morning dose and see if that makes a difference.      I will see her back in 3 months.    ECOG Performance    1 - Can't do physically strenuous work, but fully ambyulatory and can do light sedentary work    Distress Screening (within last 30 days)    1. How concerned are you about your ability to eat? : 0  2. How concerned are you about unintended weight loss or your current weight? : 0  3. How concerned are you about feeling depressed or very sad? : 0  4. How concerned are you about feeling anxious or very scared? : 0  5. Do you struggle with the loss of meaning and jeimy in your life? : Not at all  6. How concerned are you about work and home life issues that may be affected by your cancer? : 0  7. How concerned are you about knowing what resources are available to help you? : 0  8. Do you currently have what you would describe as Restorationist or spiritual struggles?            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  Mammogram showing 2.1 x 2.1 x 2 cm hypoechoic mass at 2 o'clock position about 11 cm from the nipple.  Ultrasound-guided biopsy of the mass showed invasive ductal carcinoma, grade 3, ER, NJ and HER2 positive.  Left axillary lymph node biopsy positive for metastatic disease.     Treatment:   Neoadjuvant chemotherapy with TCHP starting 9/2/2021.  Finished 6 cycles of chemo.  Last dose was 12/17/2021.  Carboplatin was held for cycle 4 and 6 due to poor tolerance. Had Kadcyla adjuvantly for 10 doses. Then switched to herceptin for 2 more doses. Last dose was 12/28/22. Did not receive last 2 doses of Heceptin due to intolerance.      Left mastectomy with sentinel lymph node biopsy and axillary lymph node dissection done on 1/19/2022.     Surgical path showing residual disease.  ypT1a, pN1(mi).  Patient declined radiation therapy.    Received 10 cycles of adjuvant Kadcyla with dose reductions due to neuropathy    We stopped Kadcyla due to significant adenopathy and she received 2 doses of single agent Herceptin.  Had side effects from that and we stopped treatment.    Tried adjuvant anastrozole for endocrine therapy which caused significant issues especially with depression and mood changes.    Switched to letrozole in May 2023.      Due to significant side effects from letrozole we switched her to exemestane in November 2023.    Interim history:   She is here for follow-up.  Currently taking exemestane and has been on it for the last 3 months.  We switched her to exemestane from letrozole in November last year due to significant side effects from the latter.  Looks like she has done fairly okay on exemestane with less pain but she has noticed a bit  more brain fogginess.  Has been making some mistakes at work which she never used to do before.  No significant memory loss or other clinical signs of cognitive decline.  But for her it feels like lack of concentration.  Neuropathy is rather stable.  No significant worsening at least.  Has a bit more energy than before but is not a huge improvement.    Review of Systems    Pertinent items are noted in HPI.    Past History    Past Medical History:   Diagnosis Date    Anemia     Breast cancer (H)     left    Gastroesophageal reflux disease     Hypertension     Motion sickness     Obese     PONV (postoperative nausea and vomiting)        PHYSICAL EXAM  There were no vitals taken for this visit.    GENERAL: Alert and cooperative in no distress.  NEURO: non focal. Alert and oriented x3.   Breast: s/p left mastectomy.  No evidence of bilateral axillary adenopathy.  Right breast normal without any evidence of masses or lumps.  SKIN: exposed skin is dry intact.     Lab Results    No results found for this or any previous visit (from the past 168 hour(s)).    Imaging    No results found.     A total of 30 min were spent today on this visit which included face to face conversation with the patient, EMR review, counseling and co-ordination of care and medical documentation.      Signed by: Xavier Rubio MD

## 2024-02-28 NOTE — TELEPHONE ENCOUNTER
Khloe returned my call. She will see Dr. Viera on 3-1-24 at 0920 for prophylactic mastectomy discussion.

## 2024-02-28 NOTE — Clinical Note
Maday Hicks, You can reach out to Khloe if you wish, or wait until she contacts your clinic. I am sure you have lots of experience with women going through this process. ...E

## 2024-02-28 NOTE — PROGRESS NOTES
"Oncology Rooming Note    February 28, 2024 11:38 AM   Khloe Becker is a 68 year old female who presents for:    Chief Complaint   Patient presents with    Oncology Clinic Visit     Malignant neoplasm of upper-outer quadrant of left breast in female, estrogen receptor positive (H)  Malignant neoplasm of overlapping sites of left breast in female, estrogen receptor positive (H)       Initial Vitals: BP (!) 153/72   Pulse 80   Temp 98.6  F (37  C)   Resp 20   Ht 1.626 m (5' 4\")   Wt 108.3 kg (238 lb 12.8 oz)   SpO2 96%   BMI 40.99 kg/m   Estimated body mass index is 40.99 kg/m  as calculated from the following:    Height as of this encounter: 1.626 m (5' 4\").    Weight as of this encounter: 108.3 kg (238 lb 12.8 oz). Body surface area is 2.21 meters squared.  Moderate Pain (4) Comment: Data Unavailable   No LMP recorded. Patient is postmenopausal.  Allergies reviewed: Yes  Medications reviewed: Yes    Medications: yes. yes  Pharmacy name entered into [email or web address]: [id number] - [address]  AT [facility]    Frailty Screening:   Is the patient here for a new oncology consult visit in cancer care? 2. No      Clinical concerns: None      Daniela Ma LPN             "

## 2024-03-01 ENCOUNTER — OFFICE VISIT (OUTPATIENT)
Dept: SURGERY | Facility: CLINIC | Age: 69
End: 2024-03-01
Attending: PHYSICIAN ASSISTANT
Payer: COMMERCIAL

## 2024-03-01 DIAGNOSIS — B37.2 YEAST INFECTION OF THE SKIN: Primary | ICD-10-CM

## 2024-03-01 DIAGNOSIS — Z85.3 HISTORY OF BREAST CANCER: ICD-10-CM

## 2024-03-01 PROCEDURE — G0463 HOSPITAL OUTPT CLINIC VISIT: HCPCS | Performed by: SPECIALIST

## 2024-03-01 PROCEDURE — 99214 OFFICE O/P EST MOD 30 MIN: CPT | Performed by: SPECIALIST

## 2024-03-01 RX ORDER — NYSTATIN 100000 [USP'U]/G
POWDER TOPICAL 2 TIMES DAILY
Qty: 30 G | Refills: 1 | Status: SHIPPED | OUTPATIENT
Start: 2024-03-01

## 2024-03-01 NOTE — LETTER
3/1/2024         RE: Khloe Becker  [facility] [place] [address]        Dear Colleague,    Thank you for referring your patient, Khloe Becker, to the [facility] [place]. Please see a copy of my visit note below.    This is a 68 year old  female who I'm asked to see by Dr. Rubio for consideration for prophylactic mastectomy.  This patient is now approximately 2 years status post left mastectomy for left breast cancer.  This was preceded by neoadjuvant chemotherapy and then she needed more chemotherapy after her surgery.  She is now on exemestane.  Last fall she underwent a mammogram on the right and they found another lesion and had a workup.  This came back benign but it made her start thinking that she does not want to have to worry about mammograms going forward.      PAST MEDICAL HISTORY:  Past Medical History:   Diagnosis Date     Anemia      Breast cancer (H)     left     Gastroesophageal reflux disease      Hypertension      Motion sickness      Obese      PONV (postoperative nausea and vomiting)      Past Surgical History:   Procedure Laterality Date     CHOLECYSTECTOMY       HAND SURGERY Left      hysteectomy       IR CHEST PORT PLACEMENT > 5 YRS OF AGE  8/17/2021     IR LYMPH NODE BIOPSY  7/13/2021     IR PORT REMOVAL RIGHT  2/28/2023     MASTECTOMY SIMPLE Left 1/19/2022    Procedure: Left Mastectomy, [place] Lymph Node Biopsy, COMPLETION LYMPH NODE DISSECTION;  Surgeon: Ana Viera MD;  Location: [facility] OR     TONSILLECTOMY         Medications:    Current Outpatient Medications:      nystatin (MYCOSTATIN) 153092 UNIT/GM external powder, Apply topically 2 times daily, Disp: 30 g, Rfl: 1     acetaminophen (TYLENOL) 500 MG tablet, Take 1,000 mg by mouth as needed for mild pain, Disp: , Rfl:      ASHWAGANDHA PO, Take 1 capsule by mouth daily, Disp: , Rfl:      Calcium Carbonate-Vitamin D (CALCIUM-CARB 600 + D PO), Take 1 tablet by mouth daily, Disp: ,  Rfl:      clonazePAM (KLONOPIN) 0.5 MG tablet, Take 1 tablet (0.5 mg) by mouth as needed for anxiety, Disp: 30 tablet, Rfl: 0     exemestane (AROMASIN) 25 MG tablet, Take 1 tablet (25 mg) by mouth daily, Disp: 60 tablet, Rfl: 1     fish oil-omega-3 fatty acids 1000 MG capsule, Take 1 g by mouth Occasionally, Disp: , Rfl:      fluticasone (VERAMYST) 27.5 MCG/SPRAY nasal spray, Spray 2 sprays into both nostrils as needed, Disp: , Rfl:      furosemide (LASIX) 20 MG tablet, Take 1 tablet (20 mg) by mouth daily as needed (swelling), Disp: 30 tablet, Rfl: 1     gabapentin (NEURONTIN) 300 MG capsule, Take 1 capsule (300 mg) by mouth 2 times daily, Disp: 120 capsule, Rfl: 2     hydrochlorothiazide (HYDRODIURIL) 25 MG tablet, Take 1 tablet (25 mg) by mouth daily, Disp: 90 tablet, Rfl: 0     HYDROcodone-acetaminophen (NORCO) 5-325 MG tablet, Take 1 tablet by mouth every 6 hours as needed for pain, Disp: 30 tablet, Rfl: 0     hydrocortisone 2.5 % cream, APPLY TO THE AFFECTED AREA TWICE DAILY FOR ONE WEEK THEN ONCE DAILY FOR ONE WEEK THEN EVERY OTHER DAY FOR ONE WEEK, Disp: , Rfl:      ibuprofen (ADVIL/MOTRIN) 200 MG tablet, Take 400-600 mg by mouth daily as needed for pain, Disp: , Rfl:      irbesartan (AVAPRO) 75 MG tablet, Take 75 mg by mouth as needed Takies when BP is high in the evening, Disp: , Rfl:      Magnesium Oxide 140 MG CAPS, Take 1 tablet by mouth daily, Disp: , Rfl:      multivitamin w/minerals (THERA-VIT-M) tablet, Take 1 tablet by mouth daily, Disp: , Rfl:      ondansetron (ZOFRAN-ODT) 8 MG ODT tab, Take 1 tablet (8 mg) by mouth every 8 hours as needed for nausea, Disp: 30 tablet, Rfl: 1     psyllium (METAMUCIL/KONSYL) Packet, Take 1 packet by mouth daily, Disp: , Rfl:      senna-docusate (SENOKOT-S/PERICOLACE) 8.6-50 MG tablet, Take 1-2 tablets by mouth as needed for constipation, Disp: , Rfl:   No current facility-administered medications for this visit.    Facility-Administered Medications Ordered in Other  "Visits:      heparin 100 UNIT/ML injection 5 mL, 5 mL, Intracatheter, Once PRN, Xavier Rubio MD, 5 mL at 01/18/23 1450     sodium chloride (PF) 0.9% PF flush 3-20 mL, 3-20 mL, Intracatheter, Q1H PRN, Xavier Rubio MD, 20 mL at 01/18/23 1450    Allergies:  Allergies   Allergen Reactions     Ibuprofen      Hypertensive reaction     Seafood Anaphylaxis     Shrimp allergy     Shrimp Anaphylaxis     Shrimp allergy     Keflex [Cephalexin]      Blood in Urine, heavy     Lisinopril Cough     Caused aspiration     Shellfish-Derived Products Hives     Latex Rash        Social History:  Social History     Tobacco Use     Smoking status: Never     Smokeless tobacco: Never   Vaping Use     Vaping Use: Never used   Substance Use Topics     Alcohol use: Not Currently     Drug use: Never        ROS:  Pertinent items are noted in HPI.    Physical Exam  There were no vitals taken for this visit.  General:alert, appears stated age, cooperative, and morbidly obese    Breasts: No palpable masses in the right breast.  She does have a yeast infection in the inframammary fold.  On the left there are no palpable masses.  The scar is well-healed.  She does have a little bit of a \"dogear\".  Lymph Nodes:Supple without adenopathy  Neuro:Grossly normal  Musculoskeletal: Normal range of motion of her upper extremities.  Skin: Other than the yeast infection, skin is normal.    Reviewed her mammograms and ultrasound and pathology.     Impression: History of left breast cancer, status post left modified radical mastectomy.  Now desiring right mastectomy.  Went over the risk and benefits of surgery.  I made sure she understood that this does not improve her survival of her left breast cancer.  She understands that.  She states she just does not want to have to have a mammogram again if possible.  I do think we need to get the yeast infection under control.  She in fact would like to wait until June which I think is very " reasonable.      Plan: We'll schedule for a right mastectomy without sentinel lymph node biopsy.  Almost all of our breast surgeries are now done as an outpatient.  The risks and benefits of surgery were explained.  Also talked about expected recovery time.  [facility] will try to set this up sometime in June.            Again, thank you for allowing me to participate in the care of your patient.        Sincerely,        Ana Viera MD

## 2024-03-01 NOTE — PROGRESS NOTES
This is a 68 year old  female who I'm asked to see by Dr. Rubio for consideration for prophylactic mastectomy.  This patient is now approximately 2 years status post left mastectomy for left breast cancer.  This was preceded by neoadjuvant chemotherapy and then she needed more chemotherapy after her surgery.  She is now on exemestane.  Last fall she underwent a mammogram on the right and they found another lesion and had a workup.  This came back benign but it made her start thinking that she does not want to have to worry about mammograms going forward.      PAST MEDICAL HISTORY:  Past Medical History:   Diagnosis Date    Anemia     Breast cancer (H)     left    Gastroesophageal reflux disease     Hypertension     Motion sickness     Obese     PONV (postoperative nausea and vomiting)      Past Surgical History:   Procedure Laterality Date    CHOLECYSTECTOMY      HAND SURGERY Left     hysteectomy      IR CHEST PORT PLACEMENT > 5 YRS OF AGE  8/17/2021    IR LYMPH NODE BIOPSY  7/13/2021    IR PORT REMOVAL RIGHT  2/28/2023    MASTECTOMY SIMPLE Left 1/19/2022    Procedure: Left Mastectomy, [place] Lymph Node Biopsy, COMPLETION LYMPH NODE DISSECTION;  Surgeon: Ana Viera MD;  Location: [facility] OR    TONSILLECTOMY         Medications:    Current Outpatient Medications:     nystatin (MYCOSTATIN) 960293 UNIT/GM external powder, Apply topically 2 times daily, Disp: 30 g, Rfl: 1    acetaminophen (TYLENOL) 500 MG tablet, Take 1,000 mg by mouth as needed for mild pain, Disp: , Rfl:     ASHWAGANDHA PO, Take 1 capsule by mouth daily, Disp: , Rfl:     Calcium Carbonate-Vitamin D (CALCIUM-CARB 600 + D PO), Take 1 tablet by mouth daily, Disp: , Rfl:     clonazePAM (KLONOPIN) 0.5 MG tablet, Take 1 tablet (0.5 mg) by mouth as needed for anxiety, Disp: 30 tablet, Rfl: 0    exemestane (AROMASIN) 25 MG tablet, Take 1 tablet (25 mg) by mouth daily, Disp: 60 tablet, Rfl: 1    fish oil-omega-3 fatty acids 1000 MG capsule,  Take 1 g by mouth Occasionally, Disp: , Rfl:     fluticasone (VERAMYST) 27.5 MCG/SPRAY nasal spray, Spray 2 sprays into both nostrils as needed, Disp: , Rfl:     furosemide (LASIX) 20 MG tablet, Take 1 tablet (20 mg) by mouth daily as needed (swelling), Disp: 30 tablet, Rfl: 1    gabapentin (NEURONTIN) 300 MG capsule, Take 1 capsule (300 mg) by mouth 2 times daily, Disp: 120 capsule, Rfl: 2    hydrochlorothiazide (HYDRODIURIL) 25 MG tablet, Take 1 tablet (25 mg) by mouth daily, Disp: 90 tablet, Rfl: 0    HYDROcodone-acetaminophen (NORCO) 5-325 MG tablet, Take 1 tablet by mouth every 6 hours as needed for pain, Disp: 30 tablet, Rfl: 0    hydrocortisone 2.5 % cream, APPLY TO THE AFFECTED AREA TWICE DAILY FOR ONE WEEK THEN ONCE DAILY FOR ONE WEEK THEN EVERY OTHER DAY FOR ONE WEEK, Disp: , Rfl:     ibuprofen (ADVIL/MOTRIN) 200 MG tablet, Take 400-600 mg by mouth daily as needed for pain, Disp: , Rfl:     irbesartan (AVAPRO) 75 MG tablet, Take 75 mg by mouth as needed Takies when BP is high in the evening, Disp: , Rfl:     Magnesium Oxide 140 MG CAPS, Take 1 tablet by mouth daily, Disp: , Rfl:     multivitamin w/minerals (THERA-VIT-M) tablet, Take 1 tablet by mouth daily, Disp: , Rfl:     ondansetron (ZOFRAN-ODT) 8 MG ODT tab, Take 1 tablet (8 mg) by mouth every 8 hours as needed for nausea, Disp: 30 tablet, Rfl: 1    psyllium (METAMUCIL/KONSYL) Packet, Take 1 packet by mouth daily, Disp: , Rfl:     senna-docusate (SENOKOT-S/PERICOLACE) 8.6-50 MG tablet, Take 1-2 tablets by mouth as needed for constipation, Disp: , Rfl:   No current facility-administered medications for this visit.    Facility-Administered Medications Ordered in Other Visits:     heparin 100 UNIT/ML injection 5 mL, 5 mL, Intracatheter, Once PRN, Xavier Rubio MD, 5 mL at 01/18/23 1450    sodium chloride (PF) 0.9% PF flush 3-20 mL, 3-20 mL, Intracatheter, Q1H PRN, Xavier Rubio MD, 20 mL at 01/18/23 1450    Allergies:  Allergies   Allergen  "Reactions    Ibuprofen      Hypertensive reaction    Seafood Anaphylaxis     Shrimp allergy    Shrimp Anaphylaxis     Shrimp allergy    Keflex [Cephalexin]      Blood in Urine, heavy    Lisinopril Cough     Caused aspiration    Shellfish-Derived Products Hives    Latex Rash        Social History:  Social History     Tobacco Use    Smoking status: Never    Smokeless tobacco: Never   Vaping Use    Vaping Use: Never used   Substance Use Topics    Alcohol use: Not Currently    Drug use: Never        ROS:  Pertinent items are noted in HPI.    Physical Exam  There were no vitals taken for this visit.  General:alert, appears stated age, cooperative, and morbidly obese    Breasts: No palpable masses in the right breast.  She does have a yeast infection in the inframammary fold.  On the left there are no palpable masses.  The scar is well-healed.  She does have a little bit of a \"dogear\".  Lymph Nodes:Supple without adenopathy  Neuro:Grossly normal  Musculoskeletal: Normal range of motion of her upper extremities.  Skin: Other than the yeast infection, skin is normal.    Reviewed her mammograms and ultrasound and pathology.     Impression: History of left breast cancer, status post left modified radical mastectomy.  Now desiring right mastectomy.  Went over the risk and benefits of surgery.  I made sure she understood that this does not improve her survival of her left breast cancer.  She understands that.  She states she just does not want to have to have a mammogram again if possible.  I do think we need to get the yeast infection under control.  She in fact would like to wait until June which I think is very reasonable.      Plan: We'll schedule for a right mastectomy without sentinel lymph node biopsy.  Almost all of our breast surgeries are now done as an outpatient.  The risks and benefits of surgery were explained.  Also talked about expected recovery time.  [facility] will try to set this up sometime in " June.

## 2024-03-01 NOTE — NURSING NOTE
Khloe presents to [facility] today for a follow up appointment with Dr. Viera .Patient has decided she would like to pursue a right breast prophylactic mastectomy.   She is following with Dr. Rubio  for medical oncology and is taking endocrine therapy, tolerating it well.  RN assessment and EMRupdate.  Patient met with Dr. Viera, discussed surgery, planning for June 2024 as that is when her daughter would be available to help her post op.   See dictation for details of visit and follow up plan.  Support provided, invited calls.

## 2024-03-04 ENCOUNTER — HOSPITAL ENCOUNTER (OUTPATIENT)
Facility: AMBULATORY SURGERY CENTER | Age: 69
End: 2024-03-04
Attending: SPECIALIST
Payer: COMMERCIAL

## 2024-03-04 PROBLEM — Z85.3 HISTORY OF BREAST CANCER: Status: ACTIVE | Noted: 2024-03-01

## 2024-03-06 ENCOUNTER — TELEPHONE (OUTPATIENT)
Dept: SURGERY | Facility: CLINIC | Age: 69
End: 2024-03-06
Payer: COMMERCIAL

## 2024-03-06 DIAGNOSIS — Z17.0 MALIGNANT NEOPLASM OF UPPER-OUTER QUADRANT OF LEFT BREAST IN FEMALE, ESTROGEN RECEPTOR POSITIVE (H): ICD-10-CM

## 2024-03-06 DIAGNOSIS — C50.412 MALIGNANT NEOPLASM OF UPPER-OUTER QUADRANT OF LEFT BREAST IN FEMALE, ESTROGEN RECEPTOR POSITIVE (H): ICD-10-CM

## 2024-03-06 RX ORDER — CLONAZEPAM 0.5 MG/1
0.5 TABLET ORAL PRN
Qty: 30 TABLET | Refills: 0 | Status: SHIPPED | OUTPATIENT
Start: 2024-03-06

## 2024-03-06 NOTE — TELEPHONE ENCOUNTER
Spoke with patient today regarding surgery scheduling      Went over details/instructions.    Surgery Letter sent via [employer]  (Please see LETTERS TAB in chart to retrieve a copy of this letter)

## 2024-03-06 NOTE — LETTER
Pre-op Physical: Schedule a pre-op physical with your primary care doctor if not internal to [facility].  If internal, we have scheduled this.   The pre-op physical must be 10-30 days before surgery and since it is required by anesthesia, your surgery will be cancelled if it's not done.      Surgery Date: 6/10/2024     Location: [facility] [address] [place] [address]    Approximate Arrival Time: 7:00am  (Unless instructed differently by the pre-op call nurse)     Pre-Surgical Tasks:     Review all medications with your primary care or prescribing physician; they will advise you which meds to stop and when, and when you can resume taking.  Certain medications like blood thinners and weight loss medications need to be stopped in advance of surgery to proceed safely.      Blood thinners including but not exclusive to drugs like Xarelto, Eliquis, Warfarin and Aspirin, should be stopped five days before surgery, if your prescribing provider agrees. Follow your provider's advice on stopping blood thinners because they know you best.  If you are unsure if your medication is a blood thinner, ask your prescribing provider.    Weight loss medications: There are multiple medications being used for weight management and diabetes today, and the list is growing.  Phentermine, Ozempic, Wegovy, Trulicity, and other similar medications need to be stopped one week before surgery to avoid being cancelled.  Victoza and Saxenda can be continued longer but must be stopped one full day before surgery.  Please ask your prescribing provider for advice.    Diabetic medications: in addition to the medications talked about above that are used for either weight loss or diabetes, some people are on insulin that may require adjustment.  Please discuss managing diabetic medications with your prescribing doctor as these medications may require modification prior to surgery.     Please shower the  evening before and morning of surgery with Hibiclens soap.  This can be found at your local pharmacy.     Fasting instructions will be provided by the pre-op nurse who will call you 1-3 days before surgery.  Typically, we advise normal food up to 8 hours before you arrive for surgery. Clear liquids only from then until 2 hours before you arrive surgery, then nothing at all by mouth.  The nurse will review your specific instructions with you at the call.      Smoking impacts your body's ability to heal properly so we advise patients to quit if possible before surgery.  Plastic Surgery patients are required to be nicotine free for at least 8 weeks before surgery.      You will need an adult to drive you home and stay with you 24 hours after surgery. Public transportation or Medical Van Services are not permitted.    Visitor restrictions are subject to change, please verify with the pre-op nurse when they call how many people are permitted to accompany you.    We always encourage you to notify your insurance any time you have medical tests or procedures scheduled including surgery. The number is usually right on the back of your insurance card. To obtain pricing for surgery, please call  [employer] [place] [facility] at [phone] or email [email or web address].        Call our office if you have any questions! Thank you!     [facility]   [facility]  [phone]

## 2024-05-22 ENCOUNTER — ONCOLOGY VISIT (OUTPATIENT)
Dept: ONCOLOGY | Facility: HOSPITAL | Age: 69
End: 2024-05-22
Attending: INTERNAL MEDICINE
Payer: COMMERCIAL

## 2024-05-22 VITALS
SYSTOLIC BLOOD PRESSURE: 133 MMHG | HEIGHT: 64 IN | WEIGHT: 241.6 LBS | BODY MASS INDEX: 41.25 KG/M2 | TEMPERATURE: 98.1 F | OXYGEN SATURATION: 98 % | DIASTOLIC BLOOD PRESSURE: 75 MMHG | HEART RATE: 75 BPM | RESPIRATION RATE: 16 BRPM

## 2024-05-22 DIAGNOSIS — Z12.31 ENCOUNTER FOR SCREENING MAMMOGRAM FOR BREAST CANCER: ICD-10-CM

## 2024-05-22 DIAGNOSIS — M81.0 AGE-RELATED OSTEOPOROSIS WITHOUT CURRENT PATHOLOGICAL FRACTURE: ICD-10-CM

## 2024-05-22 DIAGNOSIS — C50.412 MALIGNANT NEOPLASM OF UPPER-OUTER QUADRANT OF LEFT BREAST IN FEMALE, ESTROGEN RECEPTOR POSITIVE (H): Primary | ICD-10-CM

## 2024-05-22 DIAGNOSIS — M85.80 OSTEOPENIA, UNSPECIFIED LOCATION: ICD-10-CM

## 2024-05-22 DIAGNOSIS — Z17.0 MALIGNANT NEOPLASM OF UPPER-OUTER QUADRANT OF LEFT BREAST IN FEMALE, ESTROGEN RECEPTOR POSITIVE (H): Primary | ICD-10-CM

## 2024-05-22 PROCEDURE — G0463 HOSPITAL OUTPT CLINIC VISIT: HCPCS | Performed by: INTERNAL MEDICINE

## 2024-05-22 PROCEDURE — G2211 COMPLEX E/M VISIT ADD ON: HCPCS | Performed by: INTERNAL MEDICINE

## 2024-05-22 PROCEDURE — 99214 OFFICE O/P EST MOD 30 MIN: CPT | Performed by: INTERNAL MEDICINE

## 2024-05-22 RX ORDER — DESONIDE 0.5 MG/ML
LOTION TOPICAL
COMMUNITY
Start: 2024-03-18

## 2024-05-22 ASSESSMENT — PAIN SCALES - GENERAL: PAINLEVEL: SEVERE PAIN (6)

## 2024-05-22 NOTE — PROGRESS NOTES
"Oncology Rooming Note    May 22, 2024 10:49 AM   Khloe Becker is a 68 year old female who presents for:    Chief Complaint   Patient presents with    Oncology Clinic Visit     Return visit 3 months. Malignant neoplasm of upper-outer quadrant of left breast in female, estrogen receptor positive     Initial Vitals: /75 (BP Location: Right arm, Patient Position: Sitting, Cuff Size: Adult Large)   Pulse 75   Temp 98.1  F (36.7  C) (Oral)   Resp 16   Ht 1.626 m (5' 4\")   Wt 109.6 kg (241 lb 9.6 oz)   SpO2 98%   BMI 41.47 kg/m   Estimated body mass index is 41.47 kg/m  as calculated from the following:    Height as of this encounter: 1.626 m (5' 4\").    Weight as of this encounter: 109.6 kg (241 lb 9.6 oz). Body surface area is 2.22 meters squared.  Severe Pain (6) Comment: Data Unavailable   No LMP recorded. Patient is postmenopausal.  Allergies reviewed: Yes  Medications reviewed: Yes    Medications: MEDICATION REFILLS NEEDED TODAY. Provider was notified.  Pharmacy name entered into [employer]: [facility] #[id number] - [address]  AT [facility]    Frailty Screening:   Is the patient here for a new oncology consult visit in cancer care? 2. No      Clinical concerns: none       Josseline Charles CMA              "

## 2024-05-22 NOTE — PROGRESS NOTES
[facility] Hematology and Oncology Progress Note    Patient: Khloe Becker  MRN: [id number]  Date of Service: May 22, 2024          Reason for Visit    Chief Complaint   Patient presents with    Oncology Clinic Visit     Return visit 3 months. Malignant neoplasm of upper-outer quadrant of left breast in female, estrogen receptor positive       Assessment and Plan     Cancer Staging   Malignant neoplasm of upper-outer quadrant of left breast in female, estrogen receptor positive (H)  Staging form: Breast, AJCC 8th Edition  - Clinical: Stage IB (cT2, cN1, cM0, G3, ER+, MO+, HER2+) - Signed by Xavier Rubio MD on 6/10/2022  - Pathologic: Stage IA (pT1a, pN1mi, cM0, G3, ER+, MO+, HER2+) - Signed by Xavier Rubio MD on 6/10/2022    1. Breast cancer, stage IA, triple positive, left side, status post 6 cycles of neoadjuvant TCHP and then left mastectomy: Patient did have residual disease at time of her surgery so she was started on Kadcyla adjuvantly.  Had 10 cycles and had to do dose reduction due to significant side effects including peripheral neuropathy and fatigue.  Ultimately we switched to Herceptin due to side effects. Was supposed to get 4 cycles and then would be done. Had a dose on 12/8 and 12/28 and then had even more side effects so decided to stop treatment.      Started on anastrozole for endocrine therapy but had significant side effects from it.  Especially with weight gain and mood changes and depression.  We switched to letrozole back in May 2023.  She was doing reasonably well on this initially but again developed significant musculoskeletal pain issues and we switched her to exemestane in November last year.      He seems to be tolerating exemestane better than other medications.  Still has musculoskeletal pain issues but this is tolerable to some extent.  Peripheral neuropathy symptoms are pretty much the same.  Also has bilateral leg edema.  She was contemplating prophylactic  right-sided mastectomy but each infection underneath her breast has been an issue and looks like this has not resolved yet.  She has tried oral antifungals and topical antifungal without much benefit.  Endocrine therapy with aromatase inhibitors can sometimes increase the risk for yeast infection.  So 1 option is to give exemestane a break for couple of weeks while she is undergoing treatment for yeast infection and see if that helps.  She will touch base with her PCP.    She is due for a mammogram and a DEXA scan.  I will see her back in 3 months with mammogram and DEXA scan prior.    2.  Mental fogginess and lapse in concentration  She has been holding the morning dose of gabapentin which has improved her mental function.      I will see her back in 3 months.    ECOG Performance    1 - Can't do physically strenuous work, but fully ambyulatory and can do light sedentary work    Distress Screening (within last 30 days)    1. How concerned are you about your ability to eat? : 0  2. How concerned are you about unintended weight loss or your current weight? : 0  3. How concerned are you about feeling depressed or very sad? : 0  4. How concerned are you about feeling anxious or very scared? : 0  5. Do you struggle with the loss of meaning and jeimy in your life? : Not at all  6. How concerned are you about work and home life issues that may be affected by your cancer? : 0  7. How concerned are you about knowing what resources are available to help you? : 0  8. Do you currently have what you would describe as Druze or spiritual struggles?            : Not at all       Pain       Problem List    Patient Active Problem List   Diagnosis    Morbid obesity (H)    Malignant neoplasm of overlapping sites of left breast in female, estrogen receptor positive (H)    Malignant neoplasm of upper-outer quadrant of left breast in female, estrogen receptor positive (H)    Chemotherapy-induced peripheral neuropathy (H24)     Musculoskeletal pain    Dehydration    History of breast cancer        ______________________________________________________________________________    History of Present Illness    Oncologic history  July 2021: Left breast mass palpated by patient.  Mammogram showing 2.1 x 2.1 x 2 cm hypoechoic mass at 2 o'clock position about 11 cm from the nipple.  Ultrasound-guided biopsy of the mass showed invasive ductal carcinoma, grade 3, ER, NE and HER2 positive.  Left axillary lymph node biopsy positive for metastatic disease.     Treatment:   Neoadjuvant chemotherapy with TCHP starting 9/2/2021.  Finished 6 cycles of chemo.  Last dose was 12/17/2021.  Carboplatin was held for cycle 4 and 6 due to poor tolerance. Had Kadcyla adjuvantly for 10 doses. Then switched to herceptin for 2 more doses. Last dose was 12/28/22. Did not receive last 2 doses of Heceptin due to intolerance.      Left mastectomy with sentinel lymph node biopsy and axillary lymph node dissection done on 1/19/2022.     Surgical path showing residual disease.  ypT1a, pN1(mi).  Patient declined radiation therapy.    Received 10 cycles of adjuvant Kadcyla with dose reductions due to neuropathy    We stopped Kadcyla due to significant adenopathy and she received 2 doses of single agent Herceptin.  Had side effects from that and we stopped treatment.    Tried adjuvant anastrozole for endocrine therapy which caused significant issues especially with depression and mood changes.    Switched to letrozole in May 2023.      Due to significant side effects from letrozole we switched her to exemestane in November 2023.    Interim history:   She is here for follow-up.  Doing reasonably well on exemestane with musculoskeletal pain issues and other symptoms but more tolerable than other aromatase inhibitors.  Peripheral neuropathy is pretty stable.  She has been dealing with yeast infection underneath her breast.  This has resulted in postponement of her right  prophylactic mastectomy.  Been on oral and topical antifungals.    Review of Systems    Pertinent items are noted in HPI.    Past History    Past Medical History:   Diagnosis Date    Anemia     Breast cancer (H)     left    Gastroesophageal reflux disease     Hypertension     Motion sickness     Obese     PONV (postoperative nausea and vomiting)        PHYSICAL EXAM  There were no vitals taken for this visit.    GENERAL: Alert and cooperative in no distress.  NEURO: non focal. Alert and oriented x3.   Breast: s/p left mastectomy.  No evidence of bilateral axillary adenopathy.  SKIN: exposed skin is dry intact.     Lab Results    No results found for this or any previous visit (from the past 168 hour(s)).    Imaging    No results found.     A total of 25 min was spent today on this visit which included face to face conversation with the patient, EMR review, counseling and co-ordination of care and medical documentation.    The longitudinal plan of care for the diagnosis(es)/condition(s) as documented were addressed during this visit. Due to the added complexity in care, I will continue to support Khloe in the subsequent management and with ongoing continuity of care.      Signed by: Xavier Rubio MD

## 2024-05-22 NOTE — LETTER
5/22/2024         RE: Khloe Becker  [address] [place] [address]        Dear Colleague,    Thank you for referring your patient, Khloe Becker, to the [facility] [place]. Please see a copy of my visit note below.    [facility] Hematology and Oncology Progress Note    Patient: Khloe Becker  MRN: [id number]  Date of Service: May 22, 2024          Reason for Visit    Chief Complaint   Patient presents with     Oncology Clinic Visit     Return visit 3 months. Malignant neoplasm of upper-outer quadrant of left breast in female, estrogen receptor positive       Assessment and Plan     Cancer Staging   Malignant neoplasm of upper-outer quadrant of left breast in female, estrogen receptor positive (H)  Staging form: Breast, AJCC 8th Edition  - Clinical: Stage IB (cT2, cN1, cM0, G3, ER+, FL+, HER2+) - Signed by Xavier Rubio MD on 6/10/2022  - Pathologic: Stage IA (pT1a, pN1mi, cM0, G3, ER+, FL+, HER2+) - Signed by Xavier Rubio MD on 6/10/2022    1. Breast cancer, stage IA, triple positive, left side, status post 6 cycles of neoadjuvant TCHP and then left mastectomy: Patient did have residual disease at time of her surgery so she was started on Kadcyla adjuvantly.  Had 10 cycles and had to do dose reduction due to significant side effects including peripheral neuropathy and fatigue.  Ultimately we switched to Herceptin due to side effects. Was supposed to get 4 cycles and then would be done. Had a dose on 12/8 and 12/28 and then had even more side effects so decided to stop treatment.      Started on anastrozole for endocrine therapy but had significant side effects from it.  Especially with weight gain and mood changes and depression.  We switched to letrozole back in May 2023.  She was doing reasonably well on this initially but again developed significant musculoskeletal pain issues and we switched her to exemestane in November last year.      He seems to  be tolerating exemestane better than other medications.  Still has musculoskeletal pain issues but this is tolerable to some extent.  Peripheral neuropathy symptoms are pretty much the same.  Also has bilateral leg edema.  She was contemplating prophylactic right-sided mastectomy but each infection underneath her breast has been an issue and looks like this has not resolved yet.  She has tried oral antifungals and topical antifungal without much benefit.  Endocrine therapy with aromatase inhibitors can sometimes increase the risk for yeast infection.  So 1 option is to give exemestane a break for couple of weeks while she is undergoing treatment for yeast infection and see if that helps.  She will touch base with her PCP.    She is due for a mammogram and a DEXA scan.  I will see her back in 3 months with mammogram and DEXA scan prior.    2.  Mental fogginess and lapse in concentration  She has been holding the morning dose of gabapentin which has improved her mental function.      I will see her back in 3 months.    ECOG Performance    1 - Can't do physically strenuous work, but fully ambyulatory and can do light sedentary work    Distress Screening (within last 30 days)    1. How concerned are you about your ability to eat? : 0  2. How concerned are you about unintended weight loss or your current weight? : 0  3. How concerned are you about feeling depressed or very sad? : 0  4. How concerned are you about feeling anxious or very scared? : 0  5. Do you struggle with the loss of meaning and jeimy in your life? : Not at all  6. How concerned are you about work and home life issues that may be affected by your cancer? : 0  7. How concerned are you about knowing what resources are available to help you? : 0  8. Do you currently have what you would describe as Sabianist or spiritual struggles?            : Not at all       Pain       Problem List    Patient Active Problem List   Diagnosis     Morbid obesity (H)      Malignant neoplasm of overlapping sites of left breast in female, estrogen receptor positive (H)     Malignant neoplasm of upper-outer quadrant of left breast in female, estrogen receptor positive (H)     Chemotherapy-induced peripheral neuropathy (H24)     Musculoskeletal pain     Dehydration     History of breast cancer        ______________________________________________________________________________    History of Present Illness    Oncologic history  July 2021: Left breast mass palpated by patient.  Mammogram showing 2.1 x 2.1 x 2 cm hypoechoic mass at 2 o'clock position about 11 cm from the nipple.  Ultrasound-guided biopsy of the mass showed invasive ductal carcinoma, grade 3, ER, CO and HER2 positive.  Left axillary lymph node biopsy positive for metastatic disease.     Treatment:   Neoadjuvant chemotherapy with TCHP starting 9/2/2021.  Finished 6 cycles of chemo.  Last dose was 12/17/2021.  Carboplatin was held for cycle 4 and 6 due to poor tolerance. Had Kadcyla adjuvantly for 10 doses. Then switched to herceptin for 2 more doses. Last dose was 12/28/22. Did not receive last 2 doses of Heceptin due to intolerance.      Left mastectomy with sentinel lymph node biopsy and axillary lymph node dissection done on 1/19/2022.     Surgical path showing residual disease.  ypT1a, pN1(mi).  Patient declined radiation therapy.    Received 10 cycles of adjuvant Kadcyla with dose reductions due to neuropathy    We stopped Kadcyla due to significant adenopathy and she received 2 doses of single agent Herceptin.  Had side effects from that and we stopped treatment.    Tried adjuvant anastrozole for endocrine therapy which caused significant issues especially with depression and mood changes.    Switched to letrozole in May 2023.      Due to significant side effects from letrozole we switched her to exemestane in November 2023.    Interim history:   She is here for follow-up.  Doing reasonably well on exemestane with  musculoskeletal pain issues and other symptoms but more tolerable than other aromatase inhibitors.  Peripheral neuropathy is pretty stable.  She has been dealing with yeast infection underneath her breast.  This has resulted in postponement of her right prophylactic mastectomy.  Been on oral and topical antifungals.    Review of Systems    Pertinent items are noted in HPI.    Past History    Past Medical History:   Diagnosis Date     Anemia      Breast cancer (H)     left     Gastroesophageal reflux disease      Hypertension      Motion sickness      Obese      PONV (postoperative nausea and vomiting)        PHYSICAL EXAM  There were no vitals taken for this visit.    GENERAL: Alert and cooperative in no distress.  NEURO: non focal. Alert and oriented x3.   Breast: s/p left mastectomy.  No evidence of bilateral axillary adenopathy.  SKIN: exposed skin is dry intact.     Lab Results    No results found for this or any previous visit (from the past 168 hour(s)).    Imaging    No results found.     A total of 25 min was spent today on this visit which included face to face conversation with the patient, EMR review, counseling and co-ordination of care and medical documentation.    The longitudinal plan of care for the diagnosis(es)/condition(s) as documented were addressed during this visit. Due to the added complexity in care, I will continue to support Khloe in the subsequent management and with ongoing continuity of care.      Signed by: Xavier Rubio MD    Oncology Rooming Note    May 22, 2024 10:49 AM   Khloe Becker is a 68 year old female who presents for:    Chief Complaint   Patient presents with     Oncology Clinic Visit     Return visit 3 months. Malignant neoplasm of upper-outer quadrant of left breast in female, estrogen receptor positive     Initial Vitals: /75 (BP Location: Right arm, Patient Position: Sitting, Cuff Size: Adult Large)   Pulse 75   Temp 98.1  F (36.7  C) (Oral)   Resp 16   " Ht 1.626 m (5' 4\")   Wt 109.6 kg (241 lb 9.6 oz)   SpO2 98%   BMI 41.47 kg/m   Estimated body mass index is 41.47 kg/m  as calculated from the following:    Height as of this encounter: 1.626 m (5' 4\").    Weight as of this encounter: 109.6 kg (241 lb 9.6 oz). Body surface area is 2.22 meters squared.  Severe Pain (6) Comment: Data Unavailable   No LMP recorded. Patient is postmenopausal.  Allergies reviewed: Yes  Medications reviewed: Yes    Medications: MEDICATION REFILLS NEEDED TODAY. Provider was notified.  Pharmacy name entered into [employer]: [facility] #[id number] [address]  AT [facility]    Frailty Screening:   Is the patient here for a new oncology consult visit in cancer care? 2. No      Clinical concerns: none       Josseline Charles CMA                Again, thank you for allowing me to participate in the care of your patient.        Sincerely,        Xavier Rubio MD  "

## 2024-05-24 ENCOUNTER — PATIENT OUTREACH (OUTPATIENT)
Dept: ONCOLOGY | Facility: HOSPITAL | Age: 69
End: 2024-05-24
Payer: COMMERCIAL

## 2024-05-24 DIAGNOSIS — C50.412 MALIGNANT NEOPLASM OF UPPER-OUTER QUADRANT OF LEFT BREAST IN FEMALE, ESTROGEN RECEPTOR POSITIVE (H): ICD-10-CM

## 2024-05-24 DIAGNOSIS — Z17.0 MALIGNANT NEOPLASM OF UPPER-OUTER QUADRANT OF LEFT BREAST IN FEMALE, ESTROGEN RECEPTOR POSITIVE (H): ICD-10-CM

## 2024-05-24 RX ORDER — EXEMESTANE 25 MG/1
25 TABLET ORAL DAILY
Qty: 60 TABLET | Refills: 1 | Status: SHIPPED | OUTPATIENT
Start: 2024-05-24 | End: 2024-10-02

## 2024-05-24 NOTE — PROGRESS NOTES
RN Cancer Care Coordinator responded to [employer] message, having reordered medication at preferred pharmacy.

## 2024-05-28 ENCOUNTER — TELEPHONE (OUTPATIENT)
Dept: SURGERY | Facility: CLINIC | Age: 69
End: 2024-05-28
Payer: COMMERCIAL

## 2024-05-28 NOTE — TELEPHONE ENCOUNTER
Called and spoke w/ patient after receiving message from Shalini that patient needs to cancel her surgical procedure with Dr Viera on 6/10/24.  Patient was very pleasant on the phone.  Stating that she has been working with her PCP on an ongoing infection for the last several months and it is not improving at all.  At the direction of both her PCP and Oncologist, she was advised to not move forward with surgery at this time.  When I asked if patient wants to just straight cancel at this time or reschedule, she expressed that it is best to just cancel for the time being and she will reach out to us when the infection has resolved and she is able to move forward with surgery again.  Without prompt from me, patient expressed that she is aware that Dr Viera is slowing her practice at this time and when she is able to reschedule, it may not be with Dr Viera.      At this time I have cancelled her procedure for 6/10 and will await her phone call or [employer] message indicating she is able to reschedule.  Patient is aware of this plan and very agreeable to it.  Call was ended.

## 2024-08-20 ENCOUNTER — PATIENT OUTREACH (OUTPATIENT)
Dept: ONCOLOGY | Facility: HOSPITAL | Age: 69
End: 2024-08-20
Payer: COMMERCIAL

## 2024-08-20 NOTE — PROGRESS NOTES
Situation: Patient chart reviewed by care coordinator.    Background: Malignant neoplasm of upper-outer quadrant of left breast in female, estrogen receptor positive (H)   On exemestane and tolerating.    Appt for August return was moved to October by patient. She had planned a prophylactic mastectomy on the non-cancer side, but it got postponed due to an infection. Apparently not rescheduled at this point.     Assessment: Pt tolerating oral medication. Mammo is scheduled for prior to RTC.    Plan/Recommendations: Will continue to follow for maintenance with next visit.    Isis Cobb RN

## 2024-09-30 ENCOUNTER — ANCILLARY PROCEDURE (OUTPATIENT)
Dept: MAMMOGRAPHY | Facility: HOSPITAL | Age: 69
End: 2024-09-30
Attending: INTERNAL MEDICINE
Payer: COMMERCIAL

## 2024-09-30 ENCOUNTER — HOSPITAL ENCOUNTER (OUTPATIENT)
Dept: BONE DENSITY | Facility: HOSPITAL | Age: 69
Discharge: HOME | End: 2024-09-30
Attending: INTERNAL MEDICINE
Payer: COMMERCIAL

## 2024-09-30 DIAGNOSIS — M81.0 AGE-RELATED OSTEOPOROSIS WITHOUT CURRENT PATHOLOGICAL FRACTURE: ICD-10-CM

## 2024-09-30 DIAGNOSIS — M85.80 OSTEOPENIA, UNSPECIFIED LOCATION: ICD-10-CM

## 2024-09-30 DIAGNOSIS — Z12.31 ENCOUNTER FOR SCREENING MAMMOGRAM FOR BREAST CANCER: ICD-10-CM

## 2024-09-30 PROCEDURE — 77063 BREAST TOMOSYNTHESIS BI: CPT | Mod: 52

## 2024-09-30 PROCEDURE — 77080 DXA BONE DENSITY AXIAL: CPT

## 2024-10-02 ENCOUNTER — ONCOLOGY VISIT (OUTPATIENT)
Dept: ONCOLOGY | Facility: HOSPITAL | Age: 69
End: 2024-10-02
Attending: INTERNAL MEDICINE
Payer: COMMERCIAL

## 2024-10-02 ENCOUNTER — LAB (OUTPATIENT)
Dept: INFUSION THERAPY | Facility: HOSPITAL | Age: 69
End: 2024-10-02
Attending: INTERNAL MEDICINE
Payer: COMMERCIAL

## 2024-10-02 VITALS
WEIGHT: 235.6 LBS | RESPIRATION RATE: 18 BRPM | HEART RATE: 74 BPM | BODY MASS INDEX: 40.44 KG/M2 | SYSTOLIC BLOOD PRESSURE: 140 MMHG | TEMPERATURE: 97.7 F | OXYGEN SATURATION: 99 % | DIASTOLIC BLOOD PRESSURE: 70 MMHG

## 2024-10-02 DIAGNOSIS — M13.0 POLYARTHRITIS: Primary | ICD-10-CM

## 2024-10-02 DIAGNOSIS — M13.0 POLYARTHRITIS: ICD-10-CM

## 2024-10-02 DIAGNOSIS — C50.412 MALIGNANT NEOPLASM OF UPPER-OUTER QUADRANT OF LEFT BREAST IN FEMALE, ESTROGEN RECEPTOR POSITIVE (H): ICD-10-CM

## 2024-10-02 DIAGNOSIS — Z17.0 MALIGNANT NEOPLASM OF UPPER-OUTER QUADRANT OF LEFT BREAST IN FEMALE, ESTROGEN RECEPTOR POSITIVE (H): ICD-10-CM

## 2024-10-02 LAB
CRP SERPL-MCNC: 33.3 MG/L
ERYTHROCYTE [SEDIMENTATION RATE] IN BLOOD BY WESTERGREN METHOD: 40 MM/HR (ref 0–30)
RHEUMATOID FACT SERPL-ACNC: <10 IU/ML

## 2024-10-02 PROCEDURE — G0463 HOSPITAL OUTPT CLINIC VISIT: HCPCS | Performed by: INTERNAL MEDICINE

## 2024-10-02 PROCEDURE — 86431 RHEUMATOID FACTOR QUANT: CPT

## 2024-10-02 PROCEDURE — 36415 COLL VENOUS BLD VENIPUNCTURE: CPT

## 2024-10-02 PROCEDURE — G2211 COMPLEX E/M VISIT ADD ON: HCPCS | Performed by: INTERNAL MEDICINE

## 2024-10-02 PROCEDURE — 86140 C-REACTIVE PROTEIN: CPT

## 2024-10-02 PROCEDURE — 86200 CCP ANTIBODY: CPT

## 2024-10-02 PROCEDURE — 85652 RBC SED RATE AUTOMATED: CPT

## 2024-10-02 PROCEDURE — 99213 OFFICE O/P EST LOW 20 MIN: CPT | Performed by: INTERNAL MEDICINE

## 2024-10-02 RX ORDER — EXEMESTANE 25 MG/1
25 TABLET ORAL DAILY
Qty: 90 TABLET | Refills: 2 | Status: SHIPPED | OUTPATIENT
Start: 2024-10-02

## 2024-10-02 ASSESSMENT — PAIN SCALES - GENERAL: PAINLEVEL: NO PAIN (0)

## 2024-10-02 NOTE — PROGRESS NOTES
[facility] Hematology and Oncology Progress Note    Patient: Khloe Becker  MRN: [id number]  Date of Service: Oct 2, 2024          Reason for Visit    Chief Complaint   Patient presents with    Oncology Clinic Visit     Malignant neoplasm of upper-outer quadrant of left breast in female, estrogen receptor positive (H)  Malignant neoplasm of overlapping sites of left breast in female, estrogen receptor positive (H)         Assessment and Plan     Cancer Staging   Malignant neoplasm of upper-outer quadrant of left breast in female, estrogen receptor positive (H)  Staging form: Breast, AJCC 8th Edition  - Clinical: Stage IB (cT2, cN1, cM0, G3, ER+, AR+, HER2+) - Signed by Xavier Rubio MD on 6/10/2022  - Pathologic: Stage IA (pT1a, pN1mi, cM0, G3, ER+, AR+, HER2+) - Signed by Xavier Rubio MD on 6/10/2022    1. Breast cancer, stage IA, triple positive, left side, status post 6 cycles of neoadjuvant TCHP and then left mastectomy: Patient did have residual disease at time of her surgery so she was started on Kadcyla adjuvantly.  Had 10 cycles and had to do dose reduction due to significant side effects including peripheral neuropathy and fatigue.  Ultimately we switched to Herceptin due to side effects. Was supposed to get 4 cycles and then would be done. Had a dose on 12/8/22 and 12/28/22 and then had even more side effects so decided to stop treatment.      Started on anastrozole for endocrine therapy but had significant side effects from it.  Especially with weight gain and mood changes and depression.  We switched to letrozole back in May 2023.  She was doing reasonably well on this initially but again developed significant musculoskeletal pain issues and we switched her to exemestane in November 2023.    Continues to have significant musculoskeletal pain issues.  She has a family history of rheumatoid arthritis in her mother.  Does not have any synovitis on clinical exam today but she does have  some swelling in her finger joints at times.  She did give exemestane a break for couple of weeks to see if that makes a difference but did not see a whole lot difference in her pain issues.  Peripheral neuropathy symptoms are pretty much the same.  We discussed further management.  Since she has a strong family history of rheumatoid arthritis and the mainly affected joints her finger joints, I will obtain lab studies today to rule out RA.  I will check rheumatoid factor, anti-CCP antibodies, ESR and CRP.  Will make a referral to rheumatology appropriately.    With regards to endocrine therapy we again discussed about switching to a different medication like tamoxifen.  She wants to continue exemestane for now.  It is more tolerable than letrozole or anastrozole.  She is giving 1 day break each week.  Potentially could give a break on the weekends.  In the grand scheme of things I do not think it will make a huge difference.  I also reviewed her recent mammogram.  Fortunately no evidence of any malignancy.  On clinical exam today she does not have any bilateral axillary adenopathy.  Will continue yearly mammograms.  She is 2 years out from finishing maintenance therapy.  I will see her back in 6 months for physical exam.    Reviewed her recent DEXA scan.  She has normal bone density.  Continue vitamin D and calcium supplementation.    She has stopped taking gabapentin for peripheral neuropathy.      I will see her back in 3 months.    ECOG Performance    1 - Can't do physically strenuous work, but fully ambyulatory and can do light sedentary work    Distress Screening (within last 30 days)    1. How concerned are you about your ability to eat? : 0  2. How concerned are you about unintended weight loss or your current weight? : 0  3. How concerned are you about feeling depressed or very sad? : 0  4. How concerned are you about feeling anxious or very scared? : 0  5. Do you struggle with the loss of meaning and jeimy in  your life? : Not at all  6. How concerned are you about work and home life issues that may be affected by your cancer? : 0  7. How concerned are you about knowing what resources are available to help you? : 0  8. Do you currently have what you would describe as Latter-day or spiritual struggles?            : Not at all       Pain  Pain Score: No Pain (0)    Problem List    Patient Active Problem List   Diagnosis    Morbid obesity (H)    Malignant neoplasm of overlapping sites of left breast in female, estrogen receptor positive (H)    Malignant neoplasm of upper-outer quadrant of left breast in female, estrogen receptor positive (H)    Chemotherapy-induced peripheral neuropathy (H)    Musculoskeletal pain    Dehydration    History of breast cancer        ______________________________________________________________________________    History of Present Illness    Oncologic history  July 2021: Left breast mass palpated by patient.  Mammogram showing 2.1 x 2.1 x 2 cm hypoechoic mass at 2 o'clock position about 11 cm from the nipple.  Ultrasound-guided biopsy of the mass showed invasive ductal carcinoma, grade 3, ER, WA and HER2 positive.  Left axillary lymph node biopsy positive for metastatic disease.     Treatment:   Neoadjuvant chemotherapy with TCHP starting 9/2/2021.  Finished 6 cycles of chemo.  Last dose was 12/17/2021.  Carboplatin was held for cycle 4 and 6 due to poor tolerance. Had Kadcyla adjuvantly for 10 doses. Then switched to herceptin for 2 more doses. Last dose was 12/28/22. Did not receive last 2 doses of Heceptin due to intolerance.      Left mastectomy with sentinel lymph node biopsy and axillary lymph node dissection done on 1/19/2022.     Surgical path showing residual disease.  ypT1a, pN1(mi).  Patient declined radiation therapy.    Received 10 cycles of adjuvant Kadcyla with dose reductions due to neuropathy    We stopped Kadcyla due to significant adenopathy and she received 2 doses of single  agent Herceptin.  Had side effects from that and we stopped treatment.    Tried adjuvant anastrozole for endocrine therapy which caused significant issues especially with depression and mood changes.    Switched to letrozole in May 2023.      Due to significant side effects from letrozole we switched her to exemestane in November 2023.    Interim history:   She is here for follow-up.  She is doing fairly okay.  Has significant joint aches and pains which has been bothering her for quite some time now.  Peripheral neuropathy is pretty much the same.  She has some morning stiffness.  Has difficulty in ambulation due to pain.  Probably not as physically active as she would like to because of the joint aches.  Mainly affects her finger joints.  But other joints are also affected.  Recently had a mammogram and a bone density.  Here to review the results.  Denies any new lumps or bumps.    Review of Systems    Pertinent items are noted in HPI.    Past History    Past Medical History:   Diagnosis Date    Anemia     Breast cancer (H)     left    Gastroesophageal reflux disease     Hypertension     Motion sickness     Obese     PONV (postoperative nausea and vomiting)        PHYSICAL EXAM  BP (!) 140/70   Pulse 74   Temp 97.7  F (36.5  C)   Resp 18   Wt 106.9 kg (235 lb 9.6 oz)   SpO2 99%   BMI 40.44 kg/m      GENERAL: Alert and cooperative in no distress.  NEURO: non focal. Alert and oriented x3.   Breast: No evidence of bilateral axillary adenopathy.  SKIN: exposed skin is dry intact.     Lab Results    No results found for this or any previous visit (from the past 168 hour(s)).    Imaging    MA Screen Right w/Akshat    Result Date: 9/30/2024  RIGHT FULL FIELD DIGITAL SCREENING MAMMOGRAM WITH TOMOSYNTHESIS Performed on: 9/30/24 Compared to: 07/13/2021 Technique:  This study was evaluated with the assistance of Computer-Aided Detection.  Breast Tomosynthesis was used in interpretation. Findings: The patient is status  post left mastectomy. The breast has scattered areas of fibroglandular density.  There are benign appearing calcifications in the right breast. There is no radiographic evidence of malignancy.    IMPRESSION: ACR BI-RADS Category 2: Benign BREAST CANCER SCREENING RECOMMENDATION: Routine yearly mammography beginning at age 40 or as discussed with your provider. The results and recommendations of this examination will be communicated to the patient. Lorraine Jeffers MD     DX Bone Density    Result Date: 9/30/2024  EXAM: DX TBS AXIAL LOCATION: [facility] DATE: 09/30/2024 INDICATION: BMD screening. Followup. DEMOGRAPHICS: Age- 69 years. Gender- Female. Menopausal status- Postmenopausal. COMPARISON: 06/01/2022 TECHNIQUE: Dual-energy x-ray absorptiometry (DXA) performed with routine technique.   Trabecular bone score (TBS) analysis performed. FINDINGS: DXA RESULTS -Lumbar Spine: L1-L4: BMD: 1.243 g/cm2. T-score: 0.5. Z-score: 2.2. Degenerative change may artifactually increase BMD. -RIGHT Hip Total: BMD: 1.086 g/cm2. T-score: 0.6. Z-score: 2.1. -RIGHT Hip Femoral neck: BMD: 1.002 g/cm2. T-score: -0.3. Z-score: 1.4. -LEFT Hip Total: BMD: 1.073 g/cm2. T-score: 0.5. Z-score: 2.0. -LEFT Hip Femoral neck: BMD: 0.908 g/cm2. T-score: -0.9. Z-score: 0.8. WHO T-SCORE CRITERIA -Normal: T score at or above -1 SD -Osteopenia: T score between -1 and -2.5 SD -Osteoporosis: T score at or below -2.5 SD The World Health Organization (WHO) criteria is applicable to perimenopausal females, postmenopausal females, and men aged 50 years or older. TBS RESULTS -Lumbar Spine L1-L4: TBS: 1.282. TBS T-score: -2.0.TBS Z-score: 0.2. The TBS is a DXA derived measurement for fracture risk assessment, and reflects the structural condition of the bone microarchitecture. It can be used to adjust WHO Fracture Risk Assessment Tool (FRAX) probability of fracture in postmenopausal women and older men. The calculated  probabilities of fracture have been shown to be more accurate when computed with the TBS. INTERVAL CHANGE -There has been a 9.9% increase in lumbar spine BMD (degenerative changes can artificially increase BMD values). -There has been a 0.5% increase in bilateral hip BMD. FRACTURE RISK -The FRAX risk calculator is not applicable due to normal BMD in the spine/hip regions.     IMPRESSION: NORMAL. Bone mineral density measurements are within normal limits using T score.      The longitudinal plan of care for the diagnosis(es)/condition(s) as documented were addressed during this visit. Due to the added complexity in care, I will continue to support Khloe in the subsequent management and with ongoing continuity of care.        Signed by: Xavier Rubio MD

## 2024-10-02 NOTE — PROGRESS NOTES
"Oncology Rooming Note    October 2, 2024 10:33 AM   Khloe Becker is a 69 year old female who presents for:    Chief Complaint   Patient presents with    Oncology Clinic Visit     Malignant neoplasm of upper-outer quadrant of left breast in female, estrogen receptor positive (H)  Malignant neoplasm of overlapping sites of left breast in female, estrogen receptor positive (H)       Initial Vitals: BP (!) 140/70   Pulse 74   Temp 97.7  F (36.5  C)   Resp 18   Wt 106.9 kg (235 lb 9.6 oz)   SpO2 99%   BMI 40.44 kg/m   Estimated body mass index is 40.44 kg/m  as calculated from the following:    Height as of 5/22/24: 1.626 m (5' 4\").    Weight as of this encounter: 106.9 kg (235 lb 9.6 oz). Body surface area is 2.2 meters squared.  No Pain (0) Comment: Data Unavailable   No LMP recorded. Patient is postmenopausal.  Allergies reviewed: Yes  Medications reviewed: Yes    Medications: Medication refills not needed today.  Pharmacy name entered into [facility]:    [id number] [place] [phone] [facility]  AT [facility] - [place] [id number] [id number]    Frailty Screening:   Is the patient here for a new oncology consult visit in cancer care? 2. No      Clinical concerns: None      Daniela Ma LPN             "

## 2024-10-02 NOTE — LETTER
10/2/2024      Khloe Becker  [address] [place] [address]      Dear Colleague,    Thank you for referring your patient, Khloe Becker, to the [facility] [place]. Please see a copy of my visit note below.    [facility] Hematology and Oncology Progress Note    Patient: Khloe Becker  MRN: [id number]  Date of Service: Oct 2, 2024          Reason for Visit    Chief Complaint   Patient presents with     Oncology Clinic Visit     Malignant neoplasm of upper-outer quadrant of left breast in female, estrogen receptor positive (H)  Malignant neoplasm of overlapping sites of left breast in female, estrogen receptor positive (H)         Assessment and Plan     Cancer Staging   Malignant neoplasm of upper-outer quadrant of left breast in female, estrogen receptor positive (H)  Staging form: Breast, AJCC 8th Edition  - Clinical: Stage IB (cT2, cN1, cM0, G3, ER+, ID+, HER2+) - Signed by Xavier Rubio MD on 6/10/2022  - Pathologic: Stage IA (pT1a, pN1mi, cM0, G3, ER+, ID+, HER2+) - Signed by Xavier Rubio MD on 6/10/2022    1. Breast cancer, stage IA, triple positive, left side, status post 6 cycles of neoadjuvant TCHP and then left mastectomy: Patient did have residual disease at time of her surgery so she was started on Kadcyla adjuvantly.  Had 10 cycles and had to do dose reduction due to significant side effects including peripheral neuropathy and fatigue.  Ultimately we switched to Herceptin due to side effects. Was supposed to get 4 cycles and then would be done. Had a dose on 12/8/22 and 12/28/22 and then had even more side effects so decided to stop treatment.      Started on anastrozole for endocrine therapy but had significant side effects from it.  Especially with weight gain and mood changes and depression.  We switched to letrozole back in May 2023.  She was doing reasonably well on this initially but again developed significant musculoskeletal pain issues  and we switched her to exemestane in November 2023.    Continues to have significant musculoskeletal pain issues.  She has a family history of rheumatoid arthritis in her mother.  Does not have any synovitis on clinical exam today but she does have some swelling in her finger joints at times.  She did give exemestane a break for couple of weeks to see if that makes a difference but did not see a whole lot difference in her pain issues.  Peripheral neuropathy symptoms are pretty much the same.  We discussed further management.  Since she has a strong family history of rheumatoid arthritis and the mainly affected joints her finger joints, I will obtain lab studies today to rule out RA.  I will check rheumatoid factor, anti-CCP antibodies, ESR and CRP.  Will make a referral to rheumatology appropriately.    With regards to endocrine therapy we again discussed about switching to a different medication like tamoxifen.  She wants to continue exemestane for now.  It is more tolerable than letrozole or anastrozole.  She is giving 1 day break each week.  Potentially could give a break on the weekends.  In the grand scheme of things I do not think it will make a huge difference.  I also reviewed her recent mammogram.  Fortunately no evidence of any malignancy.  On clinical exam today she does not have any bilateral axillary adenopathy.  Will continue yearly mammograms.  She is 2 years out from finishing maintenance therapy.  I will see her back in 6 months for physical exam.    Reviewed her recent DEXA scan.  She has normal bone density.  Continue vitamin D and calcium supplementation.    She has stopped taking gabapentin for peripheral neuropathy.      I will see her back in 3 months.    ECOG Performance    1 - Can't do physically strenuous work, but fully ambyulatory and can do light sedentary work    Distress Screening (within last 30 days)    1. How concerned are you about your ability to eat? : 0  2. How concerned are  you about unintended weight loss or your current weight? : 0  3. How concerned are you about feeling depressed or very sad? : 0  4. How concerned are you about feeling anxious or very scared? : 0  5. Do you struggle with the loss of meaning and jeimy in your life? : Not at all  6. How concerned are you about work and home life issues that may be affected by your cancer? : 0  7. How concerned are you about knowing what resources are available to help you? : 0  8. Do you currently have what you would describe as Oriental orthodox or spiritual struggles?            : Not at all       Pain  Pain Score: No Pain (0)    Problem List    Patient Active Problem List   Diagnosis     Morbid obesity (H)     Malignant neoplasm of overlapping sites of left breast in female, estrogen receptor positive (H)     Malignant neoplasm of upper-outer quadrant of left breast in female, estrogen receptor positive (H)     Chemotherapy-induced peripheral neuropathy (H)     Musculoskeletal pain     Dehydration     History of breast cancer        ______________________________________________________________________________    History of Present Illness    Oncologic history  July 2021: Left breast mass palpated by patient.  Mammogram showing 2.1 x 2.1 x 2 cm hypoechoic mass at 2 o'clock position about 11 cm from the nipple.  Ultrasound-guided biopsy of the mass showed invasive ductal carcinoma, grade 3, ER, MO and HER2 positive.  Left axillary lymph node biopsy positive for metastatic disease.     Treatment:   Neoadjuvant chemotherapy with TCHP starting 9/2/2021.  Finished 6 cycles of chemo.  Last dose was 12/17/2021.  Carboplatin was held for cycle 4 and 6 due to poor tolerance. Had Kadcyla adjuvantly for 10 doses. Then switched to herceptin for 2 more doses. Last dose was 12/28/22. Did not receive last 2 doses of Heceptin due to intolerance.      Left mastectomy with sentinel lymph node biopsy and axillary lymph node dissection done on 1/19/2022.      Surgical path showing residual disease.  ypT1a, pN1(mi).  Patient declined radiation therapy.    Received 10 cycles of adjuvant Kadcyla with dose reductions due to neuropathy    We stopped Kadcyla due to significant adenopathy and she received 2 doses of single agent Herceptin.  Had side effects from that and we stopped treatment.    Tried adjuvant anastrozole for endocrine therapy which caused significant issues especially with depression and mood changes.    Switched to letrozole in May 2023.      Due to significant side effects from letrozole we switched her to exemestane in November 2023.    Interim history:   She is here for follow-up.  She is doing fairly okay.  Has significant joint aches and pains which has been bothering her for quite some time now.  Peripheral neuropathy is pretty much the same.  She has some morning stiffness.  Has difficulty in ambulation due to pain.  Probably not as physically active as she would like to because of the joint aches.  Mainly affects her finger joints.  But other joints are also affected.  Recently had a mammogram and a bone density.  Here to review the results.  Denies any new lumps or bumps.    Review of Systems    Pertinent items are noted in HPI.    Past History    Past Medical History:   Diagnosis Date     Anemia      Breast cancer (H)     left     Gastroesophageal reflux disease      Hypertension      Motion sickness      Obese      PONV (postoperative nausea and vomiting)        PHYSICAL EXAM  BP (!) 140/70   Pulse 74   Temp 97.7  F (36.5  C)   Resp 18   Wt 106.9 kg (235 lb 9.6 oz)   SpO2 99%   BMI 40.44 kg/m      GENERAL: Alert and cooperative in no distress.  NEURO: non focal. Alert and oriented x3.   Breast: No evidence of bilateral axillary adenopathy.  SKIN: exposed skin is dry intact.     Lab Results    No results found for this or any previous visit (from the past 168 hour(s)).    Imaging    MA Screen Right w/Akshat    Result Date: 9/30/2024  RIGHT  FULL FIELD DIGITAL SCREENING MAMMOGRAM WITH TOMOSYNTHESIS Performed on: 9/30/24 Compared to: 07/13/2021 Technique:  This study was evaluated with the assistance of Computer-Aided Detection.  Breast Tomosynthesis was used in interpretation. Findings: The patient is status post left mastectomy. The breast has scattered areas of fibroglandular density.  There are benign appearing calcifications in the right breast. There is no radiographic evidence of malignancy.    IMPRESSION: ACR BI-RADS Category 2: Benign BREAST CANCER SCREENING RECOMMENDATION: Routine yearly mammography beginning at age 40 or as discussed with your provider. The results and recommendations of this examination will be communicated to the patient. Lorraine Jeffers MD     DX Bone Density    Result Date: 9/30/2024  EXAM: DX TBS AXIAL LOCATION: [facility] DATE: 09/30/2024 INDICATION: BMD screening. Followup. DEMOGRAPHICS: Age- 69 years. Gender- Female. Menopausal status- Postmenopausal. COMPARISON: 06/01/2022 TECHNIQUE: Dual-energy x-ray absorptiometry (DXA) performed with routine technique.   Trabecular bone score (TBS) analysis performed. FINDINGS: DXA RESULTS -Lumbar Spine: L1-L4: BMD: 1.243 g/cm2. T-score: 0.5. Z-score: 2.2. Degenerative change may artifactually increase BMD. -RIGHT Hip Total: BMD: 1.086 g/cm2. T-score: 0.6. Z-score: 2.1. -RIGHT Hip Femoral neck: BMD: 1.002 g/cm2. T-score: -0.3. Z-score: 1.4. -LEFT Hip Total: BMD: 1.073 g/cm2. T-score: 0.5. Z-score: 2.0. -LEFT Hip Femoral neck: BMD: 0.908 g/cm2. T-score: -0.9. Z-score: 0.8. WHO T-SCORE CRITERIA -Normal: T score at or above -1 SD -Osteopenia: T score between -1 and -2.5 SD -Osteoporosis: T score at or below -2.5 SD The World Health Organization (WHO) criteria is applicable to perimenopausal females, postmenopausal females, and men aged 50 years or older. TBS RESULTS -Lumbar Spine L1-L4: TBS: 1.282. TBS T-score: -2.0.TBS Z-score: 0.2. The TBS is a DXA  "derived measurement for fracture risk assessment, and reflects the structural condition of the bone microarchitecture. It can be used to adjust WHO Fracture Risk Assessment Tool (FRAX) probability of fracture in postmenopausal women and older men. The calculated probabilities of fracture have been shown to be more accurate when computed with the TBS. INTERVAL CHANGE -There has been a 9.9% increase in lumbar spine BMD (degenerative changes can artificially increase BMD values). -There has been a 0.5% increase in bilateral hip BMD. FRACTURE RISK -The FRAX risk calculator is not applicable due to normal BMD in the spine/hip regions.     IMPRESSION: NORMAL. Bone mineral density measurements are within normal limits using T score.      The longitudinal plan of care for the diagnosis(es)/condition(s) as documented were addressed during this visit. Due to the added complexity in care, I will continue to support Khloe in the subsequent management and with ongoing continuity of care.        Signed by: Xavier Rubio MD    Oncology Rooming Note    October 2, 2024 10:33 AM   Khloe Becker is a 69 year old female who presents for:    Chief Complaint   Patient presents with     Oncology Clinic Visit     Malignant neoplasm of upper-outer quadrant of left breast in female, estrogen receptor positive (H)  Malignant neoplasm of overlapping sites of left breast in female, estrogen receptor positive (H)       Initial Vitals: BP (!) 140/70   Pulse 74   Temp 97.7  F (36.5  C)   Resp 18   Wt 106.9 kg (235 lb 9.6 oz)   SpO2 99%   BMI 40.44 kg/m   Estimated body mass index is 40.44 kg/m  as calculated from the following:    Height as of 5/22/24: 1.626 m (5' 4\").    Weight as of this encounter: 106.9 kg (235 lb 9.6 oz). Body surface area is 2.2 meters squared.  No Pain (0) Comment: Data Unavailable   No LMP recorded. Patient is postmenopausal.  Allergies reviewed: Yes  Medications reviewed: Yes    Medications: Medication " refills not needed today.  Pharmacy name entered into [facility]:    [id number] - [place], [place] - [address]  AT [address]  [address] [employer] - [place] [address]    Frailty Screening:   Is the patient here for a new oncology consult visit in cancer care? 2. No      Clinical concerns: None      Daniela Ma LPN                 Again, thank you for allowing me to participate in the care of your patient.        Sincerely,        Xavier Rubio MD

## 2024-10-06 LAB — CCP AB SER IA-ACNC: 0.7 U/ML

## 2024-10-07 ENCOUNTER — TELEPHONE (OUTPATIENT)
Dept: ONCOLOGY | Facility: HOSPITAL | Age: 69
End: 2024-10-07
Payer: COMMERCIAL

## 2024-10-07 DIAGNOSIS — M13.0 POLYARTHRITIS: Primary | ICD-10-CM

## 2024-12-22 ENCOUNTER — HEALTH MAINTENANCE LETTER (OUTPATIENT)
Age: 69
End: 2024-12-22

## 2025-03-03 ENCOUNTER — TELEPHONE (OUTPATIENT)
Dept: ONCOLOGY | Facility: HOSPITAL | Age: 70
End: 2025-03-03
Payer: COMMERCIAL

## 2025-03-03 NOTE — TELEPHONE ENCOUNTER
Called and spoke with pt to r/s apt on 04/02/25 with Dr Rubio due to provider being out of clinic. Pt was r/s to 04/16/25

## 2025-03-04 ENCOUNTER — HOSPITAL ENCOUNTER (OUTPATIENT)
Dept: GENERAL RADIOLOGY | Facility: HOSPITAL | Age: 70
Discharge: HOME | End: 2025-03-04
Attending: INTERNAL MEDICINE
Payer: COMMERCIAL

## 2025-03-04 ENCOUNTER — LAB (OUTPATIENT)
Dept: LAB | Facility: CLINIC | Age: 70
End: 2025-03-04
Payer: COMMERCIAL

## 2025-03-04 ENCOUNTER — OFFICE VISIT (OUTPATIENT)
Dept: RHEUMATOLOGY | Facility: CLINIC | Age: 70
End: 2025-03-04
Attending: INTERNAL MEDICINE
Payer: COMMERCIAL

## 2025-03-04 VITALS
BODY MASS INDEX: 38.38 KG/M2 | OXYGEN SATURATION: 99 % | HEART RATE: 89 BPM | DIASTOLIC BLOOD PRESSURE: 76 MMHG | WEIGHT: 223.6 LBS | SYSTOLIC BLOOD PRESSURE: 146 MMHG

## 2025-03-04 DIAGNOSIS — M25.50 POLYARTHRALGIA: ICD-10-CM

## 2025-03-04 DIAGNOSIS — M15.0 PRIMARY OSTEOARTHRITIS INVOLVING MULTIPLE JOINTS: ICD-10-CM

## 2025-03-04 DIAGNOSIS — M25.50 POLYARTHRALGIA: Primary | ICD-10-CM

## 2025-03-04 DIAGNOSIS — M13.0 POLYARTHRITIS: ICD-10-CM

## 2025-03-04 LAB
CRP SERPL-MCNC: 39.4 MG/L
ERYTHROCYTE [SEDIMENTATION RATE] IN BLOOD BY WESTERGREN METHOD: 53 MM/HR (ref 0–30)
HCV AB SERPL QL IA: NONREACTIVE

## 2025-03-04 PROCEDURE — G2211 COMPLEX E/M VISIT ADD ON: HCPCS | Performed by: INTERNAL MEDICINE

## 2025-03-04 PROCEDURE — 86140 C-REACTIVE PROTEIN: CPT

## 2025-03-04 PROCEDURE — 99204 OFFICE O/P NEW MOD 45 MIN: CPT | Performed by: INTERNAL MEDICINE

## 2025-03-04 PROCEDURE — 36415 COLL VENOUS BLD VENIPUNCTURE: CPT

## 2025-03-04 PROCEDURE — 86803 HEPATITIS C AB TEST: CPT

## 2025-03-04 PROCEDURE — 3078F DIAST BP <80 MM HG: CPT | Performed by: INTERNAL MEDICINE

## 2025-03-04 PROCEDURE — 73522 X-RAY EXAM HIPS BI 3-4 VIEWS: CPT

## 2025-03-04 PROCEDURE — 73130 X-RAY EXAM OF HAND: CPT | Mod: 50

## 2025-03-04 PROCEDURE — 3077F SYST BP >= 140 MM HG: CPT | Performed by: INTERNAL MEDICINE

## 2025-03-04 PROCEDURE — 73560 X-RAY EXAM OF KNEE 1 OR 2: CPT | Mod: 50

## 2025-03-04 PROCEDURE — 85652 RBC SED RATE AUTOMATED: CPT

## 2025-03-04 NOTE — PROGRESS NOTES
This document was created using a software with less than 100% fidelity, at times resulting in unintended, even erroneous syntax and grammar.  The reader is advised to keep this under consideration while reviewing, interpreting this note.      Rheumatology Consult Note      Khloe Becker     YOB: 1955 Age: 69 year old    Date of visit: 3/04/25    PCP: Destiny Olmstead    Chief Complaint   Patient presents with:  Consult      Assessment and Plan     Khloe was seen today for consult.    Diagnoses and all orders for this visit:    Polyarthralgia  -     Erythrocyte sedimentation rate auto; Future  -     CRP inflammation; Future  -     Hepatitis C antibody; Future  -     XR Hand Bilateral G/E 3 Views; Future  -     XR Knee AP/Lat Standing Bilateral; Future  -     XR Pelvis and Hip Bilateral 2 Views; Future    Polyarthritis  -     Adult Rheumatology  Referral  -     Erythrocyte sedimentation rate auto; Future  -     CRP inflammation; Future  -     Hepatitis C antibody; Future    Primary osteoarthritis involving multiple joints  -     XR Hand Bilateral G/E 3 Views; Future  -     XR Knee AP/Lat Standing Bilateral; Future  -     XR Pelvis and Hip Bilateral 2 Views; Future           This patient presents with polyarthralgias predominantly affecting her hands knees, associated with findings of osteoarthritis, soft tissue etiology of her pain such as trochanteric bursitis, impingement of the shoulders likely tendinopathy.  I have outlined this diagnosis to her.  Have suggested that instead of Advil that as we discussed she is already discontinued I will ask her to consider taking duloxetine major side effects and literature on this is provided.  She is already ambulating with the help of a cane in view of her neuropathy we talked about dizziness that.  One of the questions going forward that will remain under consideration is if she has an additional reason for pain beyond osteoarthritis not  "withstanding peripheral neuropathy associated with the chemotherapy.  In this context couple of important observations such as elevated sed rate and CRP back in October 2024 and her mom's history of rheumatoid arthritis would be important.  As I explained to her the management principles of OA she has had such a bad experience of corticosteroid injection into her left knee many years ago that she would not like to consider those options right now.  X-rays of the knees taken today, personally reviewed the films, findings: Advanced degenerative changes in all compartments particularly the medial more so on the right, the patellofemoral space is also significantly reduced.  There is no chondrocalcinosis noted.  Osteophyte formation noted.  The longitudinal plan of care for the diagnosis(es)/condition(s) as documented were addressed during this visit. Due to the added complexity in care, I will continue to support Khloe in the subsequent management and with ongoing continuity of care.      As she read through the literature on duloxetine she recall that this was the same drug given to her in 2022 by her oncologist for neuropathy pain and she was simply not able to tolerate even after 24 hours she had developed suicidal thoughts.  So this will be no longer an option for her.       Return to clinic: 4 weeks      HPI   Khloe Becker is a 69 year old female  is seen today for evaluation of polyarthralgias.  She noted the pain that troubled her the most are in her hands, knees, hip, across her shoulders.  These have troubled her for \"years\".  However since 2023 chemotherapy for the left breast cancer she feels the symptoms have gotten worse.  Along with the chemotherapy she had developed neuropathy affecting her upper and lower extremities.  She has noted pain and to be there throughout the day.  She rates this pain as moderately severe.  She would say it is 7 out of 10 without Advil and with Advil it can drop as low as 2 " "out of 10.  She used to take it regularly however then she noted significant hair loss she googled it and then finding that it can be associated with hair loss discontinued the Advil which did help her hair loss but also made her joint pains worse.  First thing in the morning it is hard for her to clench her fingers.  Takes about 5 minutes before she can start moving them.  She has not observed swelling in her joints in the hands.  She is finding it hard to perform some of the day-to-day activities such as opening a water bottle.  She feels her strength is diminished.  Many years ago she was told that she should have an arthroplasty of the left knee as it was \"bone-on-bone.  She chose to go for conservative measures and done remarkably well.  More recently though she has started experiencing pain in both her knees.  She has had pain across her hip areas pointing to the trochanteric region.  This troubles her not only during nighttime when she has pain tossing and turning from 1 side or the other but also during the day when she likes to stay do some walking.  She has had pain across her shoulders pointing to the trapezius regions.  She has noted neck and back pain.  The symptoms have interfered with day-to-day activities.  Apart from Advil she cannot think of anything else that may have helped.  Last December she developed whooping cough.  She remembers getting prednisone.  She cannot recall if that had any beneficial effect for her joint symptoms as she was so preoccupied by the severe cough.  She reports no personal or family history of psoriasis ulcerative colitis Crohn's disease.  Her mom noted to have rheumatoid arthritis.  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  She lives in a house with a roommate.  She feels that many years ago while she was taking care of her  who had had spinal cord injury, passed away 12 years ago exactly, now feels like it has been quite sometime that she has been hurting. "   She has tried acetaminophen that has no beneficial effect for her.    Active Problem List     Patient Active Problem List   Diagnosis    Morbid obesity (H)    Malignant neoplasm of overlapping sites of left breast in female, estrogen receptor positive (H)    Malignant neoplasm of upper-outer quadrant of left breast in female, estrogen receptor positive (H)    Chemotherapy-induced peripheral neuropathy    Musculoskeletal pain    Dehydration    History of breast cancer     Past Medical History     Past Medical History:   Diagnosis Date    Anemia     Breast cancer (H)     left    Gastroesophageal reflux disease     Hypertension     Motion sickness     Obese     PONV (postoperative nausea and vomiting)      Past Surgical History     Past Surgical History:   Procedure Laterality Date    CHOLECYSTECTOMY      HAND SURGERY Left     hysteectomy      IR CHEST PORT PLACEMENT > 5 YRS OF AGE  8/17/2021    IR PORT REMOVAL RIGHT  2/28/2023    MASTECTOMY SIMPLE Left 1/19/2022    Procedure: Left Mastectomy, [place] Lymph Node Biopsy, COMPLETION LYMPH NODE DISSECTION;  Surgeon: Ana Viera MD;  Location: [facility]    TONSILLECTOMY       Allergy     Allergies   Allergen Reactions    Ibuprofen      Hypertensive reaction    Seafood Anaphylaxis     Shrimp allergy    Shrimp Anaphylaxis     Shrimp allergy    Keflex [Cephalexin]      Blood in Urine, heavy    Lisinopril Cough     Caused aspiration    Shellfish-Derived Products Hives    Latex Rash     Current Medication List     Current Outpatient Medications   Medication Sig Dispense Refill    acetaminophen (TYLENOL) 500 MG tablet Take 1,000 mg by mouth as needed for mild pain      ASHWAGANDHA PO Take 1 capsule by mouth daily (Patient not taking: Reported on 5/22/2024)      Calcium Carbonate-Vitamin D (CALCIUM-CARB 600 + D PO) Take 1 tablet by mouth daily      clonazePAM (KLONOPIN) 0.5 MG tablet Take 1 tablet (0.5 mg) by mouth as needed for anxiety 30 tablet 0    desonide  (DESOWEN) 0.05 % external lotion APPLY TO AFFECTED AREA(S) 1 TO 2 TIMES A DAY      exemestane (AROMASIN) 25 MG tablet Take 1 tablet (25 mg) by mouth daily. 90 tablet 2    fish oil-omega-3 fatty acids 1000 MG capsule Take 1 g by mouth Occasionally (Patient not taking: Reported on 5/22/2024)      fluticasone (VERAMYST) 27.5 MCG/SPRAY nasal spray Spray 2 sprays into both nostrils as needed      furosemide (LASIX) 20 MG tablet Take 1 tablet (20 mg) by mouth daily as needed (swelling) 30 tablet 1    gabapentin (NEURONTIN) 300 MG capsule Take 1 capsule (300 mg) by mouth 2 times daily (Patient taking differently: Take 300 mg by mouth daily.) 120 capsule 2    hydrochlorothiazide (HYDRODIURIL) 25 MG tablet Take 1 tablet (25 mg) by mouth daily 90 tablet 0    HYDROcodone-acetaminophen (NORCO) 5-325 MG tablet Take 1 tablet by mouth every 6 hours as needed for pain (Patient not taking: Reported on 5/22/2024) 30 tablet 0    hydrocortisone 2.5 % cream APPLY TO THE AFFECTED AREA TWICE DAILY FOR ONE WEEK THEN ONCE DAILY FOR ONE WEEK THEN EVERY OTHER DAY FOR ONE WEEK      ibuprofen (ADVIL/MOTRIN) 200 MG tablet Take 400-600 mg by mouth daily as needed for pain      irbesartan (AVAPRO) 75 MG tablet Take 75 mg by mouth as needed Takies when BP is high in the evening      Magnesium Oxide 140 MG CAPS Take 1 tablet by mouth daily      multivitamin w/minerals (THERA-VIT-M) tablet Take 1 tablet by mouth daily      nystatin (MYCOSTATIN) 783357 UNIT/GM external powder Apply topically 2 times daily 30 g 1    ondansetron (ZOFRAN-ODT) 8 MG ODT tab Take 1 tablet (8 mg) by mouth every 8 hours as needed for nausea (Patient not taking: Reported on 5/22/2024) 30 tablet 1    psyllium (METAMUCIL/KONSYL) Packet Take 1 packet by mouth daily      senna-docusate (SENOKOT-S/PERICOLACE) 8.6-50 MG tablet Take 1-2 tablets by mouth as needed for constipation       No current facility-administered medications for this visit.     Facility-Administered Medications  Ordered in Other Visits   Medication Dose Route Frequency Provider Last Rate Last Admin    heparin 100 UNIT/ML injection 5 mL  5 mL Intracatheter Once PRN Xavier Rubio MD   5 mL at 01/18/23 1450    sodium chloride (PF) 0.9% PF flush 3-20 mL  3-20 mL Intracatheter Q1H PRN Xavier Rubio MD   20 mL at 01/18/23 1450       No current facility-administered medications for this visit.        Family History   No family history on file.      Physical Exam     COMPREHENSIVE EXAMINATION:  Vitals:    03/04/25 1426   BP: (!) 146/76   BP Location: Right arm   Patient Position: Sitting   Cuff Size: Adult Large   Pulse: 89   SpO2: 99%   Weight: 101.4 kg (223 lb 9.6 oz)     A well appearing alert oriented female. Vital data as noted above. Her eyes examined for inflammation/scleromalacia. ENT examined for oral mucositis, moisture, thrush, nasal deformity, external ear redness, deformity. Her neck is examined for suppleness and lymphadenopathy.  Cardiopulmonary examination without dyspnea at rest, use of accessory muscles of breathing, wheezing, edema, peripheral or central cyanosis.  Abdomen examined for softness, tenderness, obvious organomegaly.  Skin examined for heliotrope, malar area eruption, lupus pernio, periungual erythema, sclerodactyly, papules, erythema nodosum, purpura, nail pitting, onycholysis, and obvious psoriasis lesion. Neurological examination done for alertness, speech, facial symmetry,  tone and power in upper and lower extremities, and gait. The joint examination is performed for swelling, tenderness, warmth, erythema, and range of motion in the following joints: DIPs, PIPs, MCPs, wrists, first CMC's, elbows, shoulders, hips, knees, ankles, feet; spine for range of motion and paraspinal muscles for tenderness. The salient normal / abnormal findings are appended.  She has Heberden's, and some of the PIPs beginning to show Tripp's, she has no synovitis of palpable joints of her upper and lower  extremities.  She has marked tenderness of the trochanteric areas bilaterally.  She has tenderness in the knee joint line bilaterally medially as well as laterally, she has lost the last 20 degrees of flexion in both left and right knee.  She has minimal impingement of the shoulders on abduction.  She does not have dactylitis no rash on the face no sclerodactyly periungual erythema.    Labs / Imaging (select studies)     Rheumatoid Factor   Date Value Ref Range Status   10/02/2024 <10 <14 IU/mL Final      Hemoglobin   Date Value Ref Range Status   01/18/2023 12.0 11.7 - 15.7 g/dL Final   12/28/2022 12.6 11.7 - 15.7 g/dL Final   11/23/2022 12.0 11.7 - 15.7 g/dL Final     Urea Nitrogen   Date Value Ref Range Status   01/18/2023 24.5 (H) 8.0 - 23.0 mg/dL Final   12/28/2022 18.0 8.0 - 23.0 mg/dL Final   11/23/2022 17.8 8.0 - 23.0 mg/dL Final   09/07/2022 18 8 - 22 mg/dL Final   08/17/2022 22 8 - 22 mg/dL Final   07/27/2022 20 8 - 22 mg/dL Final     Erythrocyte Sedimentation Rate   Date Value Ref Range Status   10/02/2024 40 (H) 0 - 30 mm/hr Final     AST   Date Value Ref Range Status   01/18/2023 20 10 - 35 U/L Final   12/28/2022 27 10 - 35 U/L Final   11/23/2022 28 10 - 35 U/L Final     Albumin   Date Value Ref Range Status   01/18/2023 4.3 3.5 - 5.2 g/dL Final   12/28/2022 4.3 3.5 - 5.2 g/dL Final   11/23/2022 4.2 3.5 - 5.2 g/dL Final   09/07/2022 3.7 3.5 - 5.0 g/dL Final   08/17/2022 3.7 3.5 - 5.0 g/dL Final   07/27/2022 3.6 3.5 - 5.0 g/dL Final     Alkaline Phosphatase   Date Value Ref Range Status   01/18/2023 100 35 - 104 U/L Final   12/28/2022 119 (H) 35 - 104 U/L Final   11/23/2022 112 (H) 35 - 104 U/L Final     ALT   Date Value Ref Range Status   01/18/2023 16 10 - 35 U/L Final   12/28/2022 20 10 - 35 U/L Final   11/23/2022 19 10 - 35 U/L Final     Rheumatoid Factor   Date Value Ref Range Status   10/02/2024 <10 <14 IU/mL Final          Immunization History     Immunization History   Administered Date(s)  Administered    COVID-19 MONOVALENT 12+ (Pfizer) 04/23/2021, 05/14/2021    TDAP (Adacel,Boostrix) 10/13/2010    Td (Adult), Adsorbed 04/06/1998

## 2025-04-08 ENCOUNTER — OFFICE VISIT (OUTPATIENT)
Dept: RHEUMATOLOGY | Facility: CLINIC | Age: 70
End: 2025-04-08
Payer: COMMERCIAL

## 2025-04-08 VITALS
DIASTOLIC BLOOD PRESSURE: 74 MMHG | WEIGHT: 224 LBS | OXYGEN SATURATION: 98 % | HEART RATE: 80 BPM | SYSTOLIC BLOOD PRESSURE: 132 MMHG | BODY MASS INDEX: 38.45 KG/M2

## 2025-04-08 DIAGNOSIS — M13.0 POLYARTHRITIS: Primary | ICD-10-CM

## 2025-04-08 DIAGNOSIS — Z17.0 MALIGNANT NEOPLASM OF OVERLAPPING SITES OF LEFT BREAST IN FEMALE, ESTROGEN RECEPTOR POSITIVE (H): ICD-10-CM

## 2025-04-08 DIAGNOSIS — G62.0 CHEMOTHERAPY-INDUCED PERIPHERAL NEUROPATHY: ICD-10-CM

## 2025-04-08 DIAGNOSIS — E66.01 MORBID OBESITY (H): ICD-10-CM

## 2025-04-08 DIAGNOSIS — C50.812 MALIGNANT NEOPLASM OF OVERLAPPING SITES OF LEFT BREAST IN FEMALE, ESTROGEN RECEPTOR POSITIVE (H): ICD-10-CM

## 2025-04-08 DIAGNOSIS — T45.1X5A CHEMOTHERAPY-INDUCED PERIPHERAL NEUROPATHY: ICD-10-CM

## 2025-04-08 DIAGNOSIS — M15.0 PRIMARY OSTEOARTHRITIS INVOLVING MULTIPLE JOINTS: ICD-10-CM

## 2025-04-08 NOTE — PROGRESS NOTES
"      Rheumatology follow-up visit note     Khloe is a 69 year old female presents today for follow-up.    Khloe was seen today for recheck.    Diagnoses and all orders for this visit:    Polyarthritis    Primary osteoarthritis involving multiple joints    Morbid obesity (H)    Malignant neoplasm of overlapping sites of left breast in female, estrogen receptor positive (H)    Chemotherapy-induced peripheral neuropathy        This patient's polyarthralgia is very likely associated with widespread osteoarthritis.  There is little to suggest she has inflammatory joint disease.  There are no features suggest connective tissue disease.  Her symptoms and the OA would not be an explanation for her acute phase response.  The BMI might contribute some however I have advised that she discusses this further with her primary physician as well as her oncologist.  Management of OA reviewed.  Given her elevated CRP sed rate without clear explanation we will meet here again in 6 months while she works with her primary physician oncologist in this regard.  The longitudinal plan of care for the diagnosis(es)/condition(s) as documented were addressed during this visit. Due to the added complexity in care, I will continue to support Khloe in the subsequent management and with ongoing continuity of care.    Follow up in 6 months.    HPI    Khloe Becker is a 69 year old female is here for follow-up of recent evaluation of polyarthralgias.  She noted the pain that troubled her the most are in her hands, knees, hip, across her shoulders.  These have troubled her for \"years\".  However since 2023 chemotherapy for the left breast cancer she feels the symptoms have gotten worse.  Along with the chemotherapy she had developed neuropathy affecting her upper and lower extremities.  She has noted pain and to be there throughout the day.  She rates this pain as moderately severe.  She would say it is 7 out of 10 without Advil and with Advil it " "can drop as low as 2 out of 10.  During her recent visit she had further labs drawn there are no signals of autoimmunity.  X-rays of multiple joint areas confirm osteoarthritis.  She used to take it regularly however then she noted significant hair loss she googled it and then finding that it can be associated with hair loss discontinued the Advil which did help her hair loss but also made her joint pains worse.  First thing in the morning it is hard for her to clench her fingers.  Takes about 5 minutes before she can start moving them.  She has not observed swelling in her joints in the hands.  She is finding it hard to perform some of the day-to-day activities such as opening a water bottle.  She feels her strength is diminished.  Many years ago she was told that she should have an arthroplasty of the left knee as it was \"bone-on-bone.  She chose to go for conservative measures and done remarkably well.  More recently though she has started experiencing pain in both her knees.  She has had pain across her hip areas pointing to the trochanteric region.  This troubles her not only during nighttime when she has pain tossing and turning from 1 side or the other but also during the day when she likes to stay do some walking.  She has had pain across her shoulders pointing to the trapezius regions.  She has noted neck and back pain.  The symptoms have interfered with day-to-day activities.  Apart from Advil she cannot think of anything else that may have helped.  Last December she developed whooping cough.  She remembers getting prednisone.  She cannot recall if that had any beneficial effect for her joint symptoms as she was so preoccupied by the severe cough.  She reports no personal or family history of psoriasis ulcerative colitis Crohn's disease.  Her mom noted to have rheumatoid arthritis.  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  She lives in a house with a roommate.  She feels that many years ago while she " was taking care of her  who had had spinal cord injury, passed away 12 years ago exactly, now feels like it has been quite sometime that she has been hurting.   She has tried acetaminophen that has no beneficial effect for her.       DETAILED EXAMINATION  04/08/25  :    Vitals:    04/08/25 1121   BP: 132/74   BP Location: Right arm   Patient Position: Sitting   Cuff Size: Adult Large   Pulse: 80   SpO2: 98%   Weight: 101.6 kg (224 lb)     Alert oriented. Head including the face is examined for malar rash, heliotropes, scarring, lupus pernio. Eyes examined for redness such as in episcleritis/scleritis, periorbital lesions.   Neck/ Face examined for parotid gland swelling, range of motion of neck.  Left upper and lower and right upper and lower extremities examined for tenderness, swelling, warmth of the appendicular joints, range of motion, edema, rash.  Some of the important findings included: she does not have evidence of synovitis in the palpable joints of the upper extremities.  No significant deformities of the digits.  small Heberden nodes.  Range of motion of the shoulders  show full abduction.  No JLT effusion or warmth of the knees.  she does not have dactylitis of the digits.  She ambulates with the help of a cane in view of neuropathy.     Patient Active Problem List    Diagnosis Date Noted    History of breast cancer 03/01/2024     Priority: Medium    Dehydration 11/09/2022     Priority: Medium    Chemotherapy-induced peripheral neuropathy 06/10/2022     Priority: Medium    Musculoskeletal pain 06/10/2022     Priority: Medium    Malignant neoplasm of upper-outer quadrant of left breast in female, estrogen receptor positive (H) 03/30/2022     Priority: Medium    Malignant neoplasm of overlapping sites of left breast in female, estrogen receptor positive (H) 01/10/2022     Priority: Medium     Added automatically from request for surgery [id number]      Morbid obesity (H) 08/19/2021     Priority: Medium      Past Surgical History:   Procedure Laterality Date    CHOLECYSTECTOMY      HAND SURGERY Left     hysteectomy      IR CHEST PORT PLACEMENT > 5 YRS OF AGE  8/17/2021    IR PORT REMOVAL RIGHT  2/28/2023    MASTECTOMY SIMPLE Left 1/19/2022    Procedure: Left Mastectomy, [place] Lymph Node Biopsy, COMPLETION LYMPH NODE DISSECTION;  Surgeon: Ana Viera MD;  Location: [facility]    TONSILLECTOMY        Past Medical History:   Diagnosis Date    Anemia     Breast cancer (H)     left    Gastroesophageal reflux disease     Hypertension     Motion sickness     Obese     PONV (postoperative nausea and vomiting)      Allergies   Allergen Reactions    Ibuprofen      Hypertensive reaction    Seafood Anaphylaxis     Shrimp allergy    Shrimp Anaphylaxis     Shrimp allergy    Keflex [Cephalexin]      Blood in Urine, heavy    Lisinopril Cough     Caused aspiration    Shellfish-Derived Products Hives    Latex Rash     Current Outpatient Medications   Medication Sig Dispense Refill    acetaminophen (TYLENOL) 500 MG tablet Take 1,000 mg by mouth as needed for mild pain      Calcium Carbonate-Vitamin D (CALCIUM-CARB 600 + D PO) Take 1 tablet by mouth daily (Patient not taking: Reported on 3/4/2025)      clonazePAM (KLONOPIN) 0.5 MG tablet Take 1 tablet (0.5 mg) by mouth as needed for anxiety 30 tablet 0    exemestane (AROMASIN) 25 MG tablet Take 1 tablet (25 mg) by mouth daily. 90 tablet 2    fluticasone (VERAMYST) 27.5 MCG/SPRAY nasal spray Spray 2 sprays into both nostrils as needed      furosemide (LASIX) 20 MG tablet Take 1 tablet (20 mg) by mouth daily as needed (swelling) 30 tablet 1    hydrochlorothiazide (HYDRODIURIL) 25 MG tablet Take 1 tablet (25 mg) by mouth daily 90 tablet 0    hydrocortisone 2.5 % cream APPLY TO THE AFFECTED AREA TWICE DAILY FOR ONE WEEK THEN ONCE DAILY FOR ONE WEEK THEN EVERY OTHER DAY FOR ONE WEEK      irbesartan (AVAPRO) 75 MG tablet Take 75 mg by mouth as needed Takies when BP is high in  the evening (Patient not taking: Reported on 3/4/2025)      Magnesium Oxide 140 MG CAPS Take 1 tablet by mouth daily      multivitamin w/minerals (THERA-VIT-M) tablet Take 1 tablet by mouth daily      senna-docusate (SENOKOT-S/PERICOLACE) 8.6-50 MG tablet Take 1-2 tablets by mouth as needed for constipation       family history is not on file.  Social Connections: Not on file          WBC Count   Date Value Ref Range Status   01/18/2023 6.5 4.0 - 11.0 10e3/uL Final     Comment:     Preliminary ANC is 4.3     RBC Count   Date Value Ref Range Status   01/18/2023 4.23 3.80 - 5.20 10e6/uL Final     Hemoglobin   Date Value Ref Range Status   01/18/2023 12.0 11.7 - 15.7 g/dL Final     Hematocrit   Date Value Ref Range Status   01/18/2023 37.8 35.0 - 47.0 % Final     MCV   Date Value Ref Range Status   01/18/2023 89 78 - 100 fL Final     MCH   Date Value Ref Range Status   01/18/2023 28.4 26.5 - 33.0 pg Final     Platelet Count   Date Value Ref Range Status   01/18/2023 180 150 - 450 10e3/uL Final     % Lymphocytes   Date Value Ref Range Status   01/18/2023 25 % Final     AST   Date Value Ref Range Status   01/18/2023 20 10 - 35 U/L Final     ALT   Date Value Ref Range Status   01/18/2023 16 10 - 35 U/L Final     Albumin   Date Value Ref Range Status   01/18/2023 4.3 3.5 - 5.2 g/dL Final   09/07/2022 3.7 3.5 - 5.0 g/dL Final     Alkaline Phosphatase   Date Value Ref Range Status   01/18/2023 100 35 - 104 U/L Final     Creatinine   Date Value Ref Range Status   01/18/2023 0.80 0.51 - 0.95 mg/dL Final     GFR Estimate   Date Value Ref Range Status   01/18/2023 80 >60 mL/min/1.73m2 Final     Comment:     Effective December 21, 2021 eGFRcr in adults is calculated using the 2021 CKD-EPI creatinine equation which includes age and gender (Eris et al., NEJM, DOI: 10.1056/NLLUzd5566467)     Erythrocyte Sedimentation Rate   Date Value Ref Range Status   03/04/2025 53 (H) 0 - 30 mm/hr Final

## 2025-04-16 ENCOUNTER — ONCOLOGY VISIT (OUTPATIENT)
Dept: ONCOLOGY | Facility: HOSPITAL | Age: 70
End: 2025-04-16
Attending: INTERNAL MEDICINE
Payer: COMMERCIAL

## 2025-04-16 VITALS
SYSTOLIC BLOOD PRESSURE: 148 MMHG | RESPIRATION RATE: 18 BRPM | BODY MASS INDEX: 38.86 KG/M2 | HEART RATE: 65 BPM | TEMPERATURE: 97.8 F | WEIGHT: 226.4 LBS | DIASTOLIC BLOOD PRESSURE: 71 MMHG | OXYGEN SATURATION: 98 %

## 2025-04-16 DIAGNOSIS — T45.1X5A CHEMOTHERAPY-INDUCED PERIPHERAL NEUROPATHY: ICD-10-CM

## 2025-04-16 DIAGNOSIS — Z17.0 MALIGNANT NEOPLASM OF UPPER-OUTER QUADRANT OF LEFT BREAST IN FEMALE, ESTROGEN RECEPTOR POSITIVE (H): Primary | ICD-10-CM

## 2025-04-16 DIAGNOSIS — C50.412 MALIGNANT NEOPLASM OF UPPER-OUTER QUADRANT OF LEFT BREAST IN FEMALE, ESTROGEN RECEPTOR POSITIVE (H): Primary | ICD-10-CM

## 2025-04-16 DIAGNOSIS — M79.18 MUSCULOSKELETAL PAIN: ICD-10-CM

## 2025-04-16 DIAGNOSIS — G62.0 CHEMOTHERAPY-INDUCED PERIPHERAL NEUROPATHY: ICD-10-CM

## 2025-04-16 PROCEDURE — G0463 HOSPITAL OUTPT CLINIC VISIT: HCPCS | Performed by: INTERNAL MEDICINE

## 2025-04-16 RX ORDER — SENNOSIDES 8.6 MG
1300 CAPSULE ORAL EVERY 8 HOURS PRN
COMMUNITY

## 2025-04-16 ASSESSMENT — PAIN SCALES - GENERAL: PAINLEVEL_OUTOF10: MODERATE PAIN (4)

## 2025-04-16 NOTE — PROGRESS NOTES
"Oncology Rooming Note    April 16, 2025 12:30 PM   Khloe Becker is a 69 year old female who presents for:    Chief Complaint   Patient presents with    Oncology Clinic Visit     Return visit 6 months. Malignant neoplasm of upper-outer quadrant of left breast in female, estrogen receptor positive. Malignant neoplasm of overlapping sites of left breast in female, estrogen receptor positive.       Initial Vitals: BP (!) 148/71 (BP Location: Left arm, Patient Position: Sitting, Cuff Size: Adult Large)   Pulse 65   Temp 97.8  F (36.6  C) (Oral)   Resp 18   Wt 102.7 kg (226 lb 6.4 oz)   SpO2 98%   BMI 38.86 kg/m   Estimated body mass index is 38.86 kg/m  as calculated from the following:    Height as of 5/22/24: 1.626 m (5' 4\").    Weight as of this encounter: 102.7 kg (226 lb 6.4 oz). Body surface area is 2.15 meters squared.  Moderate Pain (4) Comment: Data Unavailable   No LMP recorded. Patient is postmenopausal.  Allergies reviewed: Yes  Medications reviewed: Yes    Medications: Medication refills not needed today.  Pharmacy name entered into [employer]:    [id number] - [place] [id number] [facility] AT [facility] [employer] - [place] [address] [address] [place] [address]    Frailty Screening:   Is the patient here for a new oncology consult visit in cancer care? 2. No    PHQ9:  Did this patient require a PHQ9?: No      Clinical concerns: none       Josseline Charles CMA            "

## 2025-04-16 NOTE — LETTER
"4/16/2025      Khloe Becker  [place] [facility] [address]      Dear Colleague,    Thank you for referring your patient, Khloe Becker, to the [facility]. Please see a copy of my visit note below.    Oncology Rooming Note    April 16, 2025 12:30 PM   Khloe Becker is a 69 year old female who presents for:    Chief Complaint   Patient presents with     Oncology Clinic Visit     Return visit 6 months. Malignant neoplasm of upper-outer quadrant of left breast in female, estrogen receptor positive. Malignant neoplasm of overlapping sites of left breast in female, estrogen receptor positive.       Initial Vitals: BP (!) 148/71 (BP Location: Left arm, Patient Position: Sitting, Cuff Size: Adult Large)   Pulse 65   Temp 97.8  F (36.6  C) (Oral)   Resp 18   Wt 102.7 kg (226 lb 6.4 oz)   SpO2 98%   BMI 38.86 kg/m   Estimated body mass index is 38.86 kg/m  as calculated from the following:    Height as of 5/22/24: 1.626 m (5' 4\").    Weight as of this encounter: 102.7 kg (226 lb 6.4 oz). Body surface area is 2.15 meters squared.  Moderate Pain (4) Comment: Data Unavailable   No LMP recorded. Patient is postmenopausal.  Allergies reviewed: Yes  Medications reviewed: Yes    Medications: Medication refills not needed today.  Pharmacy name entered into [employer]:    [facility][id number] - [place], [place] - [address] [facility] AT [facility] [employer] - [place] [address] [address] [place] [address]    Frailty Screening:   Is the patient here for a new oncology consult visit in cancer care? 2. No    PHQ9:  Did this patient require a PHQ9?: No      Clinical concerns: none       Josseline Charles, [facility] Hematology and Oncology Progress Note    Patient: Khloe Becker  MRN: [id number]  Date of Service: Apr 16, 2025          Reason for Visit    Chief Complaint   Patient presents with     Oncology Clinic Visit     " Return visit 6 months. Malignant neoplasm of upper-outer quadrant of left breast in female, estrogen receptor positive. Malignant neoplasm of overlapping sites of left breast in female, estrogen receptor positive.         Assessment and Plan     Cancer Staging   Malignant neoplasm of upper-outer quadrant of left breast in female, estrogen receptor positive (H)  Staging form: Breast, AJCC 8th Edition  - Clinical: Stage IB (cT2, cN1, cM0, G3, ER+, NE+, HER2+) - Signed by Xavier Rubio MD on 6/10/2022  - Pathologic: Stage IA (pT1a, pN1mi, cM0, G3, ER+, NE+, HER2+) - Signed by Xavier Rubio MD on 6/10/2022    1. Breast cancer, stage IA, triple positive, left side, status post 6 cycles of neoadjuvant TCHP and then left mastectomy: Patient did have residual disease at time of her surgery so she was started on Kadcyla adjuvantly.  Had 10 cycles and had to do dose reduction due to significant side effects including peripheral neuropathy and fatigue.  Ultimately we switched to Herceptin due to side effects. Was supposed to get 4 cycles and then would be done. Had a dose on 12/8/22 and 12/28/22 and then had even more side effects so decided to stop treatment.      Started on anastrozole for endocrine therapy but had significant side effects from it.  Especially with weight gain and mood changes and depression.  We switched to letrozole back in May 2023.  She was doing reasonably well on this initially but again developed significant musculoskeletal pain issues and we switched her to exemestane in November 2023.    Continues to have significant musculoskeletal pain issues.  Quality of life is affected.  Definitely there is a competent of examination induced musculoskeletal pain.  Previously when she stopped it briefly she felt better.  We were also planning on doing it only on the weekdays and giving weekends off but she has not been taking examination consistently over the last few months.  She was very honest  about it.  Was evaluated by rheumatology for possible inflammatory arthritis and was deemed not to have 1.  She has persistently elevated CRP and ESR levels.    Results  - Elevated C-reactive protein (CRP)  - Mildly elevated sedimentation rate (ESR)  - A1c in prediabetic range  - I reviewed lab results personally and independently interpreted the results.     Plan  Malignant neoplasm of upper-outer quadrant of left breast in female, estrogen receptor and HER2 receptor positive (H)  - She has finished neoadjuvant chemotherapy followed by surgery and adjuvant anti-HER2 therapy and is currently on endocrine therapy.  She had some residual disease after neoadjuvant chemo.  Struggled with chemotherapy side effects at the time and had issues with adjuvant therapy also.  She also developed significant side effects to endocrine therapy and has switched from anastrozole to letrozole and now is on exemestane..  She has persistently elevated CRP and sedimentation rate which indicates acute on chronic inflammation.  These are not necessarily accurate markers for malignancy.  Did offer her a CT scan to look for cancer recurrence although this is unlikely since she does not have any other signs or symptoms of cancer recurrence.  - Consider a 3-month break from exemestane to assess impact on inflammatory markers and quality of life. Follow-up labs to be done in three months to evaluate changes in CRP and ESR. Discuss potential switch to tamoxifen if symptoms improve.  - Risks and side effects: Discussed the small risk of cancer recurrence during the break from exemestane.    Musculoskeletal pain  - Joint pain likely related to osteoarthritis, not bone pain. Pain management with Tylenol Arthritis has been effective.  - Continue current pain management with Tylenol Arthritis. Monitor pain levels and adjust treatment as needed.         ECOG Performance    1 - Can't do physically strenuous work, but fully ambyulatory and can do light  sedentary work    Distress Screening (within last 30 days)    1. How concerned are you about your ability to eat? : 0  2. How concerned are you about unintended weight loss or your current weight? : 0  3. How concerned are you about feeling depressed or very sad? : 0  4. How concerned are you about feeling anxious or very scared? : 0  5. Do you struggle with the loss of meaning and jeimy in your life? : Not at all  6. How concerned are you about work and home life issues that may be affected by your cancer? : 0  7. How concerned are you about knowing what resources are available to help you? : 0  8. Do you currently have what you would describe as Lutheran or spiritual struggles?            : Not at all       Pain  Pain Score: Moderate Pain (4)  Pain Loc: Other - see comment (joints-arthritis)    Problem List    Patient Active Problem List   Diagnosis     Morbid obesity (H)     Malignant neoplasm of overlapping sites of left breast in female, estrogen receptor positive (H)     Malignant neoplasm of upper-outer quadrant of left breast in female, estrogen receptor positive (H)     Chemotherapy-induced peripheral neuropathy     Musculoskeletal pain     Dehydration     History of breast cancer        ______________________________________________________________________________    History of Present Illness    Oncologic history  July 2021: Left breast mass palpated by patient.  Mammogram showing 2.1 x 2.1 x 2 cm hypoechoic mass at 2 o'clock position about 11 cm from the nipple.  Ultrasound-guided biopsy of the mass showed invasive ductal carcinoma, grade 3, ER, [place] and HER2 positive.  Left axillary lymph node biopsy positive for metastatic disease.     Treatment:   Neoadjuvant chemotherapy with TCHP starting 9/2/2021.  Finished 6 cycles of chemo.  Last dose was 12/17/2021.  Carboplatin was held for cycle 4 and 6 due to poor tolerance. Had Kadcyla adjuvantly for 10 doses. Then switched to herceptin for 2 more doses. Last  dose was 12/28/22. Did not receive last 2 doses of Heceptin due to intolerance.      Left mastectomy with sentinel lymph node biopsy and axillary lymph node dissection done on 1/19/2022.     Surgical path showing residual disease.  ypT1a, pN1(mi).  Patient declined radiation therapy.    Received 10 cycles of adjuvant Kadcyla with dose reductions due to neuropathy    We stopped Kadcyla due to significant adenopathy and she received 2 doses of single agent Herceptin.  Had side effects from that and we stopped treatment.    Tried adjuvant anastrozole for endocrine therapy which caused significant issues especially with depression and mood changes.    Switched to letrozole in May 2023.      Due to significant side effects from letrozole we switched her to exemestane in November 2023.    Interim history:   She is here for follow-up.      Previously she had significant issues with musculoskeletal pain and I was worried about inflammatory arthritis.  So I obtained some inflammatory markers and she had significantly elevated CRP and ESR level.  Rheumatoid factor and anti-CCP antibodies were negative.  I referred her to rheumatology.  They repeated her labs recently and she is found to have persistently elevated CRP and ESR levels however no obvious signs or symptoms of inflammatory arthritis.      She reports ongoing joint aches and pains, which have been managed with Tylenol Arthritis, providing some relief. A week ago, her pain was severe enough to impede walking, but it has since improved. She is actively working on weight loss, having recently weighed 221 pounds, but is frustrated by fluctuations. Khloe is on exemestane, but struggles with its side effects, including pain, and is inconsistent with its use.  Previously she was on anastrozole and letrozole and had intolerance to them.  She notes pain relief when discontinuing the medication. She completed breast cancer treatment in 2022 and has been on hormone therapy  since.  Also has chemotherapy-induced peripheral neuropathy from previous Taxotere and from Kadcyla.  Her father had osteoarthritis, and her mother had rheumatoid arthritis, but she has no signs of rheumatoid arthritis herself. Khloe is concerned about her quality of life, given her age and family history of a shorter lifespan. She is prediabetic and is working towards a plant-based diet to manage her glucose levels. She occasionally experiences a sensation of swelling in her lymph nodes, though no visible swelling is present. She takes 1000 units of vitamin D daily.    Review of Systems    Pertinent items are noted in HPI.    Past History    Past Medical History:   Diagnosis Date     Anemia      Breast cancer (H)     left     Gastroesophageal reflux disease      Hypertension      Motion sickness      Obese      PONV (postoperative nausea and vomiting)        PHYSICAL EXAM  BP (!) 148/71 (BP Location: Left arm, Patient Position: Sitting, Cuff Size: Adult Large)   Pulse 65   Temp 97.8  F (36.6  C) (Oral)   Resp 18   Wt 102.7 kg (226 lb 6.4 oz)   SpO2 98%   BMI 38.86 kg/m      GENERAL: Alert and cooperative in no distress.  NEURO: non focal. Alert and oriented x3.   Breast: No evidence of bilateral axillary adenopathy.  SKIN: exposed skin is dry intact.     Lab Results    No results found for this or any previous visit (from the past week).    Imaging    XR Upper GI and Video Swallow Eval Peds    Result Date: 4/3/2025  EXAM: FL VIDEO SWALLOW WITH SPEECH THERAPY [facility]ATION[facility]  [facility] DATE: 4/3/2025 INDICATION: Difficulty swallowing. COMPARISON: None. TECHNIQUE: Routine swallow study with speech pathology using multiple barium thicknesses. RADIATION DOSE: Total Air Kerma 5.6 mGy IMPRESSION: Swallow study with Speech Pathology using multiple barium thicknesses. There is no penetration or aspiration with thin liquids, puree, soft solids, or regular solids. No significant residual pooling. Normal swallow  of a half barium tablet. See speech pathology report for additional findings and recommendations.      The longitudinal plan of care for the diagnosis(es)/condition(s) as documented were addressed during this visit. Due to the added complexity in care, I will continue to support Khloe in the subsequent management and with ongoing continuity of care.    A total of 40 min was spent today on this visit which included face to face conversation with the patient, EMR review, counseling and co-ordination of care and medical documentation.    I independently reviewed and interpreted lab studies and imaging, reviewed pertinent notes from physicians and care providers from other specialties and ordered lab tests.    Reviewed the documentation from rheumatology personally and also reviewed labs ordered by them personally and independently interpreted results.      Signed by: Xavier Rubio MD      Again, thank you for allowing me to participate in the care of your patient.        Sincerely,        Xavier Rubio MD    Electronically signed

## 2025-04-16 NOTE — PROGRESS NOTES
[facility] Hematology and Oncology Progress Note    Patient: Khloe Becker  MRN: [id number]  Date of Service: Apr 16, 2025          Reason for Visit    Chief Complaint   Patient presents with    Oncology Clinic Visit     Return visit 6 months. Malignant neoplasm of upper-outer quadrant of left breast in female, estrogen receptor positive. Malignant neoplasm of overlapping sites of left breast in female, estrogen receptor positive.         Assessment and Plan     Cancer Staging   Malignant neoplasm of upper-outer quadrant of left breast in female, estrogen receptor positive (H)  Staging form: Breast, AJCC 8th Edition  - Clinical: Stage IB (cT2, cN1, cM0, G3, ER+, CT+, HER2+) - Signed by Xavier Rubio MD on 6/10/2022  - Pathologic: Stage IA (pT1a, pN1mi, cM0, G3, ER+, CT+, HER2+) - Signed by Xavier Rubio MD on 6/10/2022    1. Breast cancer, stage IA, triple positive, left side, status post 6 cycles of neoadjuvant TCHP and then left mastectomy: Patient did have residual disease at time of her surgery so she was started on Kadcyla adjuvantly.  Had 10 cycles and had to do dose reduction due to significant side effects including peripheral neuropathy and fatigue.  Ultimately we switched to Herceptin due to side effects. Was supposed to get 4 cycles and then would be done. Had a dose on 12/8/22 and 12/28/22 and then had even more side effects so decided to stop treatment.      Started on anastrozole for endocrine therapy but had significant side effects from it.  Especially with weight gain and mood changes and depression.  We switched to letrozole back in May 2023.  She was doing reasonably well on this initially but again developed significant musculoskeletal pain issues and we switched her to exemestane in November 2023.    Continues to have significant musculoskeletal pain issues.  Quality of life is affected.  Definitely there is a competent of examination induced musculoskeletal pain.   Previously when she stopped it briefly she felt better.  We were also planning on doing it only on the weekdays and giving weekends off but she has not been taking examination consistently over the last few months.  She was very honest about it.  Was evaluated by rheumatology for possible inflammatory arthritis and was deemed not to have 1.  She has persistently elevated CRP and ESR levels.    Results  - Elevated C-reactive protein (CRP)  - Mildly elevated sedimentation rate (ESR)  - A1c in prediabetic range  - I reviewed lab results personally and independently interpreted the results.     Plan  Malignant neoplasm of upper-outer quadrant of left breast in female, estrogen receptor and HER2 receptor positive (H)  - She has finished neoadjuvant chemotherapy followed by surgery and adjuvant anti-HER2 therapy and is currently on endocrine therapy.  She had some residual disease after neoadjuvant chemo.  Struggled with chemotherapy side effects at the time and had issues with adjuvant therapy also.  She also developed significant side effects to endocrine therapy and has switched from anastrozole to letrozole and now is on exemestane..  She has persistently elevated CRP and sedimentation rate which indicates acute on chronic inflammation.  These are not necessarily accurate markers for malignancy.  Did offer her a CT scan to look for cancer recurrence although this is unlikely since she does not have any other signs or symptoms of cancer recurrence.  - Consider a 3-month break from exemestane to assess impact on inflammatory markers and quality of life. Follow-up labs to be done in three months to evaluate changes in CRP and ESR. Discuss potential switch to tamoxifen if symptoms improve.  - Risks and side effects: Discussed the small risk of cancer recurrence during the break from exemestane.    Musculoskeletal pain  - Joint pain likely related to osteoarthritis, not bone pain. Pain management with Tylenol Arthritis has  been effective.  - Continue current pain management with Tylenol Arthritis. Monitor pain levels and adjust treatment as needed.         ECOG Performance    1 - Can't do physically strenuous work, but fully ambyulatory and can do light sedentary work    Distress Screening (within last 30 days)    1. How concerned are you about your ability to eat? : 0  2. How concerned are you about unintended weight loss or your current weight? : 0  3. How concerned are you about feeling depressed or very sad? : 0  4. How concerned are you about feeling anxious or very scared? : 0  5. Do you struggle with the loss of meaning and jeimy in your life? : Not at all  6. How concerned are you about work and home life issues that may be affected by your cancer? : 0  7. How concerned are you about knowing what resources are available to help you? : 0  8. Do you currently have what you would describe as Scientology or spiritual struggles?            : Not at all       Pain  Pain Score: Moderate Pain (4)  Pain Loc: Other - see comment (joints-arthritis)    Problem List    Patient Active Problem List   Diagnosis    Morbid obesity (H)    Malignant neoplasm of overlapping sites of left breast in female, estrogen receptor positive (H)    Malignant neoplasm of upper-outer quadrant of left breast in female, estrogen receptor positive (H)    Chemotherapy-induced peripheral neuropathy    Musculoskeletal pain    Dehydration    History of breast cancer        ______________________________________________________________________________    History of Present Illness    Oncologic history  July 2021: Left breast mass palpated by patient.  Mammogram showing 2.1 x 2.1 x 2 cm hypoechoic mass at 2 o'clock position about 11 cm from the nipple.  Ultrasound-guided biopsy of the mass showed invasive ductal carcinoma, grade 3, ER, AR and HER2 positive.  Left axillary lymph node biopsy positive for metastatic disease.     Treatment:   Neoadjuvant chemotherapy with  TCHP starting 9/2/2021.  Finished 6 cycles of chemo.  Last dose was 12/17/2021.  Carboplatin was held for cycle 4 and 6 due to poor tolerance. Had Kadcyla adjuvantly for 10 doses. Then switched to herceptin for 2 more doses. Last dose was 12/28/22. Did not receive last 2 doses of Heceptin due to intolerance.      Left mastectomy with sentinel lymph node biopsy and axillary lymph node dissection done on 1/19/2022.     Surgical path showing residual disease.  ypT1a, pN1(mi).  Patient declined radiation therapy.    Received 10 cycles of adjuvant Kadcyla with dose reductions due to neuropathy    We stopped Kadcyla due to significant adenopathy and she received 2 doses of single agent Herceptin.  Had side effects from that and we stopped treatment.    Tried adjuvant anastrozole for endocrine therapy which caused significant issues especially with depression and mood changes.    Switched to letrozole in May 2023.      Due to significant side effects from letrozole we switched her to exemestane in November 2023.    Interim history:   She is here for follow-up.      Previously she had significant issues with musculoskeletal pain and I was worried about inflammatory arthritis.  So I obtained some inflammatory markers and she had significantly elevated CRP and ESR level.  Rheumatoid factor and anti-CCP antibodies were negative.  I referred her to rheumatology.  They repeated her labs recently and she is found to have persistently elevated CRP and ESR levels however no obvious signs or symptoms of inflammatory arthritis.      She reports ongoing joint aches and pains, which have been managed with Tylenol Arthritis, providing some relief. A week ago, her pain was severe enough to impede walking, but it has since improved. She is actively working on weight loss, having recently weighed 221 pounds, but is frustrated by fluctuations. Khloe is on exemestane, but struggles with its side effects, including pain, and is inconsistent  with its use.  Previously she was on anastrozole and letrozole and had intolerance to them.  She notes pain relief when discontinuing the medication. She completed breast cancer treatment in 2022 and has been on hormone therapy since.  Also has chemotherapy-induced peripheral neuropathy from previous Taxotere and from Kadcyla.  Her father had osteoarthritis, and her mother had rheumatoid arthritis, but she has no signs of rheumatoid arthritis herself. Khloe is concerned about her quality of life, given her age and family history of a shorter lifespan. She is prediabetic and is working towards a plant-based diet to manage her glucose levels. She occasionally experiences a sensation of swelling in her lymph nodes, though no visible swelling is present. She takes 1000 units of vitamin D daily.    Review of Systems    Pertinent items are noted in HPI.    Past History    Past Medical History:   Diagnosis Date    Anemia     Breast cancer (H)     left    Gastroesophageal reflux disease     Hypertension     Motion sickness     Obese     PONV (postoperative nausea and vomiting)        PHYSICAL EXAM  BP (!) 148/71 (BP Location: Left arm, Patient Position: Sitting, Cuff Size: Adult Large)   Pulse 65   Temp 97.8  F (36.6  C) (Oral)   Resp 18   Wt 102.7 kg (226 lb 6.4 oz)   SpO2 98%   BMI 38.86 kg/m      GENERAL: Alert and cooperative in no distress.  NEURO: non focal. Alert and oriented x3.   Breast: No evidence of bilateral axillary adenopathy.  SKIN: exposed skin is dry intact.     Lab Results    No results found for this or any previous visit (from the past week).    Imaging    XR Upper GI and Video Swallow Eval Peds    Result Date: 4/3/2025  EXAM: FL VIDEO SWALLOW WITH SPEECH THERAPY [facility] DATE: 4/3/2025 INDICATION: Difficulty swallowing. COMPARISON: None. TECHNIQUE: Routine swallow study with speech pathology using multiple barium thicknesses. RADIATION DOSE: Total Air Kerma 5.6 mGy  IMPRESSION: Swallow study with Speech Pathology using multiple barium thicknesses. There is no penetration or aspiration with thin liquids, puree, soft solids, or regular solids. No significant residual pooling. Normal swallow of a half barium tablet. See speech pathology report for additional findings and recommendations.      The longitudinal plan of care for the diagnosis(es)/condition(s) as documented were addressed during this visit. Due to the added complexity in care, I will continue to support Khloe in the subsequent management and with ongoing continuity of care.    A total of 40 min was spent today on this visit which included face to face conversation with the patient, EMR review, counseling and co-ordination of care and medical documentation.    I independently reviewed and interpreted lab studies and imaging, reviewed pertinent notes from physicians and care providers from other specialties and ordered lab tests.    Reviewed the documentation from rheumatology personally and also reviewed labs ordered by them personally and independently interpreted results.      Signed by: Xavier Rubio MD

## 2025-04-17 ENCOUNTER — PATIENT OUTREACH (OUTPATIENT)
Dept: ONCOLOGY | Facility: HOSPITAL | Age: 70
End: 2025-04-17
Payer: COMMERCIAL

## 2025-04-17 NOTE — PROGRESS NOTES
Accompanied Dr. Rubio in exam room for breast exam. Greeted pt and inquired about wellbeing/symptoms.       Situation: Patient chart reviewed by care coordinator.    Background: Breast cancer, s/p chemo. Had significant side effects to endocrine therapies. Now on exemestane. Has not been taking very consistently for last few months. Musculoskeletal pain related to endocrine therapy.   Elevated CRP and ESR.     Assessment: No sign of recurrence. Ongoing side effects from endocrine therapy.    Plan/Recommendations: Consider a 3 mo break fro exemestane to assess impact on inflammatory markers and pain/quality of life.    RTC 3 mo with labs 2 days prior.    Isis Cobb RN

## 2025-05-19 DIAGNOSIS — R60.0 PERIPHERAL EDEMA: ICD-10-CM

## 2025-05-19 NOTE — TELEPHONE ENCOUNTER
Faxed refill request received in [facility] pharmacy requesting a refill of furosemide 20 mg tablets.  This was last refilled by Dr. Rubio on 11/8/2023, #30, 1 refills.  Will route this request to Dr. Rubio to review and refill if appropriate.    Rosmery Madden RN on 5/19/2025 at 11:56 AM

## 2025-05-20 RX ORDER — FUROSEMIDE 20 MG/1
20 TABLET ORAL DAILY PRN
Qty: 30 TABLET | Refills: 1 | Status: SHIPPED | OUTPATIENT
Start: 2025-05-20

## 2025-07-21 ENCOUNTER — LAB (OUTPATIENT)
Dept: INFUSION THERAPY | Facility: HOSPITAL | Age: 70
End: 2025-07-21
Attending: INTERNAL MEDICINE
Payer: COMMERCIAL

## 2025-07-21 DIAGNOSIS — M79.18 MUSCULOSKELETAL PAIN: ICD-10-CM

## 2025-07-21 DIAGNOSIS — R60.0 PERIPHERAL EDEMA: ICD-10-CM

## 2025-07-21 LAB
CRP SERPL-MCNC: 24.9 MG/L
ERYTHROCYTE [SEDIMENTATION RATE] IN BLOOD BY WESTERGREN METHOD: 40 MM/HR (ref 0–30)

## 2025-07-21 PROCEDURE — 36415 COLL VENOUS BLD VENIPUNCTURE: CPT

## 2025-07-21 PROCEDURE — 86140 C-REACTIVE PROTEIN: CPT

## 2025-07-21 PROCEDURE — 85652 RBC SED RATE AUTOMATED: CPT

## 2025-07-21 NOTE — TELEPHONE ENCOUNTER
Last Written Prescription Date:  5/20/25  Last Fill Quantity: 30,  # refills: 1   Last office visit provider:  4/16/25 with Dr. Rubio, follow up in clinic scheduled 7/23/25     Requested Prescriptions   Pending Prescriptions Disp Refills    furosemide (LASIX) 20 MG tablet 30 tablet 1     Sig: Take 1 tablet (20 mg) by mouth daily as needed (swelling).       There is no refill protocol information for this order          Elsy Mcpherson RN 07/21/25 2:16 PM

## 2025-07-22 RX ORDER — FUROSEMIDE 20 MG/1
20 TABLET ORAL DAILY PRN
Qty: 30 TABLET | Refills: 1 | Status: SHIPPED | OUTPATIENT
Start: 2025-07-22

## 2025-07-23 ENCOUNTER — ONCOLOGY VISIT (OUTPATIENT)
Dept: ONCOLOGY | Facility: HOSPITAL | Age: 70
End: 2025-07-23
Payer: COMMERCIAL

## 2025-07-23 VITALS
BODY MASS INDEX: 37.97 KG/M2 | TEMPERATURE: 98.2 F | RESPIRATION RATE: 18 BRPM | SYSTOLIC BLOOD PRESSURE: 132 MMHG | WEIGHT: 222.4 LBS | HEIGHT: 64 IN | HEART RATE: 79 BPM | DIASTOLIC BLOOD PRESSURE: 71 MMHG | OXYGEN SATURATION: 96 %

## 2025-07-23 DIAGNOSIS — Z12.31 ENCOUNTER FOR SCREENING MAMMOGRAM FOR BREAST CANCER: Primary | ICD-10-CM

## 2025-07-23 ASSESSMENT — PAIN SCALES - GENERAL: PAINLEVEL_OUTOF10: MILD PAIN (2)

## 2025-07-23 NOTE — Clinical Note
7/23/2025      Khloe Becker  [address] [place] [address]      Dear Colleague,    Thank you for referring your patient, Khloe Becker, to the [facility] [place]. Please see a copy of my visit note below.    [facility] Hematology and Oncology Progress Note    Patient: Khloe Becker  MRN: [id number]  Date of Service: Jul 23, 2025          Reason for Visit    Chief Complaint   Patient presents with     Oncology Clinic Visit     Malignant neoplasm of upper-outer quadrant of left breast in female, estrogen receptor positive (H)  Malignant neoplasm of overlapping sites of left breast in female, estrogen receptor positive (H)         Assessment and Plan     Cancer Staging   Malignant neoplasm of upper-outer quadrant of left breast in female, estrogen receptor positive (H)  Staging form: Breast, AJCC 8th Edition  - Clinical: Stage IB (cT2, cN1, cM0, G3, ER+, ND+, HER2+) - Signed by Xavier Rubio MD on 6/10/2022  - Pathologic: Stage IA (pT1a, pN1mi, cM0, G3, ER+, ND+, HER2+) - Signed by Xavier Rubio MD on 6/10/2022    1. Breast cancer, stage IA, triple positive, left side, status post 6 cycles of neoadjuvant TCHP and then left mastectomy: Patient did have residual disease at time of her surgery so she was started on Kadcyla adjuvantly.  Had 10 cycles and had to do dose reduction due to significant side effects including peripheral neuropathy and fatigue.  Ultimately we switched to Herceptin due to side effects. Was supposed to get 4 cycles and then would be done. Had a dose on 12/8/22 and 12/28/22 and then had even more side effects so decided to stop treatment.      Started on anastrozole for endocrine therapy but had significant side effects from it.  Especially with weight gain and mood changes and depression.  We switched to letrozole back in May 2023.  She was doing reasonably well on this initially but again developed significant musculoskeletal pain issues  and we switched her to exemestane in November 2023.    Continues to have significant musculoskeletal pain issues.  Quality of life is affected.  Definitely there is a competent of examination induced musculoskeletal pain.  Previously when she stopped it briefly she felt better.  We were also planning on doing it only on the weekdays and giving weekends off but she has not been taking examination consistently over the last few months.  She was very honest about it.  Was evaluated by rheumatology for possible inflammatory arthritis and was deemed not to have 1.  She has persistently elevated CRP and ESR levels.    Results  - Elevated C-reactive protein (CRP)  - Mildly elevated sedimentation rate (ESR)  - A1c in prediabetic range  - I reviewed lab results personally and independently interpreted the results.     Plan  Malignant neoplasm of upper-outer quadrant of left breast in female, estrogen receptor and HER2 receptor positive (H)  - She has finished neoadjuvant chemotherapy followed by surgery and adjuvant anti-HER2 therapy and is currently on endocrine therapy.  She had some residual disease after neoadjuvant chemo.  Struggled with chemotherapy side effects at the time and had issues with adjuvant therapy also.  She also developed significant side effects to endocrine therapy and has switched from anastrozole to letrozole and now is on exemestane..  She has persistently elevated CRP and sedimentation rate which indicates acute on chronic inflammation.  These are not necessarily accurate markers for malignancy.  Did offer her a CT scan to look for cancer recurrence although this is unlikely since she does not have any other signs or symptoms of cancer recurrence.  - Consider a 3-month break from exemestane to assess impact on inflammatory markers and quality of life. Follow-up labs to be done in three months to evaluate changes in CRP and ESR. Discuss potential switch to tamoxifen if symptoms improve.  - Risks and  side effects: Discussed the small risk of cancer recurrence during the break from exemestane.    Musculoskeletal pain  - Joint pain likely related to osteoarthritis, not bone pain. Pain management with Tylenol Arthritis has been effective.  - Continue current pain management with Tylenol Arthritis. Monitor pain levels and adjust treatment as needed.         ECOG Performance    1 - Can't do physically strenuous work, but fully ambyulatory and can do light sedentary work    Distress Screening (within last 30 days)    1. How concerned are you about your ability to eat? : 0  2. How concerned are you about unintended weight loss or your current weight? : 0  3. How concerned are you about feeling depressed or very sad? : 0  4. How concerned are you about feeling anxious or very scared? : 0  5. Do you struggle with the loss of meaning and jeimy in your life? : Not at all  6. How concerned are you about work and home life issues that may be affected by your cancer? : 0  7. How concerned are you about knowing what resources are available to help you? : 0  8. Do you currently have what you would describe as Confucianist or spiritual struggles?            : Not at all       Pain       Problem List    Patient Active Problem List   Diagnosis     Morbid obesity (H)     Malignant neoplasm of overlapping sites of left breast in female, estrogen receptor positive (H)     Malignant neoplasm of upper-outer quadrant of left breast in female, estrogen receptor positive (H)     Chemotherapy-induced peripheral neuropathy     Musculoskeletal pain     Dehydration     History of breast cancer        ______________________________________________________________________________    History of Present Illness    Oncologic history  July 2021: Left breast mass palpated by patient.  Mammogram showing 2.1 x 2.1 x 2 cm hypoechoic mass at 2 o'clock position about 11 cm from the nipple.  Ultrasound-guided biopsy of the mass showed invasive ductal  carcinoma, grade 3, ER, IA and HER2 positive.  Left axillary lymph node biopsy positive for metastatic disease.     Treatment:   Neoadjuvant chemotherapy with TCHP starting 9/2/2021.  Finished 6 cycles of chemo.  Last dose was 12/17/2021.  Carboplatin was held for cycle 4 and 6 due to poor tolerance. Had Kadcyla adjuvantly for 10 doses. Then switched to herceptin for 2 more doses. Last dose was 12/28/22. Did not receive last 2 doses of Heceptin due to intolerance.      Left mastectomy with sentinel lymph node biopsy and axillary lymph node dissection done on 1/19/2022.     Surgical path showing residual disease.  ypT1a, pN1(mi).  Patient declined radiation therapy.    Received 10 cycles of adjuvant Kadcyla with dose reductions due to neuropathy    We stopped Kadcyla due to significant adenopathy and she received 2 doses of single agent Herceptin.  Had side effects from that and we stopped treatment.    Started adjuvant endocrine therapy with anastrozole on 6/8/2022.  Developed significant side effects from it so switched to Letrozole in May 2023.    Due to significant side effects from letrozole we switched her to exemestane in November 2023.    April 2025-stopped exemestane again due to significant musculoskeletal side effects.    Interim history:   She is here for follow-up.      Previously she had significant issues with musculoskeletal pain and I was worried about inflammatory arthritis.  So I obtained some inflammatory markers and she had significantly elevated CRP and ESR level.  Rheumatoid factor and anti-CCP antibodies were negative.  I referred her to rheumatology.  They repeated her labs recently and she is found to have persistently elevated CRP and ESR levels however no obvious signs or symptoms of inflammatory arthritis.      She reports ongoing joint aches and pains, which have been managed with Tylenol Arthritis, providing some relief. A week ago, her pain was severe enough to impede walking, but it  has since improved. She is actively working on weight loss, having recently weighed 221 pounds, but is frustrated by fluctuations. Khloe is on exemestane, but struggles with its side effects, including pain, and is inconsistent with its use.  Previously she was on anastrozole and letrozole and had intolerance to them.  She notes pain relief when discontinuing the medication. She completed breast cancer treatment in 2022 and has been on hormone therapy since.  Also has chemotherapy-induced peripheral neuropathy from previous Taxotere and from Kadcyla.  Her father had osteoarthritis, and her mother had rheumatoid arthritis, but she has no signs of rheumatoid arthritis herself. Khloe is concerned about her quality of life, given her age and family history of a shorter lifespan. She is prediabetic and is working towards a plant-based diet to manage her glucose levels. She occasionally experiences a sensation of swelling in her lymph nodes, though no visible swelling is present. She takes 1000 units of vitamin D daily.    Review of Systems    Pertinent items are noted in HPI.    Past History    Past Medical History:   Diagnosis Date     Anemia      Breast cancer (H)     left     Gastroesophageal reflux disease      Hypertension      Motion sickness      Obese      PONV (postoperative nausea and vomiting)        PHYSICAL EXAM  There were no vitals taken for this visit.    GENERAL: Alert and cooperative in no distress.  NEURO: non focal. Alert and oriented x3.   Breast: No evidence of bilateral axillary adenopathy.  SKIN: exposed skin is dry intact.     Lab Results    Recent Results (from the past week)   CRP, inflammation   Result Value Ref Range    CRP Inflammation 24.90 (H) <5.00 mg/L   ESR: Erythrocyte sedimentation rate   Result Value Ref Range    Erythrocyte Sedimentation Rate 40 (H) 0 - 30 mm/hr       Imaging    No results found.     The longitudinal plan of care for the diagnosis(es)/condition(s) as documented  "were addressed during this visit. Due to the added complexity in care, I will continue to support Khloe in the subsequent management and with ongoing continuity of care.    A total of 40 min was spent today on this visit which included face to face conversation with the patient, EMR review, counseling and co-ordination of care and medical documentation.    I independently reviewed and interpreted lab studies and imaging, reviewed pertinent notes from physicians and care providers from other specialties and ordered lab tests.    Reviewed the documentation from rheumatology personally and also reviewed labs ordered by them personally and independently interpreted results.      Signed by: Xavier Rubio MD    Oncology Rooming Note    July 23, 2025 3:27 PM   Khloe Becker is a 69 year old female who presents for:    Chief Complaint   Patient presents with    Oncology Clinic Visit     Malignant neoplasm of upper-outer quadrant of left breast in female, estrogen receptor positive (H)  Malignant neoplasm of overlapping sites of left breast in female, estrogen receptor positive (H)       Initial Vitals: /71   Pulse 79   Temp 98.2  F (36.8  C)   Resp 18   Ht 1.626 m (5' 4\")   Wt 100.9 kg (222 lb 6.4 oz)   SpO2 96%   BMI 38.17 kg/m   Estimated body mass index is 38.17 kg/m  as calculated from the following:    Height as of this encounter: 1.626 m (5' 4\").    Weight as of this encounter: 100.9 kg (222 lb 6.4 oz). Body surface area is 2.13 meters squared.  Mild Pain (2) Comment: Data Unavailable   No LMP recorded. Patient is postmenopausal.  Allergies reviewed: Yes  Medications reviewed: Yes    Medications: Medication refills not needed today.  Pharmacy name entered into [place] [employer] [place]: [id number] - [place] [id number] [facility]  AT [facility]    PHQ9:  Did this patient require a PHQ9?: No      Clinical concerns: None      Daniela Ma LPN                 Again, thank " you for allowing me to participate in the care of your patient.        Sincerely,        Xavier Rubio MD    Electronically signed

## 2025-07-23 NOTE — PROGRESS NOTES
[facility] Hematology and Oncology Progress Note    Patient: Khloe Becker  MRN: [id number]  Date of Service: Jul 23, 2025          Reason for Visit    Chief Complaint   Patient presents with    Oncology Clinic Visit     Malignant neoplasm of upper-outer quadrant of left breast in female, estrogen receptor positive (H)  Malignant neoplasm of overlapping sites of left breast in female, estrogen receptor positive (H)         Assessment and Plan     Cancer Staging   Malignant neoplasm of upper-outer quadrant of left breast in female, estrogen receptor positive (H)  Staging form: Breast, AJCC 8th Edition  - Clinical: Stage IB (cT2, cN1, cM0, G3, ER+, NV+, HER2+) - Signed by Xavier Rubio MD on 6/10/2022  - Pathologic: Stage IA (pT1a, pN1mi, cM0, G3, ER+, NV+, HER2+) - Signed by Xavier Rubio MD on 6/10/2022    1. Breast cancer, stage IA, triple positive, left side, status post 6 cycles of neoadjuvant TCHP and then left mastectomy: Patient did have residual disease at time of her surgery so she was started on Kadcyla adjuvantly.  Had 10 cycles and had to do dose reduction due to significant side effects including peripheral neuropathy and fatigue.  Ultimately we switched to Herceptin due to side effects. Was supposed to get 4 cycles and then would be done. Had a dose on 12/8/22 and 12/28/22 and then had even more side effects so decided to stop treatment.      Started on anastrozole for endocrine therapy but had significant side effects from it.  Especially with weight gain and mood changes and depression.  We switched to letrozole back in May 2023.  She was doing reasonably well on this initially but again developed significant musculoskeletal pain issues and we switched her to exemestane in November 2023.    Continues to have significant musculoskeletal pain issues.  Quality of life is affected.  Definitely there is a competent of examination induced musculoskeletal pain.  Previously when she  stopped it briefly she felt better.  We were also planning on doing it only on the weekdays and giving weekends off but she has not been taking examination consistently over the last few months.  She was very honest about it.  Was evaluated by rheumatology for possible inflammatory arthritis and was deemed not to have 1.  She has persistently elevated CRP and ESR levels.    Results  - Elevated C-reactive protein (CRP)  - Mildly elevated sedimentation rate (ESR)  - A1c in prediabetic range  - I reviewed lab results personally and independently interpreted the results.     Plan  Malignant neoplasm of upper-outer quadrant of left breast in female, estrogen receptor and HER2 receptor positive (H)  - She has finished neoadjuvant chemotherapy followed by surgery and adjuvant anti-HER2 therapy and is currently on endocrine therapy.  She had some residual disease after neoadjuvant chemo.  Struggled with chemotherapy side effects at the time and had issues with adjuvant therapy also.  She also developed significant side effects to endocrine therapy and has switched from anastrozole to letrozole and now is on exemestane..  She has persistently elevated CRP and sedimentation rate which indicates acute on chronic inflammation.  These are not necessarily accurate markers for malignancy.  Did offer her a CT scan to look for cancer recurrence although this is unlikely since she does not have any other signs or symptoms of cancer recurrence.  - Consider a 3-month break from exemestane to assess impact on inflammatory markers and quality of life. Follow-up labs to be done in three months to evaluate changes in CRP and ESR. Discuss potential switch to tamoxifen if symptoms improve.  - Risks and side effects: Discussed the small risk of cancer recurrence during the break from exemestane.    Musculoskeletal pain  - Joint pain likely related to osteoarthritis, not bone pain. Pain management with Tylenol Arthritis has been effective.  -  Continue current pain management with Tylenol Arthritis. Monitor pain levels and adjust treatment as needed.         ECOG Performance    1 - Can't do physically strenuous work, but fully ambyulatory and can do light sedentary work    Distress Screening (within last 30 days)    1. How concerned are you about your ability to eat? : 0  2. How concerned are you about unintended weight loss or your current weight? : 0  3. How concerned are you about feeling depressed or very sad? : 0  4. How concerned are you about feeling anxious or very scared? : 0  5. Do you struggle with the loss of meaning and jeimy in your life? : Not at all  6. How concerned are you about work and home life issues that may be affected by your cancer? : 0  7. How concerned are you about knowing what resources are available to help you? : 0  8. Do you currently have what you would describe as Jainism or spiritual struggles?            : Not at all       Pain       Problem List    Patient Active Problem List   Diagnosis    Morbid obesity (H)    Malignant neoplasm of overlapping sites of left breast in female, estrogen receptor positive (H)    Malignant neoplasm of upper-outer quadrant of left breast in female, estrogen receptor positive (H)    Chemotherapy-induced peripheral neuropathy    Musculoskeletal pain    Dehydration    History of breast cancer        ______________________________________________________________________________    History of Present Illness    Oncologic history  July 2021: Left breast mass palpated by patient.  Mammogram showing 2.1 x 2.1 x 2 cm hypoechoic mass at 2 o'clock position about 11 cm from the nipple.  Ultrasound-guided biopsy of the mass showed invasive ductal carcinoma, grade 3, ER, LA and HER2 positive.  Left axillary lymph node biopsy positive for metastatic disease.     Treatment:   Neoadjuvant chemotherapy with TCHP starting 9/2/2021.  Finished 6 cycles of chemo.  Last dose was 12/17/2021.  Carboplatin was  held for cycle 4 and 6 due to poor tolerance. Had Kadcyla adjuvantly for 10 doses. Then switched to herceptin for 2 more doses. Last dose was 12/28/22. Did not receive last 2 doses of Heceptin due to intolerance.      Left mastectomy with sentinel lymph node biopsy and axillary lymph node dissection done on 1/19/2022.     Surgical path showing residual disease.  ypT1a, pN1(mi).  Patient declined radiation therapy.    Received 10 cycles of adjuvant Kadcyla with dose reductions due to neuropathy    We stopped Kadcyla due to significant adenopathy and she received 2 doses of single agent Herceptin.  Had side effects from that and we stopped treatment.    Started adjuvant endocrine therapy with anastrozole on 6/8/2022.  Developed significant side effects from it so switched to Letrozole in May 2023.    Due to significant side effects from letrozole we switched her to exemestane in November 2023.    April 2025-stopped exemestane again due to significant musculoskeletal side effects.    Interim history:   She is here for follow-up.      Previously she had significant issues with musculoskeletal pain and I was worried about inflammatory arthritis.  So I obtained some inflammatory markers and she had significantly elevated CRP and ESR level.  Rheumatoid factor and anti-CCP antibodies were negative.  I referred her to rheumatology.  They repeated her labs recently and she is found to have persistently elevated CRP and ESR levels however no obvious signs or symptoms of inflammatory arthritis.      She reports ongoing joint aches and pains, which have been managed with Tylenol Arthritis, providing some relief. A week ago, her pain was severe enough to impede walking, but it has since improved. She is actively working on weight loss, having recently weighed 221 pounds, but is frustrated by fluctuations. Khloe is on exemestane, but struggles with its side effects, including pain, and is inconsistent with its use.  Previously  she was on anastrozole and letrozole and had intolerance to them.  She notes pain relief when discontinuing the medication. She completed breast cancer treatment in 2022 and has been on hormone therapy since.  Also has chemotherapy-induced peripheral neuropathy from previous Taxotere and from Kadcyla.  Her father had osteoarthritis, and her mother had rheumatoid arthritis, but she has no signs of rheumatoid arthritis herself. Khloe is concerned about her quality of life, given her age and family history of a shorter lifespan. She is prediabetic and is working towards a plant-based diet to manage her glucose levels. She occasionally experiences a sensation of swelling in her lymph nodes, though no visible swelling is present. She takes 1000 units of vitamin D daily.    Review of Systems    Pertinent items are noted in HPI.    Past History    Past Medical History:   Diagnosis Date    Anemia     Breast cancer (H)     left    Gastroesophageal reflux disease     Hypertension     Motion sickness     Obese     PONV (postoperative nausea and vomiting)        PHYSICAL EXAM  There were no vitals taken for this visit.    GENERAL: Alert and cooperative in no distress.  NEURO: non focal. Alert and oriented x3.   Breast: No evidence of bilateral axillary adenopathy.  SKIN: exposed skin is dry intact.     Lab Results    Recent Results (from the past week)   CRP, inflammation   Result Value Ref Range    CRP Inflammation 24.90 (H) <5.00 mg/L   ESR: Erythrocyte sedimentation rate   Result Value Ref Range    Erythrocyte Sedimentation Rate 40 (H) 0 - 30 mm/hr       Imaging    No results found.     The longitudinal plan of care for the diagnosis(es)/condition(s) as documented were addressed during this visit. Due to the added complexity in care, I will continue to support Khloe in the subsequent management and with ongoing continuity of care.    A total of 40 min was spent today on this visit which included face to face conversation  with the patient, EMR review, counseling and co-ordination of care and medical documentation.    I independently reviewed and interpreted lab studies and imaging, reviewed pertinent notes from physicians and care providers from other specialties and ordered lab tests.    Reviewed the documentation from rheumatology personally and also reviewed labs ordered by them personally and independently interpreted results.      Signed by: Xavier Rubio MD

## 2025-07-23 NOTE — PROGRESS NOTES
"Oncology Rooming Note    July 23, 2025 3:27 PM   Khloe Becker is a 69 year old female who presents for:    Chief Complaint   Patient presents with    Oncology Clinic Visit     Malignant neoplasm of upper-outer quadrant of left breast in female, estrogen receptor positive (H)  Malignant neoplasm of overlapping sites of left breast in female, estrogen receptor positive (H)       Initial Vitals: /71   Pulse 79   Temp 98.2  F (36.8  C)   Resp 18   Ht 1.626 m (5' 4\")   Wt 100.9 kg (222 lb 6.4 oz)   SpO2 96%   BMI 38.17 kg/m   Estimated body mass index is 38.17 kg/m  as calculated from the following:    Height as of this encounter: 1.626 m (5' 4\").    Weight as of this encounter: 100.9 kg (222 lb 6.4 oz). Body surface area is 2.13 meters squared.  Mild Pain (2) Comment: Data Unavailable   No LMP recorded. Patient is postmenopausal.  Allergies reviewed: Yes  Medications reviewed: Yes    Medications: Medication refills not needed today.  Pharmacy name entered into [employer]: [id number] - [address]  AT [facility]    PHQ9:  Did this patient require a PHQ9?: No      Clinical concerns: None      Daniela Ma LPN             "

## 2025-08-13 ENCOUNTER — PATIENT OUTREACH (OUTPATIENT)
Dept: ONCOLOGY | Facility: HOSPITAL | Age: 70
End: 2025-08-13
Payer: COMMERCIAL

## (undated) RX ORDER — FENTANYL CITRATE 50 UG/ML
INJECTION, SOLUTION INTRAMUSCULAR; INTRAVENOUS
Status: DISPENSED
Start: 2021-08-17

## (undated) RX ORDER — HEPARIN SODIUM (PORCINE) LOCK FLUSH IV SOLN 100 UNIT/ML 100 UNIT/ML
SOLUTION INTRAVENOUS
Status: DISPENSED
Start: 2021-08-17

## (undated) RX ORDER — FENTANYL CITRATE 50 UG/ML
INJECTION, SOLUTION INTRAMUSCULAR; INTRAVENOUS
Status: DISPENSED
Start: 2023-02-28

## (undated) RX ORDER — LIDOCAINE HYDROCHLORIDE AND EPINEPHRINE 10; 10 MG/ML; UG/ML
INJECTION, SOLUTION INFILTRATION; PERINEURAL
Status: DISPENSED
Start: 2023-02-28

## (undated) RX ORDER — LIDOCAINE HYDROCHLORIDE AND EPINEPHRINE 10; 10 MG/ML; UG/ML
INJECTION, SOLUTION INFILTRATION; PERINEURAL
Status: DISPENSED
Start: 2021-08-17

## (undated) RX ORDER — LIDOCAINE HYDROCHLORIDE 10 MG/ML
INJECTION, SOLUTION INFILTRATION; PERINEURAL
Status: DISPENSED
Start: 2021-08-17

## (undated) RX ORDER — LIDOCAINE HYDROCHLORIDE 10 MG/ML
INJECTION, SOLUTION INFILTRATION; PERINEURAL
Status: DISPENSED
Start: 2023-02-28